# Patient Record
Sex: MALE | Race: WHITE | NOT HISPANIC OR LATINO | Employment: PART TIME | URBAN - METROPOLITAN AREA
[De-identification: names, ages, dates, MRNs, and addresses within clinical notes are randomized per-mention and may not be internally consistent; named-entity substitution may affect disease eponyms.]

---

## 2017-01-27 ENCOUNTER — HOSPITAL ENCOUNTER (OUTPATIENT)
Dept: RADIOLOGY | Facility: HOSPITAL | Age: 62
Discharge: HOME | End: 2017-01-27
Attending: FAMILY MEDICINE
Payer: COMMERCIAL

## 2017-01-27 ENCOUNTER — ALLSCRIPTS OFFICE VISIT (OUTPATIENT)
Dept: OTHER | Facility: OTHER | Age: 62
End: 2017-01-27

## 2017-01-27 ENCOUNTER — TRANSCRIBE ORDERS (OUTPATIENT)
Dept: ADMINISTRATIVE | Facility: HOSPITAL | Age: 62
End: 2017-01-27

## 2017-01-27 DIAGNOSIS — R06.02 SHORTNESS OF BREATH: ICD-10-CM

## 2017-01-27 PROCEDURE — 71020 HB CHEST X-RAY 2VW FRONTAL&LATL: CPT

## 2017-05-30 ENCOUNTER — ALLSCRIPTS OFFICE VISIT (OUTPATIENT)
Dept: OTHER | Facility: OTHER | Age: 62
End: 2017-05-30

## 2017-05-30 DIAGNOSIS — Z13.1 ENCOUNTER FOR SCREENING FOR DIABETES MELLITUS: ICD-10-CM

## 2017-05-30 DIAGNOSIS — Z12.5 ENCOUNTER FOR SCREENING FOR MALIGNANT NEOPLASM OF PROSTATE: ICD-10-CM

## 2017-05-30 DIAGNOSIS — Z13.220 ENCOUNTER FOR SCREENING FOR LIPOID DISORDERS: ICD-10-CM

## 2017-06-09 ENCOUNTER — GENERIC CONVERSION - ENCOUNTER (OUTPATIENT)
Dept: OTHER | Facility: OTHER | Age: 62
End: 2017-06-09

## 2017-06-09 LAB
A/G RATIO (HISTORICAL): 1 (ref 1.2–2.2)
ALBUMIN SERPL BCP-MCNC: 4 G/DL (ref 3.6–4.8)
ALP SERPL-CCNC: 64 IU/L (ref 39–117)
ALT SERPL W P-5'-P-CCNC: 41 IU/L (ref 0–44)
AST SERPL W P-5'-P-CCNC: 50 IU/L (ref 0–40)
BILIRUB SERPL-MCNC: 0.3 MG/DL (ref 0–1.2)
BUN SERPL-MCNC: 16 MG/DL (ref 8–27)
BUN/CREA RATIO (HISTORICAL): 18 (ref 10–24)
CALCIUM SERPL-MCNC: 9.2 MG/DL (ref 8.6–10.2)
CHLORIDE SERPL-SCNC: 100 MMOL/L (ref 96–106)
CHOLEST SERPL-MCNC: 178 MG/DL (ref 100–199)
CO2 SERPL-SCNC: 24 MMOL/L (ref 18–29)
CREAT SERPL-MCNC: 0.88 MG/DL (ref 0.76–1.27)
EGFR AFRICAN AMERICAN (HISTORICAL): 106 ML/MIN/1.73
EGFR-AMERICAN CALC (HISTORICAL): 91 ML/MIN/1.73
GLUCOSE SERPL-MCNC: 100 MG/DL (ref 65–99)
HDLC SERPL-MCNC: 43 MG/DL
LDLC SERPL CALC-MCNC: 115 MG/DL (ref 0–99)
POTASSIUM SERPL-SCNC: 4.4 MMOL/L (ref 3.5–5.2)
PROSTATE SPECIFIC ANTIGEN (HISTORICAL): 0.8 NG/ML (ref 0–4)
SODIUM SERPL-SCNC: 140 MMOL/L (ref 134–144)
TOT. GLOBULIN, SERUM (HISTORICAL): 3.9 G/DL (ref 1.5–4.5)
TOTAL PROTEIN (HISTORICAL): 7.9 G/DL (ref 6–8.5)
TRIGL SERPL-MCNC: 101 MG/DL (ref 0–149)
VLDLC SERPL CALC-MCNC: 20 MG/DL (ref 5–40)

## 2017-06-14 ENCOUNTER — ALLSCRIPTS OFFICE VISIT (OUTPATIENT)
Dept: OTHER | Facility: OTHER | Age: 62
End: 2017-06-14

## 2017-11-10 ENCOUNTER — GENERIC CONVERSION - ENCOUNTER (OUTPATIENT)
Dept: OTHER | Facility: OTHER | Age: 62
End: 2017-11-10

## 2017-12-19 ENCOUNTER — GENERIC CONVERSION - ENCOUNTER (OUTPATIENT)
Dept: FAMILY MEDICINE CLINIC | Facility: CLINIC | Age: 62
End: 2017-12-19

## 2017-12-28 DIAGNOSIS — R05.9 COUGH: ICD-10-CM

## 2018-01-13 VITALS
TEMPERATURE: 97.8 F | BODY MASS INDEX: 36.54 KG/M2 | WEIGHT: 261 LBS | OXYGEN SATURATION: 92 % | HEART RATE: 80 BPM | SYSTOLIC BLOOD PRESSURE: 122 MMHG | DIASTOLIC BLOOD PRESSURE: 80 MMHG | HEIGHT: 71 IN

## 2018-01-14 NOTE — PROGRESS NOTES
Assessment    1  Encounter for preventive health examination (V70 0) (Z00 00)   2  Elevated fasting glucose (790 21) (R73 01)   3  Benign colon polyp (211 3) (K63 5)   4  Elevated AST (SGOT) (790 4) (R74 0)    Plan  Allergic rhinitis    · Fluticasone Propionate 50 MCG/ACT Nasal Suspension; INHALE 2 SPRAY Daily  in each nostril    Healthy though obese 44-year-old male who just had labs done that were basically normal  He has a slightly elevated fasting glucose of 100 and a slightly elevated AST of 50  We discussed these in detail today and at this time I do not think there is anything to do differently but we should recheck all of his labs in 1 year  His cholesterol is excellent and his PSA is normal  He does have a family history of prostate cancer so this should be checked annually  He is up-to-date on his colonoscopy which he had last year  He did have 2 polyps of he will need follow-up 5 years from the colonoscopy  He declines an Adacel today, I did advise why we give the use to prevent tetanus which is a lethal disease and he still declines  I strongly encouraged she come in for the vaccine if he cuts himself  He denies any symptoms of anxiety or depression, he refused the [facility] stating that he was concerned that this was information collected by the government  Discussion/Summary  Possible side effects of new medications were reviewed with the patient/guardian today  The treatment plan was reviewed with the patient/guardian  The patient/guardian understands and agrees with the treatment plan      Chief Complaint  60YEAR OLD MALE PHYSICAL  I DID SEND HIS ALLERGY MED TO YOU FOR A REFILL  HE DECLINED THE ADACEL AT THIS TIME  HE DECLINED THE QUESTIONS ABOUT DEPRESSION  History of Present Illness  HM, Adult Male: The patient is being seen for a health maintenance evaluation  The last health maintenance visit was many year(s) ago  General Health:  The patient's health since the last visit is described as good  He has regular dental visits  He denies vision problems  He denies hearing loss  Immunizations status: not up to date  Lifestyle:  He consumes a diverse and healthy diet  He has weight concerns  He does not use tobacco  He consumes alcohol  He denies drug use  Screening: Prostate cancer screening includes last prostate-specific antigen testing last week, WNL 0 8  Colorectal cancer screening includes last colonoscopy done May 2016, 2 polyps, repeat 5 years  Metabolic screening includes lipid profile performed last week - all WNL except slightly elevated AST at 50, glucose screening performed last week,  and thyroid function test performed last year, WNL  Cardiovascular risk factors: obesity and sedentary lifestyle, but no hypertension, no diabetes, no high LDL cholesterol, no low HDL cholesterol and no tobacco use  General health risks: family history of prostate cancer and previous colon polyps, but no elevated prostate-specific antigen  Safety elements used: seat belt, motorcycle helmet and safe driving habits  Review of Systems    Constitutional: no fever, not feeling poorly, no chills and not feeling tired  Eyes: no eye pain and no eyesight problems  ENT: no sore throat, no hearing loss and no nasal discharge  Cardiovascular: no chest pain  Respiratory: no shortness of breath and no cough  Gastrointestinal: no abdominal pain, no nausea, no vomiting, no constipation and no diarrhea  Genitourinary: no dysuria  Musculoskeletal: no arthralgias and no myalgias  Integumentary: no rashes  Neurological: no headache, no numbness, no dizziness and no fainting  Psychiatric: no anxiety and no depression  Endocrine: no muscle weakness  Hematologic/Lymphatic: no tendency for easy bleeding and no tendency for easy bruising  Active Problems    1   Allergic rhinitis (477 9) (J30 9)    Past Medical History    · History of Abscess, cheek (682 0) (L02 01)   · History of Wheezing without diagnosis of asthma (786 07) (R06 2)    Surgical History    · History of Excision Of Lesion In Vestibule Of Mouth    Family History  Mother    · Family history of essential hypertension (V17 49) (Z82 49)   · Family history of glaucoma (V19 11) (Z83 511)  Father    · Family history of malignant neoplasm of prostate (V16 42) (Z80 42)  Maternal Uncle    · Family history of diabetes mellitus (V18 0) (Z83 3)   · Family history of glaucoma (V19 11) (Z83 511)    Social History    · Alcohol ingestion, 1-4 drinks/week (V69 8) (Z78 9)   · Daily caffeine consumption, 2-3 servings a day   · Never a smoker    Current Meds   1  Fluticasone Propionate 50 MCG/ACT Nasal Suspension; INHALE 2 SPRAY Daily in each   nostril; Therapy: [id number] to (Evaluate:26Nov2017)  Requested for: 13LXG9585; Last   Rx:[id number] Ordered    Allergies    1  Allergy CAPS    Vitals   Recorded: [id number] 08:46AM   Temperature 97 F   Heart Rate 78   Systolic 347   Diastolic 78   Height 5 ft 11 in   Weight 263 lb    BMI Calculated 36 68   BSA Calculated 2 37   O2 Saturation 98     Physical Exam    Constitutional   General appearance: No acute distress, well appearing and well nourished  Head and Face   Head and face: Normal     Eyes   Conjunctiva and lids: No erythema, swelling or discharge  Ears, Nose, Mouth, and Throat   External inspection of ears and nose: Normal     Otoscopic examination: Tympanic membranes translucent with normal light reflex  Canals patent without erythema  Nasal mucosa, septum, and turbinates: Normal without edema or erythema  Lips, teeth, and gums: Normal, good dentition  Oropharynx: Normal with no erythema, edema, exudate or lesions  Neck   Neck: Supple, symmetric, trachea midline, no masses  Pulmonary   Respiratory effort: No increased work of breathing or signs of respiratory distress  Auscultation of lungs: Clear to auscultation      Cardiovascular   Auscultation of heart: Normal rate and rhythm, normal S1 and S2, no murmurs  Examination of extremities for edema and/or varicosities: Normal     Abdomen   Abdomen: Non-tender, no masses  Lymphatic   Palpation of lymph nodes in neck: No lymphadenopathy  Palpation of lymph nodes in other areas: No lymphadenopathy  Musculoskeletal   Gait and station: Normal     Range of motion: Normal     Muscle strength/tone: Normal     Skin   Skin and subcutaneous tissue: Normal without rashes or lesions  Neurologic   Sensation: No sensory loss  Coordination: Normal finger to nose and heel to shin      Psychiatric   Orientation to person, place and time: Normal     Mood and affect: Normal        Signatures   Electronically signed by : PITA Dai ; Jun 14 2017 10:59AM EST                       (Author)

## 2018-01-15 VITALS
HEART RATE: 95 BPM | TEMPERATURE: 97.8 F | SYSTOLIC BLOOD PRESSURE: 122 MMHG | BODY MASS INDEX: 36.26 KG/M2 | WEIGHT: 259 LBS | HEIGHT: 71 IN | OXYGEN SATURATION: 99 % | DIASTOLIC BLOOD PRESSURE: 80 MMHG

## 2018-01-22 VITALS
BODY MASS INDEX: 37.77 KG/M2 | DIASTOLIC BLOOD PRESSURE: 70 MMHG | TEMPERATURE: 97.3 F | HEART RATE: 103 BPM | OXYGEN SATURATION: 98 % | SYSTOLIC BLOOD PRESSURE: 122 MMHG | WEIGHT: 269.75 LBS | HEIGHT: 71 IN

## 2018-01-22 VITALS
HEIGHT: 71 IN | TEMPERATURE: 97 F | WEIGHT: 263 LBS | HEART RATE: 78 BPM | SYSTOLIC BLOOD PRESSURE: 130 MMHG | DIASTOLIC BLOOD PRESSURE: 78 MMHG | OXYGEN SATURATION: 98 % | BODY MASS INDEX: 36.82 KG/M2

## 2018-02-12 RX ORDER — EPINEPHRINE 0.3 MG/.3ML
0.3 INJECTION INTRAMUSCULAR
Refills: 1 | COMMUNITY
Start: 2017-11-10 | End: 2018-06-07 | Stop reason: HOSPADM

## 2018-02-12 RX ORDER — FLUTICASONE PROPIONATE 50 MCG
SPRAY, SUSPENSION (ML) NASAL
Refills: 5 | COMMUNITY
Start: 2017-11-11 | End: 2018-05-15 | Stop reason: SDUPTHER

## 2018-02-12 RX ORDER — PROMETHAZINE HYDROCHLORIDE AND CODEINE PHOSPHATE 6.25; 1 MG/5ML; MG/5ML
SYRUP ORAL
Refills: 0 | COMMUNITY
Start: 2017-11-10 | End: 2018-06-03

## 2018-02-12 RX ORDER — BUTALBITAL, ACETAMINOPHEN AND CAFFEINE 50; 325; 40 MG/1; MG/1; MG/1
CAPSULE ORAL
Refills: 0 | COMMUNITY
Start: 2017-11-10 | End: 2018-04-05 | Stop reason: ALTCHOICE

## 2018-02-12 RX ORDER — HYDROCODONE BITARTRATE AND ACETAMINOPHEN 7.5; 3 MG/1; MG/1
TABLET ORAL
Refills: 0 | COMMUNITY
Start: 2017-11-10 | End: 2018-06-03

## 2018-02-12 RX ORDER — LORATADINE 10 MG/1
10 TABLET ORAL DAILY
Refills: 5 | COMMUNITY
Start: 2017-12-06 | End: 2018-06-03

## 2018-02-13 ENCOUNTER — OFFICE VISIT (OUTPATIENT)
Dept: FAMILY MEDICINE CLINIC | Facility: CLINIC | Age: 63
End: 2018-02-13
Payer: COMMERCIAL

## 2018-02-13 VITALS
DIASTOLIC BLOOD PRESSURE: 72 MMHG | OXYGEN SATURATION: 90 % | TEMPERATURE: 97.6 F | HEART RATE: 103 BPM | HEIGHT: 71 IN | SYSTOLIC BLOOD PRESSURE: 118 MMHG | WEIGHT: 266 LBS | BODY MASS INDEX: 37.24 KG/M2

## 2018-02-13 DIAGNOSIS — R74.01 ELEVATED AST (SGOT): ICD-10-CM

## 2018-02-13 DIAGNOSIS — Z12.5 SCREENING FOR MALIGNANT NEOPLASM OF PROSTATE: ICD-10-CM

## 2018-02-13 DIAGNOSIS — R06.00 DOE (DYSPNEA ON EXERTION): Primary | ICD-10-CM

## 2018-02-13 DIAGNOSIS — Z13.220 SCREENING, LIPID: ICD-10-CM

## 2018-02-13 DIAGNOSIS — R05.3 CHRONIC COUGH: ICD-10-CM

## 2018-02-13 DIAGNOSIS — Z87.891 HISTORY OF TOBACCO USE: ICD-10-CM

## 2018-02-13 DIAGNOSIS — N40.1 NOCTURIA ASSOCIATED WITH BENIGN PROSTATIC HYPERPLASIA: ICD-10-CM

## 2018-02-13 DIAGNOSIS — R35.1 NOCTURIA ASSOCIATED WITH BENIGN PROSTATIC HYPERPLASIA: ICD-10-CM

## 2018-02-13 DIAGNOSIS — R73.01 ELEVATED FASTING GLUCOSE: ICD-10-CM

## 2018-02-13 PROBLEM — J30.9 ALLERGIC RHINITIS: Status: ACTIVE | Noted: 2017-05-30

## 2018-02-13 PROBLEM — G43.109 MIGRAINE WITH AURA: Status: ACTIVE | Noted: 2017-11-10

## 2018-02-13 PROBLEM — K63.5 BENIGN COLON POLYP: Status: ACTIVE | Noted: 2017-06-14

## 2018-02-13 PROCEDURE — 99214 OFFICE O/P EST MOD 30 MIN: CPT | Performed by: FAMILY MEDICINE

## 2018-02-13 RX ORDER — OXYBUTYNIN CHLORIDE 10 MG/1
10 TABLET, EXTENDED RELEASE ORAL
Qty: 30 TABLET | Refills: 5 | Status: SHIPPED | OUTPATIENT
Start: 2018-02-13 | End: 2018-03-04

## 2018-02-13 RX ORDER — ALBUTEROL SULFATE 90 UG/1
2 AEROSOL, METERED RESPIRATORY (INHALATION) EVERY 4 HOURS PRN
Qty: 1 INHALER | Refills: 1 | Status: SHIPPED | OUTPATIENT
Start: 2018-02-13 | End: 2018-03-06 | Stop reason: HOSPADM

## 2018-02-13 NOTE — PATIENT INSTRUCTIONS
COPD (Chronic Obstructive Pulmonary Disease)   AMBULATORY CARE:   COPD (chronic obstructive pulmonary disease)  is a lung disease that makes it hard for you to breathe  COPD is usually a result of lung damage caused by years of irritation and inflammation  COPD limits air flow in your lungs  Smoking, pollution, genetics, or a history of lung infections can increase your risk for COPD  Common symptoms include the following:   · Shortness of breath     · A dry cough     · Coughing fits that bring up mucus from your lungs     · Wheezing and chest tightness  Call 911 if:   · You feel lightheaded, short of breath, and have chest pain  Seek care immediately if:   · You are confused, dizzy, or feel faint  · Your arm or leg feels warm, tender, and painful  It may look swollen and red  · You cough up blood  Contact your healthcare provider if:   · You have more shortness of breath than usual      · You need more medicine than usual to control your symptoms  · You are coughing or wheezing more than usual      · You are coughing up more mucus, or it is a different color or has a different odor  · You gain more than 3 pounds in a week  · You have a fever, a runny or stuffy nose, and a sore throat, or other cold or flu symptoms  · Your skin, lips, or nails start to turn blue  · You have swelling in your legs or ankles  · You are very tired or weak for more than a day  · You notice changes in your mood, or changes in your ability to think or concentrate  · You have questions or concerns about your condition or care  Treatment for COPD  may include medicines to help decrease swelling and inflammation in your lungs  Medicines may also help open your airways or treat and infection  You may need pulmonary rehabilitation to help you manage your symptoms and improve your quality of life  You may need extra oxygen to help you breathe easier    Help make breathing easier:   · Use pursed-lip breathing any time you feel short of breath  Take a deep breath in through your nose  Slowly breathe out through your mouth with your lips pursed for twice as long as you inhaled  You can also practice this breathing pattern while you bend, lift, climb stairs, or exercise  It slows down your breathing and helps move more air in and out of your lungs  · Do not smoke, and avoid others who smoke  Nicotine and other substances can cause lung irritation or damage and make it harder for you to breathe  Do not use e-cigarettes or smokeless tobacco  They still contain nicotine  Ask your healthcare provider for information if you currently smoke and need help to quit  For support and more information:  ¨ [employer]  Phone: 7- 400 - [phone]  Web Address: [employer]      · Be aware of and avoid anything that makes your symptoms worse  Stay out of high altitudes and places with high humidity  Stay inside, or cover your mouth and nose with a scarf when you are outside during cold weather  Stay inside on days when air pollution or pollen counts are high  Do not use aerosol sprays such as deodorant, bug spray, and hair spray  Manage COPD and help prevent exacerbations:  COPD is a serious condition that gets worse over time  A COPD exacerbation means your symptoms suddenly get worse  It is important to prevent exacerbations  An exacerbation can cause more lung damage  COPD cannot be cured, but you can take action to feel better and prevent COPD exacerbations:  · Protect yourself from germs  Germs can get into your lungs and cause an infection  An infection in your lungs can create more mucus and make it harder to breathe  An infection can also create swelling in your airways and prevent air from getting in  You can decrease your risk for infection by doing the following:     [facility] [employer] your hands often with soap and water  Carry germ-killing gel with you   You can use the gel to clean your hands when soap and water are not available  ¨ Do not touch your eyes, nose, or mouth unless you have washed your hands first      ¨ Always cover your mouth when you cough  Cough into a tissue or your shirtsleeve so you do not spread germs from your hands  ¨ Try to avoid people who have a cold or the flu  If you are sick, stay away from others as much as possible  · Drink more liquids  This will help to keep your air passages moist and help you cough up mucus  Ask how much liquid to drink each day and which liquids are best for you  · Exercise daily  Exercise for at least 20 minutes each day to help increase your energy and decrease shortness of breath  Talk to your healthcare provider about the best exercise plan for you  · Ask about vaccines  Your healthcare provider may recommend that you get regular flu and pneumonia vaccines  Pneumonia can become life-threatening for a person who has COPD  Ask about other vaccines you may need  Ask your healthcare provider about the flu and pneumonia vaccines  All adults should get the flu (influenza) vaccine every year as soon as it becomes available  The pneumonia vaccine is given to adults aged 72 or older to prevent pneumococcal disease, such as pneumonia  Adults aged 23 to 59 years who are at high risk for pneumococcal disease also should get the pneumococcal vaccine  It may need to be repeated 1 or 5 years later  Pulmonary rehabilitation:  Your healthcare provider may recommend a program to help you manage your symptoms and improve your quality of life  It may include nutritional counseling and exercise, such as walking, to strengthen your lungs  Make decisions about your choices for future treatment:  Ask for information about advanced medical directives and living gloria  These documents help you decide and write down your choices for treatment and end-of-life care  It is best to complete them when you feel well and can think clearly about your wishes   The information can then be kept for future use if you are in the hospital or become very ill  Follow up with your healthcare provider as directed: You may need more tests  Your healthcare provider may refer you to a pulmonary (lung) specialist  Write down your questions so you remember to ask them during your visits  © 2017 [facility] Information is for End User's use only and may not be sold, redistributed or otherwise used for commercial purposes  All illustrations and images included in CareNotes® are the copyrighted property of [employer] , [employer]  or Arnaldo Valdez  The above information is an  only  It is not intended as medical advice for individual conditions or treatments  Talk to your doctor, nurse or pharmacist before following any medical regimen to see if it is safe and effective for you

## 2018-02-13 NOTE — PROGRESS NOTES
Assessment/Plan:    No problem-specific Assessment & Plan notes found for this encounter  Diagnoses and all orders for this visit:    DSOUZA (dyspnea on exertion)  Chronic cough        History of tobacco use  -     CT chest wo contrast; Future  -     Spirometry; Future  -     albuterol (PROVENTIL HFA,VENTOLIN HFA) 90 mcg/act inhaler; Inhale 2 puffs every 4 (four) hours as needed for wheezing or shortness of breath for up to 30 days  -     Spacer Device for Inhaler        -     CBC and differential; Future    I do think the patient likely has COPD  While he quit smoking many years ago he did smoke for quite a long time  I am going to order PFTs as well as a CT scan as he has already had a normal chest x-ray  Additionally, I ordered an albuterol inhaler with spacer for him to use as needed in the interim  If it does turn out that he has COPD I will likely prescribe a controller medication for him to use daily  Nocturia associated with benign prostatic hyperplasia  -     oxybutynin (DITROPAN-XL) 10 MG 24 hr tablet; Take 1 tablet (10 mg total) by mouth daily at bedtime for 30 days  -     PSA, ultrasensitive; Future      With a normal PSA less than 12 months ago and symptoms progressing over a considerable amount of time the patient likely has BPH  We will recheck all of his labs including PSA 12 months from his last set which would be around June  He will follow up with me after these labs are done  I am going to start oxybutynin to see if this helps with his symptoms  I advised that if this dose is not working we can increase it after a few weeks  If this is not working we can add finasteride though I discussed that this can take up to 6 months to start working  If all else fails we can refer him to Urology  Elevated fasting glucose  -     Comprehensive metabolic panel; Future  -     Hemoglobin A1c; Future    Elevated AST (SGOT)  -     Comprehensive metabolic panel;  Future      Screening, lipid  - Lipid Panel with Direct LDL reflex; Future                Subjective:      Patient ID: Lexi Mcelroy is a 58 y o  male  The patient is here today to talk about his chronic cough and shortness of breath  This has been worsening over the past year   He did see me last year for the cough and we did get a chest x-ray which was normal   It is becoming increasingly worse and he is now very shortness of breath with activity  Winded going up the stairs  Wheezing  He did have PFTs at one point years ago, nothing recent  Smoker but quit a very long time ago - smoked on and off until 2000, started when he was in grade school, more regularly in high school    Additionally he is complaining of worsening nocturia over the past year or so  Now getting up many times at night to urinate  Very tired, not sleeping well  No hematuria   No frequency during the day  No dysuria  He had a PSA in June of 2017 which was 0 8, normal            The following portions of the patient's history were reviewed and updated as appropriate: allergies, current medications, past family history, past medical history, past social history, past surgical history and problem list     Review of Systems   Constitutional: Positive for fatigue  Negative for activity change, appetite change, chills, fever and unexpected weight change  HENT: Negative for congestion, postnasal drip, rhinorrhea, sneezing and sore throat  Eyes: Negative for discharge  Respiratory: Positive for cough, chest tightness and shortness of breath  Cardiovascular: Negative for chest pain and leg swelling  Gastrointestinal: Negative for nausea  Endocrine: Positive for polyuria (but only at night)  Genitourinary: Positive for frequency  Negative for difficulty urinating, dysuria, flank pain, hematuria and urgency  Musculoskeletal: Negative for myalgias  Skin: Negative for rash  Neurological: Positive for light-headedness (when he gets into coughing fits or get DSOUZA)  Negative for headaches  Psychiatric/Behavioral: Negative for dysphoric mood  The patient is not nervous/anxious  Objective:  Vitals:    02/13/18 0924   BP: 118/72   Pulse: 103   Temp: 97 6 °F (36 4 °C)   SpO2: 90%      Physical Exam   Constitutional: He is oriented to person, place, and time  He appears well-developed and well-nourished  HENT:   Head: Normocephalic and atraumatic  Right Ear: External ear normal    Left Ear: External ear normal    Nose: Nose normal    Mouth/Throat: Oropharynx is clear and moist    Eyes: Conjunctivae and EOM are normal  Pupils are equal, round, and reactive to light  Neck: Normal range of motion  Neck supple  Cardiovascular: Normal rate, regular rhythm and normal heart sounds  Pulmonary/Chest: Effort normal and breath sounds normal  He has no wheezes (but breaths sound tight)  Musculoskeletal: Normal range of motion  He exhibits no edema  Neurological: He is alert and oriented to person, place, and time  Skin: Skin is warm, dry and intact  Psychiatric: He has a normal mood and affect  His behavior is normal  Judgment and thought content normal    Nursing note and vitals reviewed

## 2018-02-17 ENCOUNTER — TELEPHONE (OUTPATIENT)
Dept: FAMILY MEDICINE CLINIC | Facility: CLINIC | Age: 63
End: 2018-02-17

## 2018-02-17 DIAGNOSIS — J44.1 COPD EXACERBATION (HCC): Primary | ICD-10-CM

## 2018-02-17 RX ORDER — PREDNISONE 10 MG/1
TABLET ORAL
Qty: 60 TABLET | Refills: 0 | Status: SHIPPED | OUTPATIENT
Start: 2018-02-17 | End: 2018-03-04

## 2018-02-17 NOTE — TELEPHONE ENCOUNTER
Last night was breathing really fast while at rest  Inhaler makes him wired up  Feeling better today  Working on getting a Ct, needs pre Sachin Shankar, will do Monday  I saw him last week  Will rx prednisone taper for now  If breathing gets really fast like last night he should go to the ER

## 2018-02-19 ENCOUNTER — TRANSCRIBE ORDERS (OUTPATIENT)
Dept: FAMILY MEDICINE CLINIC | Facility: CLINIC | Age: 63
End: 2018-02-19

## 2018-02-21 ENCOUNTER — TELEPHONE (OUTPATIENT)
Dept: FAMILY MEDICINE CLINIC | Facility: CLINIC | Age: 63
End: 2018-02-21

## 2018-02-21 DIAGNOSIS — R06.00 DOE (DYSPNEA ON EXERTION): Primary | ICD-10-CM

## 2018-02-21 DIAGNOSIS — N40.1 NOCTURIA ASSOCIATED WITH BENIGN PROSTATIC HYPERPLASIA: ICD-10-CM

## 2018-02-21 DIAGNOSIS — R35.1 NOCTURIA ASSOCIATED WITH BENIGN PROSTATIC HYPERPLASIA: ICD-10-CM

## 2018-02-21 PROBLEM — R06.09 DOE (DYSPNEA ON EXERTION): Status: ACTIVE | Noted: 2018-02-21

## 2018-02-21 NOTE — TELEPHONE ENCOUNTER
Tamir Amador wants to know if it would be beneficial for him to take Flomax with oxybutynin for his enlarged prostrate?

## 2018-02-21 NOTE — PROGRESS NOTES
Please call the patient let him know that since his CT of his chest was denied I think the best next option would be for him to see a pulmonologist   Please provide him with the number for [facility] pulmonology

## 2018-02-22 RX ORDER — TAMSULOSIN HYDROCHLORIDE 0.4 MG/1
0.4 CAPSULE ORAL
Qty: 30 CAPSULE | Refills: 2 | Status: SHIPPED | OUTPATIENT
Start: 2018-02-22 | End: 2018-02-23 | Stop reason: SDUPTHER

## 2018-02-22 NOTE — TELEPHONE ENCOUNTER
This definitely could be beneficial   Is the oxybutynin helping at all? If not then I would stop that and prescribe the Flomax  If he thinks the oxybutynin is helping we could certainly try both but I do not want to have him on both if it is not necessary

## 2018-02-22 NOTE — TELEPHONE ENCOUNTER
PATIENT DOES NOT FEEL LIKE THE OXYBUTYNIN IS HELPING HIM AT ALL  HE IS WAKING UP WITH A VERY DRY MOUTH AT NIGHT AND HE DOESN'T KNOW IF THIS IS A SIDE EFFECT OF THE MEDICATION OR NOT  PATIENT WOULD PREFER TRYING THE FLOMAX IF YOU CAN SEND THAT IN FOR HIM  ALSO HE MADE AN APPOINTMENT WITH [facility] AND THEY REQUESTED A CHEST XRAY PRIOR TO BEING SEEN  ARE YOU ABLE TO PUT ORDERS IN FOR THIS AS WELL AND HE WILL GO TO [facility] TO GET THIS DONE

## 2018-02-22 NOTE — TELEPHONE ENCOUNTER
Yes that can be a side effect  He should stop the oxybutynin, start flomax (I sent it)  I ordered the CXR

## 2018-02-23 ENCOUNTER — TELEPHONE (OUTPATIENT)
Dept: FAMILY MEDICINE CLINIC | Facility: CLINIC | Age: 63
End: 2018-02-23

## 2018-02-23 DIAGNOSIS — R35.1 NOCTURIA ASSOCIATED WITH BENIGN PROSTATIC HYPERPLASIA: ICD-10-CM

## 2018-02-23 DIAGNOSIS — N40.1 NOCTURIA ASSOCIATED WITH BENIGN PROSTATIC HYPERPLASIA: ICD-10-CM

## 2018-02-23 RX ORDER — TAMSULOSIN HYDROCHLORIDE 0.4 MG/1
0.4 CAPSULE ORAL
Qty: 30 CAPSULE | Refills: 5 | Status: SHIPPED | OUTPATIENT
Start: 2018-02-23 | End: 2018-06-14 | Stop reason: SDUPTHER

## 2018-03-04 ENCOUNTER — APPOINTMENT (EMERGENCY)
Dept: RADIOLOGY | Facility: HOSPITAL | Age: 63
DRG: 641 | End: 2018-03-04
Payer: COMMERCIAL

## 2018-03-04 ENCOUNTER — HOSPITAL ENCOUNTER (INPATIENT)
Facility: HOSPITAL | Age: 63
LOS: 2 days | Discharge: HOME | DRG: 641 | End: 2018-03-06
Attending: EMERGENCY MEDICINE | Admitting: INTERNAL MEDICINE
Payer: COMMERCIAL

## 2018-03-04 DIAGNOSIS — R11.2 NAUSEA AND VOMITING: ICD-10-CM

## 2018-03-04 DIAGNOSIS — E87.1 HYPONATREMIA: Primary | ICD-10-CM

## 2018-03-04 DIAGNOSIS — J45.909 ACUTE ASTHMATIC BRONCHITIS: ICD-10-CM

## 2018-03-04 PROBLEM — E86.0 DEHYDRATION: Status: ACTIVE | Noted: 2018-03-04

## 2018-03-04 LAB
ALBUMIN SERPL BCP-MCNC: 3.1 G/DL (ref 3.5–5)
ALP SERPL-CCNC: 50 U/L (ref 46–116)
ALT SERPL W P-5'-P-CCNC: 45 U/L (ref 12–78)
ANION GAP SERPL CALCULATED.3IONS-SCNC: 4 MMOL/L (ref 4–13)
ANION GAP SERPL CALCULATED.3IONS-SCNC: 7 MMOL/L (ref 4–13)
ANION GAP SERPL CALCULATED.3IONS-SCNC: 8 MMOL/L (ref 4–13)
AST SERPL W P-5'-P-CCNC: 42 U/L (ref 5–45)
BASOPHILS # BLD AUTO: 0 THOUSANDS/ΜL (ref 0–0.1)
BASOPHILS NFR BLD AUTO: 0 % (ref 0–1)
BILIRUB SERPL-MCNC: 0.6 MG/DL (ref 0.2–1)
BUN SERPL-MCNC: 10 MG/DL (ref 5–25)
BUN SERPL-MCNC: 11 MG/DL (ref 5–25)
BUN SERPL-MCNC: 12 MG/DL (ref 5–25)
CALCIUM SERPL-MCNC: 7.9 MG/DL (ref 8.3–10.1)
CALCIUM SERPL-MCNC: 7.9 MG/DL (ref 8.3–10.1)
CALCIUM SERPL-MCNC: 8 MG/DL (ref 8.3–10.1)
CHLORIDE SERPL-SCNC: 85 MMOL/L (ref 100–108)
CHLORIDE SERPL-SCNC: 89 MMOL/L (ref 100–108)
CHLORIDE SERPL-SCNC: 89 MMOL/L (ref 100–108)
CLARITY, POC: CLEAR
CO2 SERPL-SCNC: 23 MMOL/L (ref 21–32)
CO2 SERPL-SCNC: 26 MMOL/L (ref 21–32)
CO2 SERPL-SCNC: 28 MMOL/L (ref 21–32)
COLOR, POC: YELLOW
CREAT SERPL-MCNC: 0.61 MG/DL (ref 0.6–1.3)
CREAT SERPL-MCNC: 0.71 MG/DL (ref 0.6–1.3)
CREAT SERPL-MCNC: 0.75 MG/DL (ref 0.6–1.3)
EOSINOPHIL # BLD AUTO: 0.03 THOUSAND/ΜL (ref 0–0.61)
EOSINOPHIL NFR BLD AUTO: 1 % (ref 0–6)
ERYTHROCYTE [DISTWIDTH] IN BLOOD BY AUTOMATED COUNT: 14 % (ref 11.6–15.1)
EXT BILIRUBIN, UA: NEGATIVE
EXT BLOOD URINE: NORMAL
EXT GLUCOSE, UA: NEGATIVE
EXT KETONES: 80
EXT NITRITE, UA: NEGATIVE
EXT PH, UA: 6.5
EXT PROTEIN, UA: NORMAL
EXT SPECIFIC GRAVITY, UA: 1.01
EXT UROBILINOGEN: 0.2
GFR SERPL CREATININE-BSD FRML MDRD: 106 ML/MIN/1.73SQ M
GFR SERPL CREATININE-BSD FRML MDRD: 97 ML/MIN/1.73SQ M
GFR SERPL CREATININE-BSD FRML MDRD: 99 ML/MIN/1.73SQ M
GLUCOSE SERPL-MCNC: 130 MG/DL (ref 65–140)
GLUCOSE SERPL-MCNC: 135 MG/DL (ref 65–140)
GLUCOSE SERPL-MCNC: 97 MG/DL (ref 65–140)
HCT VFR BLD AUTO: 41 % (ref 36.5–49.3)
HGB BLD-MCNC: 14.4 G/DL (ref 12–17)
LIPASE SERPL-CCNC: 181 U/L (ref 73–393)
LYMPHOCYTES # BLD AUTO: 0.71 THOUSANDS/ΜL (ref 0.6–4.47)
LYMPHOCYTES NFR BLD AUTO: 11 % (ref 14–44)
MCH RBC QN AUTO: 27.7 PG (ref 26.8–34.3)
MCHC RBC AUTO-ENTMCNC: 35.1 G/DL (ref 31.4–37.4)
MCV RBC AUTO: 79 FL (ref 82–98)
MONOCYTES # BLD AUTO: 0.41 THOUSAND/ΜL (ref 0.17–1.22)
MONOCYTES NFR BLD AUTO: 6 % (ref 4–12)
NEUTROPHILS # BLD AUTO: 5.24 THOUSANDS/ΜL (ref 1.85–7.62)
NEUTS SEG NFR BLD AUTO: 82 % (ref 43–75)
OSMOLALITY UR/SERPL-RTO: 240 MMOL/KG (ref 282–298)
OSMOLALITY UR: 579 MMOL/KG
PLATELET # BLD AUTO: 208 THOUSANDS/UL (ref 149–390)
PMV BLD AUTO: 8.8 FL (ref 8.9–12.7)
POTASSIUM SERPL-SCNC: 4.6 MMOL/L (ref 3.5–5.3)
POTASSIUM SERPL-SCNC: 4.6 MMOL/L (ref 3.5–5.3)
POTASSIUM SERPL-SCNC: 4.7 MMOL/L (ref 3.5–5.3)
PROT SERPL-MCNC: 8.3 G/DL (ref 6.4–8.2)
RBC # BLD AUTO: 5.2 MILLION/UL (ref 3.88–5.62)
SODIUM SERPL-SCNC: 119 MMOL/L (ref 136–145)
SODIUM SERPL-SCNC: 120 MMOL/L (ref 136–145)
SODIUM SERPL-SCNC: 120 MMOL/L (ref 136–145)
TSH SERPL DL<=0.05 MIU/L-ACNC: 1.57 UIU/ML (ref 0.36–3.74)
WBC # BLD AUTO: 6.39 THOUSAND/UL (ref 4.31–10.16)
WBC # BLD EST: NEGATIVE 10*3/UL

## 2018-03-04 PROCEDURE — 83930 ASSAY OF BLOOD OSMOLALITY: CPT | Performed by: INTERNAL MEDICINE

## 2018-03-04 PROCEDURE — 83690 ASSAY OF LIPASE: CPT | Performed by: PHYSICIAN ASSISTANT

## 2018-03-04 PROCEDURE — C9113 INJ PANTOPRAZOLE SODIUM, VIA: HCPCS | Performed by: INTERNAL MEDICINE

## 2018-03-04 PROCEDURE — 99223 1ST HOSP IP/OBS HIGH 75: CPT | Performed by: INTERNAL MEDICINE

## 2018-03-04 PROCEDURE — 71046 X-RAY EXAM CHEST 2 VIEWS: CPT

## 2018-03-04 PROCEDURE — 80048 BASIC METABOLIC PNL TOTAL CA: CPT | Performed by: INTERNAL MEDICINE

## 2018-03-04 PROCEDURE — 36415 COLL VENOUS BLD VENIPUNCTURE: CPT | Performed by: PHYSICIAN ASSISTANT

## 2018-03-04 PROCEDURE — 83935 ASSAY OF URINE OSMOLALITY: CPT | Performed by: INTERNAL MEDICINE

## 2018-03-04 PROCEDURE — 81002 URINALYSIS NONAUTO W/O SCOPE: CPT | Performed by: PHYSICIAN ASSISTANT

## 2018-03-04 PROCEDURE — 80053 COMPREHEN METABOLIC PANEL: CPT | Performed by: PHYSICIAN ASSISTANT

## 2018-03-04 PROCEDURE — 84300 ASSAY OF URINE SODIUM: CPT | Performed by: INTERNAL MEDICINE

## 2018-03-04 PROCEDURE — 96361 HYDRATE IV INFUSION ADD-ON: CPT

## 2018-03-04 PROCEDURE — 96374 THER/PROPH/DIAG INJ IV PUSH: CPT

## 2018-03-04 PROCEDURE — 84443 ASSAY THYROID STIM HORMONE: CPT | Performed by: PHYSICIAN ASSISTANT

## 2018-03-04 PROCEDURE — 85025 COMPLETE CBC W/AUTO DIFF WBC: CPT | Performed by: PHYSICIAN ASSISTANT

## 2018-03-04 RX ORDER — ONDANSETRON 2 MG/ML
4 INJECTION INTRAMUSCULAR; INTRAVENOUS EVERY 6 HOURS PRN
Status: DISCONTINUED | OUTPATIENT
Start: 2018-03-04 | End: 2018-03-06 | Stop reason: HOSPADM

## 2018-03-04 RX ORDER — SODIUM CHLORIDE 9 MG/ML
75 INJECTION, SOLUTION INTRAVENOUS CONTINUOUS
Status: DISCONTINUED | OUTPATIENT
Start: 2018-03-04 | End: 2018-03-04

## 2018-03-04 RX ORDER — LEVALBUTEROL 1.25 MG/.5ML
1.25 SOLUTION, CONCENTRATE RESPIRATORY (INHALATION)
Status: DISCONTINUED | OUTPATIENT
Start: 2018-03-04 | End: 2018-03-06 | Stop reason: HOSPADM

## 2018-03-04 RX ORDER — SODIUM CHLORIDE 9 MG/ML
100 INJECTION, SOLUTION INTRAVENOUS CONTINUOUS
Status: DISCONTINUED | OUTPATIENT
Start: 2018-03-04 | End: 2018-03-06

## 2018-03-04 RX ORDER — PANTOPRAZOLE SODIUM 40 MG/1
40 INJECTION, POWDER, FOR SOLUTION INTRAVENOUS
Status: DISCONTINUED | OUTPATIENT
Start: 2018-03-04 | End: 2018-03-06 | Stop reason: HOSPADM

## 2018-03-04 RX ORDER — ONDANSETRON 2 MG/ML
4 INJECTION INTRAMUSCULAR; INTRAVENOUS ONCE
Status: COMPLETED | OUTPATIENT
Start: 2018-03-04 | End: 2018-03-04

## 2018-03-04 RX ORDER — PROMETHAZINE HYDROCHLORIDE 25 MG/ML
12.5 INJECTION, SOLUTION INTRAMUSCULAR; INTRAVENOUS EVERY 6 HOURS PRN
Status: DISCONTINUED | OUTPATIENT
Start: 2018-03-04 | End: 2018-03-06 | Stop reason: HOSPADM

## 2018-03-04 RX ORDER — HEPARIN SODIUM 5000 [USP'U]/ML
5000 INJECTION, SOLUTION INTRAVENOUS; SUBCUTANEOUS EVERY 8 HOURS SCHEDULED
Status: DISCONTINUED | OUTPATIENT
Start: 2018-03-04 | End: 2018-03-06 | Stop reason: HOSPADM

## 2018-03-04 RX ORDER — METHYLPREDNISOLONE SODIUM SUCCINATE 40 MG/ML
20 INJECTION, POWDER, LYOPHILIZED, FOR SOLUTION INTRAMUSCULAR; INTRAVENOUS EVERY 8 HOURS SCHEDULED
Status: DISCONTINUED | OUTPATIENT
Start: 2018-03-04 | End: 2018-03-05

## 2018-03-04 RX ORDER — TAMSULOSIN HYDROCHLORIDE 0.4 MG/1
0.4 CAPSULE ORAL
Status: DISCONTINUED | OUTPATIENT
Start: 2018-03-04 | End: 2018-03-06 | Stop reason: HOSPADM

## 2018-03-04 RX ORDER — ACETAMINOPHEN 325 MG/1
650 TABLET ORAL EVERY 6 HOURS PRN
Status: DISCONTINUED | OUTPATIENT
Start: 2018-03-04 | End: 2018-03-06 | Stop reason: HOSPADM

## 2018-03-04 RX ADMIN — METHYLPREDNISOLONE SODIUM SUCCINATE 20 MG: 40 INJECTION, POWDER, FOR SOLUTION INTRAMUSCULAR; INTRAVENOUS at 16:42

## 2018-03-04 RX ADMIN — IPRATROPIUM BROMIDE 0.5 MG: 0.5 SOLUTION RESPIRATORY (INHALATION) at 20:17

## 2018-03-04 RX ADMIN — LEVALBUTEROL 1.25 MG: 1.25 SOLUTION, CONCENTRATE RESPIRATORY (INHALATION) at 16:47

## 2018-03-04 RX ADMIN — SODIUM CHLORIDE 1000 ML: 0.9 INJECTION, SOLUTION INTRAVENOUS at 11:39

## 2018-03-04 RX ADMIN — ONDANSETRON HYDROCHLORIDE 4 MG: 2 INJECTION, SOLUTION INTRAMUSCULAR; INTRAVENOUS at 11:39

## 2018-03-04 RX ADMIN — TAMSULOSIN HYDROCHLORIDE 0.4 MG: 0.4 CAPSULE ORAL at 16:37

## 2018-03-04 RX ADMIN — IPRATROPIUM BROMIDE 0.5 MG: 0.5 SOLUTION RESPIRATORY (INHALATION) at 16:47

## 2018-03-04 RX ADMIN — PANTOPRAZOLE SODIUM 40 MG: 40 INJECTION, POWDER, FOR SOLUTION INTRAVENOUS at 16:42

## 2018-03-04 RX ADMIN — SODIUM CHLORIDE 100 ML/HR: 0.9 INJECTION, SOLUTION INTRAVENOUS at 16:52

## 2018-03-04 RX ADMIN — LEVALBUTEROL 1.25 MG: 1.25 SOLUTION, CONCENTRATE RESPIRATORY (INHALATION) at 20:17

## 2018-03-04 RX ADMIN — HEPARIN SODIUM 5000 UNITS: 5000 INJECTION, SOLUTION INTRAVENOUS; SUBCUTANEOUS at 16:53

## 2018-03-04 RX ADMIN — SODIUM CHLORIDE 75 ML/HR: 0.9 INJECTION, SOLUTION INTRAVENOUS at 13:39

## 2018-03-04 NOTE — H&P
H&P- Rani Ghosh 1955, 58 y o  male MRN: [id number]    Unit/Bed#: ED 04 A Encounter: [id number]    Primary Care Provider: Geovanny Davis DO   Date and time admitted to hospital: 3/4/2018 11:00 AM        * Hyponatremia   Assessment & Plan    Most likely due to dehydration from nausea vomiting last 24 hours with sounds like could be related to prednisone taking versus gastroenteritis  Will check patient's serum and urine osmolarity to further determine the cause of hyponatremia  Hydrate him with normal saline solution will recheck his sodium today at 4:00 p m  Kristin Nuñez Patient is going to stay in the hospital more than 2 midnights        Dehydration   Assessment & Plan    Patient appears to be dehydrated from decreased p o  intake nausea vomiting        Acute asthmatic bronchitis   Assessment & Plan    Chest x-ray shows no CHF or infiltrates  He denies any history of COPD or emphysema  His wheezing started with an upper respiratory infection that was likely a viral infection and now has asthmatic bronchitis  Will treat him with the Xopenex as patient gets jittery with albuterol  Will add ipratropium nebulizer, Symbicort as well as IV Solu-Medrol  Will provide Protonix for gastric protection        Benign prostatic hyperplasia with nocturia   Assessment & Plan    Patient is nocturiamuch better with Flomax will continue the same        Migraine with aura   Assessment & Plan    Will continue p r n  Fioricet                VTE Prophylaxis: ordered  Code Status:  Level 1 DNR      Anticipated Length of Stay:  More than 2 midnights    Justification for Hospital Stay: see assessment and plan        Chief Complaint:   Nausea vomiting and shortness of breath    History of Present Illness:    Rani Ghosh is a 58 y o  male who presents with above chief compaint  Patient's illness started about 2 months ago with nasal congestion sore throat cough and muscle aches    His shortness of breath and wheezing developed afterwards  He has been short of breath on exertion and time even sometimes at rest for last 2 months not getting better with albuterol E inhaler and steroid taper  He just finished the steroid taper this week  The last night when he used his Ventolin inhaler patient feeling jittery shaky and developed nausea ever since then he has had at least 5 episodes of vomiting  Denies any signs of bleeding due to nausea and vomiting as well as the persistent shortness of breath and wheezing he came to the ER for evaluation  He denies any weight gain, chest pain, pleurisy, lower extremity edema, history of CHF, coronary artery disease, arrhythmia  He is a former smoker quit many years ago  Denies history of asthma or COPD      Review of Systems:    Review of Systems   Constitutional: Negative for fever and unexpected weight change  HENT: Negative for congestion  Respiratory: Positive for cough, shortness of breath and wheezing  Cardiovascular: Negative for chest pain, palpitations and leg swelling  Gastrointestinal: Positive for nausea and vomiting  Negative for abdominal pain, blood in stool and diarrhea  Endocrine: Negative for polydipsia and polyphagia  Genitourinary: Negative for difficulty urinating and dysuria  Musculoskeletal: Negative for joint swelling and neck stiffness  Skin: Negative for rash  Neurological: Negative for weakness, numbness and headaches  Hematological: Does not bruise/bleed easily  Psychiatric/Behavioral: Negative for dysphoric mood  All other systems reviewed and are negative  Past Medical and Surgical History:     Past Medical History:   Diagnosis Date    Migraine        History reviewed  No pertinent surgical history  Meds/Allergies:    Prior to Admission Medications   Prescriptions Last Dose Informant Patient Reported? Taking?    Butalbital-APAP-Caffeine -40 MG per capsule  Self Yes Yes   Sig: TAKE ONE CAPSULE EVERY 4 HOURS AS NEEDED EPIPEN 2-PARUL 0 3 MG/0 3ML SOAJ  Self Yes Yes   Sig: Inject 0 3 mg into the shoulder, thigh, or buttocks As directed   HYDROcodone-acetaminophen (XODOL) 7 5-300 MG per tablet  Self Yes Yes   Sig: TAKE 1 TABLET EVERY 4 TO 6 HOURS AS NEEDED FOR PAIN   albuterol (PROVENTIL HFA,VENTOLIN HFA) 90 mcg/act inhaler   No Yes   Sig: Inhale 2 puffs every 4 (four) hours as needed for wheezing or shortness of breath for up to 30 days   fluticasone (FLONASE) 50 mcg/act nasal spray  Self Yes Yes   Sig: INHALE 2 SPRAY DAILY IN EACH NOSTRIL   loratadine (CLARITIN) 10 mg tablet  Self Yes Yes   Sig: Take 10 mg by mouth daily   promethazine-codeine (PHENERGAN WITH CODEINE) 6 25-10 mg/5 mL syrup  Self Yes Yes   Sig: TAKE 5 ML (1 TEASPOONFUL) EVERY 4 HOURS AS NEEDED   tamsulosin (FLOMAX) 0 4 mg   No Yes   Sig: Take 1 capsule (0 4 mg total) by mouth daily with dinner for 30 days      Facility-Administered Medications: None       Allergies: Allergies   Allergen Reactions    Diphenhydramine Hyperactivity and Hypertension     Category: INSOMNIA;        Social History:     History   Alcohol Use    Yes     Comment: socially     History   Smoking Status    Former Smoker   Smokeless Tobacco    Never Used     History   Drug Use No       Family History:    Family History   Problem Relation Age of Onset    Hypertension Mother     Cancer Father        Physical Exam:     Vitals:   Blood Pressure: 163/81 (03/04/18 1104)  Pulse: 72 (03/04/18 1104)  Temperature: 97 6 °F (36 4 °C) (03/04/18 1104)  Temp Source: Temporal (03/04/18 1104)  Respirations: 18 (03/04/18 1104)  Height: 6' (182 9 cm) (03/04/18 1104)  Weight - Scale: 125 kg (275 lb) (03/04/18 1104)  SpO2: 95 % (03/04/18 1104)    Physical Exam   Constitutional: He is oriented to person, place, and time  He appears well-developed  No distress  HENT:   Head: Normocephalic  Oral membranes are dry   Eyes: Conjunctivae are normal    Neck: Neck supple  JVD present     Cardiovascular: Normal rate and regular rhythm  There is no pedal edema   Pulmonary/Chest: Effort normal  No respiratory distress  He has wheezes (Bilateral expiratory wheezing is present)  He has no rales  Abdominal: Soft  Bowel sounds are normal  He exhibits no distension  There is no tenderness  Musculoskeletal: He exhibits no tenderness  Lymphadenopathy:     He has no cervical adenopathy  Neurological: He is alert and oriented to person, place, and time  No cranial nerve deficit  Skin: Skin is warm and dry  No rash noted  Psychiatric: He has a normal mood and affect  Additional Data:     Lab Results: I personally reviewed them      Results from last 7 days  Lab Units 03/04/18  1138   WBC Thousand/uL 6 39   HEMOGLOBIN g/dL 14 4   HEMATOCRIT % 41 0   PLATELETS Thousands/uL 208   NEUTROS PCT % 82*   LYMPHS PCT % 11*   MONOS PCT % 6   EOS PCT % 1       Results from last 7 days  Lab Units 03/04/18  1138   SODIUM mmol/L 120*   POTASSIUM mmol/L 4 6   CHLORIDE mmol/L 85*   CO2 mmol/L 28   BUN mg/dL 12   CREATININE mg/dL 0 75   CALCIUM mg/dL 7 9*   TOTAL PROTEIN g/dL 8 3*   BILIRUBIN TOTAL mg/dL 0 60   ALK PHOS U/L 50   ALT U/L 45   AST U/L 42   GLUCOSE RANDOM mg/dL 130           Imaging: I personally reviewed them    XR chest 2 views   Final Result by Fredrick Ayala MD (03/04 1326)      No acute cardiopulmonary disease  Workstation performed: [id number]             EKG : I personally reviewed      Yanet Werner MD    ** Please Note: This note has been constructed using a voice recognition system   **

## 2018-03-04 NOTE — ASSESSMENT & PLAN NOTE
Chest x-ray shows no CHF or infiltrates  He denies any history of COPD or emphysema  His wheezing started with an upper respiratory infection that was likely a viral infection and now has asthmatic bronchitis  Will treat him with the Xopenex as patient gets jittery with albuterol  Will add ipratropium nebulizer, Symbicort as well as IV Solu-Medrol    Will provide Protonix for gastric protection

## 2018-03-04 NOTE — ASSESSMENT & PLAN NOTE
Most likely due to dehydration from nausea vomiting last 24 hours with sounds like could be related to prednisone taking versus gastroenteritis  Will check patient's serum and urine osmolarity to further determine the cause of hyponatremia  Hydrate him with normal saline solution will recheck his sodium today at 4:00 p m  Logan Riley     Patient is going to stay in the hospital more than 2 midnights

## 2018-03-05 ENCOUNTER — APPOINTMENT (INPATIENT)
Dept: CT IMAGING | Facility: HOSPITAL | Age: 63
DRG: 641 | End: 2018-03-05
Payer: COMMERCIAL

## 2018-03-05 PROBLEM — R74.01 ELEVATED AST (SGOT): Status: RESOLVED | Noted: 2017-06-14 | Resolved: 2018-03-05

## 2018-03-05 PROBLEM — R11.2 NAUSEA VOMITING AND DIARRHEA: Status: ACTIVE | Noted: 2018-03-05

## 2018-03-05 PROBLEM — R06.00 DOE (DYSPNEA ON EXERTION): Status: RESOLVED | Noted: 2018-02-21 | Resolved: 2018-03-05

## 2018-03-05 PROBLEM — R06.09 DOE (DYSPNEA ON EXERTION): Status: RESOLVED | Noted: 2018-02-21 | Resolved: 2018-03-05

## 2018-03-05 PROBLEM — J30.9 ALLERGIC RHINITIS: Status: RESOLVED | Noted: 2017-05-30 | Resolved: 2018-03-05

## 2018-03-05 PROBLEM — K63.5 BENIGN COLON POLYP: Status: RESOLVED | Noted: 2017-06-14 | Resolved: 2018-03-05

## 2018-03-05 PROBLEM — G43.109 MIGRAINE WITH AURA: Status: RESOLVED | Noted: 2017-11-10 | Resolved: 2018-03-05

## 2018-03-05 PROBLEM — R19.7 NAUSEA VOMITING AND DIARRHEA: Status: ACTIVE | Noted: 2018-03-05

## 2018-03-05 PROBLEM — E86.0 DEHYDRATION: Status: RESOLVED | Noted: 2018-03-04 | Resolved: 2018-03-05

## 2018-03-05 PROBLEM — R73.01 ELEVATED FASTING GLUCOSE: Status: RESOLVED | Noted: 2017-06-14 | Resolved: 2018-03-05

## 2018-03-05 LAB
ANION GAP SERPL CALCULATED.3IONS-SCNC: 6 MMOL/L (ref 4–13)
ANION GAP SERPL CALCULATED.3IONS-SCNC: 6 MMOL/L (ref 4–13)
ANION GAP SERPL CALCULATED.3IONS-SCNC: 8 MMOL/L (ref 4–13)
BUN SERPL-MCNC: 11 MG/DL (ref 5–25)
BUN SERPL-MCNC: 16 MG/DL (ref 5–25)
BUN SERPL-MCNC: 9 MG/DL (ref 5–25)
CALCIUM SERPL-MCNC: 7.6 MG/DL (ref 8.3–10.1)
CALCIUM SERPL-MCNC: 7.7 MG/DL (ref 8.3–10.1)
CALCIUM SERPL-MCNC: 9 MG/DL (ref 8.3–10.1)
CHLORIDE SERPL-SCNC: 92 MMOL/L (ref 100–108)
CHLORIDE SERPL-SCNC: 93 MMOL/L (ref 100–108)
CHLORIDE SERPL-SCNC: 97 MMOL/L (ref 100–108)
CO2 SERPL-SCNC: 25 MMOL/L (ref 21–32)
CO2 SERPL-SCNC: 25 MMOL/L (ref 21–32)
CO2 SERPL-SCNC: 26 MMOL/L (ref 21–32)
CREAT SERPL-MCNC: 0.71 MG/DL (ref 0.6–1.3)
CREAT SERPL-MCNC: 0.78 MG/DL (ref 0.6–1.3)
CREAT SERPL-MCNC: 1 MG/DL (ref 0.6–1.3)
ERYTHROCYTE [DISTWIDTH] IN BLOOD BY AUTOMATED COUNT: 14 % (ref 11.6–15.1)
GFR SERPL CREATININE-BSD FRML MDRD: 79 ML/MIN/1.73SQ M
GFR SERPL CREATININE-BSD FRML MDRD: 95 ML/MIN/1.73SQ M
GFR SERPL CREATININE-BSD FRML MDRD: 99 ML/MIN/1.73SQ M
GLUCOSE SERPL-MCNC: 114 MG/DL (ref 65–140)
GLUCOSE SERPL-MCNC: 122 MG/DL (ref 65–140)
GLUCOSE SERPL-MCNC: 126 MG/DL (ref 65–140)
HCT VFR BLD AUTO: 39 % (ref 36.5–49.3)
HGB BLD-MCNC: 13.4 G/DL (ref 12–17)
MCH RBC QN AUTO: 27.5 PG (ref 26.8–34.3)
MCHC RBC AUTO-ENTMCNC: 34.4 G/DL (ref 31.4–37.4)
MCV RBC AUTO: 80 FL (ref 82–98)
OSMOLALITY UR/SERPL-RTO: 272 MMOL/KG (ref 282–298)
OSMOLALITY UR: 135 MMOL/KG
PLATELET # BLD AUTO: 195 THOUSANDS/UL (ref 149–390)
PMV BLD AUTO: 8.9 FL (ref 8.9–12.7)
POTASSIUM SERPL-SCNC: 4.1 MMOL/L (ref 3.5–5.3)
POTASSIUM SERPL-SCNC: 4.8 MMOL/L (ref 3.5–5.3)
POTASSIUM SERPL-SCNC: 5 MMOL/L (ref 3.5–5.3)
RBC # BLD AUTO: 4.87 MILLION/UL (ref 3.88–5.62)
SODIUM 24H UR-SCNC: 53 MOL/L
SODIUM SERPL-SCNC: 123 MMOL/L (ref 136–145)
SODIUM SERPL-SCNC: 125 MMOL/L (ref 136–145)
SODIUM SERPL-SCNC: 130 MMOL/L (ref 136–145)
TSH SERPL DL<=0.05 MIU/L-ACNC: 0.98 UIU/ML (ref 0.36–3.74)
URATE SERPL-MCNC: 4.8 MG/DL (ref 4.2–8)
WBC # BLD AUTO: 5.11 THOUSAND/UL (ref 4.31–10.16)

## 2018-03-05 PROCEDURE — 84550 ASSAY OF BLOOD/URIC ACID: CPT | Performed by: INTERNAL MEDICINE

## 2018-03-05 PROCEDURE — C9113 INJ PANTOPRAZOLE SODIUM, VIA: HCPCS | Performed by: INTERNAL MEDICINE

## 2018-03-05 PROCEDURE — 99285 EMERGENCY DEPT VISIT HI MDM: CPT

## 2018-03-05 PROCEDURE — 99252 IP/OBS CONSLTJ NEW/EST SF 35: CPT | Performed by: INTERNAL MEDICINE

## 2018-03-05 PROCEDURE — 99232 SBSQ HOSP IP/OBS MODERATE 35: CPT | Performed by: INTERNAL MEDICINE

## 2018-03-05 PROCEDURE — 94760 N-INVAS EAR/PLS OXIMETRY 1: CPT

## 2018-03-05 PROCEDURE — 36415 COLL VENOUS BLD VENIPUNCTURE: CPT | Performed by: INTERNAL MEDICINE

## 2018-03-05 PROCEDURE — 94664 DEMO&/EVAL PT USE INHALER: CPT

## 2018-03-05 PROCEDURE — 80048 BASIC METABOLIC PNL TOTAL CA: CPT | Performed by: INTERNAL MEDICINE

## 2018-03-05 PROCEDURE — 74176 CT ABD & PELVIS W/O CONTRAST: CPT

## 2018-03-05 PROCEDURE — 94640 AIRWAY INHALATION TREATMENT: CPT

## 2018-03-05 PROCEDURE — 83930 ASSAY OF BLOOD OSMOLALITY: CPT | Performed by: INTERNAL MEDICINE

## 2018-03-05 PROCEDURE — 83935 ASSAY OF URINE OSMOLALITY: CPT | Performed by: INTERNAL MEDICINE

## 2018-03-05 PROCEDURE — 84443 ASSAY THYROID STIM HORMONE: CPT | Performed by: INTERNAL MEDICINE

## 2018-03-05 PROCEDURE — 71250 CT THORAX DX C-: CPT

## 2018-03-05 PROCEDURE — 85027 COMPLETE CBC AUTOMATED: CPT | Performed by: INTERNAL MEDICINE

## 2018-03-05 RX ORDER — SODIUM CHLORIDE FOR INHALATION 0.9 %
VIAL, NEBULIZER (ML) INHALATION
Status: COMPLETED
Start: 2018-03-05 | End: 2018-03-05

## 2018-03-05 RX ORDER — METHYLPREDNISOLONE SODIUM SUCCINATE 40 MG/ML
20 INJECTION, POWDER, LYOPHILIZED, FOR SOLUTION INTRAMUSCULAR; INTRAVENOUS EVERY 12 HOURS SCHEDULED
Status: DISCONTINUED | OUTPATIENT
Start: 2018-03-05 | End: 2018-03-06 | Stop reason: HOSPADM

## 2018-03-05 RX ADMIN — LEVALBUTEROL 1.25 MG: 1.25 SOLUTION, CONCENTRATE RESPIRATORY (INHALATION) at 19:27

## 2018-03-05 RX ADMIN — IPRATROPIUM BROMIDE 0.5 MG: 0.5 SOLUTION RESPIRATORY (INHALATION) at 10:36

## 2018-03-05 RX ADMIN — METHYLPREDNISOLONE SODIUM SUCCINATE 20 MG: 40 INJECTION, POWDER, FOR SOLUTION INTRAMUSCULAR; INTRAVENOUS at 07:16

## 2018-03-05 RX ADMIN — METHYLPREDNISOLONE SODIUM SUCCINATE 20 MG: 40 INJECTION, POWDER, FOR SOLUTION INTRAMUSCULAR; INTRAVENOUS at 22:33

## 2018-03-05 RX ADMIN — METHYLPREDNISOLONE SODIUM SUCCINATE 20 MG: 40 INJECTION, POWDER, FOR SOLUTION INTRAMUSCULAR; INTRAVENOUS at 13:59

## 2018-03-05 RX ADMIN — TAMSULOSIN HYDROCHLORIDE 0.4 MG: 0.4 CAPSULE ORAL at 16:10

## 2018-03-05 RX ADMIN — ISODIUM CHLORIDE 3 ML: 0.03 SOLUTION RESPIRATORY (INHALATION) at 12:41

## 2018-03-05 RX ADMIN — IPRATROPIUM BROMIDE 0.5 MG: 0.5 SOLUTION RESPIRATORY (INHALATION) at 15:51

## 2018-03-05 RX ADMIN — HEPARIN SODIUM 5000 UNITS: 5000 INJECTION, SOLUTION INTRAVENOUS; SUBCUTANEOUS at 13:56

## 2018-03-05 RX ADMIN — SODIUM CHLORIDE 100 ML/HR: 0.9 INJECTION, SOLUTION INTRAVENOUS at 18:45

## 2018-03-05 RX ADMIN — PANTOPRAZOLE SODIUM 40 MG: 40 INJECTION, POWDER, FOR SOLUTION INTRAVENOUS at 09:17

## 2018-03-05 RX ADMIN — HEPARIN SODIUM 5000 UNITS: 5000 INJECTION, SOLUTION INTRAVENOUS; SUBCUTANEOUS at 22:34

## 2018-03-05 RX ADMIN — LEVALBUTEROL 1.25 MG: 1.25 SOLUTION, CONCENTRATE RESPIRATORY (INHALATION) at 10:36

## 2018-03-05 RX ADMIN — IPRATROPIUM BROMIDE 0.5 MG: 0.5 SOLUTION RESPIRATORY (INHALATION) at 12:41

## 2018-03-05 RX ADMIN — LEVALBUTEROL 1.25 MG: 1.25 SOLUTION, CONCENTRATE RESPIRATORY (INHALATION) at 13:55

## 2018-03-05 RX ADMIN — SODIUM CHLORIDE 100 ML/HR: 0.9 INJECTION, SOLUTION INTRAVENOUS at 01:00

## 2018-03-05 RX ADMIN — METHYLPREDNISOLONE SODIUM SUCCINATE 20 MG: 40 INJECTION, POWDER, FOR SOLUTION INTRAMUSCULAR; INTRAVENOUS at 01:01

## 2018-03-05 RX ADMIN — HEPARIN SODIUM 5000 UNITS: 5000 INJECTION, SOLUTION INTRAVENOUS; SUBCUTANEOUS at 07:15

## 2018-03-05 RX ADMIN — IPRATROPIUM BROMIDE 0.5 MG: 0.5 SOLUTION RESPIRATORY (INHALATION) at 19:27

## 2018-03-05 RX ADMIN — HEPARIN SODIUM 5000 UNITS: 5000 INJECTION, SOLUTION INTRAVENOUS; SUBCUTANEOUS at 01:01

## 2018-03-05 NOTE — CONSULTS
Consultation - Nephrology   Guy Gorman 58 y o  male MRN: [id number]  Unit/Bed#: ED 10-01 Encounter: [id number]      ASSESSMENT and PLAN:    1  Hyponatremia  -etiology is likely multifactorial  -history indicates volume depletion mild hypochloremia no evidence of acute kidney injury, isosthenuric urine however, with a low serum osmolality high urine osmolality, urine sodium not checked, will check now  -probably an element of mild volume depletion however concerning for SIADH in another cause  -nausea could have been a result low serum sodium so will require a further workup especially since no other medical problem, no pneumonia on chest x-ray and not on any medications that are known to cause hyponatremia  -will check a TSH, a m  cortisol, check a noncontrast CT scan of the chest abdomen and pelvis to rule out any occult malignancy  -will repeat a urine sodium, check a urine osmolality and serum osmolality daily  -not on any nephrotoxic agent  -continue isotonic fluid at 100 cc an hour and check BMPs every 6 hours  -need to see whether he is up-to-date on his cancer screenings such as colonoscopy    2  Nausea and vomiting/diarrhea  -he states that his stools were soft but not frequent or runny  -He nausea and vomiting were present all week but worse over the week  -this has subsided now    3  Hypochloremic metabolic alkalosis  -likely in the setting of nausea vomiting  -continue IV fluids as above    HISTORY OF PRESENT ILLNESS:  Requesting Physician: Doris Adams DO  Reason for Consult:  Hyponatremia    Guy Gorman is a 58y o  year old male who was admitted to [facility] after presenting with nausea vomiting and diarrhea  A renal consultation is requested today for assistance in the management of hyponatremia      His medical history is consistent for benign prostatic hypertrophy for which he is on tamsulosin no history of hypertension or diabetes presented with 1 week of nausea vomiting and diarrhea he states his diarrhea was non running however and not frequent his nausea and vomiting were severe he states he has had poor intake over the last week    Otherwise has no history of cardiac disease no chest pain or shortness of Breath no fevers or chills nausea vomiting diarrhea or constipation    PAST MEDICAL HISTORY:  Past Medical History:   Diagnosis Date    Migraine        PAST SURGICAL HISTORY:  History reviewed  No pertinent surgical history      ALLERGIES:  Allergies   Allergen Reactions    Diphenhydramine Hyperactivity and Hypertension     Category: INSOMNIA;        SOCIAL HISTORY:  History   Alcohol Use    Yes     Comment: socially     History   Drug Use No     History   Smoking Status    Former Smoker   Smokeless Tobacco    Never Used       FAMILY HISTORY:  Family History   Problem Relation Age of Onset    Hypertension Mother     Cancer Father        MEDICATIONS:    Current Facility-Administered Medications:     acetaminophen (TYLENOL) tablet 650 mg, 650 mg, Oral, Q6H PRN, Chantale Ortiz MD    heparin (porcine) subcutaneous injection 5,000 Units, 5,000 Units, Subcutaneous, Q8H JONNY, 5,000 Units at 03/05/18 0715 **AND** [CANCELED] Platelet count, , , Once, Chantale Ortiz MD    ipratropium (ATROVENT) 0 02 % inhalation solution 0 5 mg, 0 5 mg, Nebulization, 4x Daily, Chantale Ortiz MD, 0 5 mg at 03/05/18 1036    levalbuterol (XOPENEX) inhalation solution 1 25 mg, 1 25 mg, Nebulization, TID, Chantale Ortiz MD, 1 25 mg at 03/05/18 1036    methylPREDNISolone sodium succinate (Solu-MEDROL) injection 20 mg, 20 mg, Intravenous, Q8H [address], Chantale Ortiz MD, 20 mg at 03/05/18 0716    ondansetron (ZOFRAN) injection 4 mg, 4 mg, Intravenous, Q6H PRN, Chantale Ortiz MD    pantoprazole (PROTONIX) injection 40 mg, 40 mg, Intravenous, Q24H [address], Chantale Ortiz MD, 40 mg at 03/05/18 0917    promethazine (PHENERGAN) injection 12 5 mg, 12 5 mg, Intravenous, Q6H PRN, Chantale Ortiz MD    sodium chloride 0 9 % infusion, 100 mL/hr, Intravenous, Continuous, Wong Michael MD, Last Rate: 100 mL/hr at 03/05/18 0100, 100 mL/hr at 03/05/18 0100    tamsulosin (FLOMAX) capsule 0 4 mg, 0 4 mg, Oral, Daily With Lm Vogel MD, 0 4 mg at 03/04/18 1637    Current Outpatient Prescriptions:     albuterol (PROVENTIL HFA,VENTOLIN HFA) 90 mcg/act inhaler, Inhale 2 puffs every 4 (four) hours as needed for wheezing or shortness of breath for up to 30 days, Disp: 1 Inhaler, Rfl: 1    Butalbital-APAP-Caffeine -40 MG per capsule, TAKE ONE CAPSULE EVERY 4 HOURS AS NEEDED, Disp: , Rfl: 0    EPIPEN 2-PARUL 0 3 MG/0 3ML SOAJ, Inject 0 3 mg into the shoulder, thigh, or buttocks As directed, Disp: , Rfl: 1    fluticasone (FLONASE) 50 mcg/act nasal spray, INHALE 2 SPRAY DAILY IN EACH NOSTRIL, Disp: , Rfl: 5    HYDROcodone-acetaminophen (XODOL) 7 5-300 MG per tablet, TAKE 1 TABLET EVERY 4 TO 6 HOURS AS NEEDED FOR PAIN, Disp: , Rfl: 0    loratadine (CLARITIN) 10 mg tablet, Take 10 mg by mouth daily, Disp: , Rfl: 5    promethazine-codeine (PHENERGAN WITH CODEINE) 6 25-10 mg/5 mL syrup, TAKE 5 ML (1 TEASPOONFUL) EVERY 4 HOURS AS NEEDED, Disp: , Rfl: 0    tamsulosin (FLOMAX) 0 4 mg, Take 1 capsule (0 4 mg total) by mouth daily with dinner for 30 days, Disp: 30 capsule, Rfl: 5    REVIEW OF SYSTEMS:  All the systems were reviewed and were negative except as documented on the HPI        PHYSICAL EXAM:  Current Weight: Weight - Scale: 125 kg (275 lb)  First Weight: Weight - Scale: 125 kg (275 lb)  Vitals:    03/05/18 1000 03/05/18 1015 03/05/18 1030 03/05/18 1045   BP:   136/88 136/88   BP Location:   Left arm    Pulse: 101 95 78 78   Resp:    19   Temp:    97 9 °F (36 6 °C)   TempSrc:    Temporal   SpO2: 93% 92% 94% 98%   Weight:       Height:           Intake/Output Summary (Last 24 hours) at 03/05/18 1158  Last data filed at 03/05/18 0051   Gross per 24 hour   Intake             1000 ml   Output                0 ml   Net             1000 ml     General: conscious, cooperative, in not acute distress  Eyes: conjunctivae pink, anicteric sclerae  ENT: lips and mucous membranes moist  Neck: supple, no JVD  Chest: clear breath sounds bilateral, no crackles, ronchus or wheezings  CVS: distinct S1 & S2, normal rate, regular rhythm  Abdomen: soft, non-tender, non-distended, normoactive bowel sounds  Extremities: no edema of both legs  Skin: no rash  Neuro: awake, alert, oriented      Invasive Devices:      Lab Results:     Results from last 7 days  Lab Units 03/05/18  0455 03/04/18  2018 03/04/18  1641 03/04/18  1138   WBC Thousand/uL 5 11  --   --  6 39   HEMOGLOBIN g/dL 13 4  --   --  14 4   HEMATOCRIT % 39 0  --   --  41 0   PLATELETS Thousands/uL 195  --   --  208   SODIUM mmol/L 123* 119* 120* 120*   POTASSIUM mmol/L 5 0 4 7 4 6 4 6   CHLORIDE mmol/L 92* 89* 89* 85*   CO2 mmol/L 25 26 23 28   BUN mg/dL 9 10 11 12   CREATININE mg/dL 0 71 0 71 0 61 0 75   CALCIUM mg/dL 7 6* 7 9* 8 0* 7 9*   TOTAL PROTEIN g/dL  --   --   --  8 3*   BILIRUBIN TOTAL mg/dL  --   --   --  0 60   ALK PHOS U/L  --   --   --  50   ALT U/L  --   --   --  45   AST U/L  --   --   --  42   GLUCOSE RANDOM mg/dL 114 135 97 130     Other Studies:  Please see previous notes

## 2018-03-05 NOTE — ED NOTES
Patient appears to be sleeping  Symmetrical chest rise, no facial grimace, and comfortable position noted  Will continue to monitor        Chin Boggs RN  03/05/18 0166

## 2018-03-05 NOTE — ED NOTES
Spoke with floor RN  Floor RN discharging 2 patients  This RN to provide breathing treatment and then patient will be transported to floor        Carmela Moctezuma RN  03/05/18 0272

## 2018-03-05 NOTE — ASSESSMENT & PLAN NOTE
· Continue IV steroids, will wean to q12h  · Check flu PCR  · CXR negative  · Continue nebulizers PRN a

## 2018-03-05 NOTE — PROGRESS NOTES
Progress Note - True Olivas 1955, 58 y o  male MRN: [id number]    Unit/Bed#: ED 10-01 Encounter: [id number]    Primary Care Provider: Reina Harrington DO   Date and time admitted to hospital: 3/4/2018 11:00 AM      * Hyponatremia   Assessment & Plan    · Unknown etiology  History indicates a volume depletion cause 2/2 nausea, vomiting and poor PO intake  · Nephrology following  Is some concern for SIADH  · TSH normal  AM cortisol pending  CT C/A/P pending to r/o occult malignancy  · Continue IVF per renal recommendations with q6h BMP with caution to raise Na level too quickly         Nausea vomiting and diarrhea   Assessment & Plan    · Unclear etiology  Has mostly resolved  2/2 viral gastroenteritis  · CT will be done to r/o malignancy and thus will look for any abnormalities   · Supportive care with IVF and PRN medications         Acute asthmatic bronchitis   Assessment & Plan    · Continue IV steroids, will wean to q12h  · Check flu PCR  · CXR negative  · Continue nebulizers PRN a        Benign prostatic hyperplasia with nocturia   Assessment & Plan    · Continue flomax           VTE Pharmacologic Prophylaxis:   Pharmacologic: Heparin  Mechanical VTE Prophylaxis in Place: No    Patient Centered Rounds: I have evaluated patient without nursing staff present due to ER HOLD     Discussions with Specialists or Other Care Team Provider: appreciate nephrology input  Education and Discussions with Family / Patient: patient  Time Spent for Care: 30 minutes  More than 50% of total time spent on counseling and coordination of care as described above  Current Length of Stay: 1 day(s)    Current Patient Status: Inpatient   Certification Statement: The patient will continue to require additional inpatient hospital stay due to close monitoring of sodium levels    Discharge Plan: not yet stable  Should have no needs at dc  Code Status: Level 1 - Full Code      Subjective:   Pt reports feeling okay   Denies any nausea, vomiting, diarrhea  Still with cough and mild SOB with exertion  Objective:     Vitals:   Temp (24hrs), Av 9 °F (36 6 °C), Min:97 9 °F (36 6 °C), Max:97 9 °F (36 6 °C)    HR:  [] 78  Resp:  [18-20] 19  BP: (136-163)/(74-88) 136/88  SpO2:  [90 %-98 %] 98 %  Body mass index is 37 3 kg/m²  Input and Output Summary (last 24 hours): Intake/Output Summary (Last 24 hours) at 18 1430  Last data filed at 18 0051   Gross per 24 hour   Intake             1000 ml   Output                0 ml   Net             1000 ml       Physical Exam:     Physical Exam   Constitutional: He is oriented to person, place, and time  No distress  Obese    Cardiovascular: Normal rate and regular rhythm  Pulmonary/Chest: Effort normal  He has wheezes  Occasional coarse breath sounds   Abdominal: Soft  Bowel sounds are normal  He exhibits no distension  There is no tenderness  Musculoskeletal: He exhibits no edema  Neurological: He is alert and oriented to person, place, and time  Skin: Skin is warm and dry  Psychiatric: He has a normal mood and affect  Nursing note and vitals reviewed  Additional Data:     Labs:      Results from last 7 days  Lab Units 18  0455 18  1138   WBC Thousand/uL 5 11 6 39   HEMOGLOBIN g/dL 13 4 14 4   HEMATOCRIT % 39 0 41 0   PLATELETS Thousands/uL 195 208   NEUTROS PCT %  --  82*   LYMPHS PCT %  --  11*   MONOS PCT %  --  6   EOS PCT %  --  1       Results from last 7 days  Lab Units 18  1231  18  1138   SODIUM mmol/L 125*  < > 120*   POTASSIUM mmol/L 4 8  < > 4 6   CHLORIDE mmol/L 93*  < > 85*   CO2 mmol/L 26  < > 28   BUN mg/dL 11  < > 12   CREATININE mg/dL 0 78  < > 0 75   CALCIUM mg/dL 9 0  < > 7 9*   TOTAL PROTEIN g/dL  --   --  8 3*   BILIRUBIN TOTAL mg/dL  --   --  0 60   ALK PHOS U/L  --   --  50   ALT U/L  --   --  45   AST U/L  --   --  42   GLUCOSE RANDOM mg/dL 126  < > 130   < > = values in this interval not displayed  * I Have Reviewed All Lab Data Listed Above  * Additional Pertinent Lab Tests Reviewed: All Labs Within Last 24 Hours Reviewed    Imaging:    Imaging Reports Reviewed Today Include: CXR  Imaging Personally Reviewed by Myself Includes:  none    Recent Cultures (last 7 days):           Last 24 Hours Medication List:     Current Facility-Administered Medications:  acetaminophen 650 mg Oral Q6H PRN Hector Gonzalez MD    heparin (porcine) 5,000 Units Subcutaneous Q8H [address], MD    ipratropium 0 5 mg Nebulization 4x Daily Hector Gonzalez MD    levalbuterol 1 25 mg Nebulization TID Hector Gonzalez MD    methylPREDNISolone sodium succinate 20 mg Intravenous Q12H [facility] Airam Kerns PA-C    ondansetron 4 mg Intravenous Q6H PRN Hector Gonzalez MD    pantoprazole 40 mg Intravenous Q24H [address], MD    promethazine 12 5 mg Intravenous Q6H PRN Hector Gonzalez MD    sodium chloride 100 mL/hr Intravenous Continuous Hector Gonzalez MD Last Rate: 100 mL/hr (03/05/18 0100)   tamsulosin 0 4 mg Oral Daily With Kae Colon MD         Today, Patient Was Seen By: Herberth Hurley PA-C    ** Please Note: Dictation voice to text software may have been used in the creation of this document   **

## 2018-03-05 NOTE — ED NOTES
Pt repositioned  Call bell within reach  Pt has no complaints at this time  Pt pleasant upon approach  No noted respiratory distress  Pt has productive cough  No mucus coughed up  Lights turned down low to provide comfort    IV fluids running at 100 mL/hr     Yobany Henriquez RN  03/05/18 9416

## 2018-03-05 NOTE — ED NOTES
Pt in bed snoring  No labored breathing noted   Will continue to monitor     Kanika Le, SLIME  03/04/18 9118

## 2018-03-05 NOTE — ED NOTES
Called floor for 10 minute notification  Room currently being cleaned  Floor to call when bed available        Indira Capone RN  03/05/18 0051

## 2018-03-05 NOTE — ASSESSMENT & PLAN NOTE
· Unknown etiology  History indicates a volume depletion cause 2/2 nausea, vomiting and poor PO intake  · Nephrology following  Is some concern for SIADH  · TSH normal  AM cortisol pending   CT C/A/P pending to r/o occult malignancy  · Continue IVF per renal recommendations with q6h BMP with caution to raise Na level too quickly

## 2018-03-05 NOTE — ED NOTES
Patient sleeping  Symmetrical chest rise, no facial grimace, and comfortable position noted  Will continue to monitor        Trina Rahman RN  03/05/18 0781

## 2018-03-05 NOTE — ASSESSMENT & PLAN NOTE
· Unclear etiology  Has mostly resolved   2/2 viral gastroenteritis  · CT will be done to r/o malignancy and thus will look for any abnormalities   · Supportive care with IVF and PRN medications

## 2018-03-05 NOTE — ED NOTES
Patient appears to be sleeping  Symmetrical chest rise, no facial grimace, and comfortable position noted  Patient easily awakened  Denies current complaints  Will continue to monitor        Faye Montalvo RN  03/05/18 0927

## 2018-03-06 VITALS
RESPIRATION RATE: 18 BRPM | BODY MASS INDEX: 35.86 KG/M2 | TEMPERATURE: 97.7 F | DIASTOLIC BLOOD PRESSURE: 73 MMHG | HEIGHT: 72 IN | WEIGHT: 264.77 LBS | HEART RATE: 100 BPM | OXYGEN SATURATION: 95 % | SYSTOLIC BLOOD PRESSURE: 135 MMHG

## 2018-03-06 PROBLEM — E87.1 HYPONATREMIA: Status: RESOLVED | Noted: 2018-03-04 | Resolved: 2018-03-06

## 2018-03-06 PROBLEM — R19.7 NAUSEA VOMITING AND DIARRHEA: Status: RESOLVED | Noted: 2018-03-05 | Resolved: 2018-03-06

## 2018-03-06 PROBLEM — R11.2 NAUSEA VOMITING AND DIARRHEA: Status: RESOLVED | Noted: 2018-03-05 | Resolved: 2018-03-06

## 2018-03-06 PROBLEM — J45.909 ACUTE ASTHMATIC BRONCHITIS: Status: RESOLVED | Noted: 2018-03-04 | Resolved: 2018-03-06

## 2018-03-06 LAB
ANION GAP SERPL CALCULATED.3IONS-SCNC: 9 MMOL/L (ref 4–13)
BASOPHILS # BLD AUTO: 0 THOUSANDS/ΜL (ref 0–0.1)
BASOPHILS NFR BLD AUTO: 0 % (ref 0–1)
BUN SERPL-MCNC: 13 MG/DL (ref 5–25)
CALCIUM SERPL-MCNC: 8.3 MG/DL (ref 8.3–10.1)
CHLORIDE SERPL-SCNC: 100 MMOL/L (ref 100–108)
CO2 SERPL-SCNC: 24 MMOL/L (ref 21–32)
CORTIS AM PEAK SERPL-MCNC: 3.7 UG/DL (ref 4.2–22.4)
CREAT SERPL-MCNC: 0.82 MG/DL (ref 0.6–1.3)
EOSINOPHIL # BLD AUTO: 0 THOUSAND/ΜL (ref 0–0.61)
EOSINOPHIL NFR BLD AUTO: 0 % (ref 0–6)
ERYTHROCYTE [DISTWIDTH] IN BLOOD BY AUTOMATED COUNT: 14.6 % (ref 11.6–15.1)
GFR SERPL CREATININE-BSD FRML MDRD: 93 ML/MIN/1.73SQ M
GLUCOSE SERPL-MCNC: 121 MG/DL (ref 65–140)
HCT VFR BLD AUTO: 38.7 % (ref 36.5–49.3)
HGB BLD-MCNC: 13.1 G/DL (ref 12–17)
LYMPHOCYTES # BLD AUTO: 1.23 THOUSANDS/ΜL (ref 0.6–4.47)
LYMPHOCYTES NFR BLD AUTO: 19 % (ref 14–44)
MCH RBC QN AUTO: 28.4 PG (ref 26.8–34.3)
MCHC RBC AUTO-ENTMCNC: 33.9 G/DL (ref 31.4–37.4)
MCV RBC AUTO: 84 FL (ref 82–98)
MONOCYTES # BLD AUTO: 0.28 THOUSAND/ΜL (ref 0.17–1.22)
MONOCYTES NFR BLD AUTO: 4 % (ref 4–12)
NEUTROPHILS # BLD AUTO: 5.02 THOUSANDS/ΜL (ref 1.85–7.62)
NEUTS SEG NFR BLD AUTO: 77 % (ref 43–75)
PLATELET # BLD AUTO: 215 THOUSANDS/UL (ref 149–390)
PMV BLD AUTO: 9.1 FL (ref 8.9–12.7)
POTASSIUM SERPL-SCNC: 4.6 MMOL/L (ref 3.5–5.3)
RBC # BLD AUTO: 4.61 MILLION/UL (ref 3.88–5.62)
SODIUM SERPL-SCNC: 133 MMOL/L (ref 136–145)
WBC # BLD AUTO: 6.53 THOUSAND/UL (ref 4.31–10.16)

## 2018-03-06 PROCEDURE — C9113 INJ PANTOPRAZOLE SODIUM, VIA: HCPCS | Performed by: INTERNAL MEDICINE

## 2018-03-06 PROCEDURE — 94640 AIRWAY INHALATION TREATMENT: CPT

## 2018-03-06 PROCEDURE — 99232 SBSQ HOSP IP/OBS MODERATE 35: CPT | Performed by: PHYSICIAN ASSISTANT

## 2018-03-06 PROCEDURE — 99239 HOSP IP/OBS DSCHRG MGMT >30: CPT | Performed by: HOSPITALIST

## 2018-03-06 PROCEDURE — 82533 TOTAL CORTISOL: CPT | Performed by: INTERNAL MEDICINE

## 2018-03-06 PROCEDURE — 85025 COMPLETE CBC W/AUTO DIFF WBC: CPT | Performed by: INTERNAL MEDICINE

## 2018-03-06 PROCEDURE — 80048 BASIC METABOLIC PNL TOTAL CA: CPT | Performed by: INTERNAL MEDICINE

## 2018-03-06 PROCEDURE — 94760 N-INVAS EAR/PLS OXIMETRY 1: CPT

## 2018-03-06 RX ORDER — BUDESONIDE AND FORMOTEROL FUMARATE DIHYDRATE 160; 4.5 UG/1; UG/1
2 AEROSOL RESPIRATORY (INHALATION) 2 TIMES DAILY
Qty: 1 INHALER | Refills: 0 | Status: SHIPPED | OUTPATIENT
Start: 2018-03-06 | End: 2018-04-05 | Stop reason: SDUPTHER

## 2018-03-06 RX ORDER — DEXTROSE MONOHYDRATE 50 MG/ML
500 INJECTION, SOLUTION INTRAVENOUS CONTINUOUS
Status: DISPENSED | OUTPATIENT
Start: 2018-03-06 | End: 2018-03-06

## 2018-03-06 RX ORDER — LEVALBUTEROL TARTRATE 45 UG/1
1-2 AEROSOL, METERED ORAL EVERY 4 HOURS PRN
Qty: 1 INHALER | Refills: 0 | Status: SHIPPED | OUTPATIENT
Start: 2018-03-06 | End: 2018-05-15 | Stop reason: SDUPTHER

## 2018-03-06 RX ADMIN — LEVALBUTEROL 1.25 MG: 1.25 SOLUTION, CONCENTRATE RESPIRATORY (INHALATION) at 09:24

## 2018-03-06 RX ADMIN — METHYLPREDNISOLONE SODIUM SUCCINATE 20 MG: 40 INJECTION, POWDER, FOR SOLUTION INTRAMUSCULAR; INTRAVENOUS at 08:48

## 2018-03-06 RX ADMIN — PANTOPRAZOLE SODIUM 40 MG: 40 INJECTION, POWDER, FOR SOLUTION INTRAVENOUS at 08:48

## 2018-03-06 RX ADMIN — DEXTROSE 500 ML/HR: 5 SOLUTION INTRAVENOUS at 11:27

## 2018-03-06 RX ADMIN — IPRATROPIUM BROMIDE 0.5 MG: 0.5 SOLUTION RESPIRATORY (INHALATION) at 09:24

## 2018-03-06 RX ADMIN — HEPARIN SODIUM 5000 UNITS: 5000 INJECTION, SOLUTION INTRAVENOUS; SUBCUTANEOUS at 06:46

## 2018-03-06 NOTE — ASSESSMENT & PLAN NOTE
· Secondary to volume depletion  · TSH normal  AM cortisol pending    · CT C/A/P unremarkable for any acute findings  · Sodium today 133

## 2018-03-06 NOTE — DISCHARGE INSTRUCTIONS
Hyponatremia   WHAT YOU NEED TO KNOW:   What is hyponatremia? Hyponatremia occurs when the amount of sodium (salt) in your blood is lower than normal  Sodium is an electrolyte (mineral) that helps your muscles, heart, and digestive system work properly  It helps control blood pressure and fluid balance  What causes hyponatremia? Hyponatremia happens when too much sodium leaves your body, or when more water than sodium stays in your blood  Any of the following conditions can lead to hyponatremia:  · A diet that is low in sodium    · Drinking too much water or receiving too much fluid through an IV    · Intense and prolonged exercise that causes excessive sweating    · Medical conditions, such as Laith disease, kidney disease, congestive heart failure, liver cirrhosis, or cancer    · Medicines, such as diuretics, antidepressants, pain medicines, or illegal drugs such as ecstasy    · Dehydration  What are the signs and symptoms of hyponatremia? You may have no signs or symptoms  Symptoms may start to appear when the amount of sodium in your blood drops too low or too fast  You may have any of the following:  · Abdominal cramps, nausea, or vomiting    · Headache, confusion, hallucinations, or trouble staying awake    · Muscle weakness or cramps     · Seizures or coma  How is hyponatremia diagnosed? Your healthcare provider will ask you about the medicines you take  He will do a physical exam to look for signs of swelling caused by fluid retention (extra water in your body)  · Blood tests  will be done to check the level of sodium in your blood  They may also be done to find the cause of your hyponatremia  · Urine sodium  is a test that checks the level of sodium in your urine  A sample of your urine is collected and is sent to a lab for tests  How is hyponatremia treated? Treatment depends on the cause of your hyponatremia and how severe it is   Healthcare providers may limit the amount of liquids you drink if you are retaining water  A salt solution may be given through an IV to increase the amount of sodium in your blood  Medicines may also be given to help get rid of extra fluid in your body  You may urinate more often while taking these medicines  When should I contact my healthcare provider? · You have muscle cramps or twitching  · You feel very weak or tired  · You have nausea or are vomiting  · You have questions or concerns about your condition or care  When should I seek immediate care or call 911? · You have a seizure  · You have an irregular heartbeat  · You have trouble breathing  · You cannot move your arms and legs  · You are confused or cannot think clearly  CARE AGREEMENT:   You have the right to help plan your care  Learn about your health condition and how it may be treated  Discuss treatment options with your caregivers to decide what care you want to receive  You always have the right to refuse treatment  The above information is an  only  It is not intended as medical advice for individual conditions or treatments  Talk to your doctor, nurse or pharmacist before following any medical regimen to see if it is safe and effective for you  © 2017 [facility] Information is for End User's use only and may not be sold, redistributed or otherwise used for commercial purposes  All illustrations and images included in CareNotes® are the copyrighted property of [employer]  or Arnaldo Valdez

## 2018-03-06 NOTE — PROGRESS NOTES
NEPHROLOGY PROGRESS NOTE   Nancy Gwendolyn 58 y o  male MRN: [id number]  Unit/Bed#: [address] [place] [address] [id number] Encounter: [id number]      ASSESSMENT and PLAN:  1  Hyponatremia: likely volume depletion as sodium rapidly corrected with IVF  Could have had some SIADH from nausea   -CT chest, abdo, pelvis: no signs of malignancy    -serum osm 272, urine osm 135, urine sodium 53, uric acid 4 8, TSH normal    -cortisol pending    -sodium corrected 123 to 133 in 24 hours with normal saline    -will d/c normal saline   -discussed with Dr Adin Holter and due to rapid correction will give D5W x 750cc   2  Nausea, vomiting, diarrhea: resolved     Dispo: If patient is d/c today recommend repeating BMP Thursday  Follow up 3/15 at 8:40 am in our [facility] office  Discussed with Dr Aaron :  Feeling much better today  Wants to go home  No nausea, vomiting or diarrhea   Eating and drinking well today     OBJECTIVE:  Current Weight: Weight - Scale: 120 kg (264 lb 12 4 oz)  Vitals:    03/06/18 0928   BP:    Pulse:    Resp:    Temp:    SpO2: 95%     General: no acute distress   Skin: no rash   Eyes: sclera anicteric   ENT: moist mucous membranes  Neck: supple, symmetric  Chest: clear to auscultation    CVS: regular rate and rhythm  Abdomen:  Soft, non-tender, non-distended   Extremities: no edema   Neuro: awake and alert   Psych: appropriate affect      Medications:  Scheduled Meds:  Current Facility-Administered Medications:  acetaminophen 650 mg Oral Q6H PRN Hector Gonzalez MD    heparin (porcine) 5,000 Units Subcutaneous Q8H [address] [id number], MD    ipratropium 0 5 mg Nebulization TID Hector Gonzalez MD    levalbuterol 1 25 mg Nebulization TID Hector Gonzalez MD    methylPREDNISolone sodium succinate 20 mg Intravenous Q12H [address] Airam Kerns PA-C    ondansetron 4 mg Intravenous Q6H PRN Hector Gonzalez MD    pantoprazole 40 mg Intravenous Q24H [address] Hector Gonzalez MD    promethazine 12 5 mg Intravenous Q6H PRN Hector Gonzalez MD    sodium chloride 100 mL/hr Intravenous Continuous Wyatt Mcmanus MD Last Rate: 100 mL/hr (03/05/18 1845)   tamsulosin 0 4 mg Oral Daily With Jaswinder Hubbard MD        PRN Meds:   acetaminophen    ondansetron    promethazine    Continuous Infusions:  sodium chloride 100 mL/hr Last Rate: 100 mL/hr (03/05/18 1845)       Laboratory Results:  Lab Results   Component Value Date    WBC 6 53 03/06/2018    HGB 13 1 03/06/2018    HCT 38 7 03/06/2018    MCV 84 03/06/2018     03/06/2018     Lab Results   Component Value Date    GLUCOSE 121 03/06/2018    CALCIUM 8 3 03/06/2018     (L) 03/06/2018    K 4 6 03/06/2018    CO2 24 03/06/2018     03/06/2018    BUN 13 03/06/2018    CREATININE 0 82 03/06/2018     Lab Results   Component Value Date    CALCIUM 8 3 03/06/2018

## 2018-03-06 NOTE — DISCHARGE SUMMARY
Discharge- Tommy Loja 1955, 58 y o  male MRN: [id number]    Unit/Bed#: [address] [place], [place] [address] Encounter: [id number]    Primary Care Provider: Timothy Dean DO   Date and time admitted to hospital: 3/4/2018 11:00 AM        Nausea vomiting and diarrhea   Assessment & Plan    Resolved        Acute asthmatic bronchitis   Assessment & Plan    · CXR negative  · No active exacerbation  · Continue nebulizers PRN         Benign prostatic hyperplasia with nocturia   Assessment & Plan    · Continue flomax         * Hyponatremia   Assessment & Plan    · Secondary to volume depletion  · TSH normal  AM cortisol pending  · CT C/A/P unremarkable for any acute findings  · Sodium today 133               Resolved Problems  Date Reviewed: 3/6/2018          Resolved    Migraine with aura 3/5/2018     Resolved by  Anastacio Thomson PA-C    Dehydration 3/5/2018     Resolved by  Anastacio Thomson PA-C          Consultations During Hospital Stay:  · Nephrology    Procedures Performed:     CT of abdomen and pelvis:   No acute inflammatory changes in the chest, abdomen or pelvis  Note definite chest, abdominal or pelvic malignancy    Chest x-ray:  No acute cardiopulmonary disease    Significant Findings / Test Results:     None  Incidental Findings:   · None    Test Results Pending at Discharge (will require follow up): None   Outpatient Tests Requested:  None  Complications:  none    Reason for Admission:  Nausea and vomiting, shortness of breath    Hospital Course:     Tommy Loja is a 58 y o  male patient who originally presented to the hospital on 3/4/2018 due to nausea vomiting and shortness of breath  Patient was brought to the ER because of acute onset shortness of breath, wheezing, was recently given an albuterol inhaler for shortness of breath  He reported feeling very jittery and shaky after using the albuterol inhaler  Continue to have persistent shortness of breath and wheezing which brought into the ER    Patient was found to have hyponatremia secondary to dehydration from nausea and vomiting over 24 hours prior to coming in to the hospital, likely dehydration  Nephrology was consulted testing completed, imaging was unremarkable acute changes from chest x-ray negative for acute changes  Acute asthmatic bronchitis resolved  Patient will be discharged with Xopenex inhaler due to a side effect profile  Continue Symbicort as outpatient, follow up with pulmonary specialist an allergy specialist for further management, peak flow 3 x 250, 300, 300 at bedside compatible the patient  Please see above list of diagnoses and related plan for additional information  Condition at Discharge: good     Discharge Day Visit / Exam:     Subjective:  No complaints today  Review of systems negative for nausea, vomiting, changes in mental status  Vitals: Blood Pressure: 135/73 (03/06/18 0700)  Pulse: 100 (03/06/18 0700)  Temperature: 97 7 °F (36 5 °C) (03/06/18 0700)  Temp Source: Oral (03/06/18 0700)  Respirations: 18 (03/06/18 0700)  Height: 6' (182 9 cm) (03/05/18 1738)  Weight - Scale: 120 kg (264 lb 12 4 oz) (03/06/18 0700)  SpO2: 98 % (03/06/18 0700)  Exam:   Physical Exam   Constitutional: He is oriented to person, place, and time  No distress  HENT:   Head: Normocephalic and atraumatic  Mouth/Throat: Oropharynx is clear and moist    Eyes: EOM are normal  Pupils are equal, round, and reactive to light  Neck: Normal range of motion  No JVD present  Cardiovascular: Normal rate  No murmur heard  Pulmonary/Chest: Effort normal  No respiratory distress  He has no wheezes  Abdominal: Soft  Bowel sounds are normal  He exhibits no distension  There is no tenderness  Musculoskeletal: Normal range of motion  He exhibits no edema  Neurological: He is alert and oriented to person, place, and time  No cranial nerve deficit  Coordination normal    Skin: Skin is warm  No erythema  Psychiatric: He has a normal mood and affect   His behavior is normal        Discussion with Family: no    Discharge instructions/Information to patient and family:   See after visit summary for information provided to patient and family  Provisions for Follow-Up Care:  See after visit summary for information related to follow-up care and any pertinent home health orders  Disposition:     Home    For Discharges to [facility]:   · Not Applicable to this Patient - Not Applicable to this Patient    Planned Readmission: none     Discharge Statement:  I spent35 minutes discharging the patient  This time was spent on the day of discharge  I had direct contact with the patient on the day of discharge  Greater than 50% of the total time was spent examining patient, answering all patient questions, arranging and discussing plan of care with patient as well as directly providing post-discharge instructions  Additional time then spent on discharge activities  Discharge Medications:  See after visit summary for reconciled discharge medications provided to patient and family        ** Please Note: This note has been constructed using a voice recognition system **

## 2018-03-06 NOTE — PHYSICAL THERAPY NOTE
PT screen  ORder rec'd chart removed  PT is amb to [facility] no difficulty  States he is d/c today and anticipates no needs or issues   Screen only d/c PT

## 2018-03-06 NOTE — RESPIRATORY THERAPY NOTE
RT Protocol Note  Ambrose Corona 58 y o  male MRN: [id number]  Unit/Bed#: [address] [place] [address] [id number] Encounter: [id number]    Assessment    Principal Problem:    Hyponatremia  Active Problems:    Benign prostatic hyperplasia with nocturia    Acute asthmatic bronchitis    Nausea vomiting and diarrhea      Home Pulmonary Medications:  Albuterol MDI  prn    Past Medical History:   Diagnosis Date    Migraine     Seasonal allergies      Social History     Social History    Marital status: /Civil Union     Spouse name: N/A    Number of children: N/A    Years of education: N/A     Social History Main Topics    Smoking status: Former Smoker     Packs/day: 1 50     Years: 20 00     Quit date: 2/5/1993    Smokeless tobacco: Never Used    Alcohol use Yes      Comment: socially    Drug use: No    Sexual activity: No     Other Topics Concern    None     Social History Narrative    None       Subjective         Objective    Physical Exam:   Assessment Type: Pre-treatment  General Appearance: Alert  Respiratory Pattern: Normal  Chest Assessment: Chest expansion symmetrical  Bilateral Breath Sounds: Diminished  Cough: None    Vitals:  Blood pressure 115/58, pulse (!) 112, temperature 98 6 °F (37 °C), temperature source Oral, resp  rate 20, height 6' (1 829 m), weight 120 kg (264 lb 15 9 oz), SpO2 94 %  Imaging and other studies: I have personally reviewed pertinent reports              Plan    Respiratory Plan: Mild Distress pathway

## 2018-03-06 NOTE — CASE MANAGEMENT
Initial Clinical Review    Admission: Date/Time/Statement: 3/4/18 @ 1259     Orders Placed This Encounter   Procedures    Inpatient Admission (expected length of stay for this patient is greater than two midnights)     Standing Status:   Standing     Number of Occurrences:   1     Order Specific Question:   Admitting Physician     Answer:   Estee Gusman [[id number]]     Order Specific Question:   Level of Care     Answer:   Med Surg [[id number]]     Order Specific Question:   Estimated length of stay     Answer:   More than 2 Midnights     Order Specific Question:   Certification     Answer:   I certify that inpatient services are medically necessary for this patient for a duration of greater than two midnights  See H&P and MD Progress Notes for additional information about the patient's course of treatment  ED: Date/Time/Mode of Arrival:   ED Arrival Information     Expected Arrival Acuity Means of Arrival Escorted By Service Admission Type    - 3/4/2018 10:52 Urgent Walk-In Spouse General Medicine Urgent    Arrival Complaint    Breathing Problem          Chief Complaint:   Chief Complaint   Patient presents with    Flu Symptoms     Pt c/o dizziness, nausea, vomiting, and diarrhea that started this morning  History of Illness:  58 y o  male who presents with above chief compaint      Patient's illness started about 2 months ago with nasal congestion sore throat cough and muscle aches  His shortness of breath and wheezing developed afterwards  He has been short of breath on exertion and time even sometimes at rest for last 2 months not getting better with albuterol E inhaler and steroid taper  He just finished the steroid taper this week      The last night when he used his Ventolin inhaler patient feeling jittery shaky and developed nausea ever since then he has had at least 5 episodes of vomiting    Denies any signs of bleeding due to nausea and vomiting as well as the persistent shortness of breath and wheezing he came to the ER for evaluation      He denies any weight gain, chest pain, pleurisy, lower extremity edema, history of CHF, coronary artery disease, arrhythmia      He is a former smoker quit many years ago  ED Vital Signs:   ED Triage Vitals [03/04/18 1104]   Temperature Pulse Respirations Blood Pressure SpO2   97 6 °F (36 4 °C) 72 18 163/81 95 %      Temp Source Heart Rate Source Patient Position - Orthostatic VS BP Location FiO2 (%)   Temporal Monitor Lying Left arm --      Pain Score       No Pain        Wt Readings from Last 1 Encounters:   03/06/18 120 kg (264 lb 12 4 oz)       Vital Signs (abnormal): pulse 110-112  Abnormal Labs/Diagnostic Test Results: na 120--cl 85---ca 7 9--t  Pro 8 3--alb 3 1  neuts 82  Ct of chest/abd/pelvis--  No acute inflammatory changes in the chest, abdomen or pelvis      Note definite chest, abdominal or pelvic malignancy  ED Treatment:   Medication Administration from 03/04/2018 1052 to 03/05/2018 1644       Date/Time Order Dose Route Action Action by Comments     03/04/2018 1339 sodium chloride 0 9 % bolus 1,000 mL 0 mL Intravenous Stopped Shaina Falcon RN      03/04/2018 1139 sodium chloride 0 9 % bolus 1,000 mL 1,000 mL Intravenous [address] Que Diaz RN      03/04/2018 1139 ondansetron (ZOFRAN) injection 4 mg 4 mg Intravenous Given Que Diaz RN      03/04/2018 1606 sodium chloride 0 9 % infusion 0 mL/hr Intravenous Stopped Shaina Falcon RN      03/04/2018 1339 sodium chloride 0 9 % infusion 75 mL/hr Intravenous [id number] [facility], RN      03/05/2018 1610 tamsulosin (FLOMAX) capsule 0 4 mg 0 4 mg Oral Given Carmela Moctezuma RN      03/04/2018 1637 tamsulosin (FLOMAX) capsule 0 4 mg 0 4 mg Oral Given Shaina Falcon RN      03/05/2018 1355 levalbuterol (XOPENEX) inhalation solution 1 25 mg 1 25 mg Nebulization Given Carmela Moctezuma RN      03/05/2018 1036 levalbuterol (XOPENEX) inhalation solution 1 25 mg 1 25 mg Nebulization Given Gerson Giron RN      03/05/2018 0286 levalbuterol Marvetta Rein) inhalation solution 1 25 mg 0 mg Nebulization Hold Gerson Giron RN patient requested after breakfast     03/04/2018 2017 levalbuterol (Clearnce Sartorius) inhalation solution 1 25 mg 1 25 mg Nebulization Given Ness Davis RN      03/04/2018 1647 levalbuterol (Clearnce Sartorius) inhalation solution 1 25 mg 1 25 mg Nebulization Given Ness Davis RN      03/05/2018 1551 ipratropium (ATROVENT) 0 02 % inhalation solution 0 5 mg 0 5 mg Nebulization Given Gerson Giron RN      03/05/2018 1241 ipratropium (ATROVENT) 0 02 % inhalation solution 0 5 mg 0 5 mg Nebulization Given Gerson Giron RN      03/05/2018 1036 ipratropium (ATROVENT) 0 02 % inhalation solution 0 5 mg 0 5 mg Nebulization Given Gerson Giron RN      03/05/2018 2856 ipratropium (ATROVENT) 0 02 % inhalation solution 0 5 mg 0 mg Nebulization Hold Gerson Giron RN patient requested after breakfast     03/04/2018 2017 ipratropium (ATROVENT) 0 02 % inhalation solution 0 5 mg 0 5 mg Nebulization Given Ness Davis RN      03/04/2018 1647 ipratropium (ATROVENT) 0 02 % inhalation solution 0 5 mg 0 5 mg Nebulization Given Ness Davis RN      03/05/2018 1359 methylPREDNISolone sodium succinate (Solu-MEDROL) injection 20 mg 20 mg Intravenous Given Gerson Giron RN      03/05/2018 0114 methylPREDNISolone sodium succinate (Solu-MEDROL) injection 20 mg 20 mg Intravenous Given Chad Machuca RN      03/05/2018 0101 methylPREDNISolone sodium succinate (Solu-MEDROL) injection 20 mg 20 mg Intravenous Given Carson Felix RN      03/04/2018 1642 methylPREDNISolone sodium succinate (Solu-MEDROL) injection 20 mg 20 mg Intravenous Given Ness Davis RN      03/05/2018 1356 heparin (porcine) subcutaneous injection 5,000 Units 5,000 Units Subcutaneous Given Gerson Giron RN      03/05/2018 0715 heparin (porcine) subcutaneous injection 5,000 Units 5,000 Units Subcutaneous Given Tabatha Ear, RN      03/05/2018 0101 heparin (porcine) subcutaneous injection 5,000 Units 5,000 Units Subcutaneous Given Twan Garcia RN      03/04/2018 1653 heparin (porcine) subcutaneous injection 5,000 Units 5,000 Units Subcutaneous Given Mario Silver RN      03/05/2018 0100 sodium chloride 0 9 % infusion 100 mL/hr Intravenous [address] Twan Garcia RN      03/05/2018 8051 sodium chloride 0 9 % infusion 0 mL/hr Intravenous Stopped Tabatha Ange, SLIME      03/04/2018 1652 sodium chloride 0 9 % infusion 100 mL/hr Intravenous [id number] [facility], RN      03/05/2018 6250 pantoprazole (PROTONIX) injection 40 mg 40 mg Intravenous Given Denise Park RN      03/04/2018 1642 pantoprazole (PROTONIX) injection 40 mg 40 mg Intravenous Given Mario Silver RN      03/05/2018 1241 sodium chloride 0 9 % inhalation solution **AcuDose Override Pull** 3 mL  Given Denise Park RN           Past Medical/Surgical History:    Active Ambulatory Problems     Diagnosis Date Noted    Benign prostatic hyperplasia with nocturia 02/13/2018     Resolved Ambulatory Problems     Diagnosis Date Noted    Allergic rhinitis 05/30/2017    Benign colon polyp 06/14/2017    Elevated AST (SGOT) 06/14/2017    Elevated fasting glucose 06/14/2017    Migraine with aura 11/10/2017    DSOUZA (dyspnea on exertion) 02/21/2018     Past Medical History:   Diagnosis Date    Migraine     Seasonal allergies        Admitting Diagnosis: Hyponatremia [E87 1]  SOB (shortness of breath) [R06 02]  Nausea and vomiting [R11 2]    Age/Sex: 58 y o  male    Assessment/Plan:   Hyponatremia   Assessment & Plan     Most likely due to dehydration from nausea vomiting last 24 hours with sounds like could be related to prednisone taking versus gastroenteritis      Will check patient's serum and urine osmolarity to further determine the cause of hyponatremia      Hydrate him with normal saline solution will recheck his sodium today at 4:00 p m        Patient is going to stay in the hospital more than 2 midnights          Dehydration   Assessment & Plan     Patient appears to be dehydrated from decreased p o  intake nausea vomiting          Acute asthmatic bronchitis   Assessment & Plan     Chest x-ray shows no CHF or infiltrates  He denies any history of COPD or emphysema  His wheezing started with an upper respiratory infection that was likely a viral infection and now has asthmatic bronchitis      Will treat him with the Xopenex as patient gets jittery with albuterol  Will add ipratropium nebulizer, Symbicort as well as IV Solu-Medrol  Will provide Protonix for gastric protection          Benign prostatic hyperplasia with nocturia   Assessment & Plan     Patient is nocturiamuch better with Flomax will continue the same          Migraine with aura   Assessment & Plan     Will continue p r n   Fioricet                       Admission Orders:  Scheduled Meds:   Current Facility-Administered Medications:  acetaminophen 650 mg Oral Q6H PRN Javon Manley MD    heparin (porcine) 5,000 Units Subcutaneous Q8H [address], MD    ipratropium 0 5 mg Nebulization TID Javon Manley MD    levalbuterol 1 25 mg Nebulization TID Javon Manley MD    methylPREDNISolone sodium succinate 20 mg Intravenous Q12H [address] Airam Kerns PA-C    ondansetron 4 mg Intravenous Q6H PRN Javon Manley MD    pantoprazole 40 mg Intravenous Q24H [address], MD    promethazine 12 5 mg Intravenous Q6H PRN Javon Manley MD    sodium chloride 100 mL/hr Intravenous Continuous Javon Manley MD Last Rate: 100 mL/hr (03/05/18 1845)   tamsulosin 0 4 mg Oral Daily With Amberly Hernandez MD      Continuous Infusions:   sodium chloride 100 mL/hr Last Rate: 100 mL/hr (03/05/18 1845)     PRN Meds:   acetaminophen    ondansetron    promethazine      Na 125--130--133  Urine osmo 135  Serum osmo 272    Pt/ot  resp protocol  Consult nephrology  Reg diet on 3/5  Cortisol level

## 2018-03-08 ENCOUNTER — TELEPHONE (OUTPATIENT)
Dept: FAMILY MEDICINE CLINIC | Facility: CLINIC | Age: 63
End: 2018-03-08

## 2018-03-09 ENCOUNTER — TELEPHONE (OUTPATIENT)
Dept: NEPHROLOGY | Facility: CLINIC | Age: 63
End: 2018-03-09

## 2018-03-09 NOTE — TELEPHONE ENCOUNTER
I called to schedule an appointment for Magalie Schroeder  He already had an appointment scheduled with Dr Amalia Rodriguez  However, he wanted to cancel and refused to reschedule  I explained to him the reason for seeing one of the doctors, and he stated "If I have a problem, I will call you"

## 2018-03-09 NOTE — TELEPHONE ENCOUNTER
----- Message from Marisel Adame DO sent at 3/8/2018  3:28 PM EST -----  [facility] schedule this patient to follow-up with iglesia and then me for hyponatremia

## 2018-03-14 ENCOUNTER — HOSPITAL ENCOUNTER (OUTPATIENT)
Dept: PULMONOLOGY | Facility: HOSPITAL | Age: 63
Discharge: HOME | End: 2018-03-14
Attending: FAMILY MEDICINE
Payer: COMMERCIAL

## 2018-03-14 DIAGNOSIS — R05.3 CHRONIC COUGH: ICD-10-CM

## 2018-03-14 DIAGNOSIS — R06.00 DOE (DYSPNEA ON EXERTION): ICD-10-CM

## 2018-03-14 PROCEDURE — 94060 EVALUATION OF WHEEZING: CPT

## 2018-03-14 PROCEDURE — 94760 N-INVAS EAR/PLS OXIMETRY 1: CPT

## 2018-03-14 PROCEDURE — 94010 BREATHING CAPACITY TEST: CPT | Performed by: INTERNAL MEDICINE

## 2018-03-14 RX ORDER — ALBUTEROL SULFATE 2.5 MG/3ML
2.5 SOLUTION RESPIRATORY (INHALATION) EVERY 6 HOURS PRN
Status: DISCONTINUED | OUTPATIENT
Start: 2018-03-14 | End: 2018-03-18 | Stop reason: HOSPADM

## 2018-03-14 RX ADMIN — ALBUTEROL SULFATE 2.5 MG: 2.5 SOLUTION RESPIRATORY (INHALATION) at 10:51

## 2018-03-30 ENCOUNTER — OFFICE VISIT (OUTPATIENT)
Dept: PULMONOLOGY | Facility: HOSPITAL | Age: 63
End: 2018-03-30
Payer: COMMERCIAL

## 2018-03-30 ENCOUNTER — TRANSCRIBE ORDERS (OUTPATIENT)
Dept: ADMINISTRATIVE | Facility: HOSPITAL | Age: 63
End: 2018-03-30

## 2018-03-30 VITALS
WEIGHT: 266 LBS | RESPIRATION RATE: 14 BRPM | HEART RATE: 91 BPM | HEIGHT: 72 IN | OXYGEN SATURATION: 95 % | DIASTOLIC BLOOD PRESSURE: 70 MMHG | SYSTOLIC BLOOD PRESSURE: 118 MMHG | TEMPERATURE: 97.9 F | BODY MASS INDEX: 36.03 KG/M2

## 2018-03-30 DIAGNOSIS — J45.40 MODERATE PERSISTENT ASTHMA WITHOUT COMPLICATION: Primary | ICD-10-CM

## 2018-03-30 DIAGNOSIS — R06.00 DOE (DYSPNEA ON EXERTION): ICD-10-CM

## 2018-03-30 PROCEDURE — 99244 OFF/OP CNSLTJ NEW/EST MOD 40: CPT | Performed by: INTERNAL MEDICINE

## 2018-03-30 NOTE — PATIENT INSTRUCTIONS
Asthma   WHAT YOU NEED TO KNOW:   Asthma is a lung disease that makes breathing difficult  Chronic inflammation and reactions to triggers narrow the airways in the lungs  Asthma can become life-threatening if it is not managed  DISCHARGE INSTRUCTIONS:   Return to the emergency department if:   · You have severe shortness of breath  · Your lips or nails turn blue or gray  · The skin around your neck and ribs pulls in with each breath  · You have shortness of breath, even after you take your short-term medicine as directed  · Your peak flow numbers are in the red zone of your AAP  Contact your healthcare provider if:   · You run out of medicine before your next refill is due  · Your symptoms get worse  · You need to take more medicine than usual to control your symptoms  · You have questions or concerns about your condition or care  Medicines:   · Medicines  decrease inflammation, open airways, and make it easier to breathe  Medicines may be inhaled, taken as a pill, or injected  Short-term medicines relieve your symptoms quickly  Long-term medicines are used to prevent future attacks  You may also need medicine to help control your allergies  Ask your healthcare provider for more information about the medicine you are given and how to take it safely  · Take your medicine as directed  Contact your healthcare provider if you think your medicine is not helping or if you have side effects  Tell him of her if you are allergic to any medicine  Keep a list of the medicines, vitamins, and herbs you take  Include the amounts, and when and why you take them  Bring the list or the pill bottles to follow-up visits  Carry your medicine list with you in case of an emergency  Follow up with your healthcare provider as directed: You will need to return to make sure your medicine is working and your symptoms are controlled   You may be referred to an asthma specialist  Betsy Dee may be asked to keep a record of your peak flow values and bring it with you to your appointments  Write down your questions so you remember to ask them during your visits  Manage your symptoms and prevent future attacks:   · Follow your Asthma Action Plan (AAP)  This is a written plan that you and your healthcare provider create  It explains which medicine you need and when to change doses if necessary  It also explains how you can monitor symptoms and use a peak flow meter  The meter measures how well your lungs are working  · Manage other health conditions , such as allergies, acid reflux, and sleep apnea  · Identify and avoid triggers  These may include pets, dust mites, mold, and cockroaches  · Do not smoke or be around others who smoke  Nicotine and other chemicals in cigarettes and cigars can cause lung damage  Ask your healthcare provider for information if you currently smoke and need help to quit  E-cigarettes or smokeless tobacco still contain nicotine  Talk to your healthcare provider before you use these products  · Ask about the flu vaccine  The flu can make your asthma worse  You may need a yearly flu shot  © 2017 [facility] Information is for End User's use only and may not be sold, redistributed or otherwise used for commercial purposes  All illustrations and images included in CareNotes® are the copyrighted property of A D A M , Inc  or Arnaldo Valdez  The above information is an  only  It is not intended as medical advice for individual conditions or treatments  Talk to your doctor, nurse or pharmacist before following any medical regimen to see if it is safe and effective for you

## 2018-03-30 NOTE — LETTER
March 30, 2018     MD Regine [id number]    Patient: Jenna Khan   YOB: 1955   Date of Visit: 3/30/2018       Dear Dr Farzaneh Palafox:    Thank you for referring Jenna Khan to me for evaluation  Below are my notes for this consultation  If you have questions, please do not hesitate to call me  I look forward to following your patient along with you  Sincerely,        Nasir Little DO        CC: No Recipients  Nasir Little DO  3/30/2018 12:55 PM  Sign at close encounter  Pulmonary Consultation   Jenna Khan 58 y o  male MRN: [id number]      Reason for consultation: Shortness of breath    Requesting physician: Dr Farzaneh Palafox    Assessment/Plan  59 y/o M with PMHx of BPH, Hay fever, alcohol abuse and obesity who comes in for evaluation of dyspnea  1   Dyspnea - multifactorial   He has severe obstructive lung disease, morbid obesity and may event have cardiac involvement (such as pulmonary hypertension) given alcohol history       -  Management of obstructive lung disease as below       -  I encouraged diet and weight loss as his obesity is likely contributing to restrictive lung disease       -  He will likely need a full cardiac work up and evaluation with echocardiogram as he may be at risk for pulmonary hypertension or diastolic dysfunction       -  At next visit will ask about sleepiness and consider sleep study    2  Severe obstructive lung disease with bronchodilator response  He most likely has asthma given response to symbicort but has a cat at home despite being allergic     However, he also smoked 30 pack years       -  Check CBC, IgE and [facility] allergy panel to assess for triggers and to consider use of xolair       -  I strongly encouraged him to get rid of his cat       -   Continue Symbicort 160 BID       -  Use Flonase and Zyrten everyday for 2 weeks to see if that will help symptoms       -  If flonase does not help significantly, I have given him samples of Spiriva to take to see if he feels better with that  History of Present Illness   HPI:  Rosalva Munguia is a 58 y o  male with PMHx as below who comes in for evaluation of dyspnea  IN December he had URI symptoms and then persistent cough occurred after that  Since then he has noted dyspnea and has note significantly improve  He states that his primary care performed a PFT and noted severe obstruction  He was placed on Symbicort and was sent over to be evaluated  He states that with the addition of the symbicort he has noted a significant improvement but still has dyspnea most of the time  He has been following his peak flows which have been 400 without symbicort and up to 450 - 500 with symbicort  He admits to having a cat in his house  In addition he has season variation of symptoms with hay fever  He does note some rhinitis and season variation to his symptoms  He also admitted that he is a 30 pack year smoker as well  The dyspnea is mostly with exertion but can occur at any time  It can occur up a flight of steps or up a slight hill  He sometimes even gets it on a flat surface  He does admit to a chronic cough that is dry  He had tried albuterol and xopenex both of which had given him severe nausea  ROS:   Review of Systems   Constitutional: Negative for appetite change and fever  HENT: Positive for sneezing and sore throat  Negative for ear pain, postnasal drip, rhinorrhea and trouble swallowing  Eyes: Negative for visual disturbance  Respiratory: Positive for cough, shortness of breath and wheezing  Cardiovascular: Negative for chest pain  Gastrointestinal: Positive for abdominal distention  Negative for blood in stool and diarrhea  Endocrine: Negative for cold intolerance and polyuria  Genitourinary: Negative for difficulty urinating and discharge  Musculoskeletal: Positive for myalgias     Skin: Negative for color change and rash  Allergic/Immunologic: Negative for environmental allergies  Neurological: Positive for headaches  Hematological: Negative for adenopathy  Does not bruise/bleed easily  Psychiatric/Behavioral: Negative for agitation  The patient is not nervous/anxious  Historical Information   Past Medical History:   Diagnosis Date    Migraine     Seasonal allergies      No past surgical history on file  Family History   Problem Relation Age of Onset    Hypertension Mother     Cancer Father      Social History     Social History    Marital status: /Civil Union     Spouse name: N/A    Number of children: N/A    Years of education: N/A     Occupational History    Not on file  Social History Main Topics    Smoking status: Former Smoker     Packs/day: 1 50     Years: 20 00     Types: Cigarettes     Quit date: 2/5/1993    Smokeless tobacco: Never Used    Alcohol use Yes      Comment: socially    Drug use: No    Sexual activity: No     Other Topics Concern    Not on file     Social History Narrative    No narrative on file       Occupational History:     Meds/Allergies   Allergies   Allergen Reactions    Eggs Or Egg-Derived Products Diarrhea     Pt states he becomes nauseated and hasd diarrhea    Diphenhydramine Hyperactivity and Hypertension     Category: INSOMNIA;        Home medications:  Prior to Admission medications    Medication Sig Start Date End Date Taking?  Authorizing Provider   budesonide-formoterol (SYMBICORT) 160-4 5 mcg/act inhaler Inhale 2 puffs 2 (two) times a day 3/6/18  Yes Warden Kal MD   Butalbital-APAP-Caffeine -40 MG per capsule TAKE ONE CAPSULE EVERY 4 HOURS AS NEEDED 11/10/17  Yes Historical Provider, MD   EPIPEN 2-PARUL 0 3 MG/0 3ML SOAJ Inject 0 3 mg into the shoulder, thigh, or buttocks As directed 11/10/17  Yes Historical Provider, MD   fluticasone (FLONASE) 50 mcg/act nasal spray INHALE 2 SPRAY DAILY IN [facility] NOSTRIL 11/11/17 Yes Historical Provider, MD   HYDROcodone-acetaminophen (XODOL) 7 5-300 MG per tablet TAKE 1 TABLET EVERY 4 TO 6 HOURS AS NEEDED FOR PAIN 11/10/17  Yes Historical Provider, MD   loratadine (CLARITIN) 10 mg tablet Take 10 mg by mouth daily 12/6/17  Yes Historical Provider, MD   promethazine-codeine (PHENERGAN WITH CODEINE) 6 25-10 mg/5 mL syrup TAKE 5 ML (1 TEASPOONFUL) EVERY 4 HOURS AS NEEDED 11/10/17  Yes Historical Provider, MD   levalbuterol (XOPENEX HFA) 45 mcg/act inhaler Inhale 1-2 puffs every 4 (four) hours as needed for wheezing 3/6/18   Evelia Santiago MD   tamsulosin (FLOMAX) 0 4 mg Take 1 capsule (0 4 mg total) by mouth daily with dinner for 30 days 2/23/18 3/25/18  Yi Aguayo MD       Vitals:   Blood pressure 118/70, pulse 91, temperature 97 9 °F (36 6 °C), resp  rate 14, height 6' (1 829 m), weight 121 kg (266 lb), SpO2 95 % , RA, Body mass index is 36 08 kg/m²  Physical Exam  General: Morbidly obese, Awake alert and oriented x 3, conversant without conversational dyspnea, NAD, normal affect  HEENT:  PERRL, Sclera noninjected, nonicteric OU, Nares patent,  no craniofacial abnormalities, Mucous membranes, moist, no oral lesions, normal dentition  NECK: Trachea midline, no accessory muscle use, no stridor, no cervical or supraclavicular adenopathy, JVP not elevated  CARDIAC: Reg, single s1/S2, no m/r/g  PULM: Mild crackles in L lung base, otherwise lungs CTA, mild expiratory wheezing, no rhonchi  ABD: Obese, Normoactive bowel sounds, soft nontender, nondistended, no rebound, no rigidity, no guarding  EXT: No cyanosis, no clubbing, no edema, normal capillary refill  NEURO: no focal neurologic deficits, AAOx3, moving all extremities appropriately    Labs: I have personally reviewed pertinent lab results    Lab Results   Component Value Date    WBC 6 53 03/06/2018    HGB 13 1 03/06/2018    HCT 38 7 03/06/2018    MCV 84 03/06/2018     03/06/2018      Lab Results   Component Value Date GLUCOSE 121 03/06/2018    CALCIUM 8 3 03/06/2018     (L) 03/06/2018    K 4 6 03/06/2018    CO2 24 03/06/2018     03/06/2018    BUN 13 03/06/2018    CREATININE 0 82 03/06/2018       PFTs:  The most recent pulmonary function tests were reviewed  Ashlee Pena - 3/14/18   Study Interpretation:   · Severe airflow limitation  · Significant improvement in airflow on vital capacity following the administration of bronchodilators      Imaging  I personally reviewed the images on the [facility] system pertinent to today's visit  CT chest 3/5/18  ICHEST     LUNGS:  Lungs are clear    There is no tracheal or endobronchial lesion      PLEURA:  Unremarkable      HEART/GREAT VESSELS:  Unremarkable for patient's age      MEDIASTINUM AND LIZABETH:  Unremarkable    DO Christopher Dumont  [facility]  Answers for HPI/ROS submitted by the patient on 3/29/2018   Primary symptoms  Do you have difficulty breathing?: Yes  Do you have a wet cough?: Yes  Chronicity: chronic  When did you first notice your symptoms?: more than 1 month ago  How often do your symptoms occur?: constantly  Since you first noticed this problem, how has it changed?: gradually worsening  Do you have shortness of breath that occurs with effort or exertion?: Yes  Do you have ear congestion?: No  Do you have heartburn?: No  Do you have fatigue?: Yes  Do you have nasal congestion?: No  Do you have shortness of breath when lying flat?: No  Do you have shortness of breath when you wake up?: No  Do you have sweats?: No  Have you experienced weight loss?: No  Which of the following makes your symptoms worse?: animal exposure, any activity, climbing stairs, exercise, minimal activity  Which of the following makes your symptoms better?: beta-agonist, prescription cough suppressant, steroid inhaler  Risk factors for lung disease: smoking/tobacco exposure, travel

## 2018-03-30 NOTE — PROGRESS NOTES
Pulmonary Consultation   Alycia Demarco 58 y o  male MRN: [id number]      Reason for consultation: Shortness of breath    Requesting physician: Dr Juan M Pritchard    Assessment/Plan  57 y/o M with PMHx of BPH, Hay fever, alcohol abuse and obesity who comes in for evaluation of dyspnea  1   Dyspnea - multifactorial   He has severe obstructive lung disease, morbid obesity and may event have cardiac involvement (such as pulmonary hypertension) given alcohol history       -  Management of obstructive lung disease as below       -  I encouraged diet and weight loss as his obesity is likely contributing to restrictive lung disease       -  He will likely need a full cardiac work up and evaluation with echocardiogram as he may be at risk for pulmonary hypertension or diastolic dysfunction       -  At next visit will ask about sleepiness and consider sleep study    2  Severe obstructive lung disease with bronchodilator response  He most likely has asthma given response to symbicort but has a cat at home despite being allergic  However, he also smoked 30 pack years       -  Check CBC, IgE and [facility] allergy panel to assess for triggers and to consider use of xolair       -  I strongly encouraged him to get rid of his cat       -   Continue Symbicort 160 BID       -  Use Flonase and Zyrten everyday for 2 weeks to see if that will help symptoms       -  If flonase does not help significantly, I have given him samples of Spiriva to take to see if he feels better with that  History of Present Illness   HPI:  Alycia Demarco is a 58 y o  male with PMHx as below who comes in for evaluation of dyspnea  IN December he had URI symptoms and then persistent cough occurred after that  Since then he has noted dyspnea and has note significantly improve  He states that his primary care performed a PFT and noted severe obstruction  He was placed on Symbicort and was sent over to be evaluated    He states that with the addition of the symbicort he has noted a significant improvement but still has dyspnea most of the time  He has been following his peak flows which have been 400 without symbicort and up to 450 - 500 with symbicort  He admits to having a cat in his house  In addition he has season variation of symptoms with hay fever  He does note some rhinitis and season variation to his symptoms  He also admitted that he is a 30 pack year smoker as well  The dyspnea is mostly with exertion but can occur at any time  It can occur up a flight of steps or up a slight hill  He sometimes even gets it on a flat surface  He does admit to a chronic cough that is dry  He had tried albuterol and xopenex both of which had given him severe nausea  ROS:   Review of Systems   Constitutional: Negative for appetite change and fever  HENT: Positive for sneezing and sore throat  Negative for ear pain, postnasal drip, rhinorrhea and trouble swallowing  Eyes: Negative for visual disturbance  Respiratory: Positive for cough, shortness of breath and wheezing  Cardiovascular: Negative for chest pain  Gastrointestinal: Positive for abdominal distention  Negative for blood in stool and diarrhea  Endocrine: Negative for cold intolerance and polyuria  Genitourinary: Negative for difficulty urinating and discharge  Musculoskeletal: Positive for myalgias  Skin: Negative for color change and rash  Allergic/Immunologic: Negative for environmental allergies  Neurological: Positive for headaches  Hematological: Negative for adenopathy  Does not bruise/bleed easily  Psychiatric/Behavioral: Negative for agitation  The patient is not nervous/anxious  Historical Information   Past Medical History:   Diagnosis Date    Migraine     Seasonal allergies      No past surgical history on file    Family History   Problem Relation Age of Onset    Hypertension Mother     Cancer Father      Social History     Social History    Marital status: /Civil Union     Spouse name: N/A    Number of children: N/A    Years of education: N/A     Occupational History    Not on file  Social History Main Topics    Smoking status: Former Smoker     Packs/day: 1 50     Years: 20 00     Types: Cigarettes     Quit date: 2/5/1993    Smokeless tobacco: Never Used    Alcohol use Yes      Comment: socially    Drug use: No    Sexual activity: No     Other Topics Concern    Not on file     Social History Narrative    No narrative on file       Occupational History:     Meds/Allergies   Allergies   Allergen Reactions    Eggs Or Egg-Derived Products Diarrhea     Pt states he becomes nauseated and hasd diarrhea    Diphenhydramine Hyperactivity and Hypertension     Category: INSOMNIA;        Home medications:  Prior to Admission medications    Medication Sig Start Date End Date Taking?  Authorizing Provider   budesonide-formoterol (SYMBICORT) 160-4 5 mcg/act inhaler Inhale 2 puffs 2 (two) times a day 3/6/18  Yes Wilbert Hernández MD   Butalbital-APAP-Caffeine -40 MG per capsule TAKE ONE CAPSULE EVERY 4 HOURS AS NEEDED 11/10/17  Yes Historical Provider, MD   EPIPEN 2-PARUL 0 3 MG/0 3ML SOAJ Inject 0 3 mg into the shoulder, thigh, or buttocks As directed 11/10/17  Yes Historical Provider, MD   fluticasone (FLONASE) 50 mcg/act nasal spray INHALE 2 SPRAY DAILY IN EACH NOSTRIL 11/11/17  Yes Historical Provider, MD   HYDROcodone-acetaminophen ([place] [facility]) 7 5-300 MG per tablet TAKE 1 TABLET EVERY 4 TO 6 HOURS AS NEEDED FOR PAIN 11/10/17  Yes Historical Provider, MD   loratadine (CLARITIN) 10 mg tablet Take 10 mg by mouth daily 12/6/17  Yes Historical Provider, MD   promethazine-codeine (PHENERGAN WITH CODEINE) 6 25-10 mg/5 mL syrup TAKE 5 ML (1 TEASPOONFUL) EVERY 4 HOURS AS NEEDED 11/10/17  Yes Historical Provider, MD   levalbuterol (XOPENEX HFA) 45 mcg/act inhaler Inhale 1-2 puffs every 4 (four) hours as needed for wheezing 3/6/18 Cynthia Barajas MD   tamsulosin (FLOMAX) 0 4 mg Take 1 capsule (0 4 mg total) by mouth daily with dinner for 30 days 2/23/18 3/25/18  Dyan Henry MD       Vitals:   Blood pressure 118/70, pulse 91, temperature 97 9 °F (36 6 °C), resp  rate 14, height 6' (1 829 m), weight 121 kg (266 lb), SpO2 95 % , RA, Body mass index is 36 08 kg/m²  Physical Exam  General: Morbidly obese, Awake alert and oriented x 3, conversant without conversational dyspnea, NAD, normal affect  HEENT:  PERRL, Sclera noninjected, nonicteric OU, Nares patent,  no craniofacial abnormalities, Mucous membranes, moist, no oral lesions, normal dentition  NECK: Trachea midline, no accessory muscle use, no stridor, no cervical or supraclavicular adenopathy, JVP not elevated  CARDIAC: Reg, single s1/S2, no m/r/g  PULM: Mild crackles in L lung base, otherwise lungs CTA, mild expiratory wheezing, no rhonchi  ABD: Obese, Normoactive bowel sounds, soft nontender, nondistended, no rebound, no rigidity, no guarding  EXT: No cyanosis, no clubbing, no edema, normal capillary refill  NEURO: no focal neurologic deficits, AAOx3, moving all extremities appropriately    Labs: I have personally reviewed pertinent lab results  Lab Results   Component Value Date    WBC 6 53 03/06/2018    HGB 13 1 03/06/2018    HCT 38 7 03/06/2018    MCV 84 03/06/2018     03/06/2018      Lab Results   Component Value Date    GLUCOSE 121 03/06/2018    CALCIUM 8 3 03/06/2018     (L) 03/06/2018    K 4 6 03/06/2018    CO2 24 03/06/2018     03/06/2018    BUN 13 03/06/2018    CREATININE 0 82 03/06/2018       PFTs:  The most recent pulmonary function tests were reviewed  Sima Rueda - 3/14/18   Study Interpretation:   · Severe airflow limitation    · Significant improvement in airflow on vital capacity following the administration of bronchodilators      Imaging  I personally reviewed the images on the [facility] system pertinent to today's visit  CT chest 3/5/18  ICHEST     LUNGS:  Lungs are clear    There is no tracheal or endobronchial lesion      PLEURA:  Unremarkable      HEART/GREAT VESSELS:  Unremarkable for patient's age      MEDIASTINUM AND LIZABETH:  Unremarkable    Claudeen Berry, DO [facility]  Answers for HPI/ROS submitted by the patient on 3/29/2018   Primary symptoms  Do you have difficulty breathing?: Yes  Do you have a wet cough?: Yes  Chronicity: chronic  When did you first notice your symptoms?: more than 1 month ago  How often do your symptoms occur?: constantly  Since you first noticed this problem, how has it changed?: gradually worsening  Do you have shortness of breath that occurs with effort or exertion?: Yes  Do you have ear congestion?: No  Do you have heartburn?: No  Do you have fatigue?: Yes  Do you have nasal congestion?: No  Do you have shortness of breath when lying flat?: No  Do you have shortness of breath when you wake up?: No  Do you have sweats?: No  Have you experienced weight loss?: No  Which of the following makes your symptoms worse?: animal exposure, any activity, climbing stairs, exercise, minimal activity  Which of the following makes your symptoms better?: beta-agonist, prescription cough suppressant, steroid inhaler  Risk factors for lung disease: smoking/tobacco exposure, travel

## 2018-04-05 ENCOUNTER — OFFICE VISIT (OUTPATIENT)
Dept: FAMILY MEDICINE CLINIC | Facility: HOSPITAL | Age: 63
End: 2018-04-05
Payer: COMMERCIAL

## 2018-04-05 VITALS
HEART RATE: 70 BPM | TEMPERATURE: 96.2 F | SYSTOLIC BLOOD PRESSURE: 108 MMHG | WEIGHT: 261.5 LBS | RESPIRATION RATE: 14 BRPM | HEIGHT: 73 IN | OXYGEN SATURATION: 94 % | BODY MASS INDEX: 34.66 KG/M2 | DIASTOLIC BLOOD PRESSURE: 80 MMHG

## 2018-04-05 DIAGNOSIS — R06.00 DYSPNEA ON EXERTION: Primary | ICD-10-CM

## 2018-04-05 DIAGNOSIS — J45.909 ACUTE ASTHMATIC BRONCHITIS: ICD-10-CM

## 2018-04-05 DIAGNOSIS — E87.1 HYPONATREMIA: ICD-10-CM

## 2018-04-05 PROBLEM — J45.40 MODERATE PERSISTENT ASTHMA WITHOUT COMPLICATION: Status: ACTIVE | Noted: 2018-04-05

## 2018-04-05 PROBLEM — J30.81 CHRONIC ALLERGIC RHINITIS DUE TO ANIMAL HAIR AND DANDER: Status: ACTIVE | Noted: 2018-04-05

## 2018-04-05 PROCEDURE — 99214 OFFICE O/P EST MOD 30 MIN: CPT | Performed by: INTERNAL MEDICINE

## 2018-04-05 RX ORDER — BUDESONIDE AND FORMOTEROL FUMARATE DIHYDRATE 160; 4.5 UG/1; UG/1
2 AEROSOL RESPIRATORY (INHALATION) 2 TIMES DAILY
Qty: 1 INHALER | Refills: 5 | Status: SHIPPED | OUTPATIENT
Start: 2018-04-05 | End: 2018-07-27

## 2018-04-05 NOTE — PROGRESS NOTES
Assessment/Plan:    Chronic allergic rhinitis due to animal hair and dander  Stable on flonase, claritin    Benign prostatic hyperplasia with nocturia  Stable flomax    Moderate persistent asthma without complication  Feels continuous improvement on symbicort  Handheld peak flows at home are 500-550 these days       Diagnoses and all orders for this visit:    Dyspnea on exertion  -     Echo complete with contrast if indicated; Future    Acute asthmatic bronchitis  -     budesonide-formoterol (SYMBICORT) 160-4 5 mcg/act inhaler; Inhale 2 puffs 2 (two) times a day    Hyponatremia  -     Basic metabolic panel; Future      Will rule out cardiac causes of dyspnea which I doubt  Since PFTs were done in the setting of respiratory infection I think they should be repeated to document if severe obstruction is resolving or persisting, will ask Dr Katelyn Schmid to address this    Pt's Na in the hospital was 133, will recheck this    Subjective:      Patient ID: Hernandez Suarez is a 58 y o  male  Asthma   He complains of shortness of breath  There is no cough, hemoptysis or sputum production  This is a new problem  The current episode started more than 1 month ago  The problem has been gradually improving  Pertinent negatives include no chest pain, fever, headaches, myalgias, PND or sore throat  His symptoms are aggravated by exercise and animal exposure  His symptoms are alleviated by beta-agonist  He reports significant improvement on treatment  His past medical history is significant for asthma  Shortness of Breath   This is a new problem  The current episode started more than 1 month ago  The problem occurs intermittently  The problem has been rapidly improving  Pertinent negatives include no abdominal pain, chest pain, claudication, coryza, fever, headaches, hemoptysis, leg pain, leg swelling, orthopnea, PND, rash, sore throat or sputum production  The symptoms are aggravated by exercise and animal exposure   He has tried beta agonist inhalers for the symptoms  The treatment provided significant relief  His past medical history is significant for asthma  The following portions of the patient's history were reviewed and updated as appropriate: current medications, past family history, past medical history, past social history, past surgical history and problem list     Review of Systems   Constitutional: Negative for fever  HENT: Negative for congestion and sore throat  Eyes: Negative for visual disturbance  Respiratory: Positive for shortness of breath  Negative for cough, hemoptysis and sputum production  Cardiovascular: Negative for chest pain, palpitations, orthopnea, claudication, leg swelling and PND  Gastrointestinal: Negative for abdominal pain, blood in stool and diarrhea  Endocrine: Negative for polydipsia and polyphagia  Genitourinary: Negative for difficulty urinating and dysuria  Musculoskeletal: Negative for joint swelling, myalgias and neck stiffness  Skin: Negative for rash  Neurological: Negative for weakness, numbness and headaches  Hematological: Negative for adenopathy  Psychiatric/Behavioral: Negative for dysphoric mood  All other systems reviewed and are negative  Objective:    /80   Pulse 70   Temp (!) 96 2 °F (35 7 °C) (Tympanic)   Resp 14   Ht 6' 1" (1 854 m)   Wt 119 kg (261 lb 8 oz)   SpO2 94%   BMI 34 50 kg/m²      Physical Exam   Constitutional: He is oriented to person, place, and time  He appears well-developed  No distress  HENT:   Head: Normocephalic  Mouth/Throat: Oropharynx is clear and moist    Eyes: Conjunctivae are normal    Neck: Neck supple  Cardiovascular: Normal rate and regular rhythm  Pulmonary/Chest: Effort normal  No respiratory distress  He has no wheezes  He has no rales  Abdominal: Soft  Bowel sounds are normal  He exhibits no distension  There is no tenderness  Musculoskeletal: He exhibits no tenderness     Lymphadenopathy: He has no cervical adenopathy  Neurological: He is alert and oriented to person, place, and time  No cranial nerve deficit  Skin: Skin is warm and dry  No rash noted  Psychiatric: He has a normal mood and affect  Vitals reviewed            Alyson Kaplan MD

## 2018-04-08 LAB
A ALTERNATA IGE QN: <0.1 KU/L
A FUMIGATUS IGE QN: <0.1 KU/L
BERMUDA GRASS IGE QN: 0.25 KU/L
BOXELDER IGE QN: <0.1 KU/L
C HERBARUM IGE QN: <0.1 KU/L
CAT DANDER IGE QN: 1.75 KU/L
CMN PIGWEED IGE QN: <0.1 KU/L
COMMON RAGWEED IGE QN: 0.53 KU/L
COTTONWOOD IGE QN: <0.1 KU/L
D FARINAE IGE QN: 5.68 KU/L
D PTERONYSS IGE QN: 6.41 KU/L
DOG DANDER IGE QN: 0.67 KU/L
IGE SERPL-ACNC: 104 IU/ML (ref 0–100)
LONDON PLANE IGE QN: 0.19 KU/L
Lab: ABNORMAL
MOUSE URINE PROT IGE QN: <0.1 KU/L
MT JUNIPER IGE QN: <0.1 KU/L
MUGWORT IGE QN: <0.1 KU/L
PENICILLIUM CHRYSOGENUM: <0.1 KU/L
ROACH IGE QN: <0.1 KU/L
SHEEP SORREL IGE QN: <0.1 KU/L
SILVER BIRCH IGE QN: 5.22 KU/L
TIMOTHY IGE QN: 5.91 KU/L
WALNUT IGE: <0.1 KU/L
WHITE ASH IGE QN: 1.32 KU/L
WHITE ELM IGE QN: <0.1 KU/L
WHITE MULBERRY IGE QN: <0.1 KU/L
WHITE OAK IGE QN: 3.27 KU/L

## 2018-04-10 NOTE — PROGRESS NOTES
Call patient:Someone ordered an allergy panel that shows that he is probably allergic to multiple trees in glasses in the area as well as CAT dander

## 2018-04-11 ENCOUNTER — TELEPHONE (OUTPATIENT)
Dept: FAMILY MEDICINE CLINIC | Facility: HOSPITAL | Age: 63
End: 2018-04-11

## 2018-04-12 ENCOUNTER — TELEPHONE (OUTPATIENT)
Dept: FAMILY MEDICINE CLINIC | Facility: HOSPITAL | Age: 63
End: 2018-04-12

## 2018-04-12 LAB
AMBIG ABBREV DEFAULT: NORMAL
BUN SERPL-MCNC: 16 MG/DL (ref 8–27)
BUN/CREAT SERPL: 17 (ref 10–24)
CALCIUM SERPL-MCNC: 8.9 MG/DL (ref 8.6–10.2)
CHLORIDE SERPL-SCNC: 102 MMOL/L (ref 96–106)
CO2 SERPL-SCNC: 24 MMOL/L (ref 18–29)
CREAT SERPL-MCNC: 0.94 MG/DL (ref 0.76–1.27)
GLUCOSE SERPL-MCNC: 88 MG/DL (ref 65–99)
POTASSIUM SERPL-SCNC: 4.6 MMOL/L (ref 3.5–5.2)
SL AMB EGFR AFRICAN AMERICAN: 99 ML/MIN/1.73
SL AMB EGFR NON AFRICAN AMERICAN: 85 ML/MIN/1.73
SODIUM SERPL-SCNC: 139 MMOL/L (ref 134–144)

## 2018-04-13 ENCOUNTER — TRANSCRIBE ORDERS (OUTPATIENT)
Dept: FAMILY MEDICINE CLINIC | Facility: CLINIC | Age: 63
End: 2018-04-13

## 2018-04-13 DIAGNOSIS — R94.2 ABNORMAL PFT: Primary | ICD-10-CM

## 2018-04-23 DIAGNOSIS — J44.9 OBSTRUCTIVE LUNG DISEASE (GENERALIZED) (HCC): Primary | ICD-10-CM

## 2018-04-23 NOTE — PROGRESS NOTES
I discussed the results of the allergy panel  I discussed different treatment options including continue spiriva vs  Adding or changing to singulair  He preferred to trial spiriva for 8 weeks first and then if not successful move toward singulair  He will continue flonase and zyrtec as well

## 2018-05-07 ENCOUNTER — HOSPITAL ENCOUNTER (OUTPATIENT)
Dept: NON INVASIVE DIAGNOSTICS | Facility: CLINIC | Age: 63
Discharge: HOME | End: 2018-05-07
Payer: COMMERCIAL

## 2018-05-07 DIAGNOSIS — R06.00 DYSPNEA ON EXERTION: ICD-10-CM

## 2018-05-07 PROCEDURE — 93306 TTE W/DOPPLER COMPLETE: CPT | Performed by: INTERNAL MEDICINE

## 2018-05-07 PROCEDURE — 93306 TTE W/DOPPLER COMPLETE: CPT

## 2018-05-15 DIAGNOSIS — J45.909 ACUTE ASTHMATIC BRONCHITIS: ICD-10-CM

## 2018-05-15 DIAGNOSIS — J40 BRONCHITIS: Primary | ICD-10-CM

## 2018-05-15 DIAGNOSIS — J30.9 ALLERGIC RHINITIS, UNSPECIFIED SEASONALITY, UNSPECIFIED TRIGGER: ICD-10-CM

## 2018-05-15 RX ORDER — CETIRIZINE HYDROCHLORIDE 10 MG/1
10 TABLET ORAL DAILY
Qty: 30 TABLET | Refills: 1 | Status: SHIPPED | OUTPATIENT
Start: 2018-05-15 | End: 2018-06-03

## 2018-05-15 RX ORDER — FLUTICASONE PROPIONATE 50 MCG
2 SPRAY, SUSPENSION (ML) NASAL DAILY
Qty: 16 G | Refills: 3 | Status: SHIPPED | OUTPATIENT
Start: 2018-05-15 | End: 2018-07-31 | Stop reason: SDUPTHER

## 2018-05-15 RX ORDER — GUAIFENESIN 1200 MG/1
TABLET, EXTENDED RELEASE ORAL
Qty: 14 EACH | Refills: 0 | Status: SHIPPED | OUTPATIENT
Start: 2018-05-15 | End: 2018-06-03

## 2018-05-15 RX ORDER — LEVALBUTEROL TARTRATE 45 UG/1
1-2 AEROSOL, METERED ORAL EVERY 4 HOURS PRN
Qty: 1 INHALER | Refills: 3 | Status: SHIPPED | OUTPATIENT
Start: 2018-05-15 | End: 2018-07-07

## 2018-06-03 ENCOUNTER — APPOINTMENT (EMERGENCY)
Dept: RADIOLOGY | Facility: HOSPITAL | Age: 63
DRG: 193 | End: 2018-06-03
Payer: COMMERCIAL

## 2018-06-03 ENCOUNTER — HOSPITAL ENCOUNTER (INPATIENT)
Facility: HOSPITAL | Age: 63
LOS: 4 days | Discharge: HOME | DRG: 193 | End: 2018-06-07
Attending: EMERGENCY MEDICINE | Admitting: INTERNAL MEDICINE
Payer: COMMERCIAL

## 2018-06-03 DIAGNOSIS — J44.9 CHRONIC OBSTRUCTIVE PULMONARY DISEASE, UNSPECIFIED COPD TYPE (HCC): ICD-10-CM

## 2018-06-03 DIAGNOSIS — J30.81 CHRONIC ALLERGIC RHINITIS DUE TO ANIMAL HAIR AND DANDER: ICD-10-CM

## 2018-06-03 DIAGNOSIS — J18.9 COMMUNITY ACQUIRED PNEUMONIA: Primary | ICD-10-CM

## 2018-06-03 DIAGNOSIS — R09.02 HYPOXEMIA: ICD-10-CM

## 2018-06-03 DIAGNOSIS — J45.40 MODERATE PERSISTENT ASTHMA WITHOUT COMPLICATION: ICD-10-CM

## 2018-06-03 DIAGNOSIS — A41.9 SEPSIS (HCC): ICD-10-CM

## 2018-06-03 DIAGNOSIS — J96.01 ACUTE RESPIRATORY FAILURE WITH HYPOXIA (HCC): ICD-10-CM

## 2018-06-03 DIAGNOSIS — J18.9 ATYPICAL PNEUMONIA: ICD-10-CM

## 2018-06-03 LAB
ALBUMIN SERPL BCP-MCNC: 2.4 G/DL (ref 3.5–5)
ALP SERPL-CCNC: 55 U/L (ref 46–116)
ALT SERPL W P-5'-P-CCNC: 176 U/L (ref 12–78)
ANION GAP SERPL CALCULATED.3IONS-SCNC: 10 MMOL/L (ref 4–13)
APTT PPP: 30 SECONDS (ref 24–36)
ARTERIAL PATENCY WRIST A: ABNORMAL
AST SERPL W P-5'-P-CCNC: 78 U/L (ref 5–45)
BASE EXCESS BLDA CALC-SCNC: -3 MMOL/L (ref -2–3)
BASOPHILS # BLD AUTO: 0.01 THOUSANDS/ΜL (ref 0–0.1)
BASOPHILS NFR BLD AUTO: 0 % (ref 0–1)
BILIRUB SERPL-MCNC: 0.3 MG/DL (ref 0.2–1)
BUN SERPL-MCNC: 21 MG/DL (ref 5–25)
CALCIUM SERPL-MCNC: 8.8 MG/DL (ref 8.3–10.1)
CHLORIDE SERPL-SCNC: 103 MMOL/L (ref 100–108)
CO2 SERPL-SCNC: 24 MMOL/L (ref 21–32)
CREAT SERPL-MCNC: 1.02 MG/DL (ref 0.6–1.3)
EOSINOPHIL # BLD AUTO: 0.33 THOUSAND/ΜL (ref 0–0.61)
EOSINOPHIL NFR BLD AUTO: 3 % (ref 0–6)
ERYTHROCYTE [DISTWIDTH] IN BLOOD BY AUTOMATED COUNT: 14.3 % (ref 11.6–15.1)
FIO2 GAS DIL.REBREATH: 40 L
GFR SERPL CREATININE-BSD FRML MDRD: 77 ML/MIN/1.73SQ M
GLUCOSE SERPL-MCNC: 114 MG/DL (ref 65–140)
HCO3 BLDA-SCNC: 20.3 MMOL/L (ref 22–28)
HCT VFR BLD AUTO: 41.1 % (ref 36.5–49.3)
HGB BLD-MCNC: 13.2 G/DL (ref 12–17)
IMM GRANULOCYTES # BLD AUTO: 0.06 THOUSAND/UL (ref 0–0.2)
IMM GRANULOCYTES NFR BLD AUTO: 1 % (ref 0–2)
INR PPP: 1.1 (ref 0.86–1.17)
LACTATE SERPL-SCNC: 1.2 MMOL/L (ref 0.5–2)
LYMPHOCYTES # BLD AUTO: 2.14 THOUSANDS/ΜL (ref 0.6–4.47)
LYMPHOCYTES NFR BLD AUTO: 21 % (ref 14–44)
MCH RBC QN AUTO: 27.6 PG (ref 26.8–34.3)
MCHC RBC AUTO-ENTMCNC: 32.1 G/DL (ref 31.4–37.4)
MCV RBC AUTO: 86 FL (ref 82–98)
MONOCYTES # BLD AUTO: 0.69 THOUSAND/ΜL (ref 0.17–1.22)
MONOCYTES NFR BLD AUTO: 7 % (ref 4–12)
NEUTROPHILS # BLD AUTO: 7.18 THOUSANDS/ΜL (ref 1.85–7.62)
NEUTS SEG NFR BLD AUTO: 68 % (ref 43–75)
NRBC BLD AUTO-RTO: 0 /100 WBCS
NT-PROBNP SERPL-MCNC: 237 PG/ML
PCO2 BLD: 21 MMOL/L (ref 21–32)
PCO2 BLD: 30.7 MM HG (ref 36–44)
PH BLD: 7.43 [PH] (ref 7.35–7.45)
PLATELET # BLD AUTO: 303 THOUSANDS/UL (ref 149–390)
PMV BLD AUTO: 8.9 FL (ref 8.9–12.7)
PO2 BLD: 92 MM HG (ref 75–129)
POTASSIUM SERPL-SCNC: 4 MMOL/L (ref 3.5–5.3)
PROT SERPL-MCNC: 7.8 G/DL (ref 6.4–8.2)
PROTHROMBIN TIME: 13.6 SECONDS (ref 11.8–14.2)
RBC # BLD AUTO: 4.78 MILLION/UL (ref 3.88–5.62)
SAMPLE SITE: ABNORMAL
SAO2 % BLD FROM PO2: 97 % (ref 95–98)
SODIUM SERPL-SCNC: 137 MMOL/L (ref 136–145)
SPECIMEN SOURCE: ABNORMAL
WBC # BLD AUTO: 10.41 THOUSAND/UL (ref 4.31–10.16)

## 2018-06-03 PROCEDURE — 83880 ASSAY OF NATRIURETIC PEPTIDE: CPT | Performed by: EMERGENCY MEDICINE

## 2018-06-03 PROCEDURE — 87040 BLOOD CULTURE FOR BACTERIA: CPT | Performed by: EMERGENCY MEDICINE

## 2018-06-03 PROCEDURE — 93005 ELECTROCARDIOGRAM TRACING: CPT

## 2018-06-03 PROCEDURE — 80053 COMPREHEN METABOLIC PANEL: CPT | Performed by: EMERGENCY MEDICINE

## 2018-06-03 PROCEDURE — 36600 WITHDRAWAL OF ARTERIAL BLOOD: CPT

## 2018-06-03 PROCEDURE — 82803 BLOOD GASES ANY COMBINATION: CPT

## 2018-06-03 PROCEDURE — 96375 TX/PRO/DX INJ NEW DRUG ADDON: CPT

## 2018-06-03 PROCEDURE — 85610 PROTHROMBIN TIME: CPT | Performed by: EMERGENCY MEDICINE

## 2018-06-03 PROCEDURE — 36415 COLL VENOUS BLD VENIPUNCTURE: CPT | Performed by: EMERGENCY MEDICINE

## 2018-06-03 PROCEDURE — 96367 TX/PROPH/DG ADDL SEQ IV INF: CPT

## 2018-06-03 PROCEDURE — 85730 THROMBOPLASTIN TIME PARTIAL: CPT | Performed by: EMERGENCY MEDICINE

## 2018-06-03 PROCEDURE — 83605 ASSAY OF LACTIC ACID: CPT | Performed by: EMERGENCY MEDICINE

## 2018-06-03 PROCEDURE — 71045 X-RAY EXAM CHEST 1 VIEW: CPT

## 2018-06-03 PROCEDURE — 96361 HYDRATE IV INFUSION ADD-ON: CPT

## 2018-06-03 PROCEDURE — 96365 THER/PROPH/DIAG IV INF INIT: CPT

## 2018-06-03 PROCEDURE — 87631 RESP VIRUS 3-5 TARGETS: CPT | Performed by: EMERGENCY MEDICINE

## 2018-06-03 PROCEDURE — 85025 COMPLETE CBC W/AUTO DIFF WBC: CPT | Performed by: EMERGENCY MEDICINE

## 2018-06-03 RX ORDER — TAMSULOSIN HYDROCHLORIDE 0.4 MG/1
CAPSULE ORAL
COMMUNITY
Start: 2018-04-16 | End: 2018-06-03

## 2018-06-03 RX ORDER — SODIUM CHLORIDE FOR INHALATION 0.9 %
VIAL, NEBULIZER (ML) INHALATION
Status: COMPLETED
Start: 2018-06-03 | End: 2018-06-03

## 2018-06-03 RX ORDER — LEVALBUTEROL 1.25 MG/.5ML
1.25 SOLUTION, CONCENTRATE RESPIRATORY (INHALATION) ONCE
Status: COMPLETED | OUTPATIENT
Start: 2018-06-03 | End: 2018-06-03

## 2018-06-03 RX ORDER — ALBUTEROL SULFATE 90 UG/1
2 AEROSOL, METERED RESPIRATORY (INHALATION) EVERY 6 HOURS PRN
COMMUNITY
End: 2018-06-07 | Stop reason: HOSPADM

## 2018-06-03 RX ORDER — METHYLPREDNISOLONE SODIUM SUCCINATE 125 MG/2ML
80 INJECTION, POWDER, LYOPHILIZED, FOR SOLUTION INTRAMUSCULAR; INTRAVENOUS ONCE
Status: COMPLETED | OUTPATIENT
Start: 2018-06-03 | End: 2018-06-03

## 2018-06-03 RX ORDER — 0.9 % SODIUM CHLORIDE 0.9 %
3 VIAL (ML) INJECTION AS NEEDED
Status: DISCONTINUED | OUTPATIENT
Start: 2018-06-03 | End: 2018-06-07 | Stop reason: HOSPADM

## 2018-06-03 RX ORDER — ACETAMINOPHEN 325 MG/1
650 TABLET ORAL ONCE
Status: COMPLETED | OUTPATIENT
Start: 2018-06-03 | End: 2018-06-03

## 2018-06-03 RX ADMIN — ISODIUM CHLORIDE 3 ML: 0.03 SOLUTION RESPIRATORY (INHALATION) at 19:46

## 2018-06-03 RX ADMIN — IPRATROPIUM BROMIDE 0.5 MG: 0.5 SOLUTION RESPIRATORY (INHALATION) at 19:46

## 2018-06-03 RX ADMIN — LEVALBUTEROL 1.25 MG: 1.25 SOLUTION, CONCENTRATE RESPIRATORY (INHALATION) at 19:46

## 2018-06-03 RX ADMIN — METHYLPREDNISOLONE SODIUM SUCCINATE 80 MG: 125 INJECTION, POWDER, FOR SOLUTION INTRAMUSCULAR; INTRAVENOUS at 20:14

## 2018-06-03 RX ADMIN — CEFTRIAXONE 1000 MG: 1 INJECTION, SOLUTION INTRAVENOUS at 21:06

## 2018-06-03 RX ADMIN — AZITHROMYCIN MONOHYDRATE 500 MG: 500 INJECTION, POWDER, LYOPHILIZED, FOR SOLUTION INTRAVENOUS at 21:46

## 2018-06-03 RX ADMIN — ACETAMINOPHEN 650 MG: 325 TABLET, FILM COATED ORAL at 19:44

## 2018-06-03 RX ADMIN — SODIUM CHLORIDE 1000 ML: 0.9 INJECTION, SOLUTION INTRAVENOUS at 19:34

## 2018-06-03 NOTE — ED PROVIDER NOTES
History  Chief Complaint   Patient presents with    Cough     Patient complains of coughing for 1 week  Does not feel asthma meds are working  Stopped taking spiriva, because did not improve peak flows  Denies chest pain     This 16-year-old man with history of asthma complains of increased dyspnea wheeze and productive cough for the past week  He had anorexia several days and diarrhea twice since yesterday  He had rigors and subjective fever last night and today  Patient denies hemoptysis chest pain bloody stool rash and recent foreign travel  He is hypoxemic here  He coughs frequently, especially when taking deep breaths for the pulmonary exam   Patient had an essentially normal echocardiogram May 7, 2018  Denies recent pedal edema and orthopnea  Prior to Admission Medications   Prescriptions Last Dose Informant Patient Reported? Taking?    EPIPEN 2-PARUL 0 3 MG/0 3ML SOAJ  Self Yes No   Sig: Inject 0 3 mg into the shoulder, thigh, or buttocks As directed   Tiotropium Bromide Monohydrate 2 5 MCG/ACT AERS   No No   Sig: Inhale 2 Act (5 mcg total) daily   albuterol (PROVENTIL HFA,VENTOLIN HFA) 90 mcg/act inhaler   Yes Yes   Sig: Inhale 2 puffs every 6 (six) hours as needed for wheezing   budesonide-formoterol (SYMBICORT) 160-4 5 mcg/act inhaler   No Yes   Sig: Inhale 2 puffs 2 (two) times a day   fluticasone (FLONASE) 50 mcg/act nasal spray   No Yes   Si sprays into each nostril daily   levalbuterol (XOPENEX HFA) 45 mcg/act inhaler   No No   Sig: Inhale 1-2 puffs every 4 (four) hours as needed for wheezing   tamsulosin (FLOMAX) 0 4 mg   No Yes   Sig: Take 1 capsule (0 4 mg total) by mouth daily with dinner for 30 days      Facility-Administered Medications: None       Past Medical History:   Diagnosis Date    Asthma     Chronic headaches     Constipation     Cough     Difficulty attaining erection     Dyspnea     Enlarged prostate     Hemorrhoids     Migraine     Seasonal allergies     Slow urinary stream     Wheezing        Past Surgical History:   Procedure Laterality Date    ABSCESS DRAINAGE         Family History   Problem Relation Age of Onset    Hypertension Mother     Cancer Father     Diabetes Maternal Uncle      I have reviewed and agree with the history as documented  Social History   Substance Use Topics    Smoking status: Former Smoker     Packs/day: 1 50     Years: 20 00     Types: Cigarettes     Quit date: 2/5/1993    Smokeless tobacco: Former User    Alcohol use Yes      Comment: socially        Review of Systems   Constitutional: Positive for appetite change, chills and fever  Negative for diaphoresis  HENT: Negative  Eyes: Negative  Respiratory: Positive for cough, shortness of breath and wheezing  Cardiovascular: Negative  Gastrointestinal: Positive for diarrhea and nausea  Negative for abdominal pain, blood in stool and vomiting  Endocrine: Negative  Genitourinary: Negative  Musculoskeletal: Negative  Skin: Negative  Allergic/Immunologic: Negative  Neurological: Negative  Hematological: Negative  Psychiatric/Behavioral: Negative  All other systems reviewed and are negative  Physical Exam  Physical Exam   Constitutional: He is oriented to person, place, and time  He appears well-developed and well-nourished  HENT:   Head: Normocephalic and atraumatic  Right Ear: External ear normal    Left Ear: External ear normal    Mouth/Throat: Oropharynx is clear and moist    Eyes: Conjunctivae and EOM are normal  Pupils are equal, round, and reactive to light  Neck: Normal range of motion  Neck supple  No JVD present  Cardiovascular: Regular rhythm, normal heart sounds and intact distal pulses  No murmur heard  Rapid heartbeat   Pulmonary/Chest: Effort normal  He has wheezes  He has no rales  He exhibits no tenderness  Patient coughs after each deep breath   Abdominal: Soft  Bowel sounds are normal  He exhibits no mass  There is no rebound and no guarding  Musculoskeletal: Normal range of motion  He exhibits no edema or tenderness  Lymphadenopathy:     He has no cervical adenopathy  Neurological: He is alert and oriented to person, place, and time  He has normal reflexes  No cranial nerve deficit  Coordination normal    Skin: Skin is warm and dry  Capillary refill takes less than 2 seconds  No rash noted  Psychiatric: He has a normal mood and affect  His behavior is normal    Nursing note and vitals reviewed        Vital Signs  ED Triage Vitals [06/03/18 1913]   Temperature Pulse Respirations Blood Pressure SpO2   (!) 102 8 °F (39 3 °C) (!) 124 (!) 28 131/72 (!) 87 %      Temp Source Heart Rate Source Patient Position - Orthostatic VS BP Location FiO2 (%)   Tympanic -- -- -- --      Pain Score       No Pain           Vitals:    06/03/18 2010 06/03/18 2025 06/03/18 2030 06/03/18 2100   BP: 116/58  115/63 104/57   Pulse: (!) 128 (!) 125 (!) 123 (!) 122       Visual Acuity      ED Medications  Medications   sodium chloride (PF) 0 9 % injection 3 mL (not administered)   azithromycin (ZITHROMAX) 500 mg in sodium chloride 0 9% 250mL IVPB 500 mg (500 mg Intravenous New Bag 6/3/18 2146)   sodium chloride 0 9 % bolus 1,000 mL (0 mL Intravenous Stopped 6/3/18 2034)   acetaminophen (TYLENOL) tablet 650 mg (650 mg Oral Given 6/3/18 1944)   levalbuterol (XOPENEX) inhalation solution 1 25 mg (1 25 mg Nebulization Given 6/3/18 1946)   ipratropium (ATROVENT) 0 02 % inhalation solution 0 5 mg (0 5 mg Nebulization Given 6/3/18 1946)   sodium chloride 0 9 % inhalation solution **AcuDose Override Pull** (3 mL  Given 6/3/18 1946)   methylPREDNISolone sodium succinate (Solu-MEDROL) injection 80 mg (80 mg Intravenous Given 6/3/18 2014)   cefTRIAXone (ROCEPHIN) IVPB (premix) 1,000 mg (1,000 mg Intravenous New Bag 6/3/18 2106)       Diagnostic Studies  Results Reviewed     Procedure Component Value Units Date/Time    POCT Blood Gas (G3+) [[id number]] (Abnormal) Collected:  06/03/18 2128    Lab Status:  Final result Updated:  06/03/18 2132     pH, Art i-STAT 7 428     pCO2, Art i-STAT 30 7 (L) mm HG      pO2, ART i-STAT 92 0 mm HG      BE, i-STAT -3 (L) mmol/L      HCO3, Art i-STAT 20 3 (L) mmol/L      CO2, i-STAT 21 mmol/L      O2 Sat, i-STAT 97 %      POC FIO2 40 L      Specimen Type ARTERIAL     SITE Right Radial     YARIEL TEST Postive Yariel Test    Influenza A/B and RSV by PCR (indicated for patients >2 mo of age) [[id number]] Collected:  06/03/18 2049    Lab Status: In process Specimen:  Nasopharyngeal from Nasopharyngeal Swab Updated:  06/03/18 2049    B-type natriuretic peptide [[id number]]  (Abnormal) Collected:  06/03/18 1937    Lab Status:  Final result Specimen:  Blood from Arm, Left Updated:  06/03/18 2040     NT-proBNP 237 (H) pg/mL     Comprehensive metabolic panel [[id number]]  (Abnormal) Collected:  06/03/18 1937    Lab Status:  Final result Specimen:  Blood from Arm, Left Updated:  06/03/18 2020     Sodium 137 mmol/L      Potassium 4 0 mmol/L      Chloride 103 mmol/L      CO2 24 mmol/L      Anion Gap 10 mmol/L      BUN 21 mg/dL      Creatinine 1 02 mg/dL      Glucose 114 mg/dL      Calcium 8 8 mg/dL      AST 78 (H) U/L       (H) U/L      Alkaline Phosphatase 55 U/L      Total Protein 7 8 g/dL      Albumin 2 4 (L) g/dL      Total Bilirubin 0 30 mg/dL      eGFR 77 ml/min/1 73sq m     Narrative:         National Kidney Disease Education Program recommendations are as follows:  GFR calculation is accurate only with a steady state creatinine  Chronic Kidney disease less than 60 ml/min/1 73 sq  meters  Kidney failure less than 15 ml/min/1 73 sq  meters  Lactic Acid x2 [[id number]]  (Normal) Collected:  06/03/18 1937    Lab Status:  Final result Specimen:  Blood from Arm, Left Updated:  06/03/18 2015     LACTIC ACID 1 2 mmol/L     Narrative:         Result may be elevated if tourniquet was used during collection      Protime-INR [[id number]]  (Normal) Collected:  06/03/18 1937    Lab Status:  Final result Specimen:  Blood from Arm, Left Updated:  06/03/18 2007     Protime 13 6 seconds      INR 1 10    APTT [[id number]]  (Normal) Collected:  06/03/18 1937    Lab Status:  Final result Specimen:  Blood from Arm, Left Updated:  06/03/18 2007     PTT 30 seconds     CBC and differential [[id number]]  (Abnormal) Collected:  06/03/18 1937    Lab Status:  Final result Specimen:  Blood from Arm, Left Updated:  06/03/18 1955     WBC 10 41 (H) Thousand/uL      RBC 4 78 Million/uL      Hemoglobin 13 2 g/dL      Hematocrit 41 1 %      MCV 86 fL      MCH 27 6 pg      MCHC 32 1 g/dL      RDW 14 3 %      MPV 8 9 fL      Platelets 652 Thousands/uL      nRBC 0 /100 WBCs      Neutrophils Relative 68 %      Immat GRANS % 1 %      Lymphocytes Relative 21 %      Monocytes Relative 7 %      Eosinophils Relative 3 %      Basophils Relative 0 %      Neutrophils Absolute 7 18 Thousands/µL      Immature Grans Absolute 0 06 Thousand/uL      Lymphocytes Absolute 2 14 Thousands/µL      Monocytes Absolute 0 69 Thousand/µL      Eosinophils Absolute 0 33 Thousand/µL      Basophils Absolute 0 01 Thousands/µL     Blood culture #1 [[id number]] Collected:  06/03/18 1937    Lab Status: In process Specimen:  Blood from Arm, Left Updated:  06/03/18 1953    Blood culture #2 [[id number]] Collected:  06/03/18 1937    Lab Status: In process Specimen:  Blood from Arm, Left Updated:  06/03/18 1953    Lactic Acid x2 [[id number]]     Lab Status:  No result Specimen:  Blood     UA w Reflex to Microscopic w Reflex to Culture [[id number]]     Lab Status:  No result Specimen:  Urine                  XR chest portable   ED Interpretation by Beulah Guadalupe DO (06/03 2018)   Bilateral interstitial markings in butterfly pattern rule out pulmonary edema      Final Result by Kyung Dudley MD (06/03 2042)      Interstitial airspace opacities in a perihilar and bronchovascular distribution suggesting atypical pneumonia  Workstation performed: [id number]                    Procedures  ECG 12 Lead Documentation  Date/Time: 6/3/2018 9:26 PM  Performed by: Bubba Eduardo  Authorized by: Bubba Eduardo     Previous ECG:     Previous ECG:  Unavailable  Interpretation:     Interpretation comment:  Sinus tachycardia  Rightward axis  Phone Contacts  ED Phone Contact    ED Course  ED Course as of Jun 03 2207   Micah Berumen Jun 03, 2018 2044   Cough has diminished and a air entry improved with nebulizer  Wheezing still noted generally  No rales  Flu swab performed  Antibiotics to be ordered    2049  Treatment begun  Case discussed with nurse practitioner  We will check an ABG before deciding whether he can be held in the emergency department tonight or needs transfer out because the hospital is full                                  [facility]  Number of Diagnoses or Management Options  Community acquired pneumonia: new and requires workup  Hypoxemia: new and requires workup  Sepsis [facility]): new and requires workup     Amount and/or Complexity of Data Reviewed  Clinical lab tests: ordered and reviewed  Tests in the radiology section of CPT®: ordered and reviewed  Obtain history from someone other than the patient: yes  Review and summarize past medical records: yes  Discuss the patient with other providers: yes  Independent visualization of images, tracings, or specimens: yes      CritCare Time    Disposition  Final diagnoses:   Community acquired pneumonia   Hypoxemia   Sepsis ([facility] [address] )     Time reflects when diagnosis was documented in both MDM as applicable and the Disposition within this note     Time User Action Codes Description Comment    6/3/2018 10:06 PM Tamsen Jung Add [J18 9] Community acquired pneumonia     6/3/2018 10:06 PM Tamsen Jung Add [R09 02] Hypoxemia     6/3/2018 10:06 PM [address] [A41 9] Sepsis [facility])       ED Disposition     ED Disposition Condition Comment    Admit  Case was discussed with NP and the patient's admission status was agreed to be Admission Status: inpatient status to the service of Dr Gurrola   Follow-up Information    None         Patient's Medications   Discharge Prescriptions    No medications on file     No discharge procedures on file      ED Provider  Electronically Signed by           Smooth Duggan DO  06/03/18 5364

## 2018-06-04 PROBLEM — J45.41 MODERATE PERSISTENT ASTHMA WITH ACUTE EXACERBATION: Status: ACTIVE | Noted: 2018-04-05

## 2018-06-04 PROBLEM — R74.01 TRANSAMINITIS: Status: ACTIVE | Noted: 2018-06-04

## 2018-06-04 LAB
ALBUMIN SERPL BCP-MCNC: 2.2 G/DL (ref 3.5–5)
ALP SERPL-CCNC: 51 U/L (ref 46–116)
ALT SERPL W P-5'-P-CCNC: 148 U/L (ref 12–78)
ANION GAP SERPL CALCULATED.3IONS-SCNC: 10 MMOL/L (ref 4–13)
AST SERPL W P-5'-P-CCNC: 62 U/L (ref 5–45)
BASOPHILS # BLD AUTO: 0.01 THOUSANDS/ΜL (ref 0–0.1)
BASOPHILS NFR BLD AUTO: 0 % (ref 0–1)
BILIRUB SERPL-MCNC: 0.3 MG/DL (ref 0.2–1)
BILIRUB UR QL STRIP: NEGATIVE
BUN SERPL-MCNC: 18 MG/DL (ref 5–25)
CALCIUM SERPL-MCNC: 8.5 MG/DL (ref 8.3–10.1)
CHLORIDE SERPL-SCNC: 109 MMOL/L (ref 100–108)
CLARITY UR: CLEAR
CO2 SERPL-SCNC: 22 MMOL/L (ref 21–32)
COLOR UR: YELLOW
CREAT SERPL-MCNC: 0.86 MG/DL (ref 0.6–1.3)
EOSINOPHIL # BLD AUTO: 0 THOUSAND/ΜL (ref 0–0.61)
EOSINOPHIL NFR BLD AUTO: 0 % (ref 0–6)
ERYTHROCYTE [DISTWIDTH] IN BLOOD BY AUTOMATED COUNT: 14.6 % (ref 11.6–15.1)
FLUAV AG SPEC QL: NORMAL
FLUBV AG SPEC QL: NORMAL
GFR SERPL CREATININE-BSD FRML MDRD: 92 ML/MIN/1.73SQ M
GLUCOSE SERPL-MCNC: 165 MG/DL (ref 65–140)
GLUCOSE UR STRIP-MCNC: NEGATIVE MG/DL
HCT VFR BLD AUTO: 39 % (ref 36.5–49.3)
HGB BLD-MCNC: 12.3 G/DL (ref 12–17)
HGB UR QL STRIP.AUTO: NEGATIVE
IMM GRANULOCYTES # BLD AUTO: 0.03 THOUSAND/UL (ref 0–0.2)
IMM GRANULOCYTES NFR BLD AUTO: 0 % (ref 0–2)
KETONES UR STRIP-MCNC: NEGATIVE MG/DL
L PNEUMO1 AG UR QL IA.RAPID: NEGATIVE
LEUKOCYTE ESTERASE UR QL STRIP: NEGATIVE
LYMPHOCYTES # BLD AUTO: 1.43 THOUSANDS/ΜL (ref 0.6–4.47)
LYMPHOCYTES NFR BLD AUTO: 21 % (ref 14–44)
MCH RBC QN AUTO: 27.5 PG (ref 26.8–34.3)
MCHC RBC AUTO-ENTMCNC: 31.5 G/DL (ref 31.4–37.4)
MCV RBC AUTO: 87 FL (ref 82–98)
MONOCYTES # BLD AUTO: 0.13 THOUSAND/ΜL (ref 0.17–1.22)
MONOCYTES NFR BLD AUTO: 2 % (ref 4–12)
NEUTROPHILS # BLD AUTO: 5.17 THOUSANDS/ΜL (ref 1.85–7.62)
NEUTS SEG NFR BLD AUTO: 77 % (ref 43–75)
NITRITE UR QL STRIP: NEGATIVE
NRBC BLD AUTO-RTO: 0 /100 WBCS
PH UR STRIP.AUTO: 5.5 [PH] (ref 4.5–8)
PLATELET # BLD AUTO: 282 THOUSANDS/UL (ref 149–390)
PMV BLD AUTO: 9 FL (ref 8.9–12.7)
POTASSIUM SERPL-SCNC: 4.6 MMOL/L (ref 3.5–5.3)
PROT SERPL-MCNC: 7.3 G/DL (ref 6.4–8.2)
PROT UR STRIP-MCNC: NEGATIVE MG/DL
RBC # BLD AUTO: 4.47 MILLION/UL (ref 3.88–5.62)
RSV B RNA SPEC QL NAA+PROBE: NORMAL
S PNEUM AG UR QL: NEGATIVE
SODIUM SERPL-SCNC: 141 MMOL/L (ref 136–145)
SP GR UR STRIP.AUTO: 1.02 (ref 1–1.03)
UROBILINOGEN UR QL STRIP.AUTO: 0.2 E.U./DL
WBC # BLD AUTO: 6.77 THOUSAND/UL (ref 4.31–10.16)

## 2018-06-04 PROCEDURE — 94640 AIRWAY INHALATION TREATMENT: CPT

## 2018-06-04 PROCEDURE — 85025 COMPLETE CBC W/AUTO DIFF WBC: CPT | Performed by: NURSE PRACTITIONER

## 2018-06-04 PROCEDURE — 87070 CULTURE OTHR SPECIMN AEROBIC: CPT | Performed by: NURSE PRACTITIONER

## 2018-06-04 PROCEDURE — 87449 NOS EACH ORGANISM AG IA: CPT | Performed by: NURSE PRACTITIONER

## 2018-06-04 PROCEDURE — 99254 IP/OBS CNSLTJ NEW/EST MOD 60: CPT | Performed by: INTERNAL MEDICINE

## 2018-06-04 PROCEDURE — 80053 COMPREHEN METABOLIC PANEL: CPT | Performed by: NURSE PRACTITIONER

## 2018-06-04 PROCEDURE — 99285 EMERGENCY DEPT VISIT HI MDM: CPT

## 2018-06-04 PROCEDURE — 99223 1ST HOSP IP/OBS HIGH 75: CPT | Performed by: NURSE PRACTITIONER

## 2018-06-04 PROCEDURE — 87205 SMEAR GRAM STAIN: CPT | Performed by: NURSE PRACTITIONER

## 2018-06-04 PROCEDURE — 81003 URINALYSIS AUTO W/O SCOPE: CPT | Performed by: EMERGENCY MEDICINE

## 2018-06-04 PROCEDURE — 94760 N-INVAS EAR/PLS OXIMETRY 1: CPT

## 2018-06-04 RX ORDER — TAMSULOSIN HYDROCHLORIDE 0.4 MG/1
0.4 CAPSULE ORAL ONCE
Status: COMPLETED | OUTPATIENT
Start: 2018-06-04 | End: 2018-06-04

## 2018-06-04 RX ORDER — ALBUTEROL SULFATE 90 UG/1
2 AEROSOL, METERED RESPIRATORY (INHALATION) EVERY 6 HOURS PRN
Status: DISCONTINUED | OUTPATIENT
Start: 2018-06-04 | End: 2018-06-07 | Stop reason: HOSPADM

## 2018-06-04 RX ORDER — ACETAMINOPHEN 325 MG/1
650 TABLET ORAL EVERY 6 HOURS PRN
Status: DISCONTINUED | OUTPATIENT
Start: 2018-06-04 | End: 2018-06-07 | Stop reason: HOSPADM

## 2018-06-04 RX ORDER — LEVALBUTEROL INHALATION SOLUTION 0.63 MG/3ML
0.63 SOLUTION RESPIRATORY (INHALATION)
Status: DISCONTINUED | OUTPATIENT
Start: 2018-06-04 | End: 2018-06-07 | Stop reason: HOSPADM

## 2018-06-04 RX ORDER — FLUTICASONE PROPIONATE 50 MCG
2 SPRAY, SUSPENSION (ML) NASAL DAILY
Status: DISCONTINUED | OUTPATIENT
Start: 2018-06-04 | End: 2018-06-07 | Stop reason: HOSPADM

## 2018-06-04 RX ORDER — SODIUM CHLORIDE 9 MG/ML
100 INJECTION, SOLUTION INTRAVENOUS CONTINUOUS
Status: DISCONTINUED | OUTPATIENT
Start: 2018-06-04 | End: 2018-06-04

## 2018-06-04 RX ORDER — GUAIFENESIN/DEXTROMETHORPHAN 100-10MG/5
10 SYRUP ORAL EVERY 4 HOURS PRN
Status: DISCONTINUED | OUTPATIENT
Start: 2018-06-04 | End: 2018-06-04

## 2018-06-04 RX ORDER — TAMSULOSIN HYDROCHLORIDE 0.4 MG/1
0.4 CAPSULE ORAL
Status: DISCONTINUED | OUTPATIENT
Start: 2018-06-04 | End: 2018-06-07 | Stop reason: HOSPADM

## 2018-06-04 RX ORDER — BUDESONIDE AND FORMOTEROL FUMARATE DIHYDRATE 160; 4.5 UG/1; UG/1
2 AEROSOL RESPIRATORY (INHALATION) 2 TIMES DAILY
Status: DISCONTINUED | OUTPATIENT
Start: 2018-06-04 | End: 2018-06-07 | Stop reason: HOSPADM

## 2018-06-04 RX ORDER — PREDNISONE 20 MG/1
40 TABLET ORAL
Status: DISCONTINUED | OUTPATIENT
Start: 2018-06-04 | End: 2018-06-06

## 2018-06-04 RX ORDER — HYDROCODONE POLISTIREX AND CHLORPHENIRAMINE POLISTIREX 10; 8 MG/5ML; MG/5ML
5 SUSPENSION, EXTENDED RELEASE ORAL EVERY 12 HOURS PRN
Status: DISCONTINUED | OUTPATIENT
Start: 2018-06-04 | End: 2018-06-05

## 2018-06-04 RX ADMIN — FLUTICASONE PROPIONATE 2 SPRAY: 50 SPRAY, METERED NASAL at 09:17

## 2018-06-04 RX ADMIN — BUDESONIDE AND FORMOTEROL FUMARATE DIHYDRATE 2 PUFF: 160; 4.5 AEROSOL RESPIRATORY (INHALATION) at 19:40

## 2018-06-04 RX ADMIN — IPRATROPIUM BROMIDE 0.5 MG: 0.5 SOLUTION RESPIRATORY (INHALATION) at 13:54

## 2018-06-04 RX ADMIN — BUDESONIDE AND FORMOTEROL FUMARATE DIHYDRATE 2 PUFF: 160; 4.5 AEROSOL RESPIRATORY (INHALATION) at 07:51

## 2018-06-04 RX ADMIN — GUAIFENESIN AND DEXTROMETHORPHAN 10 ML: 100; 10 SYRUP ORAL at 09:17

## 2018-06-04 RX ADMIN — TAMSULOSIN HYDROCHLORIDE 0.4 MG: 0.4 CAPSULE ORAL at 02:05

## 2018-06-04 RX ADMIN — HYDROCODONE POLISTIREX AND CHLORPHENIRAMINE POLISTIREX 5 ML: 10; 8 SUSPENSION, EXTENDED RELEASE ORAL at 23:17

## 2018-06-04 RX ADMIN — TAMSULOSIN HYDROCHLORIDE 0.4 MG: 0.4 CAPSULE ORAL at 17:22

## 2018-06-04 RX ADMIN — IPRATROPIUM BROMIDE 0.5 MG: 0.5 SOLUTION RESPIRATORY (INHALATION) at 19:41

## 2018-06-04 RX ADMIN — PREDNISONE 40 MG: 20 TABLET ORAL at 11:46

## 2018-06-04 RX ADMIN — LEVALBUTEROL HYDROCHLORIDE 0.63 MG: 0.63 SOLUTION RESPIRATORY (INHALATION) at 07:51

## 2018-06-04 RX ADMIN — AZITHROMYCIN MONOHYDRATE 500 MG: 500 INJECTION, POWDER, LYOPHILIZED, FOR SOLUTION INTRAVENOUS at 20:41

## 2018-06-04 RX ADMIN — GUAIFENESIN AND DEXTROMETHORPHAN 10 ML: 100; 10 SYRUP ORAL at 04:38

## 2018-06-04 RX ADMIN — CEFTRIAXONE 1000 MG: 1 INJECTION, SOLUTION INTRAVENOUS at 22:21

## 2018-06-04 RX ADMIN — LEVALBUTEROL HYDROCHLORIDE 0.63 MG: 0.63 SOLUTION RESPIRATORY (INHALATION) at 19:41

## 2018-06-04 RX ADMIN — LEVALBUTEROL HYDROCHLORIDE 0.63 MG: 0.63 SOLUTION RESPIRATORY (INHALATION) at 13:54

## 2018-06-04 RX ADMIN — IPRATROPIUM BROMIDE 0.5 MG: 0.5 SOLUTION RESPIRATORY (INHALATION) at 07:51

## 2018-06-04 RX ADMIN — ENOXAPARIN SODIUM 40 MG: 40 INJECTION SUBCUTANEOUS at 09:17

## 2018-06-04 RX ADMIN — SODIUM CHLORIDE 100 ML/HR: 0.9 INJECTION, SOLUTION INTRAVENOUS at 04:32

## 2018-06-04 NOTE — RESPIRATORY THERAPY NOTE
RT Protocol Note  Sandi Julien 58 y o  male MRN: [id number]  Unit/Bed#: [address] [place] [address] [id number] Encounter: [id number]    Assessment    Principal Problem:    Acute respiratory failure with hypoxia ([address] )  Active Problems:    Benign prostatic hyperplasia with nocturia    Chronic allergic rhinitis due to animal hair and dander    Moderate persistent asthma without complication    Atypical pneumonia    Transaminitis      Home Pulmonary Medications:  Pt on breathing inhalers at home       Past Medical History:   Diagnosis Date    Asthma     Chronic headaches     Constipation     Cough     Difficulty attaining erection     Dyspnea     Enlarged prostate     Hemorrhoids     Migraine     Seasonal allergies     Slow urinary stream     Wheezing      Social History     Social History    Marital status: /Civil Union     Spouse name: N/A    Number of children: N/A    Years of education: N/A     Social History Main Topics    Smoking status: Former Smoker     Packs/day: 1 50     Years: 20 00     Types: Cigarettes     Quit date: 2/5/1993    Smokeless tobacco: Former User    Alcohol use Yes      Comment: socially    Drug use: No    Sexual activity: No     Other Topics Concern    None     Social History Narrative    None       Subjective         Objective    Physical Exam:   Assessment Type: Assess only  Bilateral Breath Sounds: Diminished, Coarse  Cough: Non-productive    Vitals:  Blood pressure 116/78, pulse 80, temperature 97 9 °F (36 6 °C), temperature source Oral, resp  rate 20, height 6' (1 829 m), weight 118 kg (260 lb), SpO2 92 %  Results from last 7 days  Lab Units 06/03/18 2128   YARIEL TEST  Postive Yariel Test       Imaging and other studies: I have personally reviewed pertinent reports              Plan    Respiratory Plan: Mild Distress pathway

## 2018-06-04 NOTE — CONSULTS
Pulmonary Consultation   Rakesh Molina 58 y o  male MRN: [id number]  Unit/Bed#: [address] [place] [address] Encounter: [id number]      Reason for consultation:  Pneumonia, respiratory failure, asthma    Requesting physician: Ana Rai    Impressions/Recommendations:     · Asthma with probable COPD overlap with acute exacerbation -  given IV steroids yesterday  Continue prednisone 40 mg daily, tapering by 10 mg every 3 days  Continue Symbicort twice daily  Consider trial of Incruse Ellipta on hospital discharge  Patient felt that Spiriva was not beneficial   He has an appointment with Dr Marce Duong on 07/17/2018    · Acute bilateral community-acquired pneumonia - continue ceftriaxone and azithromycin  Follow up final culture data, urinary antigens and flu swab  He will need repeat chest x-ray in 4-6 weeks     · Acute hypoxemic respiratory failure - oxygenation improving  Room air saturations at rest are adequate  We will need to perform ambulatory pulse ox prior to discharge      History of Present Illness   HPI:  Rakesh Molina is a 58 y o  male who was admitted yesterday with worsening shortness of breath and fever  He was recently seen by my partner, Dr Marce Duong for dyspnea on exertion  Recent PFT showed severe obstruction with bronchodilator response, felt to be underlying asthma  He was started on Symbicort twice daily  He was also recently given samples of Spiriva, but did not find it beneficial   He discontinued the Spiriva about a week ago and noted increasing cough in the evening hours  He initially thought it was related to stopping the Spiriva, however he then developed worsening cough with post-tussive emesis, fevers and chills  He presented to the emergency department and room air saturations were noted to be 82% with a temperature of 102 8°  Chest x-ray demonstrated bilateral airspace opacities and he was started on ceftriaxone and azithromycin    He was also given intravenous steroids and nebulizer regimen  Today, he is feeling better  Review of systems:  (+) headache  No vision changes  Weight is stable  Denies sore throat or runny nose  (+) fever, chills, no sweats  Denies chest pain or palpitations  Denies nausea, vomiting, (+)diarrhea  Denies lower extremity edema  Denies skin rashes  Denies dysuria or hematuria  All other 12-point review of systems are negative      Historical Information   Past Medical History:   Diagnosis Date    Asthma     Chronic headaches     Constipation     Cough     Difficulty attaining erection     Dyspnea     Enlarged prostate     Hemorrhoids     Migraine     Seasonal allergies     Slow urinary stream     Wheezing      Past Surgical History:   Procedure Laterality Date    ABSCESS DRAINAGE       Family History   Problem Relation Age of Onset    Hypertension Mother     Cancer Father     Diabetes Maternal Uncle        Tobacco history:  Smoked 1 pack per day for 20 years, quit prior to 2000    Family history:  Negative from pulmonary standpoint    Meds/Allergies   Current Facility-Administered Medications   Medication Dose Route Frequency    acetaminophen (TYLENOL) tablet 650 mg  650 mg Oral Q6H PRN    albuterol (PROVENTIL HFA,VENTOLIN HFA) inhaler 2 puff  2 puff Inhalation Q6H PRN    [START ON 6/5/2018] cefTRIAXone (ROCEPHIN) IVPB (premix) 1,000 mg  1,000 mg Intravenous Q24H    And    [START ON 6/5/2018] azithromycin (ZITHROMAX) 500 mg in sodium chloride 0 9% 250mL IVPB 500 mg  500 mg Intravenous Q24H    budesonide-formoterol (SYMBICORT) 160-4 5 mcg/act inhaler 2 puff  2 puff Inhalation BID    dextromethorphan-guaiFENesin (ROBITUSSIN DM)  mg/5 mL oral syrup 10 mL  10 mL Oral Q4H PRN    enoxaparin (LOVENOX) subcutaneous injection 40 mg  40 mg Subcutaneous Daily    fluticasone (FLONASE) 50 mcg/act nasal spray 2 spray  2 spray Nasal Daily    ipratropium (ATROVENT) 0 02 % inhalation solution 0 5 mg  0 5 mg Nebulization TID    levalbuterol (XOPENEX) inhalation solution 0 63 mg  0 63 mg Nebulization TID    sodium chloride (PF) 0 9 % injection 3 mL  3 mL Intravenous PRN    sodium chloride 0 9 % infusion  100 mL/hr Intravenous Continuous    tamsulosin (FLOMAX) capsule 0 4 mg  0 4 mg Oral Daily With Dinner     Allergies   Allergen Reactions    Eggs Or Egg-Derived Products Diarrhea     Pt states he becomes nauseated and hasd diarrhea    Diphenhydramine Hyperactivity and Hypertension     Category: INSOMNIA;        Vitals: Blood pressure 127/79, pulse 78, temperature 97 8 °F (36 6 °C), temperature source Oral, resp  rate 18, height 6' (1 829 m), weight 117 kg (257 lb 4 4 oz), SpO2 93 %  , 2 L, 90% on room air, Body mass index is 34 89 kg/m²  Intake/Output Summary (Last 24 hours) at 06/04/18 0942  Last data filed at 06/03/18 2034   Gross per 24 hour   Intake             1000 ml   Output                0 ml   Net             1000 ml       Physical exam:    General Appearance:    Alert, cooperative, mild conversational dyspnea, no accessory muscle use       Head/eyes:    Normocephalic, without obvious abnormality, atraumatic,         PERRL, extraocular muscles intact, no scleral icterus    Nose:   Nares normal, septum midline, mucosa normal, no drainage    or sinus tenderness   Throat:   Moist mucous membranes, no thrush   Neck:   Supple, trachea midline, no adenopathy; no carotid    bruit or JVD   Lungs:     Scant crackles on the left, no wheeze   Chest Wall:    No tenderness or deformity    Heart:    Regular rate and rhythm, S1 and S2 normal, no murmur, rub   or gallop   Abdomen:     Soft, non-tender, bowel sounds active all four quadrants,     no masses, no organomegaly   Extremities:   Extremities normal, atraumatic, no cyanosis or edema   Skin:   Warm, dry, turgor normal, no rashes or lesions   Lymph nodes:   Cervical and supraclavicular nodes normal   Neurologic:   CNII-XII intact, normal strength, non-focal     Labs:  I have personally reviewed pertinent lab results  ABG done yesterday shows pH 7 42, pCO2 30, PO2 92 on 40% FiO2    Results from last 7 days  Lab Units 18  0544 18  1937   WBC Thousand/uL 6 77 10 41*   HEMOGLOBIN g/dL 12 3 13 2   HEMATOCRIT % 39 0 41 1   PLATELETS Thousands/uL 282 303           Results from last 7 days  Lab Units 18  0543 18  1937   SODIUM mmol/L 141 137   POTASSIUM mmol/L 4 6 4 0   CHLORIDE mmol/L 109* 103   CO2 mmol/L 22 24   BUN mg/dL 18 21   CREATININE mg/dL 0 86 1 02   CALCIUM mg/dL 8 5 8 8   TOTAL PROTEIN g/dL 7 3 7 8   BILIRUBIN TOTAL mg/dL 0 30 0 30   ALK PHOS U/L 51 55   ALT U/L 148* 176*   AST U/L 62* 78*   GLUCOSE RANDOM mg/dL 165* 114       Results from last 7 days  Lab Units 18  193   INR  1 10   PTT seconds 30           NT-    Imaging and other studies: I have personally reviewed pertinent films in PACS chest x-ray done yesterday shows perihilar airspace disease, left greater than right    Pulmonary function testin2018  FEV1/FVC ratio 60 %    FEV1 48% predicted  FVC 60% predicted  There is response to bronchodilators  Post bronchodilator FEV1 57% predicted    Code Status: Level 1 - Full Code    Thank you for allowing us to participate in the care of your patient      Marzena CosmeDO

## 2018-06-04 NOTE — ASSESSMENT & PLAN NOTE
No wheezing heard on exam   Continue Symbicort  Patient stopped taking Spiriva approximately 1 week ago due to the fact this  Will place inpatient consult to pulmonology

## 2018-06-04 NOTE — PLAN OF CARE
Problem: PAIN - ADULT  Goal: Verbalizes/displays adequate comfort level or baseline comfort level  Interventions:  - Encourage patient to monitor pain and request assistance  - Assess pain using appropriate pain scale  - Administer analgesics based on type and severity of pain and evaluate response  - Implement non-pharmacological measures as appropriate and evaluate response  - Consider cultural and social influences on pain and pain management  - Notify physician/advanced practitioner if interventions unsuccessful or patient reports new pain   Outcome: Progressing      Problem: INFECTION - ADULT  Goal: Absence or prevention of progression during hospitalization  INTERVENTIONS:  - Assess and monitor for signs and symptoms of infection  - Monitor lab/diagnostic results  - Monitor all insertion sites, i e  indwelling lines, tubes, and drains  - Monitor endotracheal (as able) and nasal secretions for changes in amount and color  - Oriental appropriate cooling/warming therapies per order  - Administer medications as ordered  - Instruct and encourage patient and family to use good hand hygiene technique  - Identify and instruct in appropriate isolation precautions for identified infection/condition   Outcome: Progressing      Problem: SAFETY ADULT  Goal: Patient will remain free of falls  INTERVENTIONS:  - Assess patient frequently for physical needs  -  Identify cognitive and physical deficits and behaviors that affect risk of falls    -  Oriental fall precautions as indicated by assessment   - Educate patient/family on patient safety including physical limitations  - Instruct patient to call for assistance with activity based on assessment  - Modify environment to reduce risk of injury  - Consider OT/PT consult to assist with strengthening/mobility   Outcome: Progressing    Goal: Maintain or return to baseline ADL function  INTERVENTIONS:  -  Assess patient's ability to carry out ADLs; assess patient's baseline for ADL function and identify physical deficits which impact ability to perform ADLs (bathing, care of mouth/teeth, toileting, grooming, dressing, etc )  - Assess/evaluate cause of self-care deficits   - Assess range of motion  - Assess patient's mobility; develop plan if impaired  - Assess patient's need for assistive devices and provide as appropriate  - Encourage maximum independence but intervene and supervise when necessary  ¯ Involve family in performance of ADLs  ¯ Assess for home care needs following discharge   ¯ Request OT consult to assist with ADL evaluation and planning for discharge  ¯ Provide patient education as appropriate   Outcome: Progressing    Goal: Maintain or return mobility status to optimal level  INTERVENTIONS:  - Assess patient's baseline mobility status (ambulation, transfers, stairs, etc )    - Identify cognitive and physical deficits and behaviors that affect mobility  - Identify mobility aids required to assist with transfers and/or ambulation (gait belt, sit-to-stand, lift, walker, cane, etc )  - [place] fall precautions as indicated by assessment  - Record patient progress and toleration of activity level on Mobility SBAR; progress patient to next Phase/Stage  - Instruct patient to call for assistance with activity based on assessment  - Request Rehabilitation consult to assist with strengthening/weightbearing, etc    Outcome: Progressing      Problem: DISCHARGE PLANNING  Goal: Discharge to home or other facility with appropriate resources  INTERVENTIONS:  - Identify barriers to discharge w/patient and caregiver  - Arrange for needed discharge resources and transportation as appropriate  - Identify discharge learning needs (meds, wound care, etc )  - Arrange for interpretive services to assist at discharge as needed  - Refer to Case Management Department for coordinating discharge planning if the patient needs post-hospital services based on physician/advanced practitioner order or complex needs related to functional status, cognitive ability, or social support system   Outcome: Progressing      Problem: Knowledge Deficit  Goal: Patient/family/caregiver demonstrates understanding of disease process, treatment plan, medications, and discharge instructions  Complete learning assessment and assess knowledge base    Interventions:  - Provide teaching at level of understanding  - Provide teaching via preferred learning methods   Outcome: Progressing      Problem: RESPIRATORY - ADULT  Goal: Achieves optimal ventilation and oxygenation  INTERVENTIONS:  - Assess for changes in respiratory status  - Assess for changes in mentation and behavior  - Position to facilitate oxygenation and minimize respiratory effort  - Oxygen administration by appropriate delivery method based on oxygen saturation (per order) or ABGs  - Initiate smoking cessation education as indicated  - Encourage broncho-pulmonary hygiene including cough, deep breathe, Incentive Spirometry  - Assess the need for suctioning and aspirate as needed  - Assess and instruct to report SOB or any respiratory difficulty  - Respiratory Therapy support as indicated   Outcome: Progressing

## 2018-06-04 NOTE — SOCIAL WORK
Met with patient  Explained role of care management  Patient lives with spouse in a two story home  No steps to enter  He is independent adl's and ambulation, drives, works  DME - denies  Past services - denies  Pharmacy of choice is [facility] in [facility]  He plans on returning home at discharge and does not anticipate any discharge needs  Will follow

## 2018-06-04 NOTE — H&P
H&P- Dorcus Days 1955, 58 y o  male MRN: [id number]    Unit/Bed#: [address] [place] [address] Encounter: [id number]    Primary Care Provider: Clint Velasco MD   Date and time admitted to hospital: 6/3/2018  7:08 PM        * Acute respiratory failure with hypoxia (HCC)   Assessment & Plan    SpO2 87%  Administer O2 to keep SpO2 greater than 92%  Atypical pneumonia   Assessment & Plan    CXR shows interstitial airspace opacities in a perihilar and bronchovascular distribution  Obtain sputum culture, urine Legionella, and strep pneumoniae  Continue ceftriaxone and azithromycin  Follow up on culture results and flu PCR  Place on Atrovent and Xopenex inhalers t i d   Incentive spirometry  Guaifenesin p r n  Moderate persistent asthma without complication   Assessment & Plan    No wheezing heard on exam   Continue Symbicort  Patient stopped taking Spiriva approximately 1 week ago due to the fact this  Will place inpatient consult to pulmonology  Chronic allergic rhinitis due to animal hair and dander   Assessment & Plan    Continue fluticasone nasal spray  Benign prostatic hyperplasia with nocturia   Assessment & Plan    Continue Flomax  Transaminitis   Assessment & Plan    Avoid hepatotoxic medications  Administer IV fluids for hydration  Recheck CMP in a m  VTE Prophylaxis: Enoxaparin (Lovenox)  / sequential compression device   Code Status:  Full code  POLST: There is no POLST form on file for this patient (pre-hospital)  Discussion with family:  No family present during admission    Anticipated Length of Stay:  Patient will be admitted on an Inpatient basis with an anticipated length of stay of  > 2 midnights  Justification for Hospital Stay:  IV antibiotics    Total Time for Visit, including Counseling / Coordination of Care: 30 minutes  Greater than 50% of this total time spent on direct patient counseling and coordination of care      Chief Complaint: Shortness of breath    History of Present Illness:    Malou Nova is a 58 y o  male with history of asthma who presents with shortness of breath and cough x1 week  Patient states he has frequent productive cough  He has been coughing so hard he had several episodes of vomiting  Patient complains of fevers and chills  No nausea but has vomited and had diarrhea  Denies chest pain or palpitations  Lab work shows WBC 10 41, AST 70    Chest x-ray shows atypical pneumonia  SpO2 82% on room air, heart rate in the 120s, T-max 102 8° on exam patient appears diaphoretic  Crackles heard to bilateral lung bases       Review of Systems:    Review of Systems   Constitutional: Positive for chills and fever  Negative for activity change and appetite change  HENT: Negative for congestion, postnasal drip and sneezing  Respiratory: Positive for cough and shortness of breath  Negative for apnea  Cardiovascular: Negative for chest pain, palpitations and leg swelling  Gastrointestinal: Positive for diarrhea and vomiting  Negative for abdominal distention, abdominal pain, constipation and nausea  Genitourinary: Negative for dysuria  Neurological: Negative for dizziness, seizures, facial asymmetry, weakness, light-headedness, numbness and headaches  Psychiatric/Behavioral: Negative for agitation and confusion  The patient is not nervous/anxious  All other systems reviewed and are negative  Past Medical and Surgical History:     Past Medical History:   Diagnosis Date    Asthma     Chronic headaches     Constipation     Cough     Difficulty attaining erection     Dyspnea     Enlarged prostate     Hemorrhoids     Migraine     Seasonal allergies     Slow urinary stream     Wheezing        Past Surgical History:   Procedure Laterality Date    ABSCESS DRAINAGE         Meds/Allergies:    Prior to Admission medications    Medication Sig Start Date End Date Taking?  Authorizing Provider albuterol (PROVENTIL HFA,VENTOLIN HFA) 90 mcg/act inhaler Inhale 2 puffs every 6 (six) hours as needed for wheezing   Yes Historical Provider, MD   budesonide-formoterol (SYMBICORT) 160-4 5 mcg/act inhaler Inhale 2 puffs 2 (two) times a day 4/5/18  Yes Elisabeth Parsons MD   fluticasone (FLONASE) 50 mcg/act nasal spray 2 sprays into each nostril daily 5/15/18  Yes Elisabeth Parsons MD   tamsulosin (FLOMAX) 0 4 mg Take 1 capsule (0 4 mg total) by mouth daily with dinner for 30 days 2/23/18 6/3/18 Yes Augusto Marcos MD   EPIPEN 2-PARUL 0 3 MG/0 3ML SOAJ Inject 0 3 mg into the shoulder, thigh, or buttocks As directed 11/10/17   Historical Provider, MD   levalbuterol Rosalynd Letona HFA) 45 mcg/act inhaler Inhale 1-2 puffs every 4 (four) hours as needed for wheezing 5/15/18   Elisabeth Parsons MD   Tiotropium Bromide Monohydrate 2 5 MCG/ACT AERS Inhale 2 Act (5 mcg total) daily 4/23/18   Aravind Jones DO     I have reviewed home medications with patient personally  Allergies:    Allergies   Allergen Reactions    Eggs Or Egg-Derived Products Diarrhea     Pt states he becomes nauseated and hasd diarrhea    Diphenhydramine Hyperactivity and Hypertension     Category: INSOMNIA;        Social History:     Marital Status: /Civil Union   Occupation:    Patient Pre-hospital Living Situation:  Wife  Patient Pre-hospital Level of Mobility:  Independent  Patient Pre-hospital Diet Restrictions:  None  Substance Use History:   History   Alcohol Use    Yes     Comment: socially     History   Smoking Status    Former Smoker    Packs/day: 1 50    Years: 20 00    Types: Cigarettes    Quit date: 2/5/1993   Smokeless Tobacco    Former User     History   Drug Use No       Family History:    non-contributory    Physical Exam:     Vitals:   Blood Pressure: 114/74 (06/04/18 0205)  Pulse: 77 (06/04/18 0300)  Temperature: (!) 96 8 °F (36 °C) (06/04/18 0100)  Temp Source: Temporal (06/04/18 0100)  Respirations: 20 (06/04/18 0300)  Height: 6' (182 9 cm) (06/03/18 1913)  Weight - Scale: 118 kg (260 lb) (06/03/18 1913)  SpO2: 93 % (06/04/18 0300)    Physical Exam   Constitutional: He is oriented to person, place, and time  He appears well-developed and well-nourished  He appears ill  No distress  Nasal cannula in place  HENT:   Head: Normocephalic and atraumatic  Eyes: EOM are normal  Pupils are equal, round, and reactive to light  Neck: Normal range of motion  Neck supple  Cardiovascular: Regular rhythm, normal heart sounds and intact distal pulses  Tachycardia present  Exam reveals no gallop and no friction rub  No murmur heard  Pulmonary/Chest: Effort normal  Tachypnea noted  No respiratory distress  He has no wheezes  He has rales  Abdominal: Soft  Bowel sounds are normal  He exhibits no distension  There is no tenderness  Musculoskeletal: Normal range of motion  He exhibits no edema  Neurological: He is alert and oriented to person, place, and time  Skin: Skin is warm and dry  Psychiatric: He has a normal mood and affect  Judgment normal    Nursing note and vitals reviewed  Additional Data:     Lab Results: I have personally reviewed pertinent reports  Results from last 7 days  Lab Units 06/03/18 1937   WBC Thousand/uL 10 41*   HEMOGLOBIN g/dL 13 2   HEMATOCRIT % 41 1   PLATELETS Thousands/uL 303   NEUTROS PCT % 68   LYMPHS PCT % 21   MONOS PCT % 7   EOS PCT % 3       Results from last 7 days  Lab Units 06/03/18 1937   SODIUM mmol/L 137   POTASSIUM mmol/L 4 0   CHLORIDE mmol/L 103   CO2 mmol/L 24   BUN mg/dL 21   CREATININE mg/dL 1 02   CALCIUM mg/dL 8 8   TOTAL PROTEIN g/dL 7 8   BILIRUBIN TOTAL mg/dL 0 30   ALK PHOS U/L 55   ALT U/L 176*   AST U/L 78*   GLUCOSE RANDOM mg/dL 114       Results from last 7 days  Lab Units 06/03/18 1937   INR  1 10               Imaging: I have personally reviewed pertinent reports     and I have personally reviewed pertinent films in PACS    XR chest portable   ED Interpretation by Tuan Mari DO (06/03 2018)   Bilateral interstitial markings in butterfly pattern rule out pulmonary edema      Final Result by Riccardo Larkin MD (06/03 2042)      Interstitial airspace opacities in a perihilar and bronchovascular distribution suggesting atypical pneumonia  Workstation performed: [id number]             EKG, Pathology, and Other Studies Reviewed on Admission:   · EKG:  Sinus tachycardia    Allscripts / Epic Records Reviewed: Yes     ** Please Note: This note has been constructed using a voice recognition system   **

## 2018-06-04 NOTE — ED NOTES
Pt ambulated to bathroom to void, pulse oxi 88% on room air, tolerated well        Martine Howard RN  06/04/18 2703

## 2018-06-04 NOTE — CASE MANAGEMENT
Initial Clinical Review  Admission: Date/Time/Statement: 6/3/18 @ 2207   Orders Placed This Encounter   Procedures    Inpatient Admission (expected length of stay for this patient is greater than two midnights)     Standing Status:   Standing     Number of Occurrences:   1     Order Specific Question:   Admitting Physician     Answer:   Graeme Sigala [[id number]]     Order Specific Question:   Level of Care     Answer:   Med Surg [16]     Order Specific Question:   Estimated length of stay     Answer:   More than 2 Midnights     Order Specific Question:   Certification     Answer:   I certify that inpatient services are medically necessary for this patient for a duration of greater than two midnights  See H&P and MD Progress Notes for additional information about the patient's course of treatment  ED: Date/Time/Mode of Arrival:   ED Arrival Information     Expected Arrival Acuity Means of Arrival Escorted By Service Admission Type    - 6/3/2018 19:04 Emergent [address]  [facility] Emergency    Arrival Complaint    SOB      Chief Complaint:   Chief Complaint   Patient presents with    Cough     Patient complains of coughing for 1 week  Does not feel asthma meds are working  Stopped taking spiriva, because did not improve peak flows  Denies chest pain   History of Illness:   25-year-old man with history of asthma complains of increased dyspnea wheeze and productive cough for the past week  He had anorexia several days and diarrhea twice since yesterday  He had rigors and subjective fever last night and today  Patient denies hemoptysis chest pain bloody stool rash and recent foreign travel  He is hypoxemic here  He coughs frequently, especially when taking deep breaths for the pulmonary exam   Patient had an essentially normal echocardiogram May 7, 2018  Denies recent pedal edema and orthopnea    ED Vital Signs:   ED Triage Vitals   Temperature Pulse Respirations Blood Pressure SpO2   06/03/18 1913 06/03/18 1913 06/03/18 1913 06/03/18 1913 06/03/18 1913   (!) 102 8 °F (39 3 °C) (!) 124 (!) 28 131/72 (!) 87 %      Temp Source Heart Rate Source Patient Position - Orthostatic VS BP Location FiO2 (%)   06/03/18 1913 06/03/18 2339 06/03/18 2339 06/03/18 2339 --   Tympanic Monitor Lying Right arm       Pain Score       06/03/18 1913       No Pain        Wt Readings from Last 1 Encounters:   06/04/18 117 kg (257 lb 4 4 oz)   Vital Signs (abnormal): Abnormal Labs/Diagnostic Test Results:   WBC 10 41 AST 78  ALB 2 4    pCO2, Art i-STAT 36 0 - 44 0 mm HG 30 7   L    pO2, ART i-STAT 75 0 - 129 0 mm HG 92 0     BE, i-STAT -2 - 3 mmol/L -3   L    HCO3, Art i-STAT 22 0 - 28 0 mmol/L 20 3   L      cxr=Interstitial airspace opacities in a perihilar and bronchovascular distribution suggesting atypical pneumonia   ekg=Sinus tachycardia  Rightward axis    ED Treatment:   Medication Administration from 06/03/2018 1904 to 06/04/2018 4543       Date/Time Order Dose Route Action Action by Comments     06/03/2018 2034 sodium chloride 0 9 % bolus 1,000 mL 0 mL Intravenous Stopped Chino Causey RN      06/03/2018 1934 sodium chloride 0 9 % bolus 1,000 mL 1,000 mL Intravenous [address] Chino Causey RN      06/03/2018 1944 acetaminophen (TYLENOL) tablet 650 mg 650 mg Oral Given Chino Causey RN      06/03/2018 1946 levalbuterol (XOPENEX) inhalation solution 1 25 mg 1 25 mg Nebulization Given Chino Causey RN      06/03/2018 1946 ipratropium (ATROVENT) 0 02 % inhalation solution 0 5 mg 0 5 mg Nebulization Given Chino Causey RN      06/03/2018 1946 sodium chloride 0 9 % inhalation solution **AcuDose Override Pull** 3 mL  Given Chino Causey RN      06/03/2018 2014 methylPREDNISolone sodium succinate (Solu-MEDROL) injection 80 mg 80 mg Intravenous Given Chino Causey RN      06/03/2018 2130 cefTRIAXone (ROCEPHIN) IVPB (premix) 1,000 mg 0 mg Intravenous Stopped Alia Tellez RN      06/03/2018 2106 cefTRIAXone (ROCEPHIN) IVPB (premix) 1,000 mg 1,000 mg Intravenous [address] Ronaldo Pitts RN      06/03/2018 2250 azithromycin (ZITHROMAX) 500 mg in sodium chloride 0 9% 250mL IVPB 500 mg 0 mg Intravenous Stopped Fariha Dolan RN      06/03/2018 2146 azithromycin (ZITHROMAX) 500 mg in sodium chloride 0 9% 250mL IVPB 500 mg 500 mg Intravenous [address] Levon Jack RN      06/04/2018 0205 tamsulosin (FLOMAX) capsule 0 4 mg 0 4 mg Oral Given Fariha Dolan RN       Past Medical/Surgical History: Active Ambulatory Problems     Diagnosis Date Noted    Benign prostatic hyperplasia with nocturia 02/13/2018    Chronic allergic rhinitis due to animal hair and dander 04/05/2018    Moderate persistent asthma without complication 11/14/1515     Resolved Ambulatory Problems     Diagnosis Date Noted    Allergic rhinitis 05/30/2017    Benign colon polyp 06/14/2017    Elevated AST (SGOT) 06/14/2017    Elevated fasting glucose 06/14/2017    Migraine with aura 11/10/2017    DSOUZA (dyspnea on exertion) 02/21/2018    Hyponatremia 03/04/2018    Dehydration 03/04/2018    Acute asthmatic bronchitis 03/04/2018    Nausea vomiting and diarrhea 03/05/2018    Enlarged prostate     Asthma      Past Medical History:   Diagnosis Date    Asthma     Chronic headaches     Constipation     Cough     Difficulty attaining erection     Dyspnea     Enlarged prostate     Hemorrhoids     Migraine     Seasonal allergies     Slow urinary stream     Wheezing    Admitting Diagnosis: Hypoxemia [R09 02]  SOB (shortness of breath) [R06 02]  Community acquired pneumonia [J18 9]  Atypical pneumonia [J18 9]  Acute respiratory failure with hypoxia (HCC) [J96 01]  Moderate persistent asthma without complication [B81 50]  Sepsis ([facility] [address] ) [A41 9]  Chronic allergic rhinitis due to animal hair and dander [J30 81]  Age/Sex: 58 y o  male  Assessment/Plan:   Acute respiratory failure with hypoxia (HCC)   Assessment & Plan     SpO2 87%    Administer O2 to keep SpO2 greater than 92%  Atypical pneumonia   Assessment & Plan     CXR shows interstitial airspace opacities in a perihilar and bronchovascular distribution  Obtain sputum culture, urine Legionella, and strep pneumoniae  Continue ceftriaxone and azithromycin  Follow up on culture results and flu PCR  Place on Atrovent and Xopenex inhalers t i d   Incentive spirometry  Guaifenesin p r n  Moderate persistent asthma without complication   Assessment & Plan     Continue Symbicort  Patient stopped taking Spiriva approximately 1 week ago due to the fact this  Will place inpatient consult to pulmonology  Transaminitis   Assessment & Plan     Avoid hepatotoxic medications  Administer IV fluids for hydration  Recheck CMP in a m      Admission Orders:  Telemetry  DROPLET ISOLATION  CONSULT PULMONARY  ELEVATE HOB 30 DEGREES  INCENTIVE SPIROMETRY  VENODYNES  Scheduled Meds:   Current Facility-Administered Medications:  acetaminophen 650 mg Oral Q6H PRN eigital, CHRISTOPHENP    albuterol 2 puff Inhalation Q6H PRN Thermon GENNY Nagel    cefTRIAXone 1,000 mg Intravenous Q24H eigital, CHRISTOPHENP    And        azithromycin 500 mg Intravenous Q24H eigital, GENNY    budesonide-formoterol 2 puff Inhalation BID eigital, CHRISTOPHENP    dextromethorphan-guaiFENesin 10 mL Oral Q4H PRN Thermon GENNY Nagel    enoxaparin 40 mg Subcutaneous Daily GENNY Santiago    fluticasone 2 spray Nasal Daily GENNY Santiago    ipratropium 0 5 mg Nebulization TID eigital, GENNY    levalbuterol 0 63 mg Nebulization TID eigital, CHRISTOPHENP    predniSONE 40 mg Oral Daily With Breakfast Angella Jarquin, DO    sodium chloride (PF) 3 mL Intravenous PRN Pedro August, DO    sodium chloride 100 mL/hr Intravenous Continuous GENNY Santiago Last Rate: 100 mL/hr (06/04/18 3382)   tamsulosin 0 4 mg Oral Daily With [employer] GENNY Thomas    Continuous Infusions:   sodium chloride 100 mL/hr Last Rate: 100 mL/hr (06/04/18 0432)   PRN Meds:   acetaminophen    albuterol    dextromethorphan-guaiFENesin    sodium chloride (PF)  Thank you,  [facility] in the [facility] by Arnaldo Valdez for 2017  Network Utilization Review Department  Phone: [phone]; Fax [phone]  ATTENTION: The Network Utilization Review Department is now centralized for our 7 Facilities  Make a note that we have a new phone and fax numbers for our Department  Please call with any questions or concerns to [phone] and carefully follow the prompts so that you are directed to the right person  All voicemails are confidential  Fax any determinations, approvals, denials, and requests for initial or continue stay review clinical to [phone]  Due to HIGH CALL volume, it would be easier if you could please send faxed requests to expedite your requests and in part, help us provide discharge notifications faster

## 2018-06-04 NOTE — PROGRESS NOTES
Progress Note - Christiano Reid 58 y o  male MRN: [id number]    Unit/Bed#: [address] [place] [address] Encounter: [id number]      Assessment:  Principal Problem:    Atypical pneumonia  Active Problems: Moderate persistent asthma with acute exacerbation    Acute respiratory failure with hypoxia (HCC)    Benign prostatic hyperplasia with nocturia    Chronic allergic rhinitis due to animal hair and dander    Transaminitis  Resolved Problems:    * No resolved hospital problems  *        Plan:  · Pneumonia-chest x-ray with interstitial opacities and bronchovascular distribution consistent with atypical pneumonitis-day 2  Of empiric antibiotic therapy azithromycin and Rocephin-also influenza swab is pending although not typical for this time of year  · Moderate persistent asthma with acute exacerbation-switch to p o  prednisone as per Dr Huynh note yesterday  Patient did not tolerate Spiriva because of urinary symptoms with his BPH and did not seem to make a significant difference improvement in his peak flows  pulmonary did suggest use of  Incruse on discharge    Subjective:   Patient having coughing spasms this morning with minimal sputum production  He did send down a specimen to the lab earlier today for sputum culture  ROS  Comprehensive system review negative other than noted above    Objective:     Vitals: Blood pressure 127/79, pulse 78, temperature 97 8 °F (36 6 °C), temperature source Oral, resp  rate 18, height 6' (1 829 m), weight 117 kg (257 lb 4 4 oz), SpO2 90 %  ,Body mass index is 34 89 kg/m²    Current Facility-Administered Medications   Medication Dose Route Frequency Provider Last Rate Last Dose    acetaminophen (TYLENOL) tablet 650 mg  650 mg Oral Q6H PRN GENNY Ortiz        albuterol (PROVENTIL HFA,VENTOLIN HFA) inhaler 2 puff  2 puff Inhalation Q6H PRN GENNY Ortiz        [START ON 6/5/2018] cefTRIAXone (ROCEPHIN) IVPB (premix) 1,000 mg  1,000 mg Intravenous Q24H Minal Rosenbaum GENNY        And    [START ON 6/5/2018] azithromycin (ZITHROMAX) 500 mg in sodium chloride 0 9% 250mL IVPB 500 mg  500 mg Intravenous Q24H GENNY Santiago        budesonide-formoterol (SYMBICORT) 160-4 5 mcg/act inhaler 2 puff  2 puff Inhalation BID GENNY Wynn   2 puff at 06/04/18 0751    dextromethorphan-guaiFENesin (ROBITUSSIN DM)  mg/5 mL oral syrup 10 mL  10 mL Oral Q4H PRN GENNY Santiago   10 mL at 06/04/18 0917    enoxaparin (LOVENOX) subcutaneous injection 40 mg  40 mg Subcutaneous Daily GENNY Santiago   40 mg at 06/04/18 0917    fluticasone (FLONASE) 50 mcg/act nasal spray 2 spray  2 spray Nasal Daily GENNY Santiago   2 spray at 06/04/18 0917    ipratropium (ATROVENT) 0 02 % inhalation solution 0 5 mg  0 5 mg Nebulization TID GENNY Wynn   0 5 mg at 06/04/18 0751    levalbuterol (XOPENEX) inhalation solution 0 63 mg  0 63 mg Nebulization TID GENNY Wynn   0 63 mg at 06/04/18 0751    predniSONE tablet 40 mg  40 mg Oral Daily With Breakfast Angella Jarquin DO   40 mg at 06/04/18 1146    sodium chloride (PF) 0 9 % injection 3 mL  3 mL Intravenous PRN Cassandra Martinez DO        tamsulosin (FLOMAX) capsule 0 4 mg  0 4 mg Oral Daily With [employer] GENNY Thomas         Prescriptions Prior to Admission   Medication    albuterol (PROVENTIL HFA,VENTOLIN HFA) 90 mcg/act inhaler    budesonide-formoterol (SYMBICORT) 160-4 5 mcg/act inhaler    fluticasone (FLONASE) 50 mcg/act nasal spray    tamsulosin (FLOMAX) 0 4 mg    EPIPEN 2-PARUL 0 3 MG/0 3ML SOAJ    levalbuterol (XOPENEX HFA) 45 mcg/act inhaler    Tiotropium Bromide Monohydrate 2 5 MCG/ACT AERS         Intake/Output Summary (Last 24 hours) at 06/04/18 1149  Last data filed at 06/03/18 2034   Gross per 24 hour   Intake             1000 ml   Output                0 ml   Net             1000 ml       Physical Exam:  General appearance: alert and mild distress  Neck: no adenopathy, no JVD, supple, symmetrical, trachea midline and thyroid not enlarged, symmetric, no tenderness/mass/nodules  Lungs: clear to auscultation bilaterally  Heart: regular rate and rhythm, S1, S2 normal, no murmur, click, rub or gallop  Abdomen: soft, non-tender; bowel sounds normal; no masses,  no organomegaly  Extremities: extremities normal, warm and well-perfused; no cyanosis, clubbing, or edema  Skin: Skin color, texture, turgor normal  No rashes or lesions  Neurologic: Grossly normal       Lab, Imaging and other studies: I have personally reviewed pertinent reports  and I have personally reviewed pertinent films in PACS          Results from last 7 days  Lab Units 06/04/18  0544 06/03/18 1937   WBC Thousand/uL 6 77 10 41*   HEMOGLOBIN g/dL 12 3 13 2   HEMATOCRIT % 39 0 41 1   PLATELETS Thousands/uL 282 303   NEUTROS PCT % 77* 68   LYMPHS PCT % 21 21   MONOS PCT % 2* 7   EOS PCT % 0 3       Results from last 7 days  Lab Units 06/04/18  0543 06/03/18 1937   SODIUM mmol/L 141 137   POTASSIUM mmol/L 4 6 4 0   CHLORIDE mmol/L 109* 103   CO2 mmol/L 22 24   BUN mg/dL 18 21   CREATININE mg/dL 0 86 1 02   CALCIUM mg/dL 8 5 8 8   TOTAL PROTEIN g/dL 7 3 7 8   BILIRUBIN TOTAL mg/dL 0 30 0 30   ALK PHOS U/L 51 55   ALT U/L 148* 176*   AST U/L 62* 78*   GLUCOSE RANDOM mg/dL 165* 114     No results found for: TROPONINI, CKMB, CKTOTAL    Results from last 7 days  Lab Units 06/03/18 1937   INR  1 10     No results found for: Wendelin Peaches, SPUTUMCULTUR    Imaging:  Results for orders placed during the hospital encounter of 06/03/18   XR chest portable    Narrative CHEST     INDICATION:   Sepsis, dyspnea, tachypnea  COMPARISON:  3/4/2018    EXAM PERFORMED/VIEWS:  XR CHEST PORTABLE      FINDINGS:    Normal heart size and mediastinal contour  No evidence of pleural effusion  Pulmonary vascular congestion    Interstitial and airspace opacities in a bilateral perihilar and bronchovascular distribution  No evidence of pulmonary interstitial edema  No atelectasis or pneumothorax  No evidence of lytic or blastic lesion or fracture  Impression Interstitial airspace opacities in a perihilar and bronchovascular distribution suggesting atypical pneumonia  Workstation performed: [id number]       Results for orders placed during the hospital encounter of 03/04/18   XR chest 2 views    Narrative CHEST     INDICATION:  "SOB,DIZZY"  wheezing    COMPARISON:  Dual-energy chest 1/27/2017  EXAM PERFORMED/VIEWS:  XR CHEST PA & LATERAL  The frontal view was performed utilizing dual energy radiographic technique  FINDINGS:    Cardiomediastinal silhouette appears unremarkable  The lungs are clear  No pneumothorax or pleural effusion  Osseous structures appear within normal limits for patient age  Impression No acute cardiopulmonary disease  Workstation performed: [id number]         PATIENT CENTERED ROUNDS: I have performed rounds with the nursing staff            Lucinda Astorga DO

## 2018-06-05 LAB
ERYTHROCYTE [DISTWIDTH] IN BLOOD BY AUTOMATED COUNT: 14.6 % (ref 11.6–15.1)
GLUCOSE SERPL-MCNC: 120 MG/DL (ref 65–140)
HCT VFR BLD AUTO: 37.4 % (ref 36.5–49.3)
HGB BLD-MCNC: 11.6 G/DL (ref 12–17)
MCH RBC QN AUTO: 27.1 PG (ref 26.8–34.3)
MCHC RBC AUTO-ENTMCNC: 31 G/DL (ref 31.4–37.4)
MCV RBC AUTO: 87 FL (ref 82–98)
PLATELET # BLD AUTO: 304 THOUSANDS/UL (ref 149–390)
PMV BLD AUTO: 9.1 FL (ref 8.9–12.7)
RBC # BLD AUTO: 4.28 MILLION/UL (ref 3.88–5.62)
WBC # BLD AUTO: 9.47 THOUSAND/UL (ref 4.31–10.16)

## 2018-06-05 PROCEDURE — 94640 AIRWAY INHALATION TREATMENT: CPT

## 2018-06-05 PROCEDURE — 99232 SBSQ HOSP IP/OBS MODERATE 35: CPT | Performed by: INTERNAL MEDICINE

## 2018-06-05 PROCEDURE — 82948 REAGENT STRIP/BLOOD GLUCOSE: CPT

## 2018-06-05 PROCEDURE — 85027 COMPLETE CBC AUTOMATED: CPT | Performed by: INTERNAL MEDICINE

## 2018-06-05 PROCEDURE — 94760 N-INVAS EAR/PLS OXIMETRY 1: CPT

## 2018-06-05 RX ORDER — HYDROCODONE POLISTIREX AND CHLORPHENIRAMINE POLISTIREX 10; 8 MG/5ML; MG/5ML
5 SUSPENSION, EXTENDED RELEASE ORAL EVERY 8 HOURS PRN
Status: DISCONTINUED | OUTPATIENT
Start: 2018-06-05 | End: 2018-06-07 | Stop reason: HOSPADM

## 2018-06-05 RX ORDER — BENZONATATE 100 MG/1
100 CAPSULE ORAL 3 TIMES DAILY
Status: DISCONTINUED | OUTPATIENT
Start: 2018-06-05 | End: 2018-06-07 | Stop reason: HOSPADM

## 2018-06-05 RX ADMIN — BENZONATATE 100 MG: 100 CAPSULE ORAL at 14:34

## 2018-06-05 RX ADMIN — AZITHROMYCIN MONOHYDRATE 500 MG: 500 INJECTION, POWDER, LYOPHILIZED, FOR SOLUTION INTRAVENOUS at 21:16

## 2018-06-05 RX ADMIN — LEVALBUTEROL HYDROCHLORIDE 0.63 MG: 0.63 SOLUTION RESPIRATORY (INHALATION) at 08:14

## 2018-06-05 RX ADMIN — BENZONATATE 100 MG: 100 CAPSULE ORAL at 21:09

## 2018-06-05 RX ADMIN — LEVALBUTEROL HYDROCHLORIDE 0.63 MG: 0.63 SOLUTION RESPIRATORY (INHALATION) at 20:07

## 2018-06-05 RX ADMIN — FLUTICASONE PROPIONATE 2 SPRAY: 50 SPRAY, METERED NASAL at 09:46

## 2018-06-05 RX ADMIN — IPRATROPIUM BROMIDE 0.5 MG: 0.5 SOLUTION RESPIRATORY (INHALATION) at 08:14

## 2018-06-05 RX ADMIN — BUDESONIDE AND FORMOTEROL FUMARATE DIHYDRATE 2 PUFF: 160; 4.5 AEROSOL RESPIRATORY (INHALATION) at 20:09

## 2018-06-05 RX ADMIN — CEFTRIAXONE 1000 MG: 1 INJECTION, SOLUTION INTRAVENOUS at 21:16

## 2018-06-05 RX ADMIN — ENOXAPARIN SODIUM 40 MG: 40 INJECTION SUBCUTANEOUS at 09:40

## 2018-06-05 RX ADMIN — IPRATROPIUM BROMIDE 0.5 MG: 0.5 SOLUTION RESPIRATORY (INHALATION) at 20:07

## 2018-06-05 RX ADMIN — HYDROCODONE POLISTIREX AND CHLORPHENIRAMINE POLISTIREX 5 ML: 10; 8 SUSPENSION, EXTENDED RELEASE ORAL at 21:09

## 2018-06-05 RX ADMIN — HYDROCODONE POLISTIREX AND CHLORPHENIRAMINE POLISTIREX 5 ML: 10; 8 SUSPENSION, EXTENDED RELEASE ORAL at 09:45

## 2018-06-05 RX ADMIN — BENZONATATE 100 MG: 100 CAPSULE ORAL at 16:27

## 2018-06-05 RX ADMIN — LEVALBUTEROL HYDROCHLORIDE 0.63 MG: 0.63 SOLUTION RESPIRATORY (INHALATION) at 15:25

## 2018-06-05 RX ADMIN — IPRATROPIUM BROMIDE 0.5 MG: 0.5 SOLUTION RESPIRATORY (INHALATION) at 15:25

## 2018-06-05 RX ADMIN — BUDESONIDE AND FORMOTEROL FUMARATE DIHYDRATE 2 PUFF: 160; 4.5 AEROSOL RESPIRATORY (INHALATION) at 08:14

## 2018-06-05 RX ADMIN — PREDNISONE 40 MG: 20 TABLET ORAL at 09:40

## 2018-06-05 RX ADMIN — TAMSULOSIN HYDROCHLORIDE 0.4 MG: 0.4 CAPSULE ORAL at 16:27

## 2018-06-05 NOTE — PROGRESS NOTES
Progress Note - Mago Coy 58 y o  male MRN: [id number]    Unit/Bed#: [address] [place] [id number] Encounter: [id number]      Assessment:    Principal Problem:    Atypical pneumonia  Active Problems:    Benign prostatic hyperplasia with nocturia    Chronic allergic rhinitis due to animal hair and dander    Moderate persistent asthma with acute exacerbation    Acute respiratory failure with hypoxia (HCC)    Transaminitis  Resolved Problems:    * No resolved hospital problems  *      Plan:  Continue current treatment for pneumonia  Increased frequency of cough med  Patient still hypoxic  Check repeat chest x-ray tomorrow    Check repeat liver function tests as his LFTs were normal back in March  Need to watch sugars as a drifting up on prednisone    Subjective:   Coughing intermittently  Objective:     Vitals: Blood pressure 122/64, pulse 84, temperature (!) 97 3 °F (36 3 °C), temperature source Tympanic, resp  rate 18, height 6' (1 829 m), weight 117 kg (257 lb 4 4 oz), SpO2 91 %  ,Body mass index is 34 89 kg/m²  No intake or output data in the 24 hours ending 06/05/18 1110    Physical Exam:    Alert and awake in no acute distress  Lungs coarse breath sounds bilaterally with few expiratory wheezes  Heart regular rate and rythm, normal heart sounds  Abdomen soft, active bowel sounds, non-tender, non-distended  Extremities: no cyanosis, clubbing or edema        Invasive Devices     Peripheral Intravenous Line            Peripheral IV 06/04/18 Left Forearm less than 1 day                            Lab, Imaging and other studies: I have personally reviewed pertinent reports         Results from last 7 days  Lab Units 06/05/18  0535 06/04/18  0544 06/03/18  1937   WBC Thousand/uL 9 47 6 77 10 41*   HEMOGLOBIN g/dL 11 6* 12 3 13 2   HEMATOCRIT % 37 4 39 0 41 1   PLATELETS Thousands/uL 304 282 303   NEUTROS PCT %  --  77* 68   LYMPHS PCT %  --  21 21   MONOS PCT %  --  2* 7   EOS PCT %  --  0 3       Results from last 7 days  Lab Units 06/04/18  0543 06/03/18  1937   SODIUM mmol/L 141 137   POTASSIUM mmol/L 4 6 4 0   CHLORIDE mmol/L 109* 103   CO2 mmol/L 22 24   BUN mg/dL 18 21   CREATININE mg/dL 0 86 1 02   CALCIUM mg/dL 8 5 8 8   TOTAL PROTEIN g/dL 7 3 7 8   BILIRUBIN TOTAL mg/dL 0 30 0 30   ALK PHOS U/L 51 55   ALT U/L 148* 176*   AST U/L 62* 78*   GLUCOSE RANDOM mg/dL 165* 114     No results found for: TROPONINI, CKMB, CKTOTAL    Results from last 7 days  Lab Units 06/03/18  1937   INR  1 10     Lab Results   Component Value Date    BLOODCX No Growth at 24 hrs  06/03/2018    BLOODCX No Growth at 24 hrs  06/03/2018       Imaging:  Results for orders placed during the hospital encounter of 06/03/18   XR chest portable    Narrative CHEST     INDICATION:   Sepsis, dyspnea, tachypnea  COMPARISON:  3/4/2018    EXAM PERFORMED/VIEWS:  XR CHEST PORTABLE      FINDINGS:    Normal heart size and mediastinal contour  No evidence of pleural effusion  Pulmonary vascular congestion  Interstitial and airspace opacities in a bilateral perihilar and bronchovascular distribution  No evidence of pulmonary interstitial edema  No atelectasis or pneumothorax  No evidence of lytic or blastic lesion or fracture  Impression Interstitial airspace opacities in a perihilar and bronchovascular distribution suggesting atypical pneumonia  Workstation performed: [id number]       Results for orders placed during the hospital encounter of 03/04/18   XR chest 2 views    Narrative CHEST     INDICATION:  "SOB,DIZZY"  wheezing    COMPARISON:  Dual-energy chest 1/27/2017  EXAM PERFORMED/VIEWS:  XR CHEST PA & LATERAL  The frontal view was performed utilizing dual energy radiographic technique  FINDINGS:    Cardiomediastinal silhouette appears unremarkable  The lungs are clear  No pneumothorax or pleural effusion  Osseous structures appear within normal limits for patient age        Impression No acute cardiopulmonary disease  Workstation performed: [id number]         PATIENT CENTERED ROUNDS: I have performed rounds with the nursing staff  This note has been constructed using a voice recognition system      Magy Velasco MD

## 2018-06-05 NOTE — PROGRESS NOTES
Progress Note - Pulmonary   Varun Coker 58 y o  male MRN: [id number]  Unit/Bed#: [address] [place] [address] Encounter: [id number]      Assessment/Plan:    · Asthma/ probable COPD overlap with mild exacerbation - Prednisone taper    · Acute bilateral CAP - Antibiotic day #3/7 - can transition to levofloxacin to complete the course when time for D/C    · Acute hypoxic respiratory failure - titrate O2 for sats > 88%  Hopefully can wean off prior to D/C  Check RA sats  Subjective:   Feels a little bit better  Still with cough and SOB  Objective:     Vitals: Blood pressure 122/64, pulse 84, temperature (!) 97 3 °F (36 3 °C), temperature source Tympanic, resp  rate 18, height 6' (1 829 m), weight 117 kg (257 lb 4 4 oz), SpO2 91 % , 2L, Body mass index is 34 89 kg/m²  No intake or output data in the 24 hours ending 06/05/18 1228    Physical Exam:      General:  Awake, alert, no distress   HEENT: PERRL, EOMI, moist mucosa    Heart:  Regular rate and rhythm, no murmur   Lungs: Clear   Abdomen: Soft, nontender, normal bowel sounds   Extremities: No clubbing, cyanosis or edema    Labs: I have personally reviewed pertinent lab results        Results from last 7 days  Lab Units 06/05/18  0535 06/04/18  0544 06/03/18 1937   WBC Thousand/uL 9 47 6 77 10 41*   HEMOGLOBIN g/dL 11 6* 12 3 13 2   HEMATOCRIT % 37 4 39 0 41 1   PLATELETS Thousands/uL 304 282 303           Results from last 7 days  Lab Units 06/04/18  0543 06/03/18 1937   SODIUM mmol/L 141 137   POTASSIUM mmol/L 4 6 4 0   CHLORIDE mmol/L 109* 103   CO2 mmol/L 22 24   BUN mg/dL 18 21   CREATININE mg/dL 0 86 1 02   CALCIUM mg/dL 8 5 8 8   TOTAL PROTEIN g/dL 7 3 7 8   BILIRUBIN TOTAL mg/dL 0 30 0 30   ALK PHOS U/L 51 55   ALT U/L 148* 176*   AST U/L 62* 78*   GLUCOSE RANDOM mg/dL 165* 114       Results from last 7 days  Lab Units 06/03/18 1937   INR  1 10   PTT seconds 30

## 2018-06-06 ENCOUNTER — APPOINTMENT (INPATIENT)
Dept: RADIOLOGY | Facility: HOSPITAL | Age: 63
DRG: 193 | End: 2018-06-06
Payer: COMMERCIAL

## 2018-06-06 LAB
ALBUMIN SERPL BCP-MCNC: 2.3 G/DL (ref 3.5–5)
ALP SERPL-CCNC: 45 U/L (ref 46–116)
ALT SERPL W P-5'-P-CCNC: 99 U/L (ref 12–78)
ANION GAP SERPL CALCULATED.3IONS-SCNC: 9 MMOL/L (ref 4–13)
ARTERIAL PATENCY WRIST A: ABNORMAL
AST SERPL W P-5'-P-CCNC: 34 U/L (ref 5–45)
BACTERIA SPT RESP CULT: NORMAL
BASE EXCESS BLDA CALC-SCNC: 1 MMOL/L (ref -2–3)
BASOPHILS # BLD AUTO: 0.01 THOUSANDS/ΜL (ref 0–0.1)
BASOPHILS NFR BLD AUTO: 0 % (ref 0–1)
BILIRUB SERPL-MCNC: 0.3 MG/DL (ref 0.2–1)
BUN SERPL-MCNC: 15 MG/DL (ref 5–25)
CALCIUM SERPL-MCNC: 8.5 MG/DL (ref 8.3–10.1)
CHLORIDE SERPL-SCNC: 106 MMOL/L (ref 100–108)
CO2 SERPL-SCNC: 25 MMOL/L (ref 21–32)
CREAT SERPL-MCNC: 0.84 MG/DL (ref 0.6–1.3)
EOSINOPHIL # BLD AUTO: 0.01 THOUSAND/ΜL (ref 0–0.61)
EOSINOPHIL NFR BLD AUTO: 0 % (ref 0–6)
ERYTHROCYTE [DISTWIDTH] IN BLOOD BY AUTOMATED COUNT: 14.6 % (ref 11.6–15.1)
FIO2 GAS DIL.REBREATH: 21 L
GFR SERPL CREATININE-BSD FRML MDRD: 93 ML/MIN/1.73SQ M
GLUCOSE SERPL-MCNC: 74 MG/DL (ref 65–140)
GLUCOSE SERPL-MCNC: 81 MG/DL (ref 65–140)
GRAM STN SPEC: NORMAL
HCO3 BLDA-SCNC: 24.5 MMOL/L (ref 22–28)
HCT VFR BLD AUTO: 40.7 % (ref 36.5–49.3)
HGB BLD-MCNC: 12.6 G/DL (ref 12–17)
IMM GRANULOCYTES # BLD AUTO: 0.05 THOUSAND/UL (ref 0–0.2)
IMM GRANULOCYTES NFR BLD AUTO: 1 % (ref 0–2)
LYMPHOCYTES # BLD AUTO: 1.76 THOUSANDS/ΜL (ref 0.6–4.47)
LYMPHOCYTES NFR BLD AUTO: 21 % (ref 14–44)
MCH RBC QN AUTO: 27.2 PG (ref 26.8–34.3)
MCHC RBC AUTO-ENTMCNC: 31 G/DL (ref 31.4–37.4)
MCV RBC AUTO: 88 FL (ref 82–98)
MONOCYTES # BLD AUTO: 0.51 THOUSAND/ΜL (ref 0.17–1.22)
MONOCYTES NFR BLD AUTO: 6 % (ref 4–12)
NEUTROPHILS # BLD AUTO: 6.11 THOUSANDS/ΜL (ref 1.85–7.62)
NEUTS SEG NFR BLD AUTO: 72 % (ref 43–75)
NRBC BLD AUTO-RTO: 0 /100 WBCS
PCO2 BLD: 26 MMOL/L (ref 21–32)
PCO2 BLD: 35.5 MM HG (ref 36–44)
PH BLD: 7.45 [PH] (ref 7.35–7.45)
PLATELET # BLD AUTO: 335 THOUSANDS/UL (ref 149–390)
PMV BLD AUTO: 9.2 FL (ref 8.9–12.7)
PO2 BLD: 61 MM HG (ref 75–129)
POTASSIUM SERPL-SCNC: 4.2 MMOL/L (ref 3.5–5.3)
PROT SERPL-MCNC: 7.3 G/DL (ref 6.4–8.2)
RBC # BLD AUTO: 4.64 MILLION/UL (ref 3.88–5.62)
SAMPLE SITE: ABNORMAL
SAO2 % BLD FROM PO2: 92 % (ref 95–98)
SODIUM SERPL-SCNC: 140 MMOL/L (ref 136–145)
SPECIMEN SOURCE: ABNORMAL
WBC # BLD AUTO: 8.45 THOUSAND/UL (ref 4.31–10.16)

## 2018-06-06 PROCEDURE — 99232 SBSQ HOSP IP/OBS MODERATE 35: CPT | Performed by: INTERNAL MEDICINE

## 2018-06-06 PROCEDURE — 36600 WITHDRAWAL OF ARTERIAL BLOOD: CPT

## 2018-06-06 PROCEDURE — 82948 REAGENT STRIP/BLOOD GLUCOSE: CPT

## 2018-06-06 PROCEDURE — 94761 N-INVAS EAR/PLS OXIMETRY MLT: CPT

## 2018-06-06 PROCEDURE — 94640 AIRWAY INHALATION TREATMENT: CPT

## 2018-06-06 PROCEDURE — 80053 COMPREHEN METABOLIC PANEL: CPT | Performed by: INTERNAL MEDICINE

## 2018-06-06 PROCEDURE — 71046 X-RAY EXAM CHEST 2 VIEWS: CPT

## 2018-06-06 PROCEDURE — 82803 BLOOD GASES ANY COMBINATION: CPT

## 2018-06-06 PROCEDURE — 85025 COMPLETE CBC W/AUTO DIFF WBC: CPT | Performed by: INTERNAL MEDICINE

## 2018-06-06 PROCEDURE — 94760 N-INVAS EAR/PLS OXIMETRY 1: CPT

## 2018-06-06 RX ORDER — AZITHROMYCIN 250 MG/1
500 TABLET, FILM COATED ORAL EVERY 24 HOURS
Status: COMPLETED | OUTPATIENT
Start: 2018-06-06 | End: 2018-06-06

## 2018-06-06 RX ORDER — PREDNISONE 20 MG/1
20 TABLET ORAL
Status: DISCONTINUED | OUTPATIENT
Start: 2018-06-07 | End: 2018-06-07 | Stop reason: HOSPADM

## 2018-06-06 RX ADMIN — BENZONATATE 100 MG: 100 CAPSULE ORAL at 16:39

## 2018-06-06 RX ADMIN — IPRATROPIUM BROMIDE 0.5 MG: 0.5 SOLUTION RESPIRATORY (INHALATION) at 19:41

## 2018-06-06 RX ADMIN — IPRATROPIUM BROMIDE 0.5 MG: 0.5 SOLUTION RESPIRATORY (INHALATION) at 07:47

## 2018-06-06 RX ADMIN — LEVALBUTEROL HYDROCHLORIDE 0.63 MG: 0.63 SOLUTION RESPIRATORY (INHALATION) at 19:41

## 2018-06-06 RX ADMIN — AZITHROMYCIN 500 MG: 250 TABLET, FILM COATED ORAL at 21:18

## 2018-06-06 RX ADMIN — IPRATROPIUM BROMIDE 0.5 MG: 0.5 SOLUTION RESPIRATORY (INHALATION) at 13:07

## 2018-06-06 RX ADMIN — BUDESONIDE AND FORMOTEROL FUMARATE DIHYDRATE 2 PUFF: 160; 4.5 AEROSOL RESPIRATORY (INHALATION) at 05:27

## 2018-06-06 RX ADMIN — TAMSULOSIN HYDROCHLORIDE 0.4 MG: 0.4 CAPSULE ORAL at 16:39

## 2018-06-06 RX ADMIN — PREDNISONE 40 MG: 20 TABLET ORAL at 09:18

## 2018-06-06 RX ADMIN — BUDESONIDE AND FORMOTEROL FUMARATE DIHYDRATE 2 PUFF: 160; 4.5 AEROSOL RESPIRATORY (INHALATION) at 19:40

## 2018-06-06 RX ADMIN — CEFTRIAXONE 1000 MG: 1 INJECTION, SOLUTION INTRAVENOUS at 21:18

## 2018-06-06 RX ADMIN — LEVALBUTEROL HYDROCHLORIDE 0.63 MG: 0.63 SOLUTION RESPIRATORY (INHALATION) at 07:47

## 2018-06-06 RX ADMIN — BENZONATATE 100 MG: 100 CAPSULE ORAL at 21:18

## 2018-06-06 RX ADMIN — HYDROCODONE POLISTIREX AND CHLORPHENIRAMINE POLISTIREX 5 ML: 10; 8 SUSPENSION, EXTENDED RELEASE ORAL at 05:17

## 2018-06-06 RX ADMIN — HYDROCODONE POLISTIREX AND CHLORPHENIRAMINE POLISTIREX 5 ML: 10; 8 SUSPENSION, EXTENDED RELEASE ORAL at 21:20

## 2018-06-06 RX ADMIN — FLUTICASONE PROPIONATE 2 SPRAY: 50 SPRAY, METERED NASAL at 09:14

## 2018-06-06 RX ADMIN — ENOXAPARIN SODIUM 40 MG: 40 INJECTION SUBCUTANEOUS at 09:18

## 2018-06-06 RX ADMIN — LEVALBUTEROL HYDROCHLORIDE 0.63 MG: 0.63 SOLUTION RESPIRATORY (INHALATION) at 13:08

## 2018-06-06 RX ADMIN — BENZONATATE 100 MG: 100 CAPSULE ORAL at 09:18

## 2018-06-06 NOTE — RESPIRATORY THERAPY NOTE
sl oxygen          Home Oxygen Qualifying Test       Patient name: Beck Carrington        : 1955   Date of Test:  2018  Diagnosis:      Home Oxygen Test:    **Medicare Guidelines require item(s) 1-5 on all ambulatory patients or 1 and 2 on on-ambulatory patients  1   Baseline SPO2 on Room Air at rest 92 %  If = or < 88% on room air add O2 via NC and titrate patient  Patient must be ambulated with O2 and titrated to > 88% with exertion  2   SPO2 on Oxygen at rest 2lpm, 94 %  3   SPO2 during exercise on Room Air 87% to 91 %  4   SPO2 during exercise on Oxygen   93% to 95% at a liter flow of  2 lpm     5   Exercise performed:    [x] Walking     [] Stairs     [x] Duration  5 (min )     [x] Distance 350 (ft )       [x]  Supplemental Home Oxygen is indicated  []  Client does not qualify for home oxygen        Carlos Alberto Tatum , RT

## 2018-06-06 NOTE — PROGRESS NOTES
Progress Note - Pulmonary   Jean Rashid 58 y o  male MRN: [id number]  Unit/Bed#: [address] [place] [address] Encounter: [id number]      Assessment/Plan:    · Asthma/probable COPD overlap with mild exacerbation - agree with steroid taper  Continue Symbicort twice daily and Xopenex with ipratropium 3 times daily  Patient will be discharged home with nebulizer    · Acute bilateral community-acquired pneumonia, antibiotic day #4/7 - can discontinue azithromycin after tomorrow's dose, then complete 2 additional days of Omnicef upon discharge    · Acute hypoxic respiratory failure - check room air saturations at rest and with ambulation  Subjective:   Patient feels better today  Had some cough earlier today  Now improved  Complains of dyspnea on exertion  Objective:     Vitals: Blood pressure 148/86, pulse 89, temperature 97 5 °F (36 4 °C), temperature source Oral, resp  rate (!) 2, height 6' (1 829 m), weight 117 kg (258 lb 2 5 oz), SpO2 93 %  , 2 L, Body mass index is 35 01 kg/m²  Intake/Output Summary (Last 24 hours) at 06/06/18 1351  Last data filed at 06/06/18 0000   Gross per 24 hour   Intake              250 ml   Output                0 ml   Net              250 ml       Physical Exam:      General:  Awake, alert, no distress   HEENT: PERRL, EOMI, moist mucosa    Heart:  Regular rate and rhythm, no murmur   Lungs: Clear   Abdomen: Soft, nontender, normal bowel sounds   Extremities: No clubbing, cyanosis or edema    Labs: I have personally reviewed pertinent lab results        Results from last 7 days  Lab Units 06/06/18  0518 06/05/18  0535 06/04/18  0544   WBC Thousand/uL 8 45 9 47 6 77   HEMOGLOBIN g/dL 12 6 11 6* 12 3   HEMATOCRIT % 40 7 37 4 39 0   PLATELETS Thousands/uL 335 304 282           Results from last 7 days  Lab Units 06/06/18  0518 06/04/18  0543 06/03/18  1937   SODIUM mmol/L 140 141 137   POTASSIUM mmol/L 4 2 4 6 4 0   CHLORIDE mmol/L 106 109* 103   CO2 mmol/L 25 22 24   BUN mg/dL 15 18 21 CREATININE mg/dL 0 84 0 86 1 02   CALCIUM mg/dL 8 5 8 5 8 8   TOTAL PROTEIN g/dL 7 3 7 3 7 8   BILIRUBIN TOTAL mg/dL 0 30 0 30 0 30   ALK PHOS U/L 45* 51 55   ALT U/L 99* 148* 176*   AST U/L 34 62* 78*   GLUCOSE RANDOM mg/dL 81 165* 114       Results from last 7 days  Lab Units 06/03/18  1937   INR  1 10   PTT seconds 30     Imaging and other studies: I have personally reviewed pertinent films in PACS chest x-ray today shows stable bilateral airspace and interstitial prominence

## 2018-06-06 NOTE — PROGRESS NOTES
Pt observed to be awake frequently overnight during hrly rounds; denies pain  Req inhaler that he didn't have the previous evening; RRT notified

## 2018-06-06 NOTE — PROGRESS NOTES
Progress Note - Emmett Longoria 58 y o  male MRN: [id number]    Unit/Bed#: [address] [place] [address] Encounter: [id number]      Assessment:  Principal Problem:    Atypical pneumonia  Active Problems: Moderate persistent asthma with acute exacerbation    Acute respiratory failure with hypoxia (HCC)    Benign prostatic hyperplasia with nocturia    Chronic allergic rhinitis due to animal hair and dander    Transaminitis  Resolved Problems:    * No resolved hospital problems  *        Plan:  · Bilateral pneumonia-chest x-ray still shows infiltrates clinically showing some improvement- day 4  Empiric antibiotic treatment-sputum culture shows mixed growth of respiratory cata-hope to have him stable for discharge to home tomorrow  · Asthma-some component of seasonal exacerbation-will taper steroids tomorrow morning to 20 mg daily  Will have case management explore home nebulizer is he is getting better relief with that compared withMDI  · Hypoxic respiratory failure-check ABG on room air and hope to be able to have him sent home without oxygen supplement but if still hypoxic may need oxygen set up at home  Subjective:   Still having some cough and thick sputum production with less discoloration  Still some shortness of breath with the coughing spasms    ROS  Comprehensive system review negative other than noted above    Objective:     Vitals: Blood pressure 148/86, pulse 89, temperature 97 5 °F (36 4 °C), temperature source Oral, resp  rate (!) 2, height 6' (1 829 m), weight 117 kg (258 lb 2 5 oz), SpO2 93 %  ,Body mass index is 35 01 kg/m²    Current Facility-Administered Medications   Medication Dose Route Frequency Provider Last Rate Last Dose    acetaminophen (TYLENOL) tablet 650 mg  650 mg Oral Q6H PRN GENNY Nieves        albuterol (PROVENTIL HFA,VENTOLIN HFA) inhaler 2 puff  2 puff Inhalation Q6H PRN GENNY Santiago        cefTRIAXone (ROCEPHIN) IVPB (premix) 1,000 mg  1,000 mg Intravenous Q24H Alvino Harris,  mL/hr at 06/05/18 2116 1,000 mg at 06/05/18 2116    And    azithromycin (ZITHROMAX) 500 mg in sodium chloride 0 9% 250mL IVPB 500 mg  500 mg Intravenous Q24H Christine Fly, DO   Stopped at 06/06/18 0000    benzonatate (TESSALON PERLES) capsule 100 mg  100 mg Oral TID Maryjo Funez MD   100 mg at 06/06/18 0424    budesonide-formoterol (SYMBICORT) 160-4 5 mcg/act inhaler 2 puff  2 puff Inhalation BID GENNY Lopez   2 puff at 06/06/18 0527    enoxaparin (LOVENOX) subcutaneous injection 40 mg  40 mg Subcutaneous Daily GENNY Santiago   40 mg at 06/06/18 0918    fluticasone (FLONASE) 50 mcg/act nasal spray 2 spray  2 spray Nasal Daily GENNY Santiago   2 spray at 06/06/18 0914    hydrocodone-chlorpheniramine polistirex (TUSSIONEX) 10-8 mg/5 mL ER suspension 5 mL  5 mL Oral Q8H PRN Maryjo Funez MD   5 mL at 06/06/18 0517    ipratropium (ATROVENT) 0 02 % inhalation solution 0 5 mg  0 5 mg Nebulization TID GENNY Lopez   0 5 mg at 06/06/18 0747    levalbuterol (XOPENEX) inhalation solution 0 63 mg  0 63 mg Nebulization TID GENNY Lopez   0 63 mg at 06/06/18 0747    [START ON 6/7/2018] predniSONE tablet 20 mg  20 mg Oral Daily With Breakfast Christine Fly, DO        sodium chloride (PF) 0 9 % injection 3 mL  3 mL Intravenous PRN Rekha Galicia DO        tamsulosin [facility]) capsule 0 4 mg  0 4 mg Oral Daily With [employer] GENNY Thomas   0 4 mg at 06/05/18 1627     Prescriptions Prior to Admission   Medication    albuterol (PROVENTIL HFA,VENTOLIN HFA) 90 mcg/act inhaler    budesonide-formoterol (SYMBICORT) 160-4 5 mcg/act inhaler    fluticasone (FLONASE) 50 mcg/act nasal spray    tamsulosin (FLOMAX) 0 4 mg    EPIPEN 2-PARUL 0 3 MG/0 3ML SOAJ    levalbuterol (XOPENEX HFA) 45 mcg/act inhaler    Tiotropium Bromide Monohydrate 2 5 MCG/ACT AERS         Intake/Output Summary (Last 24 hours) at 06/06/18 1107  Last data filed at 06/06/18 0000   Gross per 24 hour Intake              250 ml   Output                0 ml   Net              250 ml       Physical Exam:  General appearance: alert and fatigued  Neck: no adenopathy, no JVD and thyroid not enlarged, symmetric, no tenderness/mass/nodules  Lungs: wheezes upper airways bilaterally  Heart: regular rate and rhythm, S1, S2 normal, no murmur, click, rub or gallop  Abdomen: soft, non-tender; bowel sounds normal; no masses,  no organomegaly  Extremities: extremities normal, warm and well-perfused; no cyanosis, clubbing, or edema  Skin: Skin color, texture, turgor normal  No rashes or lesions  Neurologic: Grossly normal       Lab, Imaging and other studies: I have personally reviewed pertinent reports      I reviewed his films in PACs today          Results from last 7 days  Lab Units 06/06/18 0518 06/05/18  0535 06/04/18  0544 06/03/18 1937   WBC Thousand/uL 8 45 9 47 6 77 10 41*   HEMOGLOBIN g/dL 12 6 11 6* 12 3 13 2   HEMATOCRIT % 40 7 37 4 39 0 41 1   PLATELETS Thousands/uL 335 304 282 303   NEUTROS PCT % 72  --  77* 68   LYMPHS PCT % 21  --  21 21   MONOS PCT % 6  --  2* 7   EOS PCT % 0  --  0 3       Results from last 7 days  Lab Units 06/06/18 0518 06/04/18  0543 06/03/18 1937   SODIUM mmol/L 140 141 137   POTASSIUM mmol/L 4 2 4 6 4 0   CHLORIDE mmol/L 106 109* 103   CO2 mmol/L 25 22 24   BUN mg/dL 15 18 21   CREATININE mg/dL 0 84 0 86 1 02   CALCIUM mg/dL 8 5 8 5 8 8   TOTAL PROTEIN g/dL 7 3 7 3 7 8   BILIRUBIN TOTAL mg/dL 0 30 0 30 0 30   ALK PHOS U/L 45* 51 55   ALT U/L 99* 148* 176*   AST U/L 34 62* 78*   GLUCOSE RANDOM mg/dL 81 165* 114     No results found for: TROPONINI, CKMB, CKTOTAL    Results from last 7 days  Lab Units 06/03/18 1937   INR  1 10     Lab Results   Component Value Date    BLOODCX No Growth at 48 hrs  06/03/2018    BLOODCX No Growth at 48 hrs  06/03/2018    SPUTUMCULTUR 1+ Growth of  06/04/2018       Imaging:  Results for orders placed during the hospital encounter of 06/03/18   XR chest portable    Narrative CHEST     INDICATION:   Sepsis, dyspnea, tachypnea  COMPARISON:  3/4/2018    EXAM PERFORMED/VIEWS:  XR CHEST PORTABLE      FINDINGS:    Normal heart size and mediastinal contour  No evidence of pleural effusion  Pulmonary vascular congestion  Interstitial and airspace opacities in a bilateral perihilar and bronchovascular distribution  No evidence of pulmonary interstitial edema  No atelectasis or pneumothorax  No evidence of lytic or blastic lesion or fracture  Impression Interstitial airspace opacities in a perihilar and bronchovascular distribution suggesting atypical pneumonia  Workstation performed: [id number]       Results for orders placed during the hospital encounter of 03/04/18   XR chest 2 views    Narrative CHEST     INDICATION:  "SOB,DIZZY"  wheezing    COMPARISON:  Dual-energy chest 1/27/2017  EXAM PERFORMED/VIEWS:  XR CHEST PA & LATERAL  The frontal view was performed utilizing dual energy radiographic technique  FINDINGS:    Cardiomediastinal silhouette appears unremarkable  The lungs are clear  No pneumothorax or pleural effusion  Osseous structures appear within normal limits for patient age  Impression No acute cardiopulmonary disease  Workstation performed: [id number]         PATIENT CENTERED ROUNDS: I have performed rounds with the nursing staff            Alvino Harris DO

## 2018-06-06 NOTE — SOCIAL WORK
Received consult for a home nebulizer for patient  Met with patient, discussed his need for a nebulizer and he is agreeable  Freedom of Choice given and he has no preference  Referral to [facility] made via [place]    Will follow

## 2018-06-07 VITALS
WEIGHT: 257.94 LBS | RESPIRATION RATE: 20 BRPM | HEART RATE: 84 BPM | BODY MASS INDEX: 34.94 KG/M2 | SYSTOLIC BLOOD PRESSURE: 168 MMHG | OXYGEN SATURATION: 93 % | DIASTOLIC BLOOD PRESSURE: 86 MMHG | TEMPERATURE: 99.2 F | HEIGHT: 72 IN

## 2018-06-07 PROCEDURE — 94640 AIRWAY INHALATION TREATMENT: CPT

## 2018-06-07 PROCEDURE — 99238 HOSP IP/OBS DSCHRG MGMT 30/<: CPT | Performed by: INTERNAL MEDICINE

## 2018-06-07 PROCEDURE — 94760 N-INVAS EAR/PLS OXIMETRY 1: CPT

## 2018-06-07 PROCEDURE — 99232 SBSQ HOSP IP/OBS MODERATE 35: CPT | Performed by: INTERNAL MEDICINE

## 2018-06-07 RX ORDER — PREDNISONE 20 MG/1
20 TABLET ORAL DAILY
Qty: 60 TABLET | Refills: 1 | Status: SHIPPED | OUTPATIENT
Start: 2018-06-07 | End: 2018-06-07

## 2018-06-07 RX ORDER — PREDNISONE 20 MG/1
20 TABLET ORAL DAILY
Qty: 60 TABLET | Refills: 0 | Status: SHIPPED | OUTPATIENT
Start: 2018-06-07 | End: 2018-06-21 | Stop reason: ALTCHOICE

## 2018-06-07 RX ORDER — PROMETHAZINE HYDROCHLORIDE AND CODEINE PHOSPHATE 6.25; 1 MG/5ML; MG/5ML
5 SYRUP ORAL EVERY 4 HOURS PRN
Qty: 120 ML | Refills: 0 | Status: SHIPPED | OUTPATIENT
Start: 2018-06-07 | End: 2018-06-17

## 2018-06-07 RX ORDER — CEFUROXIME AXETIL 500 MG/1
500 TABLET ORAL EVERY 12 HOURS SCHEDULED
Qty: 6 TABLET | Refills: 0 | Status: SHIPPED | OUTPATIENT
Start: 2018-06-07 | End: 2018-06-10

## 2018-06-07 RX ORDER — HYDROCODONE POLISTIREX AND CHLORPHENIRAMINE POLISTIREX 10; 8 MG/5ML; MG/5ML
5 SUSPENSION, EXTENDED RELEASE ORAL EVERY 8 HOURS PRN
Qty: 120 ML | Refills: 0 | Status: SHIPPED | OUTPATIENT
Start: 2018-06-07 | End: 2018-06-07 | Stop reason: HOSPADM

## 2018-06-07 RX ORDER — CEFUROXIME AXETIL 500 MG/1
500 TABLET ORAL EVERY 12 HOURS SCHEDULED
Qty: 6 TABLET | Refills: 0 | Status: SHIPPED | OUTPATIENT
Start: 2018-06-07 | End: 2018-06-07

## 2018-06-07 RX ADMIN — PREDNISONE 20 MG: 20 TABLET ORAL at 10:36

## 2018-06-07 RX ADMIN — FLUTICASONE PROPIONATE 2 SPRAY: 50 SPRAY, METERED NASAL at 10:00

## 2018-06-07 RX ADMIN — LEVALBUTEROL HYDROCHLORIDE 0.63 MG: 0.63 SOLUTION RESPIRATORY (INHALATION) at 09:23

## 2018-06-07 RX ADMIN — IPRATROPIUM BROMIDE 0.5 MG: 0.5 SOLUTION RESPIRATORY (INHALATION) at 09:23

## 2018-06-07 RX ADMIN — BUDESONIDE AND FORMOTEROL FUMARATE DIHYDRATE 2 PUFF: 160; 4.5 AEROSOL RESPIRATORY (INHALATION) at 09:23

## 2018-06-07 NOTE — DISCHARGE SUMMARY
Discharge Summary  Mago Coy 58 y o  male MRN: [id number]  Unit/Bed#: [address] [place] [address] Encounter: [id number]    Admission Date:   Admission Orders     Ordered        06/03/18 2207  Inpatient Admission (expected length of stay for this patient is greater than two midnights)  Once               Discharge Date: 06/07/18    Admitting Diagnosis: Hypoxemia [R09 02]  SOB (shortness of breath) [R06 02]  Community acquired pneumonia [J18 9]  Atypical pneumonia [J18 9]  Acute respiratory failure with hypoxia ([address] ) [J96 01]  Moderate persistent asthma without complication [N29 58]  Sepsis ([address] ) [A41 9]  Chronic allergic rhinitis due to animal hair and dander [J30 81]    HPI:  See history and physical    Procedures Performed:   Orders Placed This Encounter   Procedures    ED ECG Documentation Only       Hospital Course:  Patient to the hospital with atypical pneumonia  He had bilateral infiltrates  He was treated with Rocephin azithromycin  The patient did improve but did week require oxygen with ambulation  Patient was discharged with that and also be used at bedtime  His discharge in 20 mg of prednisone a be followed by his primary care physician in 1 week  He has an appoint with his pulmonologist next month  He had mild elevation of his transaminases which were decreasing at the time of admission  CMP should be repeated as an outpatient  Patient should have repeat chest x-ray in about a month  Also assessment of his oxygen needs in the office would be a useful  Significant Findings, Care, Treatment and Services Provided:  None    Discharge Diagnosis: Principal Problem:    Atypical pneumonia  Active Problems:    Benign prostatic hyperplasia with nocturia    Chronic allergic rhinitis due to animal hair and dander    Moderate persistent asthma with acute exacerbation    Acute respiratory failure with hypoxia (HCC)    Transaminitis  Resolved Problems:    * No resolved hospital problems   *        Condition at Discharge: stable     Discharge instructions/Information to patient and family:   See after visit summary for information provided to patient and family  Provisions for Follow-Up Care:  See after visit summary for information related to follow-up care and any pertinent home health orders  Disposition: Home    Discharge Medications:  See after visit summary for reconciled discharge medications provided to patient and family  This note has been constructed using a voice recognition system         Jodi Mora MD

## 2018-06-07 NOTE — PROGRESS NOTES
Progress Note - Pulmonary   Celeste Canas 58 y o  male MRN: [id number]  Unit/Bed#: [address] [place] [address] Encounter: [id number]      Assessment/Plan:    · Asthma/probable COPD overlap with mild exacerbation - taper prednisone by 10 mg every 3 days  Continue Symbicort twice daily and Xopenex with Atrovent 3 times daily  · Acute bilateral community-acquired pneumonia - Day #5/5 azithromycin, day #5/7 ceftriaxone (can transition to [address] to complete a course)    · Hypoxia - we will make arrangements for home oxygen to use at 2 liters/minute with exertion    Patient has follow-up with Dr Chris Dahl on 07/17/2018    Subjective:   Patient is feeling better today  No significant cough or sputum  Objective:     Vitals: Blood pressure 168/86, pulse 84, temperature 99 2 °F (37 3 °C), temperature source Oral, resp  rate 20, height 6' (1 829 m), weight 117 kg (257 lb 15 oz), SpO2 94 %  , 2L, Body mass index is 34 98 kg/m²  No intake or output data in the 24 hours ending 06/07/18 0918    Physical Exam:      General:  Awake, alert, no distress   HEENT: PERRL, EOMI, moist mucosa    Heart:  Regular rate and rhythm, no murmur   Lungs: Clear   Abdomen: Soft, nontender, normal bowel sounds   Extremities: No clubbing, cyanosis or edema    Labs: I have personally reviewed pertinent lab results        Results from last 7 days  Lab Units 06/06/18  0518 06/05/18  0535 06/04/18  0544   WBC Thousand/uL 8 45 9 47 6 77   HEMOGLOBIN g/dL 12 6 11 6* 12 3   HEMATOCRIT % 40 7 37 4 39 0   PLATELETS Thousands/uL 335 304 282           Results from last 7 days  Lab Units 06/06/18  0518 06/04/18  0543 06/03/18  1937   SODIUM mmol/L 140 141 137   POTASSIUM mmol/L 4 2 4 6 4 0   CHLORIDE mmol/L 106 109* 103   CO2 mmol/L 25 22 24   BUN mg/dL 15 18 21   CREATININE mg/dL 0 84 0 86 1 02   CALCIUM mg/dL 8 5 8 5 8 8   TOTAL PROTEIN g/dL 7 3 7 3 7 8   BILIRUBIN TOTAL mg/dL 0 30 0 30 0 30   ALK PHOS U/L 45* 51 55   ALT U/L 99* 148* 176*   AST U/L 34 62* 78* GLUCOSE RANDOM mg/dL 81 165* 114       Results from last 7 days  Lab Units 06/03/18  1937   INR  1 10   PTT seconds 30        Ambulatory pulse oximetry testing performed on 06/06/2018 is reviewed  Room air saturations at rest 92%  Room air saturations with exertion 87%  Saturations with ambulation on 2 liters/minute, 93-95%

## 2018-06-07 NOTE — SOCIAL WORK
Received consult for Home O2 for patient  Met with patient, dicussed his need for Home O2 and he is agreeable  Freedom of Choice given and he has no preference  Referral sent to [facility] via [address]  His wife will transport him home

## 2018-06-08 ENCOUNTER — TRANSITIONAL CARE MANAGEMENT (OUTPATIENT)
Dept: FAMILY MEDICINE CLINIC | Facility: HOSPITAL | Age: 63
End: 2018-06-08

## 2018-06-08 LAB
ATRIAL RATE: 120 BPM
P AXIS: 63 DEGREES
PR INTERVAL: 132 MS
QRS AXIS: 92 DEGREES
QRSD INTERVAL: 74 MS
QT INTERVAL: 300 MS
QTC INTERVAL: 424 MS
T WAVE AXIS: 66 DEGREES
VENTRICULAR RATE: 120 BPM

## 2018-06-08 PROCEDURE — 93010 ELECTROCARDIOGRAM REPORT: CPT | Performed by: INTERNAL MEDICINE

## 2018-06-08 NOTE — SOCIAL WORK
Late note form 6/7/18, patient decided he did not want a home nebulizer and asked that it be retrurned  Placed call to [facility] and spoke with Flora  Home Nebulizer will be picked up by [facility]

## 2018-06-09 LAB
BACTERIA BLD CULT: NORMAL
BACTERIA BLD CULT: NORMAL

## 2018-06-14 ENCOUNTER — OFFICE VISIT (OUTPATIENT)
Dept: FAMILY MEDICINE CLINIC | Facility: HOSPITAL | Age: 63
End: 2018-06-14
Payer: COMMERCIAL

## 2018-06-14 VITALS
OXYGEN SATURATION: 93 % | WEIGHT: 251.5 LBS | BODY MASS INDEX: 34.06 KG/M2 | HEART RATE: 107 BPM | DIASTOLIC BLOOD PRESSURE: 84 MMHG | TEMPERATURE: 98 F | SYSTOLIC BLOOD PRESSURE: 110 MMHG | HEIGHT: 72 IN

## 2018-06-14 DIAGNOSIS — R35.1 BENIGN PROSTATIC HYPERPLASIA WITH NOCTURIA: ICD-10-CM

## 2018-06-14 DIAGNOSIS — J18.9 PNEUMONIA OF BOTH LUNGS DUE TO INFECTIOUS ORGANISM, UNSPECIFIED PART OF LUNG: Primary | ICD-10-CM

## 2018-06-14 DIAGNOSIS — N40.1 NOCTURIA ASSOCIATED WITH BENIGN PROSTATIC HYPERPLASIA: ICD-10-CM

## 2018-06-14 DIAGNOSIS — N40.1 BENIGN PROSTATIC HYPERPLASIA WITH NOCTURIA: ICD-10-CM

## 2018-06-14 DIAGNOSIS — J45.40 MODERATE PERSISTENT ASTHMA WITHOUT COMPLICATION: ICD-10-CM

## 2018-06-14 DIAGNOSIS — R35.1 NOCTURIA ASSOCIATED WITH BENIGN PROSTATIC HYPERPLASIA: ICD-10-CM

## 2018-06-14 PROBLEM — J96.01 ACUTE RESPIRATORY FAILURE WITH HYPOXIA (HCC): Status: RESOLVED | Noted: 2018-06-03 | Resolved: 2018-06-14

## 2018-06-14 PROBLEM — R74.01 TRANSAMINITIS: Status: RESOLVED | Noted: 2018-06-04 | Resolved: 2018-06-14

## 2018-06-14 PROCEDURE — 99495 TRANSJ CARE MGMT MOD F2F 14D: CPT | Performed by: INTERNAL MEDICINE

## 2018-06-14 RX ORDER — TAMSULOSIN HYDROCHLORIDE 0.4 MG/1
0.4 CAPSULE ORAL
Qty: 30 CAPSULE | Refills: 5
Start: 2018-06-14 | End: 2018-07-18 | Stop reason: SDUPTHER

## 2018-06-14 NOTE — PROGRESS NOTES
Assessment/Plan:     Benign prostatic hyperplasia with nocturia  Stable on flomax    Moderate persistent asthma without complication  Exacerbation resolved, pt has no tightness and I hear no wheezing  Will taper off prednisone and continue symbicort       Diagnoses and all orders for this visit:    Pneumonia of both lungs due to infectious organism, unspecified part of lung  -     XR chest pa & lateral; Future    Nocturia associated with benign prostatic hyperplasia  -     tamsulosin (FLOMAX) 0 4 mg; Take 1 capsule (0 4 mg total) by mouth daily with dinner for 30 days    Moderate persistent asthma without complication    Benign prostatic hyperplasia with nocturia        will recheck cxr in 4 wks! Subjective:     Patient ID: Laura Wyatt is a 58 y o  male  Asthma   There is no chest tightness, cough, difficulty breathing, shortness of breath, sputum production or wheezing  This is a chronic problem  The current episode started more than 1 year ago  The problem occurs intermittently  Pertinent negatives include no chest pain, fever, headaches or myalgias  His past medical history is significant for asthma  Review of Systems   Constitutional: Negative for chills and fever  HENT: Negative for congestion  Eyes: Negative for visual disturbance  Respiratory: Negative for cough, sputum production, shortness of breath and wheezing  Cardiovascular: Negative for chest pain, palpitations and leg swelling  Gastrointestinal: Negative for abdominal pain, blood in stool and diarrhea  Endocrine: Negative for polydipsia and polyphagia  Genitourinary: Negative for difficulty urinating and dysuria  Musculoskeletal: Negative for joint swelling, myalgias and neck stiffness  Skin: Negative for rash  Neurological: Negative for weakness, numbness and headaches  Hematological: Negative for adenopathy  Psychiatric/Behavioral: Negative for dysphoric mood  All other systems reviewed and are negative  Objective:     Physical Exam   Constitutional: He appears well-developed  No distress  HENT:   Head: Normocephalic  Mouth/Throat: Oropharynx is clear and moist    Eyes: Conjunctivae are normal    Neck: Neck supple  Cardiovascular: Normal rate and regular rhythm  Pulmonary/Chest: Effort normal  No respiratory distress  He has no wheezes  He has no rales  Abdominal: Soft  Bowel sounds are normal  He exhibits no distension  There is no tenderness  Musculoskeletal: He exhibits no tenderness  Lymphadenopathy:     He has no cervical adenopathy  Neurological: He is alert  No cranial nerve deficit  Skin: Skin is warm and dry  No rash noted  Psychiatric: He has a normal mood and affect  Vitals reviewed  Vitals:    06/14/18 1500   BP: 110/84   Pulse: (!) 107   Temp: 98 °F (36 7 °C)   SpO2: 93%   Weight: 114 kg (251 lb 8 oz)   Height: 6' (1 829 m)       Transitional Care Management Review:  Vasu Cardenas is a 58 y o  male here for TCM follow up  During the TCM phone call patient stated:    Date and time hospital follow up call was made:  6/8/2018  1:39 PM  Hospital care reviewed:  Records reviewed  Patient was hopsitalized at:  [facility]  Date of admission:  6/3/18  Date of discharge:  6/7/18  Diagnosis:  Atypical pneumonia   Disposition:  Home  Were the patients medicaitons reviewed and updated:  Yes  Current symptoms:  None  Scheduled for follow up?:  Yes  Patients specialists:  Pulmonlolgist  Pulmonologist's name:  Isatu Petty, DO  Did you obtain your prescribed medications:  Yes  Do you need help managing your perscriptions or medications:  No  Is transportation to your appointments needed:  No  I have advised the patient to call PCP with any new or worsening symptoms (please type in name along with any credentials):   HELLEN Ann MA  Living Arrangements:  Spouse or Significiant other  Support System:  Spouse, Friends, Family  Are you recieving outpatient services: No  Are you recieving home care services:  No  Comments:  PT states that he's doing OK - is using is Oxygen - readings were 89-90 without, 92 with               Skip Reddy MD

## 2018-06-14 NOTE — ASSESSMENT & PLAN NOTE
Exacerbation resolved, pt has no tightness and I hear no wheezing    Will taper off prednisone and continue symbicort

## 2018-06-21 ENCOUNTER — OFFICE VISIT (OUTPATIENT)
Dept: PULMONOLOGY | Facility: CLINIC | Age: 63
End: 2018-06-21
Payer: COMMERCIAL

## 2018-06-21 VITALS
TEMPERATURE: 97.9 F | HEIGHT: 72 IN | SYSTOLIC BLOOD PRESSURE: 114 MMHG | WEIGHT: 258.4 LBS | HEART RATE: 113 BPM | OXYGEN SATURATION: 92 % | BODY MASS INDEX: 35 KG/M2 | DIASTOLIC BLOOD PRESSURE: 82 MMHG

## 2018-06-21 DIAGNOSIS — R94.2 ABNORMAL PFT: ICD-10-CM

## 2018-06-21 DIAGNOSIS — J96.11 CHRONIC RESPIRATORY FAILURE WITH HYPOXIA (HCC): ICD-10-CM

## 2018-06-21 DIAGNOSIS — J44.9 CHRONIC OBSTRUCTIVE PULMONARY DISEASE, UNSPECIFIED COPD TYPE (HCC): ICD-10-CM

## 2018-06-21 DIAGNOSIS — J18.9 PNEUMONIA DUE TO INFECTIOUS ORGANISM, UNSPECIFIED LATERALITY, UNSPECIFIED PART OF LUNG: ICD-10-CM

## 2018-06-21 DIAGNOSIS — J45.30 MILD PERSISTENT ASTHMA WITHOUT COMPLICATION: Primary | ICD-10-CM

## 2018-06-21 PROCEDURE — 99214 OFFICE O/P EST MOD 30 MIN: CPT | Performed by: INTERNAL MEDICINE

## 2018-06-21 PROCEDURE — 94618 PULMONARY STRESS TESTING: CPT | Performed by: INTERNAL MEDICINE

## 2018-06-21 RX ORDER — MONTELUKAST SODIUM 10 MG/1
10 TABLET ORAL
Qty: 30 TABLET | Refills: 3 | Status: SHIPPED | OUTPATIENT
Start: 2018-06-21 | End: 2018-07-27

## 2018-06-21 NOTE — PROGRESS NOTES
Progress note - Pulmonary Medicine   Shell Salguero 58 y o  male MRN: [id number]    Impression & Plan:   57 y/o M with PMHx of BPH, Hay fever, alcohol abuse and obesity who comes in for follow up of COPD/asthma overlap and chronic hypoxic respiratory failure  1  Chronic hypoxic respiratory failure - multifactorial   He has severe obstructive lung disease, obesity and there were interstitial changes on most recent CXR which could represent pulmonary edema or ILD  -  continue 3L of supplemental oxygen with ambulation       -  I again emphasized diet and weight loss as his obesity is likely contributing to restrictive lung disease       -  Repeat CXR pending     2  Bilateral atypical pneumonia  Vs  Interstitial edema or inflammation          -  Repeat CXR pending    3  COPD and asthma overlap -  Severe obstruction noted with bronchodilator response  IgE mildly elevated but [place] panel with many different allergens       -  I again strongly encouraged him to get rid of his cat and dogs       -   Continue Symbicort 160 BID       -  Continue Flonase         -  Will add singulair to see if that helps     ______________________________________________________________________    HPI:    Shell Salguero presents today for follow-up after recent hospitalization due to bilateral pneumonia  He was treated with Rocephin and Zithromax and had noted significant clinical improvement  He is now back at his baseline and does not feel he has limitations with his breathing  However, with exertion, especially up a hill he will have significant work of breathing  He denies that he has any limitations related to his breathing  He denies wheezing, cough, night sweats or fatigue        Answers for HPI/ROS submitted by the patient on 6/14/2018   Primary symptoms  Chronicity: chronic  When did you first notice your symptoms?: more than 1 month ago  How often do your symptoms occur?: constantly  Since you first noticed this problem, how has it changed?: gradually improving  Do you have shortness of breath that occurs with effort or exertion?: Yes  Do you have ear congestion?: No  Do you have heartburn?: No  Do you have fatigue?: No  Do you have nasal congestion?: No  Do you have shortness of breath when lying flat?: No  Do you have shortness of breath when you wake up?: No  Do you have sweats?: No  Have you experienced weight loss?: No  Which of the following makes your symptoms worse?: any activity, change in weather, climbing stairs, exercise, pollen  Which of the following makes your symptoms better?: beta-agonist, ipratropium, oral steroids    Review of Systems:  Review of Systems   Constitutional: Negative for appetite change and fever  HENT: Negative for ear pain, postnasal drip, rhinorrhea, sneezing, sore throat and trouble swallowing  Eyes: Negative  Respiratory: Positive for cough and shortness of breath  Negative for chest tightness  Cardiovascular: Negative for chest pain and palpitations  Gastrointestinal: Negative for abdominal distention, anal bleeding, nausea and vomiting  Endocrine: Negative  Genitourinary: Negative  Musculoskeletal: Negative for arthralgias, gait problem and myalgias  Allergic/Immunologic: Negative  Neurological: Negative for speech difficulty, weakness and headaches  Hematological: Negative  Psychiatric/Behavioral: Negative  Social history updates:  History   Smoking Status    Former Smoker    Packs/day: 1 50    Years: 20 00    Types: Cigarettes    Quit date: 2/5/1993   Smokeless Tobacco    Never Used     Comment: never a smoker per [employer]     Social History     Social History    Marital status: /Civil Union     Spouse name: N/A    Number of children: N/A    Years of education: N/A     Occupational History    Not on file       Social History Main Topics    Smoking status: Former Smoker     Packs/day: 1 50     Years: 20 00     Types: Cigarettes Quit date: 2/5/1993    Smokeless tobacco: Never Used      Comment: never a smoker per [employer]    Alcohol use Yes      Comment: socially / 1-4 drinks/week    Drug use: No    Sexual activity: No     Other Topics Concern    Not on file     Social History Narrative    Daily caffeine consumption, 2-3 servings a day           PhysicalExamination:  Vitals:   /82 (BP Location: Left arm, Patient Position: Sitting)   Pulse (!) 113   Temp 97 9 °F (36 6 °C) (Tympanic)   Ht 6' (1 829 m)   Wt 117 kg (258 lb 6 4 oz)   SpO2 92%   BMI 35 05 kg/m²   General: Obese, Awake alert and oriented x 3, conversant without conversational dyspnea, NAD, normal affect  HEENT:  PERRL, Sclera noninjected, nonicteric OU, Nares patent,  no craniofacial abnormalities, Mucous membranes, moist, no oral lesions, normal dentition,   NECK: Trachea midline, no accessory muscle use, no stridor, no cervical or supraclavicular adenopathy, JVP not elevated  CARDIAC: Reg, single s1/S2, no m/r/g  PULM: CTA bilaterally no wheezing, rhonchi or rales  ABD: Normoactive bowel sounds, soft nontender, nondistended, no rebound, no rigidity, no guarding  EXT: No cyanosis, no clubbing, no edema, normal capillary refill  NEURO: no focal neurologic deficits, AAOx3, moving all extremities appropriately    Diagnostic Data:  Labs: I personally reviewed the most recent laboratory data pertinent to today's visit  I have personally reviewed pertinent lab results    Lab Results   Component Value Date    WBC 8 45 06/06/2018    HGB 12 6 06/06/2018    HCT 40 7 06/06/2018    MCV 88 06/06/2018     06/06/2018     Lab Results   Component Value Date    GLUCOSE 81 06/06/2018    CALCIUM 8 5 06/06/2018     06/06/2018    K 4 2 06/06/2018    CO2 25 06/06/2018     06/06/2018    BUN 15 06/06/2018    CREATININE 0 84 06/06/2018     No results found for: IGE  Lab Results   Component Value Date    ALT 99 (H) 06/06/2018    AST 34 06/06/2018    ALKPHOS 45 (L) 06/06/2018    BILITOT 0 30 06/06/2018       PFT results: The most recent pulmonary function tests were reviewed  3/4/18 Study Interpretation:   · Severe airflow limitation  Significant improvement in airflow on vital capacity following the administration of bronchodilators  6 minute walk test in office today  Saturation - 91% at start, dropped to 80% with ambulation, maintained at 90% with 3L nasal cannula    Imaging:  I personally reviewed the images on the [facility] system pertinent to today's visit  CXR - 6/6/18   IMPRESSION:  Persistent bilateral pulmonary airspace disease and interstitial prominence in a pattern most compatible with pneumonia and with no significant interval change since 6/3/2018    Other studies:  Echo -    SUMMARY     LEFT VENTRICLE:  Size was normal   Systolic function was normal  Ejection fraction was estimated to be 55 %    Wall thickness was normal   Left ventricular diastolic function parameters were normal      RIGHT VENTRICLE:  The size was normal   Systolic function was normal      LEFT ATRIUM:  The atrium was mildly dilated      TRICUSPID VALVE:  There was trace regurgitation      AORTA:  The root exhibited mild dilatation      Verubya Samanthas, DO

## 2018-07-02 ENCOUNTER — TELEPHONE (OUTPATIENT)
Dept: FAMILY MEDICINE CLINIC | Facility: HOSPITAL | Age: 63
End: 2018-07-02

## 2018-07-03 DIAGNOSIS — I10 HYPERTENSION, UNSPECIFIED TYPE: Primary | ICD-10-CM

## 2018-07-04 LAB
ALBUMIN SERPL-MCNC: 3.4 G/DL (ref 3.6–4.8)
ALBUMIN/GLOB SERPL: 0.9 {RATIO} (ref 1.2–2.2)
ALP SERPL-CCNC: 51 IU/L (ref 39–117)
ALT SERPL-CCNC: 20 IU/L (ref 0–44)
AST SERPL-CCNC: 31 IU/L (ref 0–40)
BASOPHILS # BLD AUTO: 0 X10E3/UL (ref 0–0.2)
BASOPHILS NFR BLD AUTO: 0 %
BILIRUB SERPL-MCNC: 0.4 MG/DL (ref 0–1.2)
BUN SERPL-MCNC: 12 MG/DL (ref 8–27)
BUN/CREAT SERPL: 11 (ref 10–24)
CALCIUM SERPL-MCNC: 8.4 MG/DL (ref 8.6–10.2)
CHLORIDE SERPL-SCNC: 98 MMOL/L (ref 96–106)
CHOLEST SERPL-MCNC: 118 MG/DL (ref 100–199)
CO2 SERPL-SCNC: 24 MMOL/L (ref 20–29)
CREAT SERPL-MCNC: 1.05 MG/DL (ref 0.76–1.27)
EOSINOPHIL # BLD AUTO: 0.3 X10E3/UL (ref 0–0.4)
EOSINOPHIL NFR BLD AUTO: 5 %
ERYTHROCYTE [DISTWIDTH] IN BLOOD BY AUTOMATED COUNT: 14.8 % (ref 12.3–15.4)
EST. AVERAGE GLUCOSE BLD GHB EST-MCNC: 126 MG/DL
GLOBULIN SER-MCNC: 4 G/DL (ref 1.5–4.5)
GLUCOSE SERPL-MCNC: 95 MG/DL (ref 65–99)
HBA1C MFR BLD: 6 % (ref 4.8–5.6)
HCT VFR BLD AUTO: 40 % (ref 37.5–51)
HDLC SERPL-MCNC: 32 MG/DL
HGB BLD-MCNC: 13.4 G/DL (ref 13–17.7)
IMM GRANULOCYTES # BLD: 0 X10E3/UL (ref 0–0.1)
IMM GRANULOCYTES NFR BLD: 1 %
LDLC SERPL CALC-MCNC: 75 MG/DL (ref 0–99)
LDLC/HDLC SERPL: 2.3 RATIO (ref 0–3.6)
LYMPHOCYTES # BLD AUTO: 2.2 X10E3/UL (ref 0.7–3.1)
LYMPHOCYTES NFR BLD AUTO: 37 %
MCH RBC QN AUTO: 27.4 PG (ref 26.6–33)
MCHC RBC AUTO-ENTMCNC: 33.5 G/DL (ref 31.5–35.7)
MCV RBC AUTO: 82 FL (ref 79–97)
MONOCYTES # BLD AUTO: 0.4 X10E3/UL (ref 0.1–0.9)
MONOCYTES NFR BLD AUTO: 7 %
NEUTROPHILS # BLD AUTO: 3 X10E3/UL (ref 1.4–7)
NEUTROPHILS NFR BLD AUTO: 50 %
PLATELET # BLD AUTO: 236 X10E3/UL (ref 150–379)
POTASSIUM SERPL-SCNC: 4.2 MMOL/L (ref 3.5–5.2)
PROT SERPL-MCNC: 7.4 G/DL (ref 6–8.5)
PSA SERPL DL<=0.01 NG/ML-MCNC: 0.99 NG/ML (ref 0–4)
RBC # BLD AUTO: 4.89 X10E6/UL (ref 4.14–5.8)
SL AMB EGFR AFRICAN AMERICAN: 86 ML/MIN/1.73
SL AMB EGFR NON AFRICAN AMERICAN: 75 ML/MIN/1.73
SL AMB VLDL CHOLESTEROL CALC: 11 MG/DL (ref 5–40)
SODIUM SERPL-SCNC: 138 MMOL/L (ref 134–144)
TRIGL SERPL-MCNC: 56 MG/DL (ref 0–149)
WBC # BLD AUTO: 5.9 X10E3/UL (ref 3.4–10.8)

## 2018-07-07 ENCOUNTER — HOSPITAL ENCOUNTER (INPATIENT)
Facility: HOSPITAL | Age: 63
LOS: 9 days | Discharge: HOME | DRG: 974 | End: 2018-07-16
Attending: EMERGENCY MEDICINE | Admitting: INTERNAL MEDICINE
Payer: COMMERCIAL

## 2018-07-07 ENCOUNTER — APPOINTMENT (EMERGENCY)
Dept: RADIOLOGY | Facility: HOSPITAL | Age: 63
DRG: 974 | End: 2018-07-07
Payer: COMMERCIAL

## 2018-07-07 DIAGNOSIS — J18.9 PNEUMONIA OF BOTH LOWER LOBES DUE TO INFECTIOUS ORGANISM: ICD-10-CM

## 2018-07-07 DIAGNOSIS — J45.40 MODERATE PERSISTENT ASTHMA WITHOUT COMPLICATION: Chronic | ICD-10-CM
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
## 2018-07-07 DIAGNOSIS — J18.9 HAP (HOSPITAL-ACQUIRED PNEUMONIA): Primary | ICD-10-CM

## 2018-07-07 DIAGNOSIS — B59: ICD-10-CM

## 2018-07-07 PROBLEM — A41.9 SEPSIS (HCC): Status: ACTIVE | Noted: 2018-07-07

## 2018-07-07 PROBLEM — J44.9 COPD (CHRONIC OBSTRUCTIVE PULMONARY DISEASE) (HCC): Status: ACTIVE | Noted: 2018-07-07

## 2018-07-07 LAB
ALBUMIN SERPL BCP-MCNC: 2.4 G/DL (ref 3.5–5)
ALP SERPL-CCNC: 51 U/L (ref 46–116)
ALT SERPL W P-5'-P-CCNC: 30 U/L (ref 12–78)
ANION GAP SERPL CALCULATED.3IONS-SCNC: 8 MMOL/L (ref 4–13)
APTT PPP: 30 SECONDS (ref 24–36)
AST SERPL W P-5'-P-CCNC: 38 U/L (ref 5–45)
BASOPHILS # BLD AUTO: 0.01 THOUSANDS/ΜL (ref 0–0.1)
BASOPHILS NFR BLD AUTO: 0 % (ref 0–1)
BILIRUB SERPL-MCNC: 0.4 MG/DL (ref 0.2–1)
BUN SERPL-MCNC: 13 MG/DL (ref 5–25)
CALCIUM SERPL-MCNC: 8.6 MG/DL (ref 8.3–10.1)
CHLORIDE SERPL-SCNC: 101 MMOL/L (ref 100–108)
CO2 SERPL-SCNC: 27 MMOL/L (ref 21–32)
CREAT SERPL-MCNC: 1.03 MG/DL (ref 0.6–1.3)
EOSINOPHIL # BLD AUTO: 0.4 THOUSAND/ΜL (ref 0–0.61)
EOSINOPHIL NFR BLD AUTO: 4 % (ref 0–6)
ERYTHROCYTE [DISTWIDTH] IN BLOOD BY AUTOMATED COUNT: 14.6 % (ref 11.6–15.1)
GFR SERPL CREATININE-BSD FRML MDRD: 76 ML/MIN/1.73SQ M
GLUCOSE SERPL-MCNC: 106 MG/DL (ref 65–140)
HCT VFR BLD AUTO: 40.3 % (ref 36.5–49.3)
HGB BLD-MCNC: 12.7 G/DL (ref 12–17)
IMM GRANULOCYTES # BLD AUTO: 0.04 THOUSAND/UL (ref 0–0.2)
IMM GRANULOCYTES NFR BLD AUTO: 0 % (ref 0–2)
INR PPP: 1.08 (ref 0.86–1.17)
LACTATE SERPL-SCNC: 1.3 MMOL/L (ref 0.5–2)
LYMPHOCYTES # BLD AUTO: 0.88 THOUSANDS/ΜL (ref 0.6–4.47)
LYMPHOCYTES NFR BLD AUTO: 9 % (ref 14–44)
MCH RBC QN AUTO: 26.8 PG (ref 26.8–34.3)
MCHC RBC AUTO-ENTMCNC: 31.5 G/DL (ref 31.4–37.4)
MCV RBC AUTO: 85 FL (ref 82–98)
MONOCYTES # BLD AUTO: 0.55 THOUSAND/ΜL (ref 0.17–1.22)
MONOCYTES NFR BLD AUTO: 6 % (ref 4–12)
NEUTROPHILS # BLD AUTO: 8.12 THOUSANDS/ΜL (ref 1.85–7.62)
NEUTS SEG NFR BLD AUTO: 81 % (ref 43–75)
PLATELET # BLD AUTO: 349 THOUSANDS/UL (ref 149–390)
PMV BLD AUTO: 9 FL (ref 8.9–12.7)
POTASSIUM SERPL-SCNC: 4.2 MMOL/L (ref 3.5–5.3)
PROT SERPL-MCNC: 7.6 G/DL (ref 6.4–8.2)
PROTHROMBIN TIME: 13.4 SECONDS (ref 11.8–14.2)
RBC # BLD AUTO: 4.74 MILLION/UL (ref 3.88–5.62)
SODIUM SERPL-SCNC: 136 MMOL/L (ref 136–145)
TROPONIN I SERPL-MCNC: <0.02 NG/ML
WBC # BLD AUTO: 10 THOUSAND/UL (ref 4.31–10.16)

## 2018-07-07 PROCEDURE — 85610 PROTHROMBIN TIME: CPT | Performed by: EMERGENCY MEDICINE

## 2018-07-07 PROCEDURE — 71046 X-RAY EXAM CHEST 2 VIEWS: CPT

## 2018-07-07 PROCEDURE — 87449 NOS EACH ORGANISM AG IA: CPT | Performed by: PHYSICIAN ASSISTANT

## 2018-07-07 PROCEDURE — 36415 COLL VENOUS BLD VENIPUNCTURE: CPT | Performed by: EMERGENCY MEDICINE

## 2018-07-07 PROCEDURE — 87040 BLOOD CULTURE FOR BACTERIA: CPT | Performed by: EMERGENCY MEDICINE

## 2018-07-07 PROCEDURE — 80053 COMPREHEN METABOLIC PANEL: CPT | Performed by: EMERGENCY MEDICINE

## 2018-07-07 PROCEDURE — 93005 ELECTROCARDIOGRAM TRACING: CPT

## 2018-07-07 PROCEDURE — 99223 1ST HOSP IP/OBS HIGH 75: CPT | Performed by: PHYSICIAN ASSISTANT

## 2018-07-07 PROCEDURE — 99285 EMERGENCY DEPT VISIT HI MDM: CPT

## 2018-07-07 PROCEDURE — 84484 ASSAY OF TROPONIN QUANT: CPT | Performed by: EMERGENCY MEDICINE

## 2018-07-07 PROCEDURE — 85025 COMPLETE CBC W/AUTO DIFF WBC: CPT | Performed by: EMERGENCY MEDICINE

## 2018-07-07 PROCEDURE — 94640 AIRWAY INHALATION TREATMENT: CPT

## 2018-07-07 PROCEDURE — 83605 ASSAY OF LACTIC ACID: CPT | Performed by: EMERGENCY MEDICINE

## 2018-07-07 PROCEDURE — 85730 THROMBOPLASTIN TIME PARTIAL: CPT | Performed by: EMERGENCY MEDICINE

## 2018-07-07 PROCEDURE — 84145 PROCALCITONIN (PCT): CPT | Performed by: PHYSICIAN ASSISTANT

## 2018-07-07 RX ORDER — ALBUTEROL SULFATE 90 UG/1
2 AEROSOL, METERED RESPIRATORY (INHALATION) EVERY 6 HOURS PRN
COMMUNITY
End: 2019-10-17 | Stop reason: SDUPTHER

## 2018-07-07 RX ORDER — ALBUTEROL SULFATE 90 UG/1
2 AEROSOL, METERED RESPIRATORY (INHALATION) EVERY 6 HOURS PRN
Status: DISCONTINUED | OUTPATIENT
Start: 2018-07-07 | End: 2018-07-16 | Stop reason: HOSPADM

## 2018-07-07 RX ORDER — SODIUM CHLORIDE 9 MG/ML
100 INJECTION, SOLUTION INTRAVENOUS CONTINUOUS
Status: DISCONTINUED | OUTPATIENT
Start: 2018-07-07 | End: 2018-07-08

## 2018-07-07 RX ORDER — TAMSULOSIN HYDROCHLORIDE 0.4 MG/1
0.4 CAPSULE ORAL
Status: DISCONTINUED | OUTPATIENT
Start: 2018-07-08 | End: 2018-07-07

## 2018-07-07 RX ORDER — MONTELUKAST SODIUM 10 MG/1
10 TABLET ORAL
Status: DISCONTINUED | OUTPATIENT
Start: 2018-07-07 | End: 2018-07-16 | Stop reason: HOSPADM

## 2018-07-07 RX ORDER — 0.9 % SODIUM CHLORIDE 0.9 %
3 VIAL (ML) INJECTION AS NEEDED
Status: DISCONTINUED | OUTPATIENT
Start: 2018-07-07 | End: 2018-07-16 | Stop reason: HOSPADM

## 2018-07-07 RX ORDER — ALBUTEROL SULFATE 2.5 MG/3ML
5 SOLUTION RESPIRATORY (INHALATION) ONCE
Status: COMPLETED | OUTPATIENT
Start: 2018-07-07 | End: 2018-07-07

## 2018-07-07 RX ORDER — TAMSULOSIN HYDROCHLORIDE 0.4 MG/1
0.4 CAPSULE ORAL
Status: DISCONTINUED | OUTPATIENT
Start: 2018-07-07 | End: 2018-07-16 | Stop reason: HOSPADM

## 2018-07-07 RX ORDER — ACETAMINOPHEN 325 MG/1
650 TABLET ORAL EVERY 6 HOURS PRN
Status: DISCONTINUED | OUTPATIENT
Start: 2018-07-07 | End: 2018-07-16 | Stop reason: HOSPADM

## 2018-07-07 RX ORDER — FLUTICASONE PROPIONATE 50 MCG
2 SPRAY, SUSPENSION (ML) NASAL DAILY
Status: DISCONTINUED | OUTPATIENT
Start: 2018-07-08 | End: 2018-07-16 | Stop reason: HOSPADM

## 2018-07-07 RX ORDER — BUDESONIDE AND FORMOTEROL FUMARATE DIHYDRATE 160; 4.5 UG/1; UG/1
2 AEROSOL RESPIRATORY (INHALATION) 2 TIMES DAILY
Status: DISCONTINUED | OUTPATIENT
Start: 2018-07-07 | End: 2018-07-16 | Stop reason: HOSPADM

## 2018-07-07 RX ADMIN — SODIUM CHLORIDE 1000 ML: 0.9 INJECTION, SOLUTION INTRAVENOUS at 19:57

## 2018-07-07 RX ADMIN — ALBUTEROL SULFATE 5 MG: 2.5 SOLUTION RESPIRATORY (INHALATION) at 19:25

## 2018-07-07 RX ADMIN — ACETAMINOPHEN 650 MG: 325 TABLET, FILM COATED ORAL at 21:35

## 2018-07-07 RX ADMIN — IPRATROPIUM BROMIDE 0.5 MG: 0.5 SOLUTION RESPIRATORY (INHALATION) at 19:25

## 2018-07-07 RX ADMIN — TAMSULOSIN HYDROCHLORIDE 0.4 MG: 0.4 CAPSULE ORAL at 21:51

## 2018-07-07 RX ADMIN — SODIUM CHLORIDE 100 ML/HR: 0.9 INJECTION, SOLUTION INTRAVENOUS at 21:51

## 2018-07-07 RX ADMIN — CEFEPIME HYDROCHLORIDE 2000 MG: 2 INJECTION, SOLUTION INTRAVENOUS at 20:26

## 2018-07-07 NOTE — Clinical Note
Case was discussed with Merline Lay, [facility] and the patient's admission status was agreed to be Admission Status: inpatient status to the service of Dr Dr Kirsten Aguirre

## 2018-07-08 PROBLEM — J96.21 ACUTE ON CHRONIC RESPIRATORY FAILURE WITH HYPOXIA (HCC): Status: ACTIVE | Noted: 2018-06-03

## 2018-07-08 LAB
ALBUMIN SERPL BCP-MCNC: 2.1 G/DL (ref 3.5–5)
ALP SERPL-CCNC: 45 U/L (ref 46–116)
ALT SERPL W P-5'-P-CCNC: 26 U/L (ref 12–78)
ANION GAP SERPL CALCULATED.3IONS-SCNC: 7 MMOL/L (ref 4–13)
AST SERPL W P-5'-P-CCNC: 34 U/L (ref 5–45)
BACTERIA SPT RESP CULT: NORMAL
BASOPHILS # BLD AUTO: 0.01 THOUSANDS/ΜL (ref 0–0.1)
BASOPHILS NFR BLD AUTO: 0 % (ref 0–1)
BILIRUB SERPL-MCNC: 0.3 MG/DL (ref 0.2–1)
BUN SERPL-MCNC: 12 MG/DL (ref 5–25)
CALCIUM SERPL-MCNC: 8.2 MG/DL (ref 8.3–10.1)
CHLORIDE SERPL-SCNC: 106 MMOL/L (ref 100–108)
CO2 SERPL-SCNC: 27 MMOL/L (ref 21–32)
CREAT SERPL-MCNC: 0.99 MG/DL (ref 0.6–1.3)
EOSINOPHIL # BLD AUTO: 0.13 THOUSAND/ΜL (ref 0–0.61)
EOSINOPHIL NFR BLD AUTO: 2 % (ref 0–6)
ERYTHROCYTE [DISTWIDTH] IN BLOOD BY AUTOMATED COUNT: 14.1 % (ref 11.6–15.1)
GFR SERPL CREATININE-BSD FRML MDRD: 80 ML/MIN/1.73SQ M
GLUCOSE SERPL-MCNC: 82 MG/DL (ref 65–140)
GRAM STN SPEC: NORMAL
HCT VFR BLD AUTO: 37 % (ref 36.5–49.3)
HGB BLD-MCNC: 11.9 G/DL (ref 12–17)
IMM GRANULOCYTES # BLD AUTO: 0.06 THOUSAND/UL (ref 0–0.2)
IMM GRANULOCYTES NFR BLD AUTO: 1 % (ref 0–2)
L PNEUMO1 AG UR QL IA.RAPID: NEGATIVE
LYMPHOCYTES # BLD AUTO: 1.65 THOUSANDS/ΜL (ref 0.6–4.47)
LYMPHOCYTES NFR BLD AUTO: 23 % (ref 14–44)
MAGNESIUM SERPL-MCNC: 2 MG/DL (ref 1.6–2.6)
MCH RBC QN AUTO: 27.6 PG (ref 26.8–34.3)
MCHC RBC AUTO-ENTMCNC: 32.2 G/DL (ref 31.4–37.4)
MCV RBC AUTO: 86 FL (ref 82–98)
MONOCYTES # BLD AUTO: 0.31 THOUSAND/ΜL (ref 0.17–1.22)
MONOCYTES NFR BLD AUTO: 4 % (ref 4–12)
NEUTROPHILS # BLD AUTO: 5.09 THOUSANDS/ΜL (ref 1.85–7.62)
NEUTS SEG NFR BLD AUTO: 70 % (ref 43–75)
NRBC BLD AUTO-RTO: 0 /100 WBCS
PLATELET # BLD AUTO: 326 THOUSANDS/UL (ref 149–390)
PMV BLD AUTO: 9.3 FL (ref 8.9–12.7)
POTASSIUM SERPL-SCNC: 4 MMOL/L (ref 3.5–5.3)
PROCALCITONIN SERPL-MCNC: 2.01 NG/ML
PROCALCITONIN SERPL-MCNC: 2.64 NG/ML
PROT SERPL-MCNC: 6.9 G/DL (ref 6.4–8.2)
RBC # BLD AUTO: 4.31 MILLION/UL (ref 3.88–5.62)
S PNEUM AG UR QL: NEGATIVE
SODIUM SERPL-SCNC: 140 MMOL/L (ref 136–145)
WBC # BLD AUTO: 7.25 THOUSAND/UL (ref 4.31–10.16)

## 2018-07-08 PROCEDURE — 86606 ASPERGILLUS ANTIBODY: CPT | Performed by: PHYSICIAN ASSISTANT

## 2018-07-08 PROCEDURE — 86255 FLUORESCENT ANTIBODY SCREEN: CPT | Performed by: PHYSICIAN ASSISTANT

## 2018-07-08 PROCEDURE — 82785 ASSAY OF IGE: CPT | Performed by: PHYSICIAN ASSISTANT

## 2018-07-08 PROCEDURE — 94640 AIRWAY INHALATION TREATMENT: CPT

## 2018-07-08 PROCEDURE — 99233 SBSQ HOSP IP/OBS HIGH 50: CPT | Performed by: INTERNAL MEDICINE

## 2018-07-08 PROCEDURE — 80053 COMPREHEN METABOLIC PANEL: CPT | Performed by: PHYSICIAN ASSISTANT

## 2018-07-08 PROCEDURE — 99255 IP/OBS CONSLTJ NEW/EST HI 80: CPT | Performed by: INTERNAL MEDICINE

## 2018-07-08 PROCEDURE — 86671 FUNGUS NES ANTIBODY: CPT | Performed by: PHYSICIAN ASSISTANT

## 2018-07-08 PROCEDURE — 84145 PROCALCITONIN (PCT): CPT | Performed by: PHYSICIAN ASSISTANT

## 2018-07-08 PROCEDURE — 86331 IMMUNODIFFUSION OUCHTERLONY: CPT | Performed by: PHYSICIAN ASSISTANT

## 2018-07-08 PROCEDURE — 83735 ASSAY OF MAGNESIUM: CPT | Performed by: PHYSICIAN ASSISTANT

## 2018-07-08 PROCEDURE — 85025 COMPLETE CBC W/AUTO DIFF WBC: CPT | Performed by: PHYSICIAN ASSISTANT

## 2018-07-08 PROCEDURE — 94760 N-INVAS EAR/PLS OXIMETRY 1: CPT

## 2018-07-08 PROCEDURE — 86602 ANTINOMYCES ANTIBODY: CPT | Performed by: PHYSICIAN ASSISTANT

## 2018-07-08 PROCEDURE — 87205 SMEAR GRAM STAIN: CPT | Performed by: PHYSICIAN ASSISTANT

## 2018-07-08 PROCEDURE — 86038 ANTINUCLEAR ANTIBODIES: CPT | Performed by: PHYSICIAN ASSISTANT

## 2018-07-08 PROCEDURE — 87081 CULTURE SCREEN ONLY: CPT | Performed by: PHYSICIAN ASSISTANT

## 2018-07-08 RX ORDER — GUAIFENESIN 100 MG/5ML
200 SOLUTION ORAL EVERY 4 HOURS PRN
Status: DISCONTINUED | OUTPATIENT
Start: 2018-07-08 | End: 2018-07-16 | Stop reason: HOSPADM

## 2018-07-08 RX ORDER — METHYLPREDNISOLONE SODIUM SUCCINATE 40 MG/ML
40 INJECTION, POWDER, LYOPHILIZED, FOR SOLUTION INTRAMUSCULAR; INTRAVENOUS EVERY 12 HOURS SCHEDULED
Status: DISCONTINUED | OUTPATIENT
Start: 2018-07-08 | End: 2018-07-08

## 2018-07-08 RX ORDER — SODIUM CHLORIDE FOR INHALATION 0.9 %
3 VIAL, NEBULIZER (ML) INHALATION
Status: DISCONTINUED | OUTPATIENT
Start: 2018-07-08 | End: 2018-07-09

## 2018-07-08 RX ORDER — LEVOFLOXACIN 5 MG/ML
750 INJECTION, SOLUTION INTRAVENOUS EVERY 24 HOURS
Status: DISCONTINUED | OUTPATIENT
Start: 2018-07-08 | End: 2018-07-09

## 2018-07-08 RX ORDER — ALBUTEROL SULFATE 2.5 MG/3ML
2.5 SOLUTION RESPIRATORY (INHALATION) EVERY 4 HOURS PRN
Status: DISCONTINUED | OUTPATIENT
Start: 2018-07-08 | End: 2018-07-08

## 2018-07-08 RX ORDER — BENZONATATE 100 MG/1
100 CAPSULE ORAL 3 TIMES DAILY PRN
Status: DISCONTINUED | OUTPATIENT
Start: 2018-07-08 | End: 2018-07-16 | Stop reason: HOSPADM

## 2018-07-08 RX ORDER — ALBUTEROL SULFATE 2.5 MG/3ML
SOLUTION RESPIRATORY (INHALATION)
Status: DISPENSED
Start: 2018-07-08 | End: 2018-07-08

## 2018-07-08 RX ORDER — LEVALBUTEROL 1.25 MG/.5ML
1.25 SOLUTION, CONCENTRATE RESPIRATORY (INHALATION)
Status: DISCONTINUED | OUTPATIENT
Start: 2018-07-08 | End: 2018-07-16 | Stop reason: HOSPADM

## 2018-07-08 RX ORDER — LEVALBUTEROL 1.25 MG/.5ML
1.25 SOLUTION, CONCENTRATE RESPIRATORY (INHALATION)
Status: DISCONTINUED | OUTPATIENT
Start: 2018-07-08 | End: 2018-07-08

## 2018-07-08 RX ADMIN — BUDESONIDE AND FORMOTEROL FUMARATE DIHYDRATE 2 PUFF: 160; 4.5 AEROSOL RESPIRATORY (INHALATION) at 08:08

## 2018-07-08 RX ADMIN — METHYLPREDNISOLONE SODIUM SUCCINATE 40 MG: 40 INJECTION, POWDER, FOR SOLUTION INTRAMUSCULAR; INTRAVENOUS at 08:28

## 2018-07-08 RX ADMIN — LEVALBUTEROL 1.25 MG: 1.25 SOLUTION, CONCENTRATE RESPIRATORY (INHALATION) at 13:25

## 2018-07-08 RX ADMIN — CEFEPIME HYDROCHLORIDE 2000 MG: 2 INJECTION, SOLUTION INTRAVENOUS at 20:10

## 2018-07-08 RX ADMIN — ISODIUM CHLORIDE 3 ML: 0.03 SOLUTION RESPIRATORY (INHALATION) at 13:27

## 2018-07-08 RX ADMIN — MONTELUKAST SODIUM 10 MG: 10 TABLET, FILM COATED ORAL at 21:59

## 2018-07-08 RX ADMIN — ALBUTEROL SULFATE 2.5 MG: 2.5 SOLUTION RESPIRATORY (INHALATION) at 05:42

## 2018-07-08 RX ADMIN — LEVOFLOXACIN 750 MG: 5 INJECTION, SOLUTION INTRAVENOUS at 15:00

## 2018-07-08 RX ADMIN — TAMSULOSIN HYDROCHLORIDE 0.4 MG: 0.4 CAPSULE ORAL at 16:24

## 2018-07-08 RX ADMIN — CEFEPIME HYDROCHLORIDE 2000 MG: 2 INJECTION, SOLUTION INTRAVENOUS at 08:28

## 2018-07-08 RX ADMIN — LEVALBUTEROL 1.25 MG: 1.25 SOLUTION, CONCENTRATE RESPIRATORY (INHALATION) at 20:10

## 2018-07-08 RX ADMIN — BUDESONIDE AND FORMOTEROL FUMARATE DIHYDRATE 2 PUFF: 160; 4.5 AEROSOL RESPIRATORY (INHALATION) at 20:10

## 2018-07-08 RX ADMIN — VANCOMYCIN HYDROCHLORIDE 1750 MG: 1 INJECTION, POWDER, LYOPHILIZED, FOR SOLUTION INTRAVENOUS at 12:45

## 2018-07-08 RX ADMIN — ACETAMINOPHEN 650 MG: 325 TABLET, FILM COATED ORAL at 15:03

## 2018-07-08 RX ADMIN — GUAIFENESIN 200 MG: 100 SOLUTION ORAL at 15:03

## 2018-07-08 RX ADMIN — ENOXAPARIN SODIUM 40 MG: 40 INJECTION, SOLUTION INTRAVENOUS; SUBCUTANEOUS at 08:28

## 2018-07-08 RX ADMIN — GUAIFENESIN 200 MG: 100 SOLUTION ORAL at 08:47

## 2018-07-08 RX ADMIN — ISODIUM CHLORIDE 3 ML: 0.03 SOLUTION RESPIRATORY (INHALATION) at 20:10

## 2018-07-08 RX ADMIN — FLUTICASONE PROPIONATE 2 SPRAY: 50 SPRAY, METERED NASAL at 11:21

## 2018-07-08 NOTE — H&P
[address] Internal Medicine    H&P- Paula Lake 1955, 58 y o  male MRN: [id number]    Unit/Bed#: -02 Encounter: [id number]    Primary Care Provider: Kenney Morrison MD   Date and time admitted to hospital: 7/7/2018  6:53 PM      * Pneumonia of both lungs due to infectious organism   Assessment & Plan    · CXR:  Bilateral pneumonia  · Patient hospitalized 06/03/2018 for similar complaint, qualifies patient for high risk HCAP  · Cefepime 2000 mg IV Q 24 H, day 1  · Sputum culture Gram stain  · Strep pneumoniae urine, Legionella urine antigen  · Procalcitonin  · Respiratory protocol  · Continue nasal cannula, wean as tolerated to patient's baseline 3 L   · Consider D/C Symbicort, this is patient's 2nd hospitalization with pneumonia in 90 days        Moderate persistent asthma without complication   Assessment & Plan    · Continue Symbicort, Flonase, Xopenex, Singulair        Sepsis ([facility] [address] )   Assessment & Plan    · , R 28  · Lactic 1 3  · BC x2 pending  · Antibiotics:  Cefepime 2000 mg IV Q 24 H, day 1  · Suspected source: Pneumonia          COPD (chronic obstructive pulmonary disease) ([facility] [address] )   Assessment & Plan    · No signs of acute exacerbation  · Continue albuterol, Symbicort, Flonase, Singulair          VTE Prophylaxis: Enoxaparin (Lovenox)  / sequential compression device   Code Status: Full  POLST: There is no POLST form on file for this patient (pre-hospital)  Discussion with family: Family at bedside    Anticipated Length of Stay:  Patient will be admitted on an Inpatient basis with an anticipated length of stay of  Greater than 2 midnights  Justification for Hospital Stay:  Bilateral pneumonia requiring IV antibiotics    Chief Complaint:   Shortness of breath    History of Present Illness:    Paula Lake is a 58 y o  male with a PMH for COPD, asthma who presents with shortness of breath x1 week    Patient states that he has become more short of breath over the past week and is SpO2 at home was 60-70% taken with an at home pulse oximeter  He also complains of a productive cough x3 days  He said that this afternoon he could not catch his breath and had to increas his nasal cannula to 6 L as well as use his rescue inhaler  Patient states that he feels as if he has pneumonia again  He was recently admitted in June with bilateral pneumonia and treated with Rocephin and azithromycin  Patient also complains of fever, chills, diaphoresis  His T-max is 102 0° taken tympanically for which she took Tylenol  Upon arrival to the ED his SpO2 was 95%  CXR showed bilateral pneumonia  Patient was given 1 L NSS due to blood pressure 95/55  Patient wears 3 L nasal cannula chronically at home for asthma and COPD  According to pulmonology notes he may also have a component of ILD  Patient meets sepsis criteria due to increased heart rate and respirations  Lactic was 1 3  He denies dizziness, headache, sick contacts, nausea, vomiting, diarrhea, stomach pain  Review of Systems:    Review of Systems   Constitutional: Positive for chills, diaphoresis and fever  Negative for activity change and fatigue  HENT: Negative for facial swelling, rhinorrhea, sneezing and sore throat  Eyes: Negative for photophobia  Respiratory: Positive for cough (Productive) and shortness of breath  Negative for chest tightness, wheezing and stridor  Cardiovascular: Negative for chest pain, palpitations and leg swelling  Gastrointestinal: Negative for abdominal pain, blood in stool, constipation, nausea and vomiting  Endocrine: Negative for polydipsia, polyphagia and polyuria  Musculoskeletal: Negative for back pain, neck pain and neck stiffness  Skin: Negative for pallor and wound  Neurological: Negative for dizziness, tremors, syncope, weakness, numbness and headaches         Past Medical and Surgical History:     Past Medical History:   Diagnosis Date    Abscess, cheek     Last Assessed: 2/23/2016    Asthma     Chronic headaches     Constipation     [facility]     Cough     Difficulty attaining erection     Dyspnea     Enlarged prostate     Hemorrhoids     Migraine     Seasonal allergies     Slow urinary stream     Wheezing        Past Surgical History:   Procedure Laterality Date    ABSCESS DRAINAGE      SKIN LESION EXCISION      Excision of lesion in vestibule of mouth; Last Assessed: 2/23/2016       Meds/Allergies:    Prior to Admission medications    Medication Sig Start Date End Date Taking? Authorizing Provider   albuterol (PROVENTIL HFA,VENTOLIN HFA) 90 mcg/act inhaler Inhale 2 puffs every 6 (six) hours as needed for wheezing   Yes Historical Provider, MD   budesonide-formoterol (SYMBICORT) 160-4 5 mcg/act inhaler Inhale 2 puffs 2 (two) times a day 4/5/18  Yes Zuleima Gupta MD   fluticasone (FLONASE) 50 mcg/act nasal spray 2 sprays into each nostril daily 5/15/18  Yes Zuleima Gupta MD   montelukast (SINGULAIR) 10 mg tablet Take 1 tablet (10 mg total) by mouth daily at bedtime 6/21/18  Yes Niels Runner,    tamsulosin (FLOMAX) 0 4 mg Take 1 capsule (0 4 mg total) by mouth daily with dinner for 30 days 6/14/18 7/14/18 Yes Zuleima Gupta MD   levalbuterol [facility]) 45 mcg/act inhaler Inhale 1-2 puffs every 4 (four) hours as needed for wheezing 5/15/18 7/7/18  Zuleima Gupta MD     I have reviewed home medications with patient personally  Allergies:    Allergies   Allergen Reactions    Eggs Or Egg-Derived Products Diarrhea     Pt states he becomes nauseated and hasd diarrhea    Diphenhydramine Hyperactivity and Hypertension     Category: INSOMNIA;        Social History:     Marital Status: /Civil Union   Occupation:  Noncontributory  Patient Pre-hospital Living Situation:  Family  Patient Pre-hospital Level of Mobility:  Full  Patient Pre-hospital Diet Restrictions:  None  Substance Use History:   History   Alcohol Use    Yes     Comment: socially / 1-4 drinks/week     History   Smoking Status    Former Smoker    Packs/day: 1 50    Years: 20 00    Types: Cigarettes    Quit date: 2/5/1993   Smokeless Tobacco    Never Used     Comment: never a smoker per [employer]     History   Drug Use No       Family History:    Family History   Problem Relation Age of Onset    Hypertension Mother     Glaucoma Mother     Cancer Father         Prostate    Diabetes Maternal Uncle     Glaucoma Maternal Uncle        Physical Exam:     Vitals:   Blood Pressure: 117/56 (07/07/18 2101)  Pulse: (!) 112 (07/07/18 2101)  Temperature: 99 7 °F (37 6 °C) (07/07/18 2101)  Temp Source: Oral (07/07/18 2101)  Respirations: (!) 24 (07/07/18 2101)  Height: 6' (182 9 cm) (07/07/18 2101)  Weight - Scale: 116 kg (255 lb 11 7 oz) (07/07/18 2101)  SpO2: 93 % (07/07/18 2101)    Physical Exam   Constitutional: He is oriented to person, place, and time  Vital signs are normal  He appears well-developed and well-nourished  Non-toxic appearance  No distress  Nasal cannula in place  HENT:   Head: Normocephalic and atraumatic  Mouth/Throat: Oropharynx is clear and moist and mucous membranes are normal  Normal dentition  No oropharyngeal exudate  Eyes: Conjunctivae are normal  Pupils are equal, round, and reactive to light  Right eye exhibits no discharge  Left eye exhibits no discharge  No scleral icterus  Neck: No JVD present  No tracheal deviation and no erythema present  Cardiovascular: Regular rhythm, normal heart sounds, intact distal pulses and normal pulses  Tachycardia present  Exam reveals no gallop and no friction rub  No murmur heard  Pulmonary/Chest: Effort normal  No accessory muscle usage or stridor  No respiratory distress  He has no decreased breath sounds  He has no wheezes  He has rales (Lower bases)  Abdominal: Soft  Bowel sounds are normal  He exhibits no distension and no mass  There is no tenderness  There is no rebound     Musculoskeletal: He exhibits no edema, tenderness or deformity  Neurological: He is alert and oriented to person, place, and time  GCS eye subscore is 4  GCS verbal subscore is 5  GCS motor subscore is 6  Skin: Skin is warm  No rash noted  He is diaphoretic  No erythema  No pallor  Psychiatric: He has a normal mood and affect  His behavior is normal    Nursing note and vitals reviewed  Additional Data:     Lab Results: I have personally reviewed pertinent reports  Results from last 7 days  Lab Units 07/07/18  1921   WBC Thousand/uL 10 00   HEMOGLOBIN g/dL 12 7   HEMATOCRIT % 40 3   PLATELETS Thousands/uL 349   NEUTROS PCT % 81*   LYMPHS PCT % 9*   MONOS PCT % 6   EOS PCT % 4       Results from last 7 days  Lab Units 07/07/18  1922   SODIUM mmol/L 136   POTASSIUM mmol/L 4 2   CHLORIDE mmol/L 101   CO2 mmol/L 27   BUN mg/dL 13   CREATININE mg/dL 1 03   CALCIUM mg/dL 8 6   TOTAL PROTEIN g/dL 7 6   BILIRUBIN TOTAL mg/dL 0 40   ALK PHOS U/L 51   ALT U/L 30   AST U/L 38   GLUCOSE RANDOM mg/dL 106       Results from last 7 days  Lab Units 07/07/18  1922   INR  1 08           Results from last 7 days  Lab Units 07/03/18  0724   HEMOGLOBIN A1C % 6 0*       Imaging: I have personally reviewed pertinent reports  XR Chest PA & Lateral: Bilateral pneumonia    EKG, Pathology, and Other Studies Reviewed on Admission:   · EKG: Sinus Tachycardia  Vent  rate 111 BPM  ND interval 130 ms  QRS duration 76 ms  QT/QTc 316/429 ms  P-R-T axes 54 81 45  BP 95/55 mmHg    Allscripts / Epic Records Reviewed: Yes     ** Please Note: This note has been constructed using a voice recognition system   **

## 2018-07-08 NOTE — CONSULTS
Pulmonary Consultation   Paula Lake 58 y o  male MRN: [id number]  Unit/Bed#: -02 Encounter: [id number]      Reason for consultation:   Shortness of Breath / Pneumonia    Requesting physician:   Dr Michelle Jessica / Recommendations    1  Acute / Chronic Hypoxic respiratory failure  · Continue SpO2 monitoring and maintain >88%  Currently on 5L via NC with SpO2 97%, will decrease to 3L now and monitor  · I do not see utility in steroid therapy at this time as no wheezing on exam, will d/c steroids and monitor closely  · As I believe large part is bronchospastic due to pneumonia, I will start on scheduled xopenex q8h today and monitor for improvement  I will hold on Atrovent administration and continue symbicort bid  · Continue flonase and singulair    2  Acute bilateral pneumonia vs interstitial edema vs Cryptogenic organizing PNA  · Continue SpO2 monitoring as above  · Continue cefepime BID  · Obtain MRSA swab  · Will add vancomycin   · Additionally will add atypical coverage - as recently completed azithromycin will place on Levaquin 750mg  · Obtain Hypersensitivity panel as well as ANCA, RADHA  · Obtain BNP  · Consider Chest CT  · Follow up sputum culture  · May need to consider Bronchoscopy  · Urine Strep and Legionella negative    3  Asthma with probable COPD overlap with bronchospasm  · Will initiate xopenex as above  · Recent PFTs reveiwed    The above will be discussed with Dr Romero Owens with further treatment plan modifications as necessary    History of Present Illness   HPI:  Paula Lake is a 58 y o  male who presented to [facility] with the chief complaint of shortness of breath which has been progressively getting worse over the past week  Patient reports a recent hospital admission here at [facility] 6/3 thru 6/7 for which he was treated for Pneumonia and completed his course of antibiotics    Mr Wen Cervantes reports an extensive PMH of COPD for which he takes Xopenex / Albuterol MDI as well as symbicort 2x daily  Previously attempted addition of spiriva to regimen; however stated he didn't think it helped and actually gave him additional side effects  Recent PFTs revealed severe obstruction with bronchodilator use felt to be associated with underlying Asthma  He reports since discharge from said admission he was seen by Dr Maria T Madera whom is his pulmonologist   He reports at that visit he was to continue his home O2, but at 3L and singular was added however did not appear to help  Patient reports over the past week he has been experiencing increasing shortness of breath, particularly with exertion  Fevers reported Tmax of 102 0 over the past week, with cough, at times productive; light yellow in color  Yesterday patient reports persistent shortness of breath with SpO2 60s  He attempted to use his rescue inhaler and increased his O2 L flow without relief  Upon arrival in the ED SpO2 95%, CXR findings consistent with bilateral pneumonia and cefepime was initiated  Additionally patient started on Steroids  Review of systems:  No Headache or dizziness  No Visual changes  Denied sore throat or runny nose; however positive fevers, chills and sweats  Positive shortness of breath, no wheezing  Denies chest pains  Denies abdominal pain, nausea or vomiting  Denies calf pain or leg swelling  All other ROS points negative      Historical Information   Past Medical History:   Diagnosis Date    Abscess, cheek     Last Assessed: 2/23/2016    Asthma     Chronic headaches     Constipation     COPD (chronic obstructive pulmonary disease) (HCC)     Cough     Difficulty attaining erection     Dyspnea     Enlarged prostate     Hemorrhoids     Migraine     Seasonal allergies     Slow urinary stream     Wheezing      Past Surgical History:   Procedure Laterality Date    ABSCESS DRAINAGE      SKIN LESION EXCISION      Excision of lesion in vestibule of mouth;  Last Assessed: 2/23/2016     Family History   Problem Relation Age of Onset    Hypertension Mother    [facility] Glaucoma Mother     Cancer Father         Prostate    Diabetes Maternal Uncle     Glaucoma Maternal Uncle        Occupational history: Car part manufactering    Tobacco history: Previous smoker quitting in 90s    Meds/Allergies   Current Facility-Administered Medications   Medication Dose Route Frequency    acetaminophen (TYLENOL) tablet 650 mg  650 mg Oral Q6H PRN    albuterol (2 5 mg/3 mL) 0 083 % inhalation solution **AcuDose Override Pull**        albuterol (PROVENTIL HFA,VENTOLIN HFA) inhaler 2 puff  2 puff Inhalation Q6H PRN    albuterol inhalation solution 2 5 mg  2 5 mg Nebulization Q4H PRN    benzonatate (TESSALON PERLES) capsule 100 mg  100 mg Oral TID PRN    budesonide-formoterol (SYMBICORT) 160-4 5 mcg/act inhaler 2 puff  2 puff Inhalation BID    cefepime (MAXIPIME) IVPB (premix) 2,000 mg  2,000 mg Intravenous Q12H    enoxaparin (LOVENOX) subcutaneous injection 40 mg  40 mg Subcutaneous Daily    fluticasone (FLONASE) 50 mcg/act nasal spray 2 spray  2 spray Nasal Daily    guaiFENesin (ROBITUSSIN) oral solution 200 mg  200 mg Oral Q4H PRN    methylPREDNISolone sodium succinate (Solu-MEDROL) injection 40 mg  40 mg Intravenous Q12H JONNY    montelukast (SINGULAIR) tablet 10 mg  10 mg Oral HS    sodium chloride (PF) 0 9 % injection 3 mL  3 mL Intravenous PRN    tamsulosin (FLOMAX) capsule 0 4 mg  0 4 mg Oral Daily With Dinner     Prescriptions Prior to Admission   Medication    albuterol (PROVENTIL HFA,VENTOLIN HFA) 90 mcg/act inhaler    budesonide-formoterol (SYMBICORT) 160-4 5 mcg/act inhaler    fluticasone (FLONASE) 50 mcg/act nasal spray    montelukast (SINGULAIR) 10 mg tablet    tamsulosin (FLOMAX) 0 4 mg     Allergies   Allergen Reactions    Eggs Or Egg-Derived Products Diarrhea     Pt states he becomes nauseated and hasd diarrhea    Diphenhydramine Hyperactivity and Hypertension     Category: INSOMNIA; Vitals: Blood pressure 114/59, pulse 84, temperature 100 1 °F (37 8 °C), temperature source Oral, resp  rate 21, height 6' (1 829 m), weight 118 kg (260 lb 2 3 oz), SpO2 93 %  , currently 5L with SpO2 95%, Body mass index is 35 28 kg/m²  Intake/Output Summary (Last 24 hours) at 07/08/18 1036  Last data filed at 07/08/18 0201   Gross per 24 hour   Intake                0 ml   Output              750 ml   Net             -750 ml       Physical exam:    General Appearance:    Alert, cooperative, with conversational dyspnea however no accessory     muscle use       Head/eyes:    Normocephalic, without obvious abnormality, atraumatic,         PERRL, extraocular muscles intact, no scleral icterus    Nose:   Nares normal, septum midline, mucosa normal, no drainage    or sinus tenderness   Throat:   Moist mucous membranes, no thrush   Neck:   Supple, trachea midline, no adenopathy; no carotid    bruit or JVD   Lungs:     Decreased breath sounds; however good air exchange, no wheezing, rales, or rhonchi appreciated   Chest Wall:    No tenderness or deformity    Heart:    Regular rate and rhythm, S1 and S2 normal, no murmur, rub   or gallop   Abdomen:     Soft, non-tender, bowel sounds active all four quadrants,     no masses, no organomegaly   Extremities:   Extremities normal, atraumatic, no cyanosis or edema   Skin:   Warm, dry, turgor normal, no rashes or lesions   Lymph nodes:   Cervical and supraclavicular nodes normal   Neurologic:   CNII-XII intact, normal strength, non-focal         Labs: I have personally reviewed pertinent lab results  , CBC:   Lab Results   Component Value Date    WBC 7 25 07/08/2018    HGB 11 9 (L) 07/08/2018    HCT 37 0 07/08/2018    MCV 86 07/08/2018     07/08/2018    MCH 27 6 07/08/2018    MCHC 32 2 07/08/2018    RDW 14 1 07/08/2018    MPV 9 3 07/08/2018    NRBC 0 07/08/2018   , CMP:   Lab Results   Component Value Date     07/08/2018    K 4 0 07/08/2018     07/08/2018    CO2 27 07/08/2018    ANIONGAP 7 07/08/2018    BUN 12 07/08/2018    CREATININE 0 99 07/08/2018    GLUCOSE 82 07/08/2018    CALCIUM 8 2 (L) 07/08/2018    AST 34 07/08/2018    ALT 26 07/08/2018    ALKPHOS 45 (L) 07/08/2018    PROT 6 9 07/08/2018    BILITOT 0 30 07/08/2018    EGFR 80 07/08/2018       Imaging and other studies: I have personally reviewed pertinent reports  and I have personally reviewed pertinent films in PACS    Pulmonary function testing: Feb 2018  FEV1/FVC ratio 60%  FEV1 48% predicted  FVC 60% predicted  There is response to bronchodilators  Post bronchodilator FEV1 57% predicted    EKG, Pathology, and Other Studies: I have personally reviewed pertinent reports     and I have personally reviewed pertinent films in PACS    Code Status: Level 1 - Full Code      Sherman Caraballo PA-C

## 2018-07-08 NOTE — ASSESSMENT & PLAN NOTE
Recurrent pneumonia -day 2  Of empiric antibiotic therapy with cefepime    Continue neb treatments and inhalers

## 2018-07-08 NOTE — PLAN OF CARE
Problem: PAIN - ADULT  Goal: Verbalizes/displays adequate comfort level or baseline comfort level  Interventions:  - Encourage patient to monitor pain and request assistance  - Assess pain using appropriate pain scale  - Administer analgesics based on type and severity of pain and evaluate response  - Implement non-pharmacological measures as appropriate and evaluate response  - Consider cultural and social influences on pain and pain management  - Notify physician/advanced practitioner if interventions unsuccessful or patient reports new pain  Outcome: Not Progressing      Problem: INFECTION - ADULT  Goal: Absence or prevention of progression during hospitalization  INTERVENTIONS:  - Assess and monitor for signs and symptoms of infection  - Monitor lab/diagnostic results  - Monitor all insertion sites, i e  indwelling lines, tubes, and drains  - Monitor endotracheal (as able) and nasal secretions for changes in amount and color  - [place] appropriate cooling/warming therapies per order  - Administer medications as ordered  - Instruct and encourage patient and family to use good hand hygiene technique  - Identify and instruct in appropriate isolation precautions for identified infection/condition  Outcome: Not Progressing      Problem: SAFETY ADULT  Goal: Maintain or return to baseline ADL function  INTERVENTIONS:  -  Assess patient's ability to carry out ADLs; assess patient's baseline for ADL function and identify physical deficits which impact ability to perform ADLs (bathing, care of mouth/teeth, toileting, grooming, dressing, etc )  - Assess/evaluate cause of self-care deficits   - Assess range of motion  - Assess patient's mobility; develop plan if impaired  - Assess patient's need for assistive devices and provide as appropriate  - Encourage maximum independence but intervene and supervise when necessary  ¯ Involve family in performance of ADLs  ¯ Assess for home care needs following discharge   ¯ Request OT consult to assist with ADL evaluation and planning for discharge  ¯ Provide patient education as appropriate  Outcome: Not Progressing    Goal: Maintain or return mobility status to optimal level  INTERVENTIONS:  - Assess patient's baseline mobility status (ambulation, transfers, stairs, etc )    - Identify cognitive and physical deficits and behaviors that affect mobility  - Identify mobility aids required to assist with transfers and/or ambulation (gait belt, sit-to-stand, lift, walker, cane, etc )  - [place] fall precautions as indicated by assessment  - Record patient progress and toleration of activity level on Mobility SBAR; progress patient to next Phase/Stage  - Instruct patient to call for assistance with activity based on assessment  - Request Rehabilitation consult to assist with strengthening/weightbearing, etc   Outcome: Not Progressing      Problem: DISCHARGE PLANNING  Goal: Discharge to home or other facility with appropriate resources  INTERVENTIONS:  - Identify barriers to discharge w/patient and caregiver  - Arrange for needed discharge resources and transportation as appropriate  - Identify discharge learning needs (meds, wound care, etc )  - Arrange for interpretive services to assist at discharge as needed  - Refer to Case Management Department for coordinating discharge planning if the patient needs post-hospital services based on physician/advanced practitioner order or complex needs related to functional status, cognitive ability, or social support system  Outcome: Not Progressing      Problem: Knowledge Deficit  Goal: Patient/family/caregiver demonstrates understanding of disease process, treatment plan, medications, and discharge instructions  Complete learning assessment and assess knowledge base    Interventions:  - Provide teaching at level of understanding  - Provide teaching via preferred learning methods  Outcome: Not Progressing      Problem: RESPIRATORY - ADULT  Goal: Achieves optimal ventilation and oxygenation  INTERVENTIONS:  - Assess for changes in respiratory status  - Assess for changes in mentation and behavior  - Position to facilitate oxygenation and minimize respiratory effort  - Oxygen administration by appropriate delivery method based on oxygen saturation (per order) or ABGs  - Initiate smoking cessation education as indicated  - Encourage broncho-pulmonary hygiene including cough, deep breathe, Incentive Spirometry  - Assess the need for suctioning and aspirate as needed  - Assess and instruct to report SOB or any respiratory difficulty  - Respiratory Therapy support as indicated  Outcome: Not Progressing

## 2018-07-08 NOTE — ASSESSMENT & PLAN NOTE
· , R 28  · Lactic 1 3  · BC x2 pending  · Antibiotics:  Cefepime 2000 mg IV Q 24 H, day 1  · Suspected source: Pneumonia

## 2018-07-08 NOTE — RESPIRATORY THERAPY NOTE
RT Protocol Note  Shell Salguero 58 y o  male MRN: [id number]  Unit/Bed#: -02 Encounter: [id number]    Assessment    Principal Problem:    Pneumonia of both lungs due to infectious organism  Active Problems: Moderate persistent asthma without complication    Sepsis (HCC)    COPD (chronic obstructive pulmonary disease) ([facility])      Home Pulmonary Medications:  Xopenex mdi prn, Albuterol mdi prn, Symbicort bid  Home Devices/Therapy: (P) Home O2    Past Medical History:   Diagnosis Date    Abscess, cheek     Last Assessed: 2/23/2016    Asthma     Chronic headaches     Constipation     COPD (chronic obstructive pulmonary disease) ([facility])     Cough     Difficulty attaining erection     Dyspnea     Enlarged prostate     Hemorrhoids     Migraine     Seasonal allergies     Slow urinary stream     Wheezing      Social History     Social History    Marital status: /Civil Union     Spouse name: N/A    Number of children: N/A    Years of education: N/A     Social History Main Topics    Smoking status: Former Smoker     Packs/day: 1 50     Years: 20 00     Types: Cigarettes     Quit date: 2/5/1993    Smokeless tobacco: Never Used      Comment: never a smoker per [employer]    Alcohol use Yes      Comment: socially / 1-4 drinks/week    Drug use: No    Sexual activity: No     Other Topics Concern    None     Social History Narrative    Daily caffeine consumption, 2-3 servings a day           Subjective         Objective    Physical Exam:   Assessment Type: (P) Assess only  General Appearance: (P) Alert, Awake  Respiratory Pattern: (P) Tachypneic  Chest Assessment: (P) Chest expansion symmetrical  Bilateral Breath Sounds: (P) Scattered, Rales  Location Specific: (P) Yes  Cough: (P) Non-productive    Vitals:  Blood pressure 105/58, pulse 100, temperature 99 7 °F (37 6 °C), temperature source Oral, resp  rate 22, height 6' (1 829 m), weight 116 kg (255 lb 11 7 oz), SpO2 94 %            Imaging and other studies: I have personally reviewed pertinent reports              Plan    Respiratory Plan: (P) Mild Distress pathway

## 2018-07-08 NOTE — ASSESSMENT & PLAN NOTE
Will place on IV steroids and consult Pulmonary who have followed him in the past   He had he develops diarrhea with Xopenex but has tachycardia and some jitteriness with albuterol    Trial on Spiriva had cause urinary issues with retention in the past   He is on Flomax currently

## 2018-07-08 NOTE — PROGRESS NOTES
Progress Note - Rakesh Males 1955, 58 y o  male MRN: [id number]    Unit/Bed#: -02 Encounter: [id number]    Primary Care Provider: Skip Reddy MD   Date and time admitted to hospital: 2018  6:53 PM        Sepsis [facility])   Assessment & Plan    Improved status-will cap IV fluids as he is taking p o  without difficulty        Acute on chronic respiratory failure with hypoxia [facility])   Assessment & Plan    Patient had sats in the 60% range at home and currently is on 5 L  usually on 2 L at home  * Pneumonia of both lungs due to infectious organism   Assessment & Plan    Recurrent pneumonia -day 2  Of empiric antibiotic therapy with cefepime  Continue neb treatments and inhalers        Moderate persistent asthma without complication   Assessment & Plan    Will place on IV steroids and consult Pulmonary who have followed him in the past   He had he develops diarrhea with Xopenex but has tachycardia and some jitteriness with albuterol  Trial on Spiriva had cause urinary issues with retention in the past   He is on Flomax currently            VTE Prophylaxis: in place on subcu Lovenox    Patient Centered Rounds: I rounded with patient's nurse    Current Length of Stay: 1 day(s)    Current Patient Status: Inpatient    Certification Statement: Pt requires additional inpatient hospital stay due to: see assessment and plan        Subjective:   Patient still having coughing spasms overnight  Did bring up some thick yellow discolored sputum and this was sent to the lab  No nausea or vomiting  Mild tachycardia this morning after just receiving nebulizer treatment to 105    All other ROS are negative    Objective:     Vitals:   Temp (24hrs), Av 1 °F (37 3 °C), Min:98 3 °F (36 8 °C), Max:99 8 °F (37 7 °C)    HR:  [] 84  Resp:  [20-28] 22  BP: ()/(50-63) 114/59  SpO2:  [90 %-96 %] 96 %  Body mass index is 35 28 kg/m²  Input and Output Summary (last 24 hours):        Intake/Output Summary (Last 24 hours) at 07/08/18 0728  Last data filed at 07/08/18 0201   Gross per 24 hour   Intake                0 ml   Output              750 ml   Net             -750 ml       Physical Exam:     Physical Exam   Constitutional: He is oriented to person, place, and time  He appears well-developed and well-nourished  No distress  HENT:   Head: Normocephalic and atraumatic  Mouth/Throat: No oropharyngeal exudate  Eyes: Conjunctivae are normal  Pupils are equal, round, and reactive to light  Right eye exhibits no discharge  Left eye exhibits no discharge  No scleral icterus  Neck: No JVD present  No tracheal deviation present  No thyromegaly present  Cardiovascular: Regular rhythm  No murmur heard  Mild tachycardia at 1:05 a m  after having nebulizer treat   Pulmonary/Chest: He is in respiratory distress  He has wheezes  Using accessory muscles   Abdominal: Soft  Bowel sounds are normal  He exhibits no distension  There is no tenderness  Musculoskeletal: He exhibits no edema or tenderness  Neurological: He is alert and oriented to person, place, and time  No cranial nerve deficit  Skin: Skin is warm and dry  No rash noted  No erythema  Psychiatric: He has a normal mood and affect  His behavior is normal    Nursing note and vitals reviewed  I personally reviewed labs and imaging reports for today        Last 24 Hours Medication List:     Current Facility-Administered Medications:  acetaminophen 650 mg Oral Q6H PRN Koko Loyd PA-C    albuterol        albuterol 2 puff Inhalation Q6H PRN Koko Loyd PA-C    albuterol 2 5 mg Nebulization Q4H PRN Dawson , DO    benzonatate 100 mg Oral TID PRN Koko Loyd PA-C    budesonide-formoterol 2 puff Inhalation BID Koko Loyd PA-C    cefepime 2,000 mg Intravenous Q12H Koko Loyd PA-C    enoxaparin 40 mg Subcutaneous Daily Koko Loyd PA-C    fluticasone 2 spray Nasal Daily Clearance Rogerio Jessica Benavides PA-C    guaiFENesin 200 mg Oral Q4H PRN Florence Castillo PA-C    montelukast 10 mg Oral HS Florence Castillo PA-C    sodium chloride (PF) 3 mL Intravenous PRN Eugenie Wood DO    sodium chloride 100 mL/hr Intravenous Continuous Florence Castillo PA-C Last Rate: 100 mL/hr (07/07/18 2151)   tamsulosin 0 4 mg Oral Daily With 2701 Vijay Luz DO           Today, Patient Was Seen By: Nabeel Perez DO    ** Please Note: Dictation voice to text software may have been used in the creation of this document   **

## 2018-07-08 NOTE — CASE MANAGEMENT
Initial Clinical Review    Admission: Date/Time/Statement: 7/7/18 @ 2022     Orders Placed This Encounter   Procedures    Inpatient Admission (expected length of stay for this patient is greater than two midnights)     Standing Status:   Standing     Number of Occurrences:   1     Order Specific Question:   Admitting Physician     Answer:   Karla Ramirez [[id number]]     Order Specific Question:   Level of Care     Answer:   Level 1 Stepdown [13]     Order Specific Question:   Estimated length of stay     Answer:   More than 2 Midnights     Order Specific Question:   Certification     Answer:   I certify that inpatient services are medically necessary for this patient for a duration of greater than two midnights  See H&P and MD Progress Notes for additional information about the patient's course of treatment  ED: Date/Time/Mode of Arrival:   ED Arrival Information     Expected Arrival Acuity Means of Arrival Escorted By Service Admission Type    - 7/7/2018 18:47 Urgent Wheelchair Family Member General Medicine Urgent    Arrival Complaint    SOB        Chief Complaint:   Chief Complaint   Patient presents with    Shortness of Breath     Pt presents wiht sob that he has been having for the last because asthma and COPD  Pt around 2pm he could not catch his breath and his pulse ox was 60 to 70 percent  Pt diagnised wiht pneumonia about month ago  Pt temp at home was 101 but he took motrin for it  History of Illness: Admitted the beginning of June for PNA and resp distress  Hx of Asthma, COPD - oxygen dependent  Over the last week or so, worsening cough - similar to when he had PNA  Today, started coughing and couldn't catch his breath - stated his oxygen level was on 60-70 and he had to increase his home O2 to 6L and use his rescue inhaler before he felt any better  C/o subjective fever, shaking chills and sweats  No sig ha/congestion, no sore throat  Productive cough, no cp/pressure    Notes worsening sob and curran   Appetite okay, no n/v/d  No le pain or swelling  No hx of CHF, dvt/PE  Tachypnea     decreased breath sounds  wheezes in the right upper field and the left upper field  He has rales in the left middle field and the left lower field  ED Vital Signs:   ED Triage Vitals   Temperature Pulse Respirations Blood Pressure SpO2   07/07/18 1901 07/07/18 1901 07/07/18 1901 07/07/18 1901 07/07/18 1901   99 8 °F (37 7 °C) (!) 113 20 95/55 95 %      Temp Source Heart Rate Source Patient Position - Orthostatic VS BP Location FiO2 (%)   07/07/18 1901 07/07/18 1901 07/07/18 2101 07/07/18 1901 --   Tympanic Monitor Lying Left arm       Pain Score       07/07/18 1901       No Pain        Wt Readings from Last 1 Encounters:   07/08/18 118 kg (260 lb 2 3 oz)       Vital Signs (abnormal):   07/07/18 2030 --  118  28 109/59 96 % -- EK   07/07/18 1901 99 8 °F (37 7 °C)  113 20 95/55 95 % 117 kg (259 lb) EK       Abnormal Labs/Diagnostic Test Results:   CXR: Diffuse bilateral lung opacities representing vascular congestion with pulmonary edema versus pneumonia  Alb 2 4,    ANC 8 12  BC's x 2  pending    ED Treatment:  O2  @ 6 liters NC  Medication Administration from 07/07/2018 1847 to 07/07/2018 2100       Date/Time Order Dose Route Action Action by Comments     07/07/2018 1925 albuterol inhalation solution 5 mg 5 mg Nebulization Given       07/07/2018 1925 ipratropium (ATROVENT) 0 02 % inhalation solution 0 5 mg 0 5 mg Nebulization Given       07/07/2018 1957 sodium chloride 0 9 % bolus 1,000 mL 1,000 mL Intravenous New Bag       07/07/2018 2026 cefepime (MAXIPIME) IVPB (premix) 2,000 mg 2,000 mg Intravenous New Bag            Past Medical/Surgical History:    Active Ambulatory Problems     Diagnosis Date Noted    Benign prostatic hyperplasia with nocturia 02/13/2018    Chronic allergic rhinitis due to animal hair and dander 04/05/2018    Moderate persistent asthma without complication 76/32/4566    Acute on chronic respiratory failure with hypoxia ([facility] [address] ) 06/03/2018     Resolved Ambulatory Problems     Diagnosis Date Noted    Allergic rhinitis 05/30/2017    Benign colon polyp 06/14/2017    Elevated AST (SGOT) 06/14/2017    Elevated fasting glucose 06/14/2017    Migraine with aura 11/10/2017    DSOUZA (dyspnea on exertion) 02/21/2018    Hyponatremia 03/04/2018    Dehydration 03/04/2018    Acute asthmatic bronchitis 03/04/2018    Nausea vomiting and diarrhea 03/05/2018    Enlarged prostate     Asthma     Atypical pneumonia 06/03/2018    Transaminitis 06/04/2018     Past Medical History:   Diagnosis Date    Abscess, cheek     Asthma     Chronic headaches     Constipation     [facility]ch[facility] obstructive pulmonary disease[facility]     Cough     Difficulty attaining erection     Dyspnea     Enlarged prostate     Hemorrhoids     Migraine     Seasonal allergies     Slow urinary stream     Wheezing        Admitting Diagnosis: Shortness of breath [R06 02]  HAP (hospital-acquired pneumonia) [J18 9]    Age/Sex: 58 y o  male    Assessment/Plan:   Pneumonia of both lungs due to infectious organism        · CXR:  Bilateral pneumonia  · Patient hospitalized 06/03/2018 for similar complaint, qualifies patient for high risk HCAP  · Cefepime 2000 mg IV Q 24 H, day 1  · Sputum culture Gram stain  · Strep pneumoniae urine, Legionella urine antigen  · Procalcitonin  · Respiratory protocol  · Continue nasal cannula, wean as tolerated to patient's baseline 3 L   · Consider D/C Symbicort, this is patient's 2nd hospitalization with pneumonia in 90 days       Moderate persistent asthma without complication        · Continue Symbicort, Flonase, Xopenex, Singulair       Sepsis ([facility] [address] )        · , R 28  · Lactic 1 3  · BC x2 pending  · Antibiotics:  Cefepime 2000 mg IV Q 24 H, day 1  · Suspected source: Pneumonia       COPD (chronic obstructive pulmonary disease) ([facility])        · No signs of acute exacerbation  · Continue albuterol, Symbicort, Flonase, Singulair     Admission Orders:  IP  CardioPulm Monitoring  Aspiration Precautions  O2 @  5 to 6 liters NC  CBC,  CMP,   Mag  Sputum cx  Urine strep pneumonaie & legionella  SCD's  Scheduled Meds:   Current Facility-Administered Medications:                                  budesonide-formoterol 2 puff Inhalation BID     cefepime 2,000 mg Intravenous Q12H     enoxaparin 40 mg Subcutaneous Daily     fluticasone 2 spray Nasal Daily             levalbuterol 1 25 mg Nebulization TID     levofloxacin 750 mg Intravenous Q24H     montelukast 10 mg Oral HS             sodium chloride 3 mL Nebulization TID     tamsulosin 0 4 mg Oral Daily With Dinner     vancomycin 15 mg/kg Intravenous Q12H       Continuous Infusions:  IVF @ 100 / hr  PRN Meds:   acetaminophen    albuterol    benzonatate    guaiFENesin    Insert peripheral IV **AND** sodium chloride (PF)  7/8:  100 1 - to 99 3 - 86 - 20   135/77   O2 @ 6 liters NC  Mild tachycardia at 1:05 a m  after having nebulizer treat   Pulmonary/Chest: He is in respiratory distress  He has wheezes  Using accessory muscles   Consult Pulm  Per Pulm:    1  Acute / Chronic Hypoxic respiratory failure  · Continue SpO2 monitoring and maintain >88%  Currently on 5L via NC with SpO2 97%, will decrease to 3L now and monitor  2  Acute bilateral pneumonia vs interstitial edema vs Cryptogenic organizing PNA  · Continue SpO2 monitoring as above  · Continue cefepime BID  · Obtain MRSA swab  · Will add vancomycin   · Additionally will add atypical coverage - as recently completed azithromycin will place on Levaquin 750mg  · Obtain Hypersensitivity panel as well as ANCA, RADHA     3  Asthma with probable COPD overlap with bronchospasm  · Will initiate xopenex as above  ·   Thank you,  Venecia Aqq  [facility] Utilization Review Department  Phone: [phone];  Fax [phone]  ATTENTION: The Network Utilization Review Department is now centralized for our 9 Facilities  Make a note that we have a new phone and fax numbers for our Department  Please call with any questions or concerns to [phone] and carefully follow the prompts so that you are directed to the right person  All voicemails are confidential  Fax any determinations, approvals, denials, and requests for initial or continue stay review clinical to [phone]  Due to HIGH CALL volume, it would be easier if you could please send faxed requests to expedite your requests and in part, help us provide discharge notifications faster

## 2018-07-08 NOTE — ASSESSMENT & PLAN NOTE
· CXR:  Bilateral pneumonia  · Patient hospitalized 06/03/2018 for similar complaint, qualifies patient for high risk HCAP  · Cefepime 2000 mg IV Q 24 H, day 1  · Sputum culture Gram stain  · Strep pneumoniae urine, Legionella urine antigen  · Procalcitonin  · Respiratory protocol  · Continue nasal cannula, wean as tolerated to patient's baseline 3 L   · Consider D/C Symbicort, this is patient's 2nd hospitalization with pneumonia in 90 days

## 2018-07-08 NOTE — SEPSIS NOTE
Sepsis Note   Tuan Mix 58 y o  male MRN: [id number]  Unit/Bed#: ED 05 Encounter: [id number]            Initial Sepsis Screening     Row Name 07/07/18 2006                Is the patient's history suggestive of a new or worsening infection? (!)  Yes (Proceed)  -VL        Suspected source of infection pneumonia  -VL        Are two or more of the following signs & symptoms of infection both present and new to the patient? (!)  Yes (Proceed)  -VL        Indicate SIRS criteria          If the answer is yes to both questions, suspicion of sepsis is present          If severe sepsis is present AND tissue hypoperfusion perists in the hour after fluid resuscitation or lactate > 4, the patient meets criteria for SEPTIC SHOCK          Are any of the following organ dysfunction criteria present within 6 hours of suspected infection and SIRS criteria that are NOT considered to be chronic conditions? No  -VL        Organ dysfunction          Date of presentation of severe sepsis          Time of presentation of severe sepsis          Tissue hypoperfusion persists in the hour after crystalloid fluid administration, evidenced, by either:          Was hypotension present within one hour of the conclusion of crystalloid fluid administration?           Date of presentation of septic shock          Time of presentation of septic shock            User Key  (r) = Recorded By, (t) = Taken By, (c) = Cosigned By    Initials Name Provider Type    VL Teodoro Quintanilla DO Physician

## 2018-07-09 ENCOUNTER — APPOINTMENT (INPATIENT)
Dept: CT IMAGING | Facility: HOSPITAL | Age: 63
DRG: 974 | End: 2018-07-09
Payer: COMMERCIAL

## 2018-07-09 LAB
ANION GAP SERPL CALCULATED.3IONS-SCNC: 7 MMOL/L (ref 4–13)
BUN SERPL-MCNC: 11 MG/DL (ref 5–25)
CALCIUM SERPL-MCNC: 8.3 MG/DL (ref 8.3–10.1)
CHLORIDE SERPL-SCNC: 107 MMOL/L (ref 100–108)
CO2 SERPL-SCNC: 26 MMOL/L (ref 21–32)
CREAT SERPL-MCNC: 0.77 MG/DL (ref 0.6–1.3)
ERYTHROCYTE [DISTWIDTH] IN BLOOD BY AUTOMATED COUNT: 14 % (ref 11.6–15.1)
GFR SERPL CREATININE-BSD FRML MDRD: 96 ML/MIN/1.73SQ M
GLUCOSE SERPL-MCNC: 128 MG/DL (ref 65–140)
HCT VFR BLD AUTO: 35.1 % (ref 36.5–49.3)
HGB BLD-MCNC: 11.3 G/DL (ref 12–17)
MCH RBC QN AUTO: 27.5 PG (ref 26.8–34.3)
MCHC RBC AUTO-ENTMCNC: 32.2 G/DL (ref 31.4–37.4)
MCV RBC AUTO: 85 FL (ref 82–98)
MRSA NOSE QL CULT: NORMAL
PLATELET # BLD AUTO: 301 THOUSANDS/UL (ref 149–390)
PMV BLD AUTO: 9.1 FL (ref 8.9–12.7)
POTASSIUM SERPL-SCNC: 4.3 MMOL/L (ref 3.5–5.3)
RBC # BLD AUTO: 4.11 MILLION/UL (ref 3.88–5.62)
RYE IGE QN: NEGATIVE
SODIUM SERPL-SCNC: 140 MMOL/L (ref 136–145)
TOTAL IGE SMQN RAST: 103 KU/L (ref 0–113)
WBC # BLD AUTO: 5.49 THOUSAND/UL (ref 4.31–10.16)

## 2018-07-09 PROCEDURE — 99232 SBSQ HOSP IP/OBS MODERATE 35: CPT | Performed by: INTERNAL MEDICINE

## 2018-07-09 PROCEDURE — 71250 CT THORAX DX C-: CPT

## 2018-07-09 PROCEDURE — 80202 ASSAY OF VANCOMYCIN: CPT | Performed by: PHYSICIAN ASSISTANT

## 2018-07-09 PROCEDURE — 94640 AIRWAY INHALATION TREATMENT: CPT

## 2018-07-09 PROCEDURE — 99233 SBSQ HOSP IP/OBS HIGH 50: CPT | Performed by: HOSPITALIST

## 2018-07-09 PROCEDURE — 94760 N-INVAS EAR/PLS OXIMETRY 1: CPT

## 2018-07-09 PROCEDURE — 85027 COMPLETE CBC AUTOMATED: CPT | Performed by: INTERNAL MEDICINE

## 2018-07-09 PROCEDURE — 80048 BASIC METABOLIC PNL TOTAL CA: CPT | Performed by: INTERNAL MEDICINE

## 2018-07-09 RX ORDER — LORAZEPAM 1 MG/1
1 TABLET ORAL ONCE
Status: COMPLETED | OUTPATIENT
Start: 2018-07-09 | End: 2018-07-09

## 2018-07-09 RX ORDER — HYDROCODONE POLISTIREX AND CHLORPHENIRAMINE POLISTIREX 10; 8 MG/5ML; MG/5ML
5 SUSPENSION, EXTENDED RELEASE ORAL EVERY 12 HOURS PRN
Status: DISCONTINUED | OUTPATIENT
Start: 2018-07-09 | End: 2018-07-16 | Stop reason: HOSPADM

## 2018-07-09 RX ADMIN — HYDROCODONE POLISTIREX AND CHLORPHENIRAMINE POLISTIREX 5 ML: 10; 8 SUSPENSION, EXTENDED RELEASE ORAL at 20:28

## 2018-07-09 RX ADMIN — LORAZEPAM 1 MG: 1 TABLET ORAL at 23:50

## 2018-07-09 RX ADMIN — ALBUTEROL SULFATE 2 PUFF: 90 AEROSOL, METERED RESPIRATORY (INHALATION) at 23:12

## 2018-07-09 RX ADMIN — IPRATROPIUM BROMIDE 0.5 MG: 0.5 SOLUTION RESPIRATORY (INHALATION) at 19:30

## 2018-07-09 RX ADMIN — CEFEPIME HYDROCHLORIDE 2000 MG: 2 INJECTION, SOLUTION INTRAVENOUS at 08:22

## 2018-07-09 RX ADMIN — ENOXAPARIN SODIUM 40 MG: 40 INJECTION, SOLUTION INTRAVENOUS; SUBCUTANEOUS at 08:23

## 2018-07-09 RX ADMIN — LEVALBUTEROL 1.25 MG: 1.25 SOLUTION, CONCENTRATE RESPIRATORY (INHALATION) at 19:30

## 2018-07-09 RX ADMIN — BUDESONIDE AND FORMOTEROL FUMARATE DIHYDRATE 2 PUFF: 160; 4.5 AEROSOL RESPIRATORY (INHALATION) at 19:28

## 2018-07-09 RX ADMIN — SODIUM CHLORIDE 500 ML: 0.9 INJECTION, SOLUTION INTRAVENOUS at 23:19

## 2018-07-09 RX ADMIN — VANCOMYCIN HYDROCHLORIDE 1750 MG: 1 INJECTION, POWDER, LYOPHILIZED, FOR SOLUTION INTRAVENOUS at 12:12

## 2018-07-09 RX ADMIN — LEVALBUTEROL 1.25 MG: 1.25 SOLUTION, CONCENTRATE RESPIRATORY (INHALATION) at 07:55

## 2018-07-09 RX ADMIN — IPRATROPIUM BROMIDE 0.5 MG: 0.5 SOLUTION RESPIRATORY (INHALATION) at 13:31

## 2018-07-09 RX ADMIN — LEVALBUTEROL 1.25 MG: 1.25 SOLUTION, CONCENTRATE RESPIRATORY (INHALATION) at 13:31

## 2018-07-09 RX ADMIN — IPRATROPIUM BROMIDE 0.5 MG: 0.5 SOLUTION RESPIRATORY (INHALATION) at 10:31

## 2018-07-09 RX ADMIN — FLUTICASONE PROPIONATE 2 SPRAY: 50 SPRAY, METERED NASAL at 08:22

## 2018-07-09 RX ADMIN — ISODIUM CHLORIDE 3 ML: 0.03 SOLUTION RESPIRATORY (INHALATION) at 07:55

## 2018-07-09 RX ADMIN — VANCOMYCIN HYDROCHLORIDE 1750 MG: 1 INJECTION, POWDER, LYOPHILIZED, FOR SOLUTION INTRAVENOUS at 00:59

## 2018-07-09 RX ADMIN — BUDESONIDE AND FORMOTEROL FUMARATE DIHYDRATE 2 PUFF: 160; 4.5 AEROSOL RESPIRATORY (INHALATION) at 07:55

## 2018-07-09 RX ADMIN — TAMSULOSIN HYDROCHLORIDE 0.4 MG: 0.4 CAPSULE ORAL at 16:32

## 2018-07-09 RX ADMIN — CEFEPIME HYDROCHLORIDE 2000 MG: 2 INJECTION, SOLUTION INTRAVENOUS at 20:27

## 2018-07-09 RX ADMIN — MONTELUKAST SODIUM 10 MG: 10 TABLET, FILM COATED ORAL at 20:29

## 2018-07-09 RX ADMIN — ACETAMINOPHEN 650 MG: 325 TABLET, FILM COATED ORAL at 23:13

## 2018-07-09 NOTE — PROGRESS NOTES
Progress Note - Pulmonary   Yara Ceballos 58 y o  male MRN: [id number]  Unit/Bed#: -02 Encounter: [id number]    Assessment:  Recurrent pneumonia on chest x-ray  Severe COPD  Environmental allergies      Plan:  Based on his recent history of pneumonia x2 in the past month a m  also concerned that there could be some noninfectious process going on  I reviewed his low labs which show elevated IgE and significant allergies  His procalcitonin was elevated at 2 6 indicating probable bacterial infection  He is probably predisposed to this due to his uncontrolled severe COPD  I do think keeping him on cefepime and Vanco at this time is sufficient pending his MRSA swabs and sputum cultures  I discontinue the Levaquin  I will get a CT scan with high-resolution cuts to better delineate with going on his lungs  This will help us assess whether this is bacterial pneumonia versus some noninfectious process  On the differential for this would be a hypersensitivity pneumonitis versus ABPA versus Churg-José Miguel  I doubt any collagen vascular diseases he has got no systemic manifestations, also doubt any alveolar hemorrhage syndromes given his lack of renal involvement and lack of anemia  Will make more recommendations pending the results of his CT scan  Chief Complaint:   I feel better    Subjective:   Patient is still complaining of shortness of breath, dyspnea on exertion and dry cough  He did feel better after his June admission but after discontinuing his prednisone he started getting worse with fevers nonproductive cough and worsening dyspnea  He is currently denying any fever, chills, chest pain, leg swelling or productive cough  Objective:     Vitals: Blood pressure 131/81, pulse 81, temperature 98 °F (36 7 °C), temperature source Tympanic, resp  rate 20, height 6' (1 829 m), weight 118 kg (259 lb 0 7 oz), SpO2 97 %  ,Body mass index is 35 13 kg/m²        Intake/Output Summary (Last 24 hours) at 07/09/18 0953  Last data filed at 07/09/18 0902   Gross per 24 hour   Intake              702 ml   Output             1500 ml   Net             -798 ml       Invasive Devices     Peripheral Intravenous Line            Peripheral IV 07/07/18 Left Antecubital 1 day                Physical Exam: /81   Pulse 81   Temp 98 °F (36 7 °C) (Tympanic)   Resp 20   Ht 6' (1 829 m)   Wt 118 kg (259 lb 0 7 oz)   SpO2 97%   BMI 35 13 kg/m²   General appearance: alert and oriented, in no acute distress  Neck: no adenopathy, no carotid bruit, no JVD, supple, symmetrical, trachea midline and thyroid not enlarged, symmetric, no tenderness/mass/nodules  Lungs: rhonchi and wheezes  Heart: regular rate and rhythm, S1, S2 normal, no murmur, click, rub or gallop  Abdomen: soft, non-tender; bowel sounds normal; no masses,  no organomegaly  Extremities: extremities normal, warm and well-perfused; no cyanosis, clubbing, or edema     Labs:   CBC:   Lab Results   Component Value Date    WBC 5 49 07/09/2018    HGB 11 3 (L) 07/09/2018    HCT 35 1 (L) 07/09/2018    MCV 85 07/09/2018     07/09/2018    MCH 27 5 07/09/2018    MCHC 32 2 07/09/2018    RDW 14 0 07/09/2018    MPV 9 1 07/09/2018   , CMP:   Lab Results   Component Value Date     07/09/2018    K 4 3 07/09/2018     07/09/2018    CO2 26 07/09/2018    ANIONGAP 7 07/09/2018    BUN 11 07/09/2018    CREATININE 0 77 07/09/2018    GLUCOSE 128 07/09/2018    CALCIUM 8 3 07/09/2018    EGFR 96 07/09/2018     Imaging and other studies: I have personally reviewed pertinent films in PACS

## 2018-07-09 NOTE — PROGRESS NOTES
Progress Note - Hernán Hearing 1955, 58 y o  male MRN: [id number]    Unit/Bed#: -02 Encounter: [id number]    Primary Care Provider: Musa Holden MD   Date and time admitted to hospital: 2018  6:53 PM        Acute on chronic respiratory failure with hypoxia [facility])   Assessment & Plan    -patient was on 5 L nasal cannula on admission and is now down to 2-3 L        Moderate persistent asthma without complication   Assessment & Plan    -continue Symbicort, Flonase, Atrovent, Xopenex, Singulair        * Pneumonia of both lungs due to infectious organism   Assessment & Plan    -continue cefepime, vancomycin  -stop Levaquin as Legionella antigen negative  -strep pneumoniae antigen negative  -appreciate Pulmonary  -CT chest ordered to rule out underlying interstitial lung process          VTE Pharmacologic Prophylaxis:   Pharmacologic: Enoxaparin (Lovenox)  Mechanical VTE Prophylaxis in Place: Yes    Patient Centered Rounds: I have performed bedside rounds with nursing staff today  Discussions with Specialists or Other Care Team Provider:  Pulmonary    Education and Discussions with Family / Patient:  Patient    Time Spent for Care: 20 minutes  More than 50% of total time spent on counseling and coordination of care as described above  Current Length of Stay: 2 day(s)    Current Patient Status: Inpatient   Certification Statement: The patient will continue to require additional inpatient hospital stay due to Pneumonia    Discharge Plan:  2-3 days    Code Status: Level 1 - Full Code      Subjective:   Patient reports shortness of breath has improved significantly  Objective:     Vitals:   Temp (24hrs), Av °F (36 7 °C), Min:97 5 °F (36 4 °C), Max:99 3 °F (37 4 °C)    HR:  [73-95] 81  Resp:  [18-20] 20  BP: (122-135)/(66-81) 131/81  SpO2:  [90 %-97 %] 97 %  Body mass index is 35 13 kg/m²  Input and Output Summary (last 24 hours):        Intake/Output Summary (Last 24 hours) at 18 Kaylin  79  filed at 07/09/18 0902   Gross per 24 hour   Intake              702 ml   Output             1500 ml   Net             -798 ml       Physical Exam:     Physical Exam  Gen: NAD, AAOx3, well developed, well nourished  Eyes: EOMI, PERRLA, no scleral icterus  ENMT:  Oropharynx clear of erythema or exudates, no nasal discharge, no otic discharge, moist mucous membranes  Neck:  Supple  Lymph:  No anterior or posterior cervical or supraclavicular lymphadenopathy  Cardiovascular:  Regular rate and rhythm, normal S1-S2, no murmurs, rubs, or gallops  Lungs:  Clear to auscultation bilaterally, no wheezes, or rales, or rhonchi  Abdomen:  Positive bowel sounds, soft, nontender, nondistended, no palpable organomegaly   Skin:  Intact, no obvious lesions or rashes, no edema  Neuro: Cranial nerves 2-12 are intact, non-focal, 5/5 strength in all 4 extremities      Additional Data:     Labs:      Results from last 7 days  Lab Units 07/09/18  0445 07/08/18  0456   WBC Thousand/uL 5 49 7 25   HEMOGLOBIN g/dL 11 3* 11 9*   HEMATOCRIT % 35 1* 37 0   PLATELETS Thousands/uL 301 326   NEUTROS PCT %  --  70   LYMPHS PCT %  --  23   MONOS PCT %  --  4   EOS PCT %  --  2       Results from last 7 days  Lab Units 07/09/18  0445 07/08/18  0456   SODIUM mmol/L 140 140   POTASSIUM mmol/L 4 3 4 0   CHLORIDE mmol/L 107 106   CO2 mmol/L 26 27   BUN mg/dL 11 12   CREATININE mg/dL 0 77 0 99   CALCIUM mg/dL 8 3 8 2*   TOTAL PROTEIN g/dL  --  6 9   BILIRUBIN TOTAL mg/dL  --  0 30   ALK PHOS U/L  --  45*   ALT U/L  --  26   AST U/L  --  34   GLUCOSE RANDOM mg/dL 128 82       Results from last 7 days  Lab Units 07/07/18  1922   INR  1 08           Results from last 7 days  Lab Units 07/03/18  0724   HEMOGLOBIN A1C % 6 0*         * I Have Reviewed All Lab Data Listed Above  * Additional Pertinent Lab Tests Reviewed:  [facility]    Recent Cultures (last 7 days):       Results from last 7 days  Lab Units 07/08/18  0839 07/07/18  2251 07/07/18  1922   BLOOD CULTURE   --   --  No Growth at 24 hrs  No Growth at 24 hrs  SPUTUM CULTURE  Test not performed  Suggest repeat specimen  --   --    GRAM STAIN RESULT  >10 squamous epithelial cells/lpf, indicating orophayngeal contamination  1+ Polys  3+ Gram negative rods  3+ Gram positive rods  2+ Gram positive cocci in pairs  1+ Gram positive cocci in chains  --   --    LEGIONELLA URINARY ANTIGEN   --  Negative  --        Last 24 Hours Medication List:     Current Facility-Administered Medications:  acetaminophen 650 mg Oral Q6H PRN Farrel Evener, PA-C    albuterol 2 puff Inhalation Q6H PRN Farrel Evener, PA-C    benzonatate 100 mg Oral TID PRN Farrel Evenkyree, PA-C    budesonide-formoterol 2 puff Inhalation BID Farrel Evenkyree, PA-C    cefepime 2,000 mg Intravenous Q12H Farrel Brandon, PA-C Last Rate: 2,000 mg (07/09/18 4858)   enoxaparin 40 mg Subcutaneous Daily Farrel EvenCATALINO adorno-ARIAN    fluticasone 2 spray Nasal Daily Farrel Brandon, PA-C    guaiFENesin 200 mg Oral Q4H PRN Farrel Evenkyree, PA-C    ipratropium 0 5 mg Nebulization TID Daksha Naqvi, DO    levalbuterol 1 25 mg Nebulization TID Hector Wilfrido, DO    montelukast 10 mg Oral HS Farrel Brandon, PA-ARIAN    sodium chloride (PF) 3 mL Intravenous PRN Eugenie Wood, DO    sodium chloride 3 mL Nebulization TID Hector Hang, DO    tamsulosin 0 4 mg Oral Daily With Obdulio Dinero, DO    vancomycin 15 mg/kg Intravenous Q12H Naren Bryan PA-C Last Rate: 1,750 mg (07/09/18 0059)        Today, Patient Was Seen By: Valeria Davey MD    ** Please Note: Dictation voice to text software may have been used in the creation of this document   **

## 2018-07-09 NOTE — ASSESSMENT & PLAN NOTE
-continue cefepime, vancomycin  -stop Levaquin as Legionella antigen negative  -strep pneumoniae antigen negative  -appreciate Pulmonary  -CT chest ordered to rule out underlying interstitial lung process
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
## 2018-07-10 PROBLEM — J18.9 HAP (HOSPITAL-ACQUIRED PNEUMONIA): Status: ACTIVE | Noted: 2018-07-07

## 2018-07-10 LAB
ATRIAL RATE: 111 BPM
P AXIS: 54 DEGREES
PR INTERVAL: 130 MS
PROCALCITONIN SERPL-MCNC: 0.63 NG/ML
QRS AXIS: 81 DEGREES
QRSD INTERVAL: 76 MS
QT INTERVAL: 316 MS
QTC INTERVAL: 429 MS
T WAVE AXIS: 45 DEGREES
VANCOMYCIN TROUGH SERPL-MCNC: 16.3 UG/ML (ref 10–20)
VENTRICULAR RATE: 111 BPM

## 2018-07-10 PROCEDURE — 82164 ANGIOTENSIN I ENZYME TEST: CPT | Performed by: INTERNAL MEDICINE

## 2018-07-10 PROCEDURE — 84145 PROCALCITONIN (PCT): CPT | Performed by: INTERNAL MEDICINE

## 2018-07-10 PROCEDURE — 86606 ASPERGILLUS ANTIBODY: CPT | Performed by: INTERNAL MEDICINE

## 2018-07-10 PROCEDURE — 94760 N-INVAS EAR/PLS OXIMETRY 1: CPT

## 2018-07-10 PROCEDURE — 93010 ELECTROCARDIOGRAM REPORT: CPT | Performed by: INTERNAL MEDICINE

## 2018-07-10 PROCEDURE — 99232 SBSQ HOSP IP/OBS MODERATE 35: CPT | Performed by: INTERNAL MEDICINE

## 2018-07-10 PROCEDURE — 94640 AIRWAY INHALATION TREATMENT: CPT

## 2018-07-10 PROCEDURE — 99233 SBSQ HOSP IP/OBS HIGH 50: CPT | Performed by: HOSPITALIST

## 2018-07-10 RX ORDER — METHYLPREDNISOLONE SODIUM SUCCINATE 40 MG/ML
40 INJECTION, POWDER, LYOPHILIZED, FOR SOLUTION INTRAMUSCULAR; INTRAVENOUS EVERY 8 HOURS SCHEDULED
Status: DISCONTINUED | OUTPATIENT
Start: 2018-07-10 | End: 2018-07-10

## 2018-07-10 RX ORDER — LORAZEPAM 1 MG/1
1 TABLET ORAL
Status: DISCONTINUED | OUTPATIENT
Start: 2018-07-10 | End: 2018-07-16 | Stop reason: HOSPADM

## 2018-07-10 RX ADMIN — BUDESONIDE AND FORMOTEROL FUMARATE DIHYDRATE 2 PUFF: 160; 4.5 AEROSOL RESPIRATORY (INHALATION) at 19:37

## 2018-07-10 RX ADMIN — LEVALBUTEROL 1.25 MG: 1.25 SOLUTION, CONCENTRATE RESPIRATORY (INHALATION) at 19:39

## 2018-07-10 RX ADMIN — IPRATROPIUM BROMIDE 0.5 MG: 0.5 SOLUTION RESPIRATORY (INHALATION) at 13:40

## 2018-07-10 RX ADMIN — FLUTICASONE PROPIONATE 2 SPRAY: 50 SPRAY, METERED NASAL at 08:32

## 2018-07-10 RX ADMIN — IPRATROPIUM BROMIDE 0.5 MG: 0.5 SOLUTION RESPIRATORY (INHALATION) at 19:39

## 2018-07-10 RX ADMIN — CEFEPIME HYDROCHLORIDE 2000 MG: 2 INJECTION, SOLUTION INTRAVENOUS at 20:54

## 2018-07-10 RX ADMIN — IPRATROPIUM BROMIDE 0.5 MG: 0.5 SOLUTION RESPIRATORY (INHALATION) at 08:24

## 2018-07-10 RX ADMIN — LEVALBUTEROL 1.25 MG: 1.25 SOLUTION, CONCENTRATE RESPIRATORY (INHALATION) at 13:40

## 2018-07-10 RX ADMIN — ENOXAPARIN SODIUM 40 MG: 40 INJECTION, SOLUTION INTRAVENOUS; SUBCUTANEOUS at 08:32

## 2018-07-10 RX ADMIN — METHYLPREDNISOLONE SODIUM SUCCINATE 40 MG: 40 INJECTION, POWDER, FOR SOLUTION INTRAMUSCULAR; INTRAVENOUS at 04:10

## 2018-07-10 RX ADMIN — BUDESONIDE AND FORMOTEROL FUMARATE DIHYDRATE 2 PUFF: 160; 4.5 AEROSOL RESPIRATORY (INHALATION) at 08:24

## 2018-07-10 RX ADMIN — TAMSULOSIN HYDROCHLORIDE 0.4 MG: 0.4 CAPSULE ORAL at 16:50

## 2018-07-10 RX ADMIN — MONTELUKAST SODIUM 10 MG: 10 TABLET, FILM COATED ORAL at 21:07

## 2018-07-10 RX ADMIN — CEFEPIME HYDROCHLORIDE 2000 MG: 2 INJECTION, SOLUTION INTRAVENOUS at 08:32

## 2018-07-10 RX ADMIN — LEVALBUTEROL 1.25 MG: 1.25 SOLUTION, CONCENTRATE RESPIRATORY (INHALATION) at 08:24

## 2018-07-10 RX ADMIN — VANCOMYCIN HYDROCHLORIDE 1750 MG: 1 INJECTION, POWDER, LYOPHILIZED, FOR SOLUTION INTRAVENOUS at 01:00

## 2018-07-10 RX ADMIN — LORAZEPAM 1 MG: 1 TABLET ORAL at 21:06

## 2018-07-10 NOTE — PROGRESS NOTES
Progress Note - Malou Nova 1955, 58 y o  male MRN: [id number]    Unit/Bed#: -02 Encounter: [id number]    Primary Care Provider: Tere Cabrera MD   Date and time admitted to hospital: 7/7/2018  6:53 PM        COPD (chronic obstructive pulmonary disease) ([facility] [address] )   Assessment & Plan    -due to tobacco abuse and allergic asthma        Sepsis ([facility] [address] )   Assessment & Plan    POA, due to PNA  Resolved at this time  Acute on chronic respiratory failure with hypoxia (HCC)   Assessment & Plan    -currently on 5 L nasal cannula         Moderate persistent asthma without complication   Assessment & Plan    -continue Symbicort, Flonase, Atrovent, Xopenex, Singulair        * Pneumonia of both lungs due to infectious organism   Assessment & Plan    -continue cefepime  -stop Vanco as MRSA screen NEG  -strep pneumoniae and legionella antigens negative  -appreciate Pulmonary  -CT chest 7/9/18: Diffuse bilateral groundglass density throughout both lungs with patchy areas of more consolidative airspace opacity within both upper lobes   Abnormalities have a more central and upper lobe predominance   The appearance is nonspecific with differential considerations including hypersensitivity pneumonitis, allergic bronchopulmonary aspergillosis and atypical infection (such as fungal disease) among others  Less likely considerations include Sarcoidosis, collagen vascular disorders and nonspecific interstitial pneumonia (NIP)  Ultimately, sputum samples and/or bronchoalveolar lavage in conjunction with clinical history and consideration of lung biopsy may be required for definitive diagnosis  10 x 5 mm left lower lobe nodule, new from prior study, presumed pseudonodule   Follow-up CT chest in 3 months may be obtained to ensure resolution  Mild mediastinal adenopathy has increased, presumably reactive  Tiny left and probable trace right pleural effusions  -as CT reports proves, DDx broad at this time   Bronch tomorrow, may then need VATS  -c/s ID          VTE Pharmacologic Prophylaxis:   Pharmacologic: Enoxaparin (Lovenox)  Mechanical VTE Prophylaxis in Place: Yes    Patient Centered Rounds: I have performed bedside rounds with nursing staff today  Discussions with Specialists or Other Care Team Provider: Pulmonary     Education and Discussions with Family / Patient: Pt    Time Spent for Care: 20 minutes  More than 50% of total time spent on counseling and coordination of care as described above  Current Length of Stay: 3 day(s)    Current Patient Status: Inpatient   Certification Statement: The patient will continue to require additional inpatient hospital stay due to PNA, ? ILD    Discharge Plan: minimum 2 more days    Code Status: Level 1 - Full Code      Subjective:   Patient complains of shortness of breath and cough over night as well as fever  Objective:     Vitals:   Temp (24hrs), Av 3 °F (37 4 °C), Min:97 3 °F (36 3 °C), Max:102 1 °F (38 9 °C)    HR:  [] 89  Resp:  [20-26] 20  BP: (109-164)/(59-86) 141/79  SpO2:  [86 %-97 %] 96 %  Body mass index is 35 79 kg/m²  Input and Output Summary (last 24 hours):        Intake/Output Summary (Last 24 hours) at 07/10/18 0949  Last data filed at 07/10/18 7320   Gross per 24 hour   Intake           5788 3 ml   Output              875 ml   Net           4913 3 ml       Physical Exam:     Physical Exam  Gen: NAD, AAOx3, well developed, well nourished  Eyes: EOMI, PERRLA, no scleral icterus  ENMT:  Oropharynx clear of erythema or exudates, no nasal discharge, no otic discharge, moist mucous membranes  Neck:  Supple  Lymph:  No anterior or posterior cervical or supraclavicular lymphadenopathy  Cardiovascular:  Regular rate and rhythm, normal S1-S2, no murmurs, rubs, or gallops  Lungs:  Clear to auscultation bilaterally, no wheezes, or rales, or rhonchi  Abdomen:  Positive bowel sounds, soft, nontender, nondistended, no palpable organomegaly   Skin:  Intact, no obvious lesions or rashes, no edema  Neuro: Cranial nerves 2-12 are intact, non-focal, 5/5 strength in all 4 extremities    Additional Data:     Labs:      Results from last 7 days  Lab Units 07/09/18  0445 07/08/18  0456   WBC Thousand/uL 5 49 7 25   HEMOGLOBIN g/dL 11 3* 11 9*   HEMATOCRIT % 35 1* 37 0   PLATELETS Thousands/uL 301 326   NEUTROS PCT %  --  70   LYMPHS PCT %  --  23   MONOS PCT %  --  4   EOS PCT %  --  2       Results from last 7 days  Lab Units 07/09/18  0445 07/08/18  0456   SODIUM mmol/L 140 140   POTASSIUM mmol/L 4 3 4 0   CHLORIDE mmol/L 107 106   CO2 mmol/L 26 27   BUN mg/dL 11 12   CREATININE mg/dL 0 77 0 99   CALCIUM mg/dL 8 3 8 2*   TOTAL PROTEIN g/dL  --  6 9   BILIRUBIN TOTAL mg/dL  --  0 30   ALK PHOS U/L  --  45*   ALT U/L  --  26   AST U/L  --  34   GLUCOSE RANDOM mg/dL 128 82       Results from last 7 days  Lab Units 07/07/18  1922   INR  1 08                 * I Have Reviewed All Lab Data Listed Above  * Additional Pertinent Lab Tests Reviewed: [facility] Admission Reviewed    Recent Cultures (last 7 days):       Results from last 7 days  Lab Units 07/08/18  0839 07/07/18  2251 07/07/18  1922   BLOOD CULTURE   --   --  No Growth at 48 hrs  No Growth at 48 hrs  SPUTUM CULTURE  Test not performed  Suggest repeat specimen  --   --    GRAM STAIN RESULT  >10 squamous epithelial cells/lpf, indicating orophayngeal contamination    1+ Polys  3+ Gram negative rods  3+ Gram positive rods  2+ Gram positive cocci in pairs  1+ Gram positive cocci in chains  --   --    LEGIONELLA URINARY ANTIGEN   --  Negative  --        Last 24 Hours Medication List:     Current Facility-Administered Medications:  acetaminophen 650 mg Oral Q6H PRN Nancy Mcfarlane PA-C    albuterol 2 puff Inhalation Q6H PRN Nancy Mcfarlane PA-C    benzonatate 100 mg Oral TID PRN Nancy Mcfarlane PA-C    budesonide-formoterol 2 puff Inhalation BID Nancy Mcfarlane PA-C    cefepime 2,000 mg Intravenous Q12H Juan A Jeter PA-C Last Rate: 2,000 mg (07/10/18 5500)   enoxaparin 40 mg Subcutaneous Daily Juan A Jeter PA-C    fluticasone 2 spray Nasal Daily Juan A Jeter PA-C    guaiFENesin 200 mg Oral Q4H PRN Juan A Jeter PA-C    hydrocodone-chlorpheniramine polistirex 5 mL Oral Q12H PRN GENNY Page    ipratropium 0 5 mg Nebulization TID Daksha Naqvi DO    levalbuterol 1 25 mg Nebulization TID Erna Vigil DO    montelukast 10 mg Oral HS Juan A Jeter PA-C    sodium chloride (PF) 3 mL Intravenous PRN Eugenie Wood,     tamsulosin 0 4 mg Oral Daily With Obdulio Dinero DO         Today, Patient Was Seen By: Lu Richter MD    ** Please Note: Dictation voice to text software may have been used in the creation of this document   **

## 2018-07-10 NOTE — PLAN OF CARE
INFECTION - ADULT     Absence or prevention of progression during hospitalization Not Progressing    Remains being treated with IV cefepime and IV Vanco  Vanco trough WNL  T max 102 1     RESPIRATORY - ADULT     Achieves optimal ventilation and oxygenation Not Progressing    Required increased O2 support  Felt Short of breath through-out shift at intervals  Desaturated with exertion  Assessment and treatment on-going         DISCHARGE PLANNING     Discharge to home or other facility with appropriate resources Progressing        DISCHARGE PLANNING - CARE MANAGEMENT     Discharge to post-acute care or home with appropriate resources Progressing        Knowledge Deficit     Patient/family/caregiver demonstrates understanding of disease process, treatment plan, medications, and discharge instructions Progressing        PAIN - ADULT     Verbalizes/displays adequate comfort level or baseline comfort level Progressing        Potential for Falls     Patient will remain free of falls Progressing        SAFETY ADULT     Maintain or return to baseline ADL function Progressing     Maintain or return mobility status to optimal level Progressing

## 2018-07-10 NOTE — ASSESSMENT & PLAN NOTE
-continue cefepime  -stop Vanco as MRSA screen NEG  -strep pneumoniae and legionella antigens negative  -appreciate Pulmonary  -CT chest 7/9/18: Diffuse bilateral groundglass density throughout both lungs with patchy areas of more consolidative airspace opacity within both upper lobes   Abnormalities have a more central and upper lobe predominance   The appearance is nonspecific with differential considerations including hypersensitivity pneumonitis, allergic bronchopulmonary aspergillosis and atypical infection (such as fungal disease) among others  Less likely considerations include Sarcoidosis, collagen vascular disorders and nonspecific interstitial pneumonia (NIP)  Ultimately, sputum samples and/or bronchoalveolar lavage in conjunction with clinical history and consideration of lung biopsy may be required for definitive diagnosis  10 x 5 mm left lower lobe nodule, new from prior study, presumed pseudonodule   Follow-up CT chest in 3 months may be obtained to ensure resolution  Mild mediastinal adenopathy has increased, presumably reactive  Tiny left and probable trace right pleural effusions  -as CT reports proves, DDx broad at this time  Bronch tomorrow, may then need VATS    -c/s ID

## 2018-07-10 NOTE — PROGRESS NOTES
Progress Note - Pulmonary   Tl Boards 58 y o  male MRN: [id number]  Unit/Bed#: -02 Encounter: [id number]    Assessment:  Bilateral alveolar infiltrates DDX:  Bacterial pneumonia, opportunistic infection, hypersensitivity pneumonitis, ABPA, [place], sarcoidosis  Acute hypoxic respiratory failure  Severe COPD due to allergic asthma    Plan:  Explained to the patient that this is likely not a bacterial infection given his persistent fevers lack of clinical response with regards to his hypoxia however I would continue his cefepime and vancomycin at current dose  Most likely this is due to some sort of allergic lung disease as listed above probably responsive to steroids however if we give steroids we will not be able to proceed with diagnostic workup  Patient is agreeable to hold off on steroids for now in our to pursue a more accurate diagnosis  We talked about the fact that bronchoscopy will likely not be enough and he will need to proceed with VATS with lung biopsy  Given the noninvasive nature will start with bronchoscopy tomorrow to rule out infection, opportunistic infection, bleeding, eosinophilic pneumonia  If these are negative and the blood work does not showed late on his diagnosis will need to proceed with VATS with thoracic surgery  Chief Complaint:   No better    Subjective:   Patient had a bad night with fever and significant desaturations after coughing spell  He continues to feel short of breath with exertion  He is unable to expectorate any mucus  Objective:     Vitals: Blood pressure 141/79, pulse 89, temperature 98 2 °F (36 8 °C), temperature source Oral, resp  rate 20, height 6' (1 829 m), weight 120 kg (263 lb 14 3 oz), SpO2 96 %  ,Body mass index is 35 79 kg/m²        Intake/Output Summary (Last 24 hours) at 07/10/18 0927  Last data filed at 07/10/18 0832   Gross per 24 hour   Intake           5788 3 ml   Output              875 ml   Net           4913 3 ml       Invasive Devices     Peripheral Intravenous Line            Peripheral IV 07/09/18 Left Arm less than 1 day                Physical Exam: /79 (BP Location: Right arm)   Pulse 89   Temp 98 2 °F (36 8 °C) (Oral)   Resp 20   Ht 6' (1 829 m)   Wt 120 kg (263 lb 14 3 oz)   SpO2 96%   BMI 35 79 kg/m²   General appearance: alert and oriented, in no acute distress  Neck: no adenopathy, no carotid bruit, no JVD, supple, symmetrical, trachea midline and thyroid not enlarged, symmetric, no tenderness/mass/nodules  Lungs: diminished breath sounds  Heart: regular rate and rhythm, S1, S2 normal, no murmur, click, rub or gallop  Abdomen: soft, non-tender; bowel sounds normal; no masses,  no organomegaly  Extremities: extremities normal, warm and well-perfused; no cyanosis, clubbing, or edema     Labs: CBC: No results found for: WBC, HGB, HCT, MCV, PLT, ADJUSTEDWBC, MCH, MCHC, RDW, MPV, NRBC, CMP: No results found for: NA, K, CL, CO2, ANIONGAP, BUN, CREATININE, GLUCOSE, CALCIUM, AST, ALT, ALKPHOS, PROT, ALBUMIN, BILITOT, EGFR  Imaging and other studies: I have personally reviewed pertinent films in PACS

## 2018-07-10 NOTE — PROGRESS NOTES
Pt spo2 decreased briefly to 86% then recovered 90%  Pt tachypnea, shallow breathing  Encouraged slow deep breaths  Says "I recovered easier this time" he desaturates with exertion  Spoke to [employer] aware of temp 102 1 after tyl, -130s, and desaturation  O2 increased to 6L nasal cannula  Assessment on-going      with resting  Pt in no acute distress

## 2018-07-10 NOTE — CONSULTS
Consultation - Infectious Disease   Maribeth Booth 58 y o  male MRN: [id number]  Unit/Bed#: -02 Encounter: [id number]      Assessment/Plan     Assessment:  58y o  year old male with PMH of COPD, asthma who presents with dyspnea which started 1 week ago and associated with productive cough  1  Fever with diffuse bilateral groundglass density throughout both lungs - CT has the appearance suggesting atypical process such as hypersensitivity pneumonitis, allergic bronchopulmonary aspergillosis  Less likely bacterial infection since prior imaging also showed persistent bilateral pulmonary airspace last month treated with ceftriaxone and azithromycin at that time  No h/o immunosuppression to suggest atypica fungal infection  Blood cx are showing no growth to date  Plan:  1  Continue cefepime for now while waiting for bronchoscopy with cx to be obtained     History of Present Illness   Physician Requesting Consult: Nabeel Perez DO  Reason for Consult / Principal Problem: pneumonia  Hx and PE limited by: none  HPI: Maribeth Booth is a 58y o  year old male with PMH of COPD, asthma who presents with dyspnea which started 1 week ago and associated with productive cough  He also had fever and chills but denies any sore throat, rhinorrhea, abdominal pain, N/V/D, dysuria, or skin rashes  No recent sick contacts or travel  He was recently admitted here last month for pneumonia and was treated with ceftriaxone and azithromycin  On admission, he was afebrile without leukocytosis but now developed temp of 102 1 yesterday  CT chest showed diffuse bilateral groundglass density throughout both lungs with patchy areas of more consolidative airspace opacity within both upper lobes  He was started on cefepime and blood cx are showing no growth to date  Sputum cx suggested oropharyngeal contamination      Inpatient consult to Infectious Diseases  Consult performed by: Dusty Blackman, Christiana White ordered by: Zaid Nelson of [employer]   Constitutional: Positive for chills, fatigue and fever  Respiratory: Positive for cough and shortness of breath  Cardiovascular: Negative for chest pain  Gastrointestinal: Negative for abdominal pain, nausea and vomiting  Genitourinary: Negative for dysuria  Skin: Negative for rash  Historical Information   Past Medical History:   Diagnosis Date    Abscess, cheek     Last Assessed: 2/23/2016    Asthma     Chronic headaches     Constipation     COPD (chronic obstructive pulmonary disease) (HCC)     Cough     Difficulty attaining erection     Dyspnea     Enlarged prostate     Hemorrhoids     Migraine     Seasonal allergies     Slow urinary stream     Wheezing      Past Surgical History:   Procedure Laterality Date    ABSCESS DRAINAGE      SKIN LESION EXCISION      Excision of lesion in vestibule of mouth;  Last Assessed: 2/23/2016     Social History   History   Alcohol Use    Yes     Comment: socially / 1-4 drinks/week     History   Drug Use No     History   Smoking Status    Former Smoker    Packs/day: 1 50    Years: 20 00    Types: Cigarettes    Quit date: 2/5/1993   Smokeless Tobacco    Never Used     Comment: never a smoker per [employer]     Family History: non-contributory    Meds/Allergies   all current active meds have been reviewed    Allergies   Allergen Reactions    Eggs Or Egg-Derived Products Diarrhea     Pt states he becomes nauseated and hasd diarrhea    Diphenhydramine Hyperactivity and Hypertension     Category: INSOMNIA;        Objective       Intake/Output Summary (Last 24 hours) at 07/10/18 1339  Last data filed at 07/10/18 1136   Gross per 24 hour   Intake           6318 3 ml   Output             1475 ml   Net           4843 3 ml       Invasive Devices:   Peripheral IV 07/09/18 Left Arm (Active)   Site Assessment Intact 7/10/2018  7:25 AM   Dressing Type Transparent 7/10/2018  7:25 AM   Line Status Capped;Flushed 7/10/2018  7:25 AM   Dressing Status Reinforced 7/10/2018  7:25 AM   Dressing Change Due 07/13/18 7/9/2018 11:50 PM       Physical Exam   Constitutional: He appears well-developed and well-nourished  Cardiovascular: Normal rate and regular rhythm  Pulmonary/Chest: Effort normal and breath sounds normal  He has no wheezes  He has no rales  Abdominal: Soft  Bowel sounds are normal  There is no tenderness  Musculoskeletal: He exhibits no edema  Skin: Skin is warm and dry  No erythema  Vitals reviewed  Lab Results: I have personally reviewed pertinent labs  Imaging Studies: I have personally reviewed pertinent reports  EKG, Pathology, and Other Studies: I have personally reviewed pertinent reports  Counseling / Coordination of Care  Total floor / unit time spent today 35 minutes  Greater than 50% of total time was spent with the patient and / or family counseling and / or coordination of care   A description of the counseling / coordination of care:

## 2018-07-11 ENCOUNTER — ANESTHESIA (INPATIENT)
Dept: PERIOP | Facility: HOSPITAL | Age: 63
DRG: 974 | End: 2018-07-11
Payer: COMMERCIAL

## 2018-07-11 ENCOUNTER — ANESTHESIA EVENT (INPATIENT)
Dept: PERIOP | Facility: HOSPITAL | Age: 63
DRG: 974 | End: 2018-07-11
Payer: COMMERCIAL

## 2018-07-11 LAB
ACE SERPL-CCNC: 40 U/L (ref 14–82)
BASOPHILS # BLD AUTO: 0.01 THOUSANDS/ΜL (ref 0–0.1)
BASOPHILS NFR BLD AUTO: 0 % (ref 0–1)
C-ANCA TITR SER IF: NORMAL TITER
EOSINOPHIL # BLD AUTO: 0 THOUSAND/ΜL (ref 0–0.61)
EOSINOPHIL NFR BLD AUTO: 0 % (ref 0–6)
ERYTHROCYTE [DISTWIDTH] IN BLOOD BY AUTOMATED COUNT: 14 % (ref 11.6–15.1)
HCT VFR BLD AUTO: 35.9 % (ref 36.5–49.3)
HGB BLD-MCNC: 11.5 G/DL (ref 12–17)
IMM GRANULOCYTES # BLD AUTO: 0.09 THOUSAND/UL (ref 0–0.2)
IMM GRANULOCYTES NFR BLD AUTO: 2 % (ref 0–2)
LYMPHOCYTES # BLD AUTO: 1.1 THOUSANDS/ΜL (ref 0.6–4.47)
LYMPHOCYTES NFR BLD AUTO: 20 % (ref 14–44)
MCH RBC QN AUTO: 27.3 PG (ref 26.8–34.3)
MCHC RBC AUTO-ENTMCNC: 32 G/DL (ref 31.4–37.4)
MCV RBC AUTO: 85 FL (ref 82–98)
MONOCYTES # BLD AUTO: 0.46 THOUSAND/ΜL (ref 0.17–1.22)
MONOCYTES NFR BLD AUTO: 9 % (ref 4–12)
MYELOPEROXIDASE AB SER IA-ACNC: <9 U/ML (ref 0–9)
NEUTROPHILS # BLD AUTO: 3.74 THOUSANDS/ΜL (ref 1.85–7.62)
NEUTS SEG NFR BLD AUTO: 69 % (ref 43–75)
NRBC BLD AUTO-RTO: 0 /100 WBCS
P-ANCA ATYPICAL TITR SER IF: NORMAL TITER
P-ANCA TITR SER IF: NORMAL TITER
PLATELET # BLD AUTO: 332 THOUSANDS/UL (ref 149–390)
PMV BLD AUTO: 9 FL (ref 8.9–12.7)
PROTEINASE3 AB SER IA-ACNC: <3.5 U/ML (ref 0–3.5)
RBC # BLD AUTO: 4.21 MILLION/UL (ref 3.88–5.62)
WBC # BLD AUTO: 5.4 THOUSAND/UL (ref 4.31–10.16)

## 2018-07-11 PROCEDURE — 94760 N-INVAS EAR/PLS OXIMETRY 1: CPT

## 2018-07-11 PROCEDURE — 99232 SBSQ HOSP IP/OBS MODERATE 35: CPT | Performed by: INTERNAL MEDICINE

## 2018-07-11 PROCEDURE — 87186 SC STD MICRODIL/AGAR DIL: CPT | Performed by: INTERNAL MEDICINE

## 2018-07-11 PROCEDURE — 87281 PNEUMOCYSTIS CARINII AG IF: CPT | Performed by: INTERNAL MEDICINE

## 2018-07-11 PROCEDURE — 88112 CYTOPATH CELL ENHANCE TECH: CPT | Performed by: PATHOLOGY

## 2018-07-11 PROCEDURE — 88305 TISSUE EXAM BY PATHOLOGIST: CPT | Performed by: PATHOLOGY

## 2018-07-11 PROCEDURE — 87015 SPECIMEN INFECT AGNT CONCNTJ: CPT | Performed by: INTERNAL MEDICINE

## 2018-07-11 PROCEDURE — 87070 CULTURE OTHR SPECIMN AEROBIC: CPT | Performed by: INTERNAL MEDICINE

## 2018-07-11 PROCEDURE — 87077 CULTURE AEROBIC IDENTIFY: CPT | Performed by: INTERNAL MEDICINE

## 2018-07-11 PROCEDURE — 0B9J8ZX DRAINAGE OF LEFT LOWER LUNG LOBE, VIA NATURAL OR ARTIFICIAL OPENING ENDOSCOPIC, DIAGNOSTIC: ICD-10-PCS | Performed by: INTERNAL MEDICINE

## 2018-07-11 PROCEDURE — 87252 VIRUS INOCULATION TISSUE: CPT | Performed by: INTERNAL MEDICINE

## 2018-07-11 PROCEDURE — 85025 COMPLETE CBC W/AUTO DIFF WBC: CPT | Performed by: NURSE PRACTITIONER

## 2018-07-11 PROCEDURE — 94640 AIRWAY INHALATION TREATMENT: CPT

## 2018-07-11 PROCEDURE — 87102 FUNGUS ISOLATION CULTURE: CPT | Performed by: INTERNAL MEDICINE

## 2018-07-11 PROCEDURE — 99233 SBSQ HOSP IP/OBS HIGH 50: CPT | Performed by: HOSPITALIST

## 2018-07-11 PROCEDURE — 31624 DX BRONCHOSCOPE/LAVAGE: CPT | Performed by: INTERNAL MEDICINE

## 2018-07-11 RX ORDER — SODIUM CHLORIDE 9 MG/ML
INJECTION, SOLUTION INTRAVENOUS CONTINUOUS PRN
Status: DISCONTINUED | OUTPATIENT
Start: 2018-07-11 | End: 2018-07-11 | Stop reason: SURG

## 2018-07-11 RX ORDER — LIDOCAINE HYDROCHLORIDE 20 MG/ML
INJECTION, SOLUTION EPIDURAL; INFILTRATION; INTRACAUDAL; PERINEURAL
Status: COMPLETED
Start: 2018-07-11 | End: 2018-07-11

## 2018-07-11 RX ORDER — LIDOCAINE HYDROCHLORIDE 40 MG/ML
5 INJECTION, SOLUTION RETROBULBAR; TOPICAL ONCE
Status: COMPLETED | OUTPATIENT
Start: 2018-07-11 | End: 2018-07-11

## 2018-07-11 RX ORDER — LIDOCAINE HYDROCHLORIDE 40 MG/ML
INJECTION, SOLUTION RETROBULBAR; TOPICAL
Status: COMPLETED
Start: 2018-07-11 | End: 2018-07-11

## 2018-07-11 RX ORDER — PROPOFOL 10 MG/ML
INJECTION, EMULSION INTRAVENOUS AS NEEDED
Status: DISCONTINUED | OUTPATIENT
Start: 2018-07-11 | End: 2018-07-11 | Stop reason: SURG

## 2018-07-11 RX ADMIN — LEVALBUTEROL 1.25 MG: 1.25 SOLUTION, CONCENTRATE RESPIRATORY (INHALATION) at 14:00

## 2018-07-11 RX ADMIN — GUAIFENESIN 200 MG: 100 SOLUTION ORAL at 16:57

## 2018-07-11 RX ADMIN — LEVALBUTEROL 1.25 MG: 1.25 SOLUTION, CONCENTRATE RESPIRATORY (INHALATION) at 07:50

## 2018-07-11 RX ADMIN — LORAZEPAM 1 MG: 1 TABLET ORAL at 21:13

## 2018-07-11 RX ADMIN — IPRATROPIUM BROMIDE 0.5 MG: 0.5 SOLUTION RESPIRATORY (INHALATION) at 19:46

## 2018-07-11 RX ADMIN — IPRATROPIUM BROMIDE 0.5 MG: 0.5 SOLUTION RESPIRATORY (INHALATION) at 07:53

## 2018-07-11 RX ADMIN — FLUTICASONE PROPIONATE 2 SPRAY: 50 SPRAY, METERED NASAL at 13:54

## 2018-07-11 RX ADMIN — LIDOCAINE HYDROCHLORIDE: 20 JELLY TOPICAL at 11:00

## 2018-07-11 RX ADMIN — MONTELUKAST SODIUM 10 MG: 10 TABLET, FILM COATED ORAL at 21:13

## 2018-07-11 RX ADMIN — CEFEPIME HYDROCHLORIDE 2000 MG: 2 INJECTION, SOLUTION INTRAVENOUS at 19:38

## 2018-07-11 RX ADMIN — ENOXAPARIN SODIUM 40 MG: 40 INJECTION, SOLUTION INTRAVENOUS; SUBCUTANEOUS at 13:54

## 2018-07-11 RX ADMIN — SODIUM CHLORIDE: 0.9 INJECTION, SOLUTION INTRAVENOUS at 11:04

## 2018-07-11 RX ADMIN — ACETAMINOPHEN 650 MG: 325 TABLET, FILM COATED ORAL at 19:37

## 2018-07-11 RX ADMIN — BUDESONIDE AND FORMOTEROL FUMARATE DIHYDRATE 2 PUFF: 160; 4.5 AEROSOL RESPIRATORY (INHALATION) at 07:51

## 2018-07-11 RX ADMIN — TAMSULOSIN HYDROCHLORIDE 0.4 MG: 0.4 CAPSULE ORAL at 16:43

## 2018-07-11 RX ADMIN — PROPOFOL 30 MG: 10 INJECTION, EMULSION INTRAVENOUS at 11:15

## 2018-07-11 RX ADMIN — BUDESONIDE AND FORMOTEROL FUMARATE DIHYDRATE 2 PUFF: 160; 4.5 AEROSOL RESPIRATORY (INHALATION) at 19:52

## 2018-07-11 RX ADMIN — LIDOCAINE HYDROCHLORIDE: 20 JELLY TOPICAL at 12:17

## 2018-07-11 RX ADMIN — LEVALBUTEROL 1.25 MG: 1.25 SOLUTION, CONCENTRATE RESPIRATORY (INHALATION) at 19:48

## 2018-07-11 RX ADMIN — IPRATROPIUM BROMIDE 0.5 MG: 0.5 SOLUTION RESPIRATORY (INHALATION) at 14:00

## 2018-07-11 RX ADMIN — PROPOFOL 100 MG: 10 INJECTION, EMULSION INTRAVENOUS at 11:05

## 2018-07-11 RX ADMIN — CEFEPIME HYDROCHLORIDE 2000 MG: 2 INJECTION, SOLUTION INTRAVENOUS at 09:01

## 2018-07-11 RX ADMIN — LIDOCAINE HYDROCHLORIDE 5 ML: 40 INJECTION, SOLUTION RETROBULBAR; TOPICAL at 12:20

## 2018-07-11 NOTE — PROGRESS NOTES
Ambulated to bathroom  Became very sob and tachy in the 130s  Back to bed  O2 sat in the low 80s but recovered with time  Did use O2 during ambulation

## 2018-07-11 NOTE — PROGRESS NOTES
Progress Note - Infectious Disease   Rakesh Males 58 y o  male MRN: [id number]  Unit/Bed#: OR POOL Encounter: [id number]    Assessment:   58y o  year old male with PMH of COPD, asthma who presents with dyspnea which started 1 week ago and associated with productive cough      1  Fever with diffuse bilateral groundglass density throughout both lungs - CT has the appearance suggesting atypical process such as hypersensitivity pneumonitis, allergic bronchopulmonary aspergillosis  Less likely bacterial infection since prior imaging also showed persistent bilateral pulmonary airspace last month treated with ceftriaxone and azithromycin at that time  No h/o immunosuppression to suggest atypica fungal infection  Blood cx are showing no growth to date      Plan:  1  Continue cefepime for now while waiting for bronchoscopy and will follow cx results  Subjective/Objective   Chief Complaint: abnormal CT chets    Subjective: feels well and denies any complaints  Plan for bronch today  Objective:     HR:  [] 86  Resp:  [16-22] 18  BP: (120-144)/(62-87) 120/76  SpO2:  [87 %-97 %] 94 %  Temp (24hrs), Av 2 °F (36 8 °C), Min:97 7 °F (36 5 °C), Max:98 8 °F (37 1 °C)  Current: Temperature: 98 8 °F (37 1 °C)     Physical Exam:  Constitutional: He appears well-developed and well-nourished  Cardiovascular: Normal rate and regular rhythm  Pulmonary/Chest: Effort normal and breath sounds normal  He has no wheezes  He has no rales  Abdominal: Soft  Bowel sounds are normal  There is no tenderness  Musculoskeletal: He exhibits no edema  Skin: Skin is warm and dry  No erythema  Vitals reviewed  Invasive Devices     Peripheral Intravenous Line            Peripheral IV 18 Left Arm 1 day                Lab, Imaging and other studies: I have personally reviewed pertinent reports

## 2018-07-11 NOTE — ANESTHESIA PREPROCEDURE EVALUATION
Review of Systems/Medical History  Patient summary reviewed  Chart reviewed  No history of anesthetic complications     Cardiovascular  EKG reviewed, Exercise tolerance (METS): >4,     Pulmonary  Pneumonia, COPD severe- O2 dependent , Asthma , poorly controlled Last rescue: today Asthma type of rescue: chronic medication usage, Shortness of breath, Recent URI , Oxygen dependent nasal cannula,        GI/Hepatic  Negative GI/hepatic ROS          Prostatic disorder, benign prostatic hyperplasia       Endo/Other    Obesity    GYN  Negative gynecology ROS          Hematology  Anemia ,     Musculoskeletal  Negative musculoskeletal ROS        Neurology    Headaches,    Psychology   Negative psychology ROS              Physical Exam    Airway    Mallampati score: II  TM Distance: >3 FB  Neck ROM: full     Dental   Comment: Upper bridge, implants,     Cardiovascular  Rhythm: regular, Rate: normal,     Pulmonary  Decreased breath sounds, Wheezes,     Other Findings        Anesthesia Plan  ASA Score- 3     Anesthesia Type- IV sedation with anesthesia with ASA Monitors  Additional Monitors:   Airway Plan:         Plan Factors-    Induction- intravenous  Postoperative Plan-     Informed Consent- Anesthetic plan and risks discussed with patient  I personally reviewed this patient with the CRNA  Discussed and agreed on the Anesthesia Plan with the CRNA  Rosella Goldmann

## 2018-07-11 NOTE — PLAN OF CARE
RESPIRATORY - ADULT     Achieves optimal ventilation and oxygenation Not Progressing          CARDIOVASCULAR - ADULT     Absence of cardiac dysrhythmias or at baseline rhythm Progressing        DISCHARGE PLANNING     Discharge to home or other facility with appropriate resources Progressing        DISCHARGE PLANNING - CARE MANAGEMENT     Discharge to post-acute care or home with appropriate resources Progressing        INFECTION - ADULT     Absence or prevention of progression during hospitalization Progressing        Knowledge Deficit     Patient/family/caregiver demonstrates understanding of disease process, treatment plan, medications, and discharge instructions Progressing        PAIN - ADULT     Verbalizes/displays adequate comfort level or baseline comfort level Progressing        Potential for Falls     Patient will remain free of falls Progressing        SAFETY ADULT     Maintain or return to baseline ADL function Progressing     Maintain or return mobility status to optimal level Progressing            PT remains on 5l nasal cannula  Unable to wean down this shift  Attempted to titrate to 4l, pt Spo2 87-89% with no dyspnea or distress  Increased back to 5L Nasal cannula  Plan for bronchoscopy today  Remains on cefepime IV  Afebrile throughout shift

## 2018-07-11 NOTE — PROCEDURES
Bronchoscopy  Date/Time: 7/11/2018 11:28 AM  Performed by: Ivan Ford by: Tasha Gomez     Patient location:  Other (comment)  Consent:     Consent obtained:  Written    Consent given by:  Patient    Risks discussed: Adverse reaction to sedation and bleeding    Alternatives discussed:  No treatment and delayed treatment  Universal protocol:     Procedure explained and questions answered to patient or proxy's satisfaction: yes      Relevant documents present and verified: yes      Test results available and properly labeled: yes      Radiology Images displayed and confirmed  If images not available, report reviewed: yes      Immediately prior to procedure a time out was called: yes      Patient identity confirmed:  Verbally with patient and arm band  Indications:     Procedure Purpose: diagnostic and therapeutic      Indications: pneumonia/infiltrate    Sedation:     Sedation type:  Per anesthesia  Upper Airway:     Oropharnyx: normal      Epiglottis: normal      Vocal cords movement: normal      Trachea: abnormal (comment)      Wendy:  Abnormal (comment)  Airway:     Airway:  Copious thick white secretions throughout  Marked cobblestoning and hyperemia of airways throughout  BAL done from left lower lobe   Patient tolerated procedure well

## 2018-07-11 NOTE — ASSESSMENT & PLAN NOTE
-currently on 5 L nasal cannula   -Bronch 7/11/18: Murray Lanza from Dr Kisha Owens' procedure note, "Copious thick white secretions throughout  Marked cobblestoning and hyperemia of airways throughout    BAL done from left lower lobe "  -May need VATS

## 2018-07-11 NOTE — PROGRESS NOTES
Progress Note - Pulmonary   Dorcus Days 58 y o  male MRN: [id number]  Unit/Bed#: [facility] 217-02 Encounter: [id number]    Assessment:  Acute hypoxic rest  Bilateral infiltrates  Moderate persistent asthma    Plan:  Proceed with fiberoptic bronchoscopy today risks and benefits were outlined to patient  Will continue antibiotics for now and follow up lab work and bronchoscopy results    Chief Complaint:   No new complaints    Subjective:   Patient feels breathing is slightly improved no episodes of desaturation overnight, improved cough    Objective:     Vitals: Blood pressure 137/83, pulse 79, temperature 97 8 °F (36 6 °C), temperature source Oral, resp  rate 22, height 6' (1 829 m), weight 114 kg (250 lb 10 6 oz), SpO2 94 %  ,Body mass index is 34 kg/m²        Intake/Output Summary (Last 24 hours) at 07/11/18 0841  Last data filed at 07/11/18 0001   Gross per 24 hour   Intake             1097 ml   Output             2750 ml   Net            -1653 ml       Invasive Devices     Peripheral Intravenous Line            Peripheral IV 07/09/18 Left Arm 1 day                Physical Exam: /83 (BP Location: Right arm)   Pulse 79   Temp 97 8 °F (36 6 °C) (Oral)   Resp 22   Ht 6' (1 829 m)   Wt 114 kg (250 lb 10 6 oz)   SpO2 94%   BMI 34 00 kg/m²     General Appearance:    Alert, cooperative, no distress, appears stated age   Head:    Normocephalic, without obvious abnormality, atraumatic   Eyes:    PERRL, conjunctiva/corneas clear, EOM's intact, fundi     benign, both eyes        Ears:    Normal TM's and external ear canals, both ears   Nose:   Nares normal, septum midline, mucosa normal, no drainage    or sinus tenderness   Throat:   Lips, mucosa, and tongue normal; teeth and gums normal   Neck:   Supple, symmetrical, trachea midline, no adenopathy;        thyroid:  No enlargement/tenderness/nodules; no carotid    bruit or JVD   Back:     Symmetric, no curvature, ROM normal, no CVA tenderness   Lungs:     Clear to auscultation bilaterally, respirations unlabored   Chest wall:    No tenderness or deformity   Heart:    Regular rate and rhythm, S1 and S2 normal, no murmur, rub   or gallop   Abdomen:     Soft, non-tender, bowel sounds active all four quadrants,     no masses, no organomegaly   Genitalia:    Normal male without lesion, discharge or tenderness   Rectal:    Normal tone, normal prostate, no masses or tenderness;    guaiac negative stool   Extremities:   Extremities normal, atraumatic, no cyanosis or edema   Pulses:   2+ and symmetric all extremities   Skin:   Skin color, texture, turgor normal, no rashes or lesions   Lymph nodes:   Cervical, supraclavicular, and axillary nodes normal   Neurologic:   CNII-XII intact   Normal strength, sensation and reflexes       throughout        Labs:   CBC:   Lab Results   Component Value Date    WBC 5 40 07/11/2018    HGB 11 5 (L) 07/11/2018    HCT 35 9 (L) 07/11/2018    MCV 85 07/11/2018     07/11/2018    MCH 27 3 07/11/2018    MCHC 32 0 07/11/2018    RDW 14 0 07/11/2018    MPV 9 0 07/11/2018    NRBC 0 07/11/2018   , CMP: No results found for: NA, K, CL, CO2, ANIONGAP, BUN, CREATININE, GLUCOSE, CALCIUM, AST, ALT, ALKPHOS, PROT, ALBUMIN, BILITOT, EGFR  Imaging and other studies: I have personally reviewed pertinent films in PACS

## 2018-07-11 NOTE — PROGRESS NOTES
Pt requesting the "sleeping pill" and asked if he could take it with the tucinex  Discouraged this, and he verbalized because of my "respirations   " and was understanding  Came up with plan that he would have ativan, and if needed could take robitussin for cough  Pt does say "I'm not coughing that much today"  Pt aware of plan for Bronchoscopy tomorrow  Questions and concerns addressed  Support offered

## 2018-07-11 NOTE — PROGRESS NOTES
Back from bronch at 1200  Vss afebrile  Denies pain  Tolerating liquids  Call from Dr Dina Dolan and ok to eat

## 2018-07-11 NOTE — ASSESSMENT & PLAN NOTE
-continue cefepime  -stopped Vanco as MRSA screen NEG  -strep pneumoniae and legionella antigens negative  -appreciate Pulmonary  -CT chest 7/9/18: Diffuse bilateral groundglass density throughout both lungs with patchy areas of more consolidative airspace opacity within both upper lobes   Abnormalities have a more central and upper lobe predominance   The appearance is nonspecific with differential considerations including hypersensitivity pneumonitis, allergic bronchopulmonary aspergillosis and atypical infection (such as fungal disease) among others  Less likely considerations include Sarcoidosis, collagen vascular disorders and nonspecific interstitial pneumonia (NIP)  Ultimately, sputum samples and/or bronchoalveolar lavage in conjunction with clinical history and consideration of lung biopsy may be required for definitive diagnosis  10 x 5 mm left lower lobe nodule, new from prior study, presumed pseudonodule   Follow-up CT chest in 3 months may be obtained to ensure resolution  Mild mediastinal adenopathy has increased, presumably reactive  Tiny left and probable trace right pleural effusions  -as CT reports proves, DDx broad at this time     -appreciate ID/pulm

## 2018-07-11 NOTE — PROGRESS NOTES
Progress Note - Mohan Biggs 1955, 58 y o  male MRN: [id number]    Unit/Bed#: -02 Encounter: [id number]    Primary Care Provider: Sadia Bernard MD   Date and time admitted to hospital: 7/7/2018  6:53 PM        COPD (chronic obstructive pulmonary disease) ([facility] [address] )   Assessment & Plan    -due to tobacco abuse and allergic asthma  -Continue Atrovent, Xopenex, and Singulair        Sepsis ([facility] )   Assessment & Plan    POA, due to PNA  Resolved at this time  Acute on chronic respiratory failure with hypoxia [facility])   Assessment & Plan    -currently on 5 L nasal cannula   -Bronch 7/11/18: Jaxon Oleary from Dr Pablito Santos' procedure note, "Copious thick white secretions throughout  Marked cobblestoning and hyperemia of airways throughout  BAL done from left lower lobe "  -May need VATS        Moderate persistent asthma without complication   Assessment & Plan    -continue Symbicort, Flonase, Atrovent, Xopenex, Singulair        * Pneumonia of both lungs due to infectious organism   Assessment & Plan    -continue cefepime  -stopped Vanco as MRSA screen NEG  -strep pneumoniae and legionella antigens negative  -appreciate Pulmonary  -CT chest 7/9/18: Diffuse bilateral groundglass density throughout both lungs with patchy areas of more consolidative airspace opacity within both upper lobes   Abnormalities have a more central and upper lobe predominance   The appearance is nonspecific with differential considerations including hypersensitivity pneumonitis, allergic bronchopulmonary aspergillosis and atypical infection (such as fungal disease) among others  Less likely considerations include Sarcoidosis, collagen vascular disorders and nonspecific interstitial pneumonia (NIP)  Ultimately, sputum samples and/or bronchoalveolar lavage in conjunction with clinical history and consideration of lung biopsy may be required for definitive diagnosis  10 x 5 mm left lower lobe nodule, new from prior study, presumed pseudonodule   Follow-up CT chest in 3 months may be obtained to ensure resolution  Mild mediastinal adenopathy has increased, presumably reactive  Tiny left and probable trace right pleural effusions  -as CT reports proves, DDx broad at this time  -appreciate ID/pulm          VTE Pharmacologic Prophylaxis:   Pharmacologic: Enoxaparin (Lovenox)  Mechanical VTE Prophylaxis in Place: Yes    Patient Centered Rounds: I have performed bedside rounds with nursing staff today  Discussions with Specialists or Other Care Team Provider: Pulm     Education and Discussions with Family / Patient: Pt    Time Spent for Care: 20 minutes  More than 50% of total time spent on counseling and coordination of care as described above  Current Length of Stay: 4 day(s)    Current Patient Status: Inpatient   Certification Statement: The patient will continue to require additional inpatient hospital stay due to Acute hypoxemic respiratory failure    Discharge Plan:  2-3 days    Code Status: Level 1 - Full Code      Subjective:   Patient's cough was improved over night but he has had coughing after his bronchoscopy  He denies shortness of breath at rest     Objective:     Vitals:   Temp (24hrs), Av 1 °F (36 7 °C), Min:97 5 °F (36 4 °C), Max:98 8 °F (37 1 °C)    HR:  [] 84  Resp:  [16-30] 30  BP: (116-145)/(62-88) 145/88  SpO2:  [87 %-97 %] 94 %  Body mass index is 34 kg/m²  Input and Output Summary (last 24 hours):        Intake/Output Summary (Last 24 hours) at 18 1327  Last data filed at 18 1120   Gross per 24 hour   Intake              767 ml   Output             1850 ml   Net            -1083 ml       Physical Exam:     Physical Exam  Gen: NAD, AAOx3, well developed, well nourished  Eyes: EOMI, PERRLA, no scleral icterus  ENMT:  Oropharynx clear of erythema or exudates, no nasal discharge, no otic discharge, moist mucous membranes  Neck:  Supple  Lymph:  No anterior or posterior cervical or supraclavicular lymphadenopathy  Cardiovascular:  Regular rate and rhythm, normal S1-S2, no murmurs, rubs, or gallops  Lungs:  Very faint and intermittent rales in the bases and mid lung fields  Abdomen:  Positive bowel sounds, soft, nontender, nondistended, no palpable organomegaly   Skin:  Intact, no obvious lesions or rashes, no edema  Neuro: Cranial nerves 2-12 are intact, non-focal, 5/5 strength in all 4 extremities    Additional Data:     Labs:      Results from last 7 days  Lab Units 07/11/18  0432   WBC Thousand/uL 5 40   HEMOGLOBIN g/dL 11 5*   HEMATOCRIT % 35 9*   PLATELETS Thousands/uL 332   NEUTROS PCT % 69   LYMPHS PCT % 20   MONOS PCT % 9   EOS PCT % 0       Results from last 7 days  Lab Units 07/09/18  0445 07/08/18  0456   SODIUM mmol/L 140 140   POTASSIUM mmol/L 4 3 4 0   CHLORIDE mmol/L 107 106   CO2 mmol/L 26 27   BUN mg/dL 11 12   CREATININE mg/dL 0 77 0 99   CALCIUM mg/dL 8 3 8 2*   TOTAL PROTEIN g/dL  --  6 9   BILIRUBIN TOTAL mg/dL  --  0 30   ALK PHOS U/L  --  45*   ALT U/L  --  26   AST U/L  --  34   GLUCOSE RANDOM mg/dL 128 82       Results from last 7 days  Lab Units 07/07/18  1922   INR  1 08                 * I Have Reviewed All Lab Data Listed Above  * Additional Pertinent Lab Tests Reviewed: [facility] Admission Reviewed    Recent Cultures (last 7 days):       Results from last 7 days  Lab Units 07/08/18  0839 07/07/18  2251 07/07/18  1922   BLOOD CULTURE   --   --  No Growth at 72 hrs  No Growth at 72 hrs  SPUTUM CULTURE  Test not performed  Suggest repeat specimen  --   --    GRAM STAIN RESULT  >10 squamous epithelial cells/lpf, indicating orophayngeal contamination    1+ Polys  3+ Gram negative rods  3+ Gram positive rods  2+ Gram positive cocci in pairs  1+ Gram positive cocci in chains  --   --    LEGIONELLA URINARY ANTIGEN   --  Negative  --        Last 24 Hours Medication List:     Current Facility-Administered Medications:  [MAR Hold] acetaminophen 650 mg Oral Q6H PRN [facility], PA-C    [MAR Hold] albuterol 2 puff Inhalation Q6H PRN [facility], [facility] Hold] benzonatate 100 mg Oral TID PRN [facility], [facility] Hold] budesonide-formoterol 2 puff Inhalation BID [facility], [facility] Hold] cefepime 2,000 mg Intravenous Q12H [facility], [facility] Last Rate: 2,000 mg (07/11/18 0901)   [MAR Hold] enoxaparin 40 mg Subcutaneous Daily [facility], [facility] Hold] fluticasone 2 spray Nasal Daily [facility], [facility] Hold] guaiFENesin 200 mg Oral Q4H PRN [facility], [facility] Hold] hydrocodone-chlorpheniramine polistirex 5 mL Oral Q12H PRN [email or web address], CRNP    [facility] Hold] ipratropium 0 5 mg Nebulization TID Daksha Donya, DO    [MAR Hold] levalbuterol 1 25 mg Nebulization TID Laurcinthyaa Angie, DO    [MAR Hold] LORazepam 1 mg Oral HS PRN [employer], CRNP    [MAR Hold] montelukast 10 mg Oral HS [facility], [facility] Hold] sodium chloride (PF) 3 mL Intravenous PRN Eugenie Wood DO    [MAR Hold] tamsulosin 0 4 mg Oral Daily With [address] Luz DO         Today, Patient Was Seen By: Daleen Bence, MD    ** Please Note: Dictation voice to text software may have been used in the creation of this document   **

## 2018-07-12 ENCOUNTER — APPOINTMENT (INPATIENT)
Dept: RADIOLOGY | Facility: HOSPITAL | Age: 63
DRG: 974 | End: 2018-07-12
Payer: COMMERCIAL

## 2018-07-12 LAB
BASOPHILS # BLD AUTO: 0.02 THOUSANDS/ΜL (ref 0–0.1)
BASOPHILS NFR BLD AUTO: 0 % (ref 0–1)
EOSINOPHIL # BLD AUTO: 0.25 THOUSAND/ΜL (ref 0–0.61)
EOSINOPHIL NFR BLD AUTO: 3 % (ref 0–6)
ERYTHROCYTE [DISTWIDTH] IN BLOOD BY AUTOMATED COUNT: 14 % (ref 11.6–15.1)
HCT VFR BLD AUTO: 38.7 % (ref 36.5–49.3)
HGB BLD-MCNC: 12.2 G/DL (ref 12–17)
IMM GRANULOCYTES # BLD AUTO: 0.09 THOUSAND/UL (ref 0–0.2)
IMM GRANULOCYTES NFR BLD AUTO: 1 % (ref 0–2)
LYMPHOCYTES # BLD AUTO: 1.18 THOUSANDS/ΜL (ref 0.6–4.47)
LYMPHOCYTES NFR BLD AUTO: 16 % (ref 14–44)
MCH RBC QN AUTO: 26.9 PG (ref 26.8–34.3)
MCHC RBC AUTO-ENTMCNC: 31.5 G/DL (ref 31.4–37.4)
MCV RBC AUTO: 85 FL (ref 82–98)
MONOCYTES # BLD AUTO: 0.31 THOUSAND/ΜL (ref 0.17–1.22)
MONOCYTES NFR BLD AUTO: 4 % (ref 4–12)
NEUTROPHILS # BLD AUTO: 5.52 THOUSANDS/ΜL (ref 1.85–7.62)
NEUTS SEG NFR BLD AUTO: 76 % (ref 43–75)
NRBC BLD AUTO-RTO: 0 /100 WBCS
P JIROVECII AG SPEC QL IF: ABNORMAL
PLATELET # BLD AUTO: 333 THOUSANDS/UL (ref 149–390)
PMV BLD AUTO: 9 FL (ref 8.9–12.7)
RBC # BLD AUTO: 4.54 MILLION/UL (ref 3.88–5.62)
WBC # BLD AUTO: 7.37 THOUSAND/UL (ref 4.31–10.16)

## 2018-07-12 PROCEDURE — 87040 BLOOD CULTURE FOR BACTERIA: CPT | Performed by: INTERNAL MEDICINE

## 2018-07-12 PROCEDURE — 86702 HIV-2 ANTIBODY: CPT | Performed by: INTERNAL MEDICINE

## 2018-07-12 PROCEDURE — 94760 N-INVAS EAR/PLS OXIMETRY 1: CPT

## 2018-07-12 PROCEDURE — 87389 HIV-1 AG W/HIV-1&-2 AB AG IA: CPT | Performed by: INTERNAL MEDICINE

## 2018-07-12 PROCEDURE — 87116 MYCOBACTERIA CULTURE: CPT | Performed by: INTERNAL MEDICINE

## 2018-07-12 PROCEDURE — 99232 SBSQ HOSP IP/OBS MODERATE 35: CPT | Performed by: INTERNAL MEDICINE

## 2018-07-12 PROCEDURE — 94640 AIRWAY INHALATION TREATMENT: CPT

## 2018-07-12 PROCEDURE — 85025 COMPLETE CBC W/AUTO DIFF WBC: CPT | Performed by: NURSE PRACTITIONER

## 2018-07-12 PROCEDURE — 86701 HIV-1ANTIBODY: CPT | Performed by: INTERNAL MEDICINE

## 2018-07-12 PROCEDURE — 99233 SBSQ HOSP IP/OBS HIGH 50: CPT | Performed by: HOSPITALIST

## 2018-07-12 PROCEDURE — 71046 X-RAY EXAM CHEST 2 VIEWS: CPT

## 2018-07-12 PROCEDURE — 87206 SMEAR FLUORESCENT/ACID STAI: CPT | Performed by: INTERNAL MEDICINE

## 2018-07-12 RX ADMIN — CEFEPIME HYDROCHLORIDE 2000 MG: 2 INJECTION, SOLUTION INTRAVENOUS at 20:03

## 2018-07-12 RX ADMIN — LORAZEPAM 1 MG: 1 TABLET ORAL at 21:26

## 2018-07-12 RX ADMIN — BENZONATATE 100 MG: 100 CAPSULE ORAL at 20:14

## 2018-07-12 RX ADMIN — FLUTICASONE PROPIONATE 2 SPRAY: 50 SPRAY, METERED NASAL at 09:30

## 2018-07-12 RX ADMIN — IPRATROPIUM BROMIDE 0.5 MG: 0.5 SOLUTION RESPIRATORY (INHALATION) at 20:51

## 2018-07-12 RX ADMIN — LEVALBUTEROL 1.25 MG: 1.25 SOLUTION, CONCENTRATE RESPIRATORY (INHALATION) at 07:35

## 2018-07-12 RX ADMIN — IPRATROPIUM BROMIDE 0.5 MG: 0.5 SOLUTION RESPIRATORY (INHALATION) at 14:17

## 2018-07-12 RX ADMIN — ENOXAPARIN SODIUM 40 MG: 40 INJECTION, SOLUTION INTRAVENOUS; SUBCUTANEOUS at 08:26

## 2018-07-12 RX ADMIN — ACETAMINOPHEN 650 MG: 325 TABLET, FILM COATED ORAL at 20:15

## 2018-07-12 RX ADMIN — CEFEPIME HYDROCHLORIDE 2000 MG: 2 INJECTION, SOLUTION INTRAVENOUS at 08:26

## 2018-07-12 RX ADMIN — IPRATROPIUM BROMIDE 0.5 MG: 0.5 SOLUTION RESPIRATORY (INHALATION) at 07:35

## 2018-07-12 RX ADMIN — BUDESONIDE AND FORMOTEROL FUMARATE DIHYDRATE 2 PUFF: 160; 4.5 AEROSOL RESPIRATORY (INHALATION) at 07:35

## 2018-07-12 RX ADMIN — SULFAMETHOXAZOLE AND TRIMETHOPRIM 560 MG OF TRIMETHOPRIM: 80; 16 INJECTION, SOLUTION, CONCENTRATE INTRAVENOUS at 12:55

## 2018-07-12 RX ADMIN — ACETAMINOPHEN 650 MG: 325 TABLET, FILM COATED ORAL at 14:23

## 2018-07-12 RX ADMIN — SULFAMETHOXAZOLE AND TRIMETHOPRIM 560 MG OF TRIMETHOPRIM: 80; 16 INJECTION, SOLUTION, CONCENTRATE INTRAVENOUS at 21:26

## 2018-07-12 RX ADMIN — LEVALBUTEROL 1.25 MG: 1.25 SOLUTION, CONCENTRATE RESPIRATORY (INHALATION) at 14:17

## 2018-07-12 RX ADMIN — LEVALBUTEROL 1.25 MG: 1.25 SOLUTION, CONCENTRATE RESPIRATORY (INHALATION) at 20:51

## 2018-07-12 RX ADMIN — MONTELUKAST SODIUM 10 MG: 10 TABLET, FILM COATED ORAL at 21:26

## 2018-07-12 RX ADMIN — BUDESONIDE AND FORMOTEROL FUMARATE DIHYDRATE 2 PUFF: 160; 4.5 AEROSOL RESPIRATORY (INHALATION) at 20:51

## 2018-07-12 RX ADMIN — TAMSULOSIN HYDROCHLORIDE 0.4 MG: 0.4 CAPSULE ORAL at 17:38

## 2018-07-12 NOTE — PROGRESS NOTES
Progress Note - Jean Rashid 1955, 58 y o  male MRN: [id number]    Unit/Bed#: -02 Encounter: [id number]    Primary Care Provider: Rodriguez Villaseñor MD   Date and time admitted to hospital: 7/7/2018  6:53 PM        COPD (chronic obstructive pulmonary disease) ([facility] [address] )   Assessment & Plan    -due to tobacco abuse and allergic asthma  -Continue Atrovent, Xopenex, and Singulair        Sepsis ([facility] [address] )   Assessment & Plan    POA, due to PNA  Resolved at this time  Acute on chronic respiratory failure with hypoxia [facility])   Assessment & Plan    -currently on 5 L nasal cannula   -Bronch 7/11/18: Myles Morel from Dr Lesley Howell' procedure note, "Copious thick white secretions throughout  Marked cobblestoning and hyperemia of airways throughout  BAL done from left lower lobe "  -follow for bronchial culture data through tomorrow  -cefepime to complete a 7 day course tomorrow  -follow bronchial specimen cytology  -May need VATS, will decide tomorrow        Moderate persistent asthma without complication   Assessment & Plan    -continue Symbicort, Flonase, Atrovent, Xopenex, Singulair        * Pneumonia of both lungs due to infectious organism   Assessment & Plan    -continue cefepime, day 6 of 7  -stopped Vanco as MRSA screen NEG  -strep pneumoniae and legionella antigens negative  -appreciate Pulmonary  -CT chest 7/9/18: Diffuse bilateral groundglass density throughout both lungs with patchy areas of more consolidative airspace opacity within both upper lobes   Abnormalities have a more central and upper lobe predominance   The appearance is nonspecific with differential considerations including hypersensitivity pneumonitis, allergic bronchopulmonary aspergillosis and atypical infection (such as fungal disease) among others  Less likely considerations include Sarcoidosis, collagen vascular disorders and nonspecific interstitial pneumonia (NIP)   Ultimately, sputum samples and/or bronchoalveolar lavage in conjunction with clinical history and consideration of lung biopsy may be required for definitive diagnosis  10 x 5 mm left lower lobe nodule, new from prior study, presumed pseudonodule   Follow-up CT chest in 3 months may be obtained to ensure resolution  Mild mediastinal adenopathy has increased, presumably reactive  Tiny left and probable trace right pleural effusions  -as CT reports proves, DDx broad at this time  -appreciate ID/pulm  -note that in retrospect the patient may not have had an infectious pneumonia  He remains febrile despite 6 days of cefepime  He will complete 7 days of cefepime  -procalcitonin on 7/10/2018 was 0 63  We will repeat a procalcitonin today  VTE Pharmacologic Prophylaxis:   Pharmacologic: Enoxaparin (Lovenox)  Mechanical VTE Prophylaxis in Place: Yes    Patient Centered Rounds: I have performed bedside rounds with nursing staff today  Discussions with Specialists or Other Care Team Provider: Pulm     Education and Discussions with Family / Patient: Pt    Time Spent for Care: 30 minutes  More than 50% of total time spent on counseling and coordination of care as described above  Current Length of Stay: 5 day(s)    Current Patient Status: Inpatient   Certification Statement: The patient will continue to require additional inpatient hospital stay due to ILD, hypoxemic resp failure    Discharge Plan: unclear at this time  Code Status: Level 1 - Full Code      Subjective:   Patient denies shortness of breath at rest   He has occasional cough  Objective:     Vitals:   Temp (24hrs), Av 9 °F (37 2 °C), Min:97 3 °F (36 3 °C), Max:101 5 °F (38 6 °C)    HR:  [] 102  Resp:  [16-30] 16  BP: (110-161)/(59-88) 142/78  SpO2:  [90 %-96 %] 95 %  Body mass index is 33 76 kg/m²  Input and Output Summary (last 24 hours):        Intake/Output Summary (Last 24 hours) at 18 1110  Last data filed at 18 0101   Gross per 24 hour   Intake              200 ml   Output 1600 ml   Net            -1400 ml       Physical Exam:     Physical Exam  Gen: NAD, AAOx3, well developed, well nourished  Eyes: EOMI, PERRLA, no scleral icterus  ENMT:  Oropharynx clear of erythema or exudates, no nasal discharge, no otic discharge, moist mucous membranes  Neck:  Supple  Lymph:  No anterior or posterior cervical or supraclavicular lymphadenopathy  Cardiovascular:  Regular rate and rhythm, normal S1-S2, no murmurs, rubs, or gallops  Lungs:  Decreased air exchange  No wheezes, rales, or rhonchi heard today  Abdomen:  Positive bowel sounds, soft, nontender, nondistended, no palpable organomegaly   Skin:  Intact, no obvious lesions or rashes, no edema  Neuro: Cranial nerves 2-12 are intact, non-focal, 5/5 strength in all 4 extremities    Additional Data:     Labs:      Results from last 7 days  Lab Units 07/12/18  0515   WBC Thousand/uL 7 37   HEMOGLOBIN g/dL 12 2   HEMATOCRIT % 38 7   PLATELETS Thousands/uL 333   NEUTROS PCT % 76*   LYMPHS PCT % 16   MONOS PCT % 4   EOS PCT % 3       Results from last 7 days  Lab Units 07/09/18  0445 07/08/18  0456   SODIUM mmol/L 140 140   POTASSIUM mmol/L 4 3 4 0   CHLORIDE mmol/L 107 106   CO2 mmol/L 26 27   BUN mg/dL 11 12   CREATININE mg/dL 0 77 0 99   CALCIUM mg/dL 8 3 8 2*   TOTAL PROTEIN g/dL  --  6 9   BILIRUBIN TOTAL mg/dL  --  0 30   ALK PHOS U/L  --  45*   ALT U/L  --  26   AST U/L  --  34   GLUCOSE RANDOM mg/dL 128 82       Results from last 7 days  Lab Units 07/07/18  1922   INR  1 08                 * I Have Reviewed All Lab Data Listed Above  * Additional Pertinent Lab Tests Reviewed: [facility] Admission Reviewed    Recent Cultures (last 7 days):       Results from last 7 days  Lab Units 07/11/18  1118 07/08/18  0839 07/07/18  2251 07/07/18  1922   BLOOD CULTURE   --   --   --  No Growth After 4 Days  No Growth After 4 Days  SPUTUM CULTURE   --  Test not performed  Suggest repeat specimen    --   --    GRAM STAIN RESULT  No Polys or Bacteria seen >10 squamous epithelial cells/lpf, indicating orophayngeal contamination  1+ Polys  3+ Gram negative rods  3+ Gram positive rods  2+ Gram positive cocci in pairs  1+ Gram positive cocci in chains  --   --    LEGIONELLA URINARY ANTIGEN   --   --  Negative  --        Last 24 Hours Medication List:     Current Facility-Administered Medications:  acetaminophen 650 mg Oral Q6H PRN Moni Abdi, PA-C    albuterol 2 puff Inhalation Q6H PRN Moni Rga, PA-C    benzonatate 100 mg Oral TID PRN Moni Abdi, PA-C    budesonide-formoterol 2 puff Inhalation BID Moni Abdi, PA-C    cefepime 2,000 mg Intravenous Q12H MoniCATALINO Lawton-C Last Rate: 2,000 mg (07/12/18 0826)   enoxaparin 40 mg Subcutaneous Daily Moni Abdi, PA-C    fluticasone 2 spray Nasal Daily Monimelvin Patton, PA-C    guaiFENesin 200 mg Oral Q4H PRN Monimelvin Patton, PA-C    hydrocodone-chlorpheniramine polistirex 5 mL Oral Q12H PRN Abby Thomas, CRNP    ipratropium 0 5 mg Nebulization TID Daksha Naqvi, DO    levalbuterol 1 25 mg Nebulization TID Christine Dimas, DO    LORazepam 1 mg Oral HS PRN GENNY Edmond    montelukast 10 mg Oral HS CATALINO Elmore-C    sodium chloride (PF) 3 mL Intravenous PRN Eugenie Wood,     tamsulosin 0 4 mg Oral Daily With [address] Luz DO         Today, Patient Was Seen By: Dorothy Mayes MD    ** Please Note: Dictation voice to text software may have been used in the creation of this document   **

## 2018-07-12 NOTE — ASSESSMENT & PLAN NOTE
-continue cefepime, day 6 of 7  -stopped Vanco as MRSA screen NEG  -strep pneumoniae and legionella antigens negative  -appreciate Pulmonary  -CT chest 7/9/18: Diffuse bilateral groundglass density throughout both lungs with patchy areas of more consolidative airspace opacity within both upper lobes   Abnormalities have a more central and upper lobe predominance   The appearance is nonspecific with differential considerations including hypersensitivity pneumonitis, allergic bronchopulmonary aspergillosis and atypical infection (such as fungal disease) among others  Less likely considerations include Sarcoidosis, collagen vascular disorders and nonspecific interstitial pneumonia (NIP)  Ultimately, sputum samples and/or bronchoalveolar lavage in conjunction with clinical history and consideration of lung biopsy may be required for definitive diagnosis  10 x 5 mm left lower lobe nodule, new from prior study, presumed pseudonodule   Follow-up CT chest in 3 months may be obtained to ensure resolution  Mild mediastinal adenopathy has increased, presumably reactive  Tiny left and probable trace right pleural effusions  -as CT reports proves, DDx broad at this time  -appreciate ID/pulm  -note that in retrospect the patient may not have had an infectious pneumonia  He remains febrile despite 6 days of cefepime  He will complete 7 days of cefepime  -procalcitonin on 7/10/2018 was 0 63  We will repeat a procalcitonin today

## 2018-07-12 NOTE — ASSESSMENT & PLAN NOTE
-currently on 5 L nasal cannula   -Bronch 7/11/18: Albina Potter from Dr Aidee Ford' procedure note, "Copious thick white secretions throughout  Marked cobblestoning and hyperemia of airways throughout    BAL done from left lower lobe "  -follow for bronchial culture data through tomorrow  -cefepime to complete a 7 day course tomorrow  -follow bronchial specimen cytology  -May need VATS, will decide tomorrow

## 2018-07-12 NOTE — PROGRESS NOTES
Progress Note - Pulmonary   Shell Salguero 58 y o  male MRN: [id number]  Unit/Bed#: -02 Encounter: [id number]    Assessment:  Acute hypoxic respiratory failure   Interstitial lung disease of unknown etiology  Severe COPD with bronchodilator response      Plan:  Await fungal, PCP, AFB and cytology from bronchoscopy results  It appears there is no further bacterial infection on the Gram stain however he continues to have fevers  I recommended following up on procalcitonin from yesterday finishing 7 days of cefepime  Once we finished all of these results if they are nondiagnostic particularly considering his serologies have been nondiagnostic as well, I would recommend proceeding with VATS with lung biopsy for his interstitial lung process  He is at moderate risk given his COPD and obstructive lung disease  I would hold off on giving him steroids as they will interfere with his biopsy results however think he will benefit greatly from steroids  Will discuss with thoracic surgery  Chief Complaint:   No better    Subjective:   Patient denies any change in his symptoms he continues to cough dry cough with fevers and desaturations with ambulation  He continues to have shortness of breath with exertion  Objective:     Vitals: Blood pressure 142/78, pulse 102, temperature 99 °F (37 2 °C), resp  rate 16, height 6' (1 829 m), weight 113 kg (248 lb 14 4 oz), SpO2 95 %  ,Body mass index is 33 76 kg/m²        Intake/Output Summary (Last 24 hours) at 07/12/18 1101  Last data filed at 07/12/18 0101   Gross per 24 hour   Intake              200 ml   Output             1600 ml   Net            -1400 ml       Invasive Devices     Peripheral Intravenous Line            Peripheral IV 07/09/18 Left Arm 2 days                Physical Exam: /78   Pulse 102   Temp 99 °F (37 2 °C)   Resp 16   Ht 6' (1 829 m)   Wt 113 kg (248 lb 14 4 oz)   SpO2 95%   BMI 33 76 kg/m²   General appearance: alert  Lungs: diminished breath sounds, rhonchi and wheezes  Abdomen: soft, non-tender; bowel sounds normal; no masses,  no organomegaly  Extremities: extremities normal, warm and well-perfused; no cyanosis, clubbing, or edema     Labs:   CBC:   Lab Results   Component Value Date    WBC 7 37 07/12/2018    HGB 12 2 07/12/2018    HCT 38 7 07/12/2018    MCV 85 07/12/2018     07/12/2018    MCH 26 9 07/12/2018    MCHC 31 5 07/12/2018    RDW 14 0 07/12/2018    MPV 9 0 07/12/2018    NRBC 0 07/12/2018   , CMP: No results found for: NA, K, CL, CO2, ANIONGAP, BUN, CREATININE, GLUCOSE, CALCIUM, AST, ALT, ALKPHOS, PROT, ALBUMIN, BILITOT, EGFR  Imaging and other studies: I have personally reviewed pertinent films in PACS

## 2018-07-12 NOTE — PROGRESS NOTES
Progress Note - Infectious Disease   Syed Moran 58 y o  male MRN: [id number]  Unit/Bed#: -02 Encounter: [id number]    Assessment:   63 y  o  year old male with PMH of COPD, asthma who presents with dyspnea which started 1 week ago and associated with productive cough      1  Fever with diffuse bilateral groundglass density throughout both lungs - CT has the appearance suggesting atypical process such as hypersensitivity pneumonitis, allergic bronchopulmonary aspergillosis  S/p bronch now with copious thick white secretions noted  BAL bacterial, fungal, and AFB cx pending  No h/o immunosuppression to suggest atypical fungal infection  Blood cx are showing no growth to date  MRSA screen is negative       Plan:  1  Start TMP-SMX with fever still persisting and continue cefepime for now while waiting for PCP and BAL cx results  2  Repeat blood cx  Will follow fever curve  Subjective/Objective   Chief Complaint: abnormal CT chest    Subjective: feels well but was afebrile at 101 5 overnight  Underwent bronch yesterday  MEDS:  Cefepime: #6    HR:  [] 102  Resp:  [16-30] 16  BP: (110-161)/(59-88) 142/78  SpO2:  [90 %-96 %] 95 %  Temp (24hrs), Av 9 °F (37 2 °C), Min:97 3 °F (36 3 °C), Max:101 5 °F (38 6 °C)  Current: Temperature: 99 °F (37 2 °C)    Physical Exam:  Constitutional: He appears well-developed and well-nourished  Cardiovascular: Normal rate and regular rhythm     Pulmonary/Chest: Effort normal and breath sounds normal  He has no wheezes  He has no rales  Abdominal: Soft  Bowel sounds are normal  There is no tenderness  Musculoskeletal: He exhibits no edema  Skin: Skin is warm and dry  No erythema  Vitals reviewed        Invasive Devices     Peripheral Intravenous Line            Peripheral IV 18 Left Arm 2 days                Lab, Imaging and other studies: I have personally reviewed pertinent reports

## 2018-07-12 NOTE — PROGRESS NOTES
Spoke to Dr Huston Halsted regarding isolation precautions  She is not suspecting TB, isolation not necessary at this time
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
## 2018-07-13 PROBLEM — J18.9 HAP (HOSPITAL-ACQUIRED PNEUMONIA): Status: RESOLVED | Noted: 2018-07-07 | Resolved: 2018-07-13

## 2018-07-13 LAB
A FUMIGATUS1 AB SER QL ID: NEGATIVE
A PULLULANS AB SER QL: NEGATIVE
BACTERIA BLD CULT: NORMAL
BACTERIA BLD CULT: NORMAL
BASOPHILS # BLD AUTO: 0.02 THOUSANDS/ΜL (ref 0–0.1)
BASOPHILS NFR BLD AUTO: 0 % (ref 0–1)
EOSINOPHIL # BLD AUTO: 0.41 THOUSAND/ΜL (ref 0–0.61)
EOSINOPHIL NFR BLD AUTO: 7 % (ref 0–6)
ERYTHROCYTE [DISTWIDTH] IN BLOOD BY AUTOMATED COUNT: 14 % (ref 11.6–15.1)
HCT VFR BLD AUTO: 35.6 % (ref 36.5–49.3)
HGB BLD-MCNC: 11.4 G/DL (ref 12–17)
IMM GRANULOCYTES # BLD AUTO: 0.12 THOUSAND/UL (ref 0–0.2)
IMM GRANULOCYTES NFR BLD AUTO: 2 % (ref 0–2)
LACEYELLA SACCHARI AB SER QL: NEGATIVE
LYMPHOCYTES # BLD AUTO: 0.73 THOUSANDS/ΜL (ref 0.6–4.47)
LYMPHOCYTES NFR BLD AUTO: 12 % (ref 14–44)
MCH RBC QN AUTO: 27.1 PG (ref 26.8–34.3)
MCHC RBC AUTO-ENTMCNC: 32 G/DL (ref 31.4–37.4)
MCV RBC AUTO: 85 FL (ref 82–98)
MONOCYTES # BLD AUTO: 0.29 THOUSAND/ΜL (ref 0.17–1.22)
MONOCYTES NFR BLD AUTO: 5 % (ref 4–12)
NEUTROPHILS # BLD AUTO: 4.48 THOUSANDS/ΜL (ref 1.85–7.62)
NEUTS SEG NFR BLD AUTO: 74 % (ref 43–75)
NRBC BLD AUTO-RTO: 0 /100 WBCS
PIGEON SERUM AB QL ID: NEGATIVE
PLATELET # BLD AUTO: 299 THOUSANDS/UL (ref 149–390)
PMV BLD AUTO: 8.7 FL (ref 8.9–12.7)
RBC # BLD AUTO: 4.2 MILLION/UL (ref 3.88–5.62)
S RECTIVIRGULA AB SER QL ID: NEGATIVE
T VULGARIS AB SER QL ID: NEGATIVE
WBC # BLD AUTO: 6.05 THOUSAND/UL (ref 4.31–10.16)

## 2018-07-13 PROCEDURE — 99232 SBSQ HOSP IP/OBS MODERATE 35: CPT | Performed by: INTERNAL MEDICINE

## 2018-07-13 PROCEDURE — 99233 SBSQ HOSP IP/OBS HIGH 50: CPT | Performed by: HOSPITALIST

## 2018-07-13 PROCEDURE — 85025 COMPLETE CBC W/AUTO DIFF WBC: CPT | Performed by: NURSE PRACTITIONER

## 2018-07-13 PROCEDURE — 94640 AIRWAY INHALATION TREATMENT: CPT

## 2018-07-13 PROCEDURE — 94760 N-INVAS EAR/PLS OXIMETRY 1: CPT

## 2018-07-13 RX ORDER — PREDNISONE 20 MG/1
40 TABLET ORAL 2 TIMES DAILY WITH MEALS
Status: DISCONTINUED | OUTPATIENT
Start: 2018-07-13 | End: 2018-07-16 | Stop reason: HOSPADM

## 2018-07-13 RX ADMIN — LORAZEPAM 1 MG: 1 TABLET ORAL at 21:04

## 2018-07-13 RX ADMIN — BUDESONIDE AND FORMOTEROL FUMARATE DIHYDRATE 2 PUFF: 160; 4.5 AEROSOL RESPIRATORY (INHALATION) at 20:17

## 2018-07-13 RX ADMIN — LEVALBUTEROL 1.25 MG: 1.25 SOLUTION, CONCENTRATE RESPIRATORY (INHALATION) at 07:17

## 2018-07-13 RX ADMIN — TAMSULOSIN HYDROCHLORIDE 0.4 MG: 0.4 CAPSULE ORAL at 17:59

## 2018-07-13 RX ADMIN — SULFAMETHOXAZOLE AND TRIMETHOPRIM 560 MG OF TRIMETHOPRIM: 80; 16 INJECTION, SOLUTION, CONCENTRATE INTRAVENOUS at 21:11

## 2018-07-13 RX ADMIN — PREDNISONE 40 MG: 20 TABLET ORAL at 17:59

## 2018-07-13 RX ADMIN — IPRATROPIUM BROMIDE 0.5 MG: 0.5 SOLUTION RESPIRATORY (INHALATION) at 20:14

## 2018-07-13 RX ADMIN — PREDNISONE 40 MG: 20 TABLET ORAL at 09:41

## 2018-07-13 RX ADMIN — LEVALBUTEROL 1.25 MG: 1.25 SOLUTION, CONCENTRATE RESPIRATORY (INHALATION) at 13:39

## 2018-07-13 RX ADMIN — ENOXAPARIN SODIUM 40 MG: 40 INJECTION, SOLUTION INTRAVENOUS; SUBCUTANEOUS at 09:41

## 2018-07-13 RX ADMIN — BUDESONIDE AND FORMOTEROL FUMARATE DIHYDRATE 2 PUFF: 160; 4.5 AEROSOL RESPIRATORY (INHALATION) at 07:17

## 2018-07-13 RX ADMIN — FLUTICASONE PROPIONATE 2 SPRAY: 50 SPRAY, METERED NASAL at 09:41

## 2018-07-13 RX ADMIN — MONTELUKAST SODIUM 10 MG: 10 TABLET, FILM COATED ORAL at 21:02

## 2018-07-13 RX ADMIN — IPRATROPIUM BROMIDE 0.5 MG: 0.5 SOLUTION RESPIRATORY (INHALATION) at 07:17

## 2018-07-13 RX ADMIN — LEVALBUTEROL 1.25 MG: 1.25 SOLUTION, CONCENTRATE RESPIRATORY (INHALATION) at 20:14

## 2018-07-13 RX ADMIN — IPRATROPIUM BROMIDE 0.5 MG: 0.5 SOLUTION RESPIRATORY (INHALATION) at 13:39

## 2018-07-13 RX ADMIN — SULFAMETHOXAZOLE AND TRIMETHOPRIM 560 MG OF TRIMETHOPRIM: 80; 16 INJECTION, SOLUTION, CONCENTRATE INTRAVENOUS at 13:58

## 2018-07-13 RX ADMIN — SULFAMETHOXAZOLE AND TRIMETHOPRIM 560 MG OF TRIMETHOPRIM: 80; 16 INJECTION, SOLUTION, CONCENTRATE INTRAVENOUS at 04:52

## 2018-07-13 NOTE — ASSESSMENT & PLAN NOTE
-due to PCP, bronchial washings positive for PCP  -the patient had received cefepime for 6 days and has been transitioned to Bactrim and prednisone as of the afternoon of 7/12/2018  -currently on 5 L nasal cannula   -Bronch 7/11/18: Jazmyn Chapman from Dr Trav John' procedure note, "Copious thick white secretions throughout  Marked cobblestoning and hyperemia of airways throughout    BAL done from left lower lobe "

## 2018-07-13 NOTE — PROGRESS NOTES
Yara Ceballos  58 y o   male  1955  mrn [id number]    Assessment/Plan:   58 y  o  year old male with PMH of COPD, asthma who presents with dyspnea which started 1 week ago and associated with productive cough      1  Pneumocystitis pneumonia/Fever: No further fever and WBC count is nml  Pt was noted to have diffuse bilateral groundglass density throughout both lungs - CT has the appearance suggesting atypical process such as hypersensitivity pneumonitis, allergic bronchopulmonary aspergillosis  S/p bronch on 7/11/18 with copious thick white secretions noted  BAL bacterial, fungal, and AFB cx pending  DFA of bronch specimen for Pneumocystis was positive  Pt is currently on high dose Bactrim and Prednisone was started by Pulmonary today  Blood cx are showing no growth to date  MRSA screen was negative       Plan:  1  Cont Bactrim and steroids for tx of Pneumocystis pneumonia - Pt will need to complete 3 week course of tx - will switch to oral Bactrim when Pt is ready for D/C and steroids will be tapered (40 mg po BID x 5 days, then 40 mg po Qday x 5 days, then 20 mg po Qday x 11 days)  2  Awaiting final results of remainder of bronch studies  3  HIV test is pending    Subjective: Breathing is OK after breathing tx    Objective:  Tmax: 99 4  Lungs: +Crackles at the bases  Abd; +BS, soft, nontender  Ext: No calf tenderness    Labs:  CBC w/diff  Recent Labs      07/13/18   0454   WBC  6 05   HGB  11 4*   HCT  35 6*   PLT  299   NEUTOPHILPCT  74   LYMPHOPCT  12*   MONOPCT  5   EOSPCT  7*     BMP  No results for input(s): NA, K, CL, CO2, BUN, CREATININE, LABGLOM, GLUCOSE, CALCIUM in the last 72 hours  CMP  No results for input(s): NA, K, CL, CO2, BUN, CREATININE, CALCIUM, PROT, BILITOT, ALKPHOS, ALT, AST, GLUCOSE in the last 72 hours  Invalid input(s): LABALBU     labrc    Cultures:  Lab Results   Component Value Date    BLOODCX No Growth After 5 Days  07/07/2018    BLOODCX No Growth After 5 Days   07/07/2018 BLOODCX No Growth After 5 Days  06/03/2018    BLOODCX No Growth After 5 Days  06/03/2018     No results found for: WOUNDCULT  No results found for: [facility]  Lab Results   Component Value Date    SPUTUMCULTUR Test not performed  Suggest repeat specimen   07/08/2018    SPUTUMCULTUR 1+ Growth of  06/04/2018       MED:  Cefepime: #7  Bactrim: #2  Prednisone: #1        Current Facility-Administered Medications:     acetaminophen (TYLENOL) tablet 650 mg, 650 mg, Oral, Q6H PRN, CATALINO Saucedo-C, 650 mg at 07/12/18 2015    albuterol (PROVENTIL HFA,VENTOLIN HFA) inhaler 2 puff, 2 puff, Inhalation, Q6H PRN, DONYA SaucedoC, 2 puff at 07/09/18 2312    benzonatate (TESSALON PERLES) capsule 100 mg, 100 mg, Oral, TID PRN, [place]ano Padilla PA-C, 100 mg at 07/12/18 2014    budesonide-formoterol (SYMBICORT) 160-4 5 mcg/act inhaler 2 puff, 2 puff, Inhalation, BID, CATALINO Saucedo-C, 2 puff at 07/13/18 0717    enoxaparin (LOVENOX) subcutaneous injection 40 mg, 40 mg, Subcutaneous, Daily, CATALINO Saucedo-C, 40 mg at 07/13/18 0941    fluticasone (FLONASE) 50 mcg/act nasal spray 2 spray, 2 spray, Nasal, Daily, [place]oacano Padilla PA-C, 2 spray at 07/13/18 0941    guaiFENesin (ROBITUSSIN) oral solution 200 mg, 200 mg, Oral, Q4H PRN, CATALINO Saucedo-C, 200 mg at 07/11/18 1657    hydrocodone-chlorpheniramine polistirex (TUSSIONEX) 10-8 mg/5 mL ER suspension 5 mL, 5 mL, Oral, Q12H PRN, Oanh Diaz, CRNP, 5 mL at 07/09/18 2028    ipratropium (ATROVENT) 0 02 % inhalation solution 0 5 mg, 0 5 mg, Nebulization, TID, Daksha Meyertis, DO, 0 5 mg at 07/13/18 1339    levalbuterol (XOPENEX) inhalation solution 1 25 mg, 1 25 mg, Nebulization, TID, Christine Dimas, DO, 1 25 mg at 07/13/18 1339    LORazepam (ATIVAN) tablet 1 mg, 1 mg, Oral, HS PRN, GENNY Vallecillo, 1 mg at 07/12/18 2126    montelukast (SINGULAIR) tablet 10 mg, 10 mg, Oral, HS, [place]oacano Padilla PA-C, 10 mg at 07/12/18 2126    predniSONE tablet 40 mg, 40 mg, Oral, BID With Meals, Daksha Meyertis, DO, 40 mg at 07/13/18 0941    Insert peripheral IV, , , Once **AND** sodium chloride (PF) 0 9 % injection 3 mL, 3 mL, Intravenous, PRN, Eugenie Wood DO    sulfamethoxazole-trimethoprim (BACTRIM) 560 mg of trimethoprim in dextrose 5 % 500 mL IVPB, 5 mg/kg of trimethoprim, Intravenous, Q8H, Álvaro Morris MD, Last Rate: 333 3 mL/hr at 07/13/18 1358, 560 mg of trimethoprim at 07/13/18 1358    tamsulosin (FLOMAX) capsule 0 4 mg, 0 4 mg, Oral, Daily With Dinner, Christine Dimas DO, 0 4 mg at 07/12/18 1738    Principal Problem:    Pneumonia of both lungs due to infectious organism  Active Problems:     Moderate persistent asthma without complication    Acute on chronic respiratory failure with hypoxia (HCC)    Sepsis ([facility] )    COPD (chronic obstructive pulmonary disease) ([facility] )      Sherryle Krone, MD

## 2018-07-13 NOTE — PROGRESS NOTES
Progress Note - Sandi Julien 1955, 58 y o  male MRN: [id number]    Unit/Bed#: -02 Encounter: [id number]    Primary Care Provider: Marzena Reaves MD   Date and time admitted to hospital: 7/7/2018  6:53 PM        COPD (chronic obstructive pulmonary disease) ([facility] )   Assessment & Plan    -due to tobacco abuse and allergic asthma  -Continue Atrovent, Xopenex, and Singulair        Sepsis ([facility] [address] )   Assessment & Plan    POA, due to PNA  Resolved at this time  Acute on chronic respiratory failure with hypoxia (HCC)   Assessment & Plan    -due to PCP, bronchial washings positive for PCP  -the patient had received cefepime for 6 days and has been transitioned to Bactrim and prednisone as of the afternoon of 7/12/2018  -currently on 5 L nasal cannula   -Bronch 7/11/18: Tho Amin from Dr Maria Fernanda Damon' procedure note, "Copious thick white secretions throughout  Marked cobblestoning and hyperemia of airways throughout  BAL done from left lower lobe "          Moderate persistent asthma without complication   Assessment & Plan    -continue Symbicort, Flonase, Atrovent, Xopenex, Singulair        * Pneumonia of both lungs due to infectious organism   Assessment & Plan    -due to PCP  -Bactrim and prednisone were started 7/12/2018  -MRSA screen NEG  -strep pneumoniae and legionella antigens negative  -CT chest 7/9/18: Diffuse bilateral groundglass density throughout both lungs with patchy areas of more consolidative airspace opacity within both upper lobes   Abnormalities have a more central and upper lobe predominance   The appearance is nonspecific with differential considerations including hypersensitivity pneumonitis, allergic bronchopulmonary aspergillosis and atypical infection (such as fungal disease) among others  Less likely considerations include Sarcoidosis, collagen vascular disorders and nonspecific interstitial pneumonia (NIP)   Ultimately, sputum samples and/or bronchoalveolar lavage in conjunction with clinical history and consideration of lung biopsy may be required for definitive diagnosis  10 x 5 mm left lower lobe nodule, new from prior study, presumed pseudonodule   Follow-up CT chest in 3 months may be obtained to ensure resolution  Mild mediastinal adenopathy has increased, presumably reactive  Tiny left and probable trace right pleural effusions  -appreciate ID/pulm            VTE Pharmacologic Prophylaxis:   Pharmacologic: Enoxaparin (Lovenox)  Mechanical VTE Prophylaxis in Place: No    Patient Centered Rounds: I have performed bedside rounds with nursing staff today  Discussions with Specialists or Other Care Team Provider: Pulmonary     Education and Discussions with Family / Patient: Pt    Time Spent for Care: 15 minutes  More than 50% of total time spent on counseling and coordination of care as described above  Current Length of Stay: 6 day(s)    Current Patient Status: Inpatient   Certification Statement: The patient will continue to require additional inpatient hospital stay due to [facility]    Discharge Plan: 3 days    Code Status: Level 1 - Full Code      Subjective:   Patient denies shortness of breath at rest but does have mild dyspnea on exertion  Objective:     Vitals:   Temp (24hrs), Av 2 °F (36 8 °C), Min:98 1 °F (36 7 °C), Max:98 4 °F (36 9 °C)    HR:  [] 89  Resp:  [16-20] 18  BP: (118-126)/(65-76) 121/76  SpO2:  [92 %-95 %] 92 %  Body mass index is 33 43 kg/m²  Input and Output Summary (last 24 hours):        Intake/Output Summary (Last 24 hours) at 18 1019  Last data filed at 18 0546   Gross per 24 hour   Intake                0 ml   Output             1950 ml   Net            -1950 ml       Physical Exam:     Physical Exam  Gen: NAD, AAOx3, well developed, well nourished  Eyes: EOMI, PERRLA, no scleral icterus  ENMT:  Oropharynx clear of erythema or exudates, no nasal discharge, no otic discharge, moist mucous membranes  Neck:  Supple  Cardiovascular:  Regular rate and rhythm, normal S1-S2, no murmurs, rubs, or gallops  Lungs:  Decreased air exchange  No wheezes, rales, or rhonchi heard today  Abdomen:  Positive bowel sounds, soft, nontender, nondistended, no palpable organomegaly   Skin:  Intact, no obvious lesions or rashes, no edema  Neuro: Cranial nerves 2-12 are intact, non-focal, 5/5 strength in all 4 extremities    Additional Data:     Labs:      Results from last 7 days  Lab Units 07/13/18  0454   WBC Thousand/uL 6 05   HEMOGLOBIN g/dL 11 4*   HEMATOCRIT % 35 6*   PLATELETS Thousands/uL 299   NEUTROS PCT % 74   LYMPHS PCT % 12*   MONOS PCT % 5   EOS PCT % 7*       Results from last 7 days  Lab Units 07/09/18  0445 07/08/18  0456   SODIUM mmol/L 140 140   POTASSIUM mmol/L 4 3 4 0   CHLORIDE mmol/L 107 106   CO2 mmol/L 26 27   BUN mg/dL 11 12   CREATININE mg/dL 0 77 0 99   CALCIUM mg/dL 8 3 8 2*   TOTAL PROTEIN g/dL  --  6 9   BILIRUBIN TOTAL mg/dL  --  0 30   ALK PHOS U/L  --  45*   ALT U/L  --  26   AST U/L  --  34   GLUCOSE RANDOM mg/dL 128 82       Results from last 7 days  Lab Units 07/07/18  1922   INR  1 08                 * I Have Reviewed All Lab Data Listed Above  * Additional Pertinent Lab Tests Reviewed: [facility] Admission Reviewed    Recent Cultures (last 7 days):       Results from last 7 days  Lab Units 07/11/18  1118 07/08/18  0839 07/07/18  2251 07/07/18  1922   BLOOD CULTURE   --   --   --  No Growth After 5 Days  No Growth After 5 Days  SPUTUM CULTURE   --  Test not performed  Suggest repeat specimen  --   --    GRAM STAIN RESULT  No Polys or Bacteria seen >10 squamous epithelial cells/lpf, indicating orophayngeal contamination    1+ Polys  3+ Gram negative rods  3+ Gram positive rods  2+ Gram positive cocci in pairs  1+ Gram positive cocci in chains  --   --    LEGIONELLA URINARY ANTIGEN   --   --  Negative  --        Last 24 Hours Medication List:     Current Facility-Administered Medications:  acetaminophen 650 mg Oral Q6H PRN [facility], PA-C    albuterol 2 puff Inhalation Q6H PRN [facility], PA-C    benzonatate 100 mg Oral TID PRN [facility], PA-C    budesonide-formoterol 2 puff Inhalation BID [facility]    enoxaparin 40 mg Subcutaneous Daily [facility], PA-C    fluticasone 2 spray Nasal Daily [facility], PA-C    guaiFENesin 200 mg Oral Q4H PRN [facility], PA-C    hydrocodone-chlorpheniramine polistirex 5 mL Oral Q12H PRN [facility] GENNY Thomas    ipratropium 0 5 mg Nebulization TID Daksha Naqvi, DO    levalbuterol 1 25 mg Nebulization TID Krzysztof Hallam, DO    LORazepam 1 mg Oral HS PRN Kash Magaña, CRNP    montelukast 10 mg Oral HS [facility], PA-C    predniSONE 40 mg Oral BID With Meals Daksha Naqvi, DO    sodium chloride (PF) 3 mL Intravenous PRN Eugenie Wood,     sulfamethoxazole-trimethoprim 5 mg/kg of trimethoprim Intravenous Q8H Abimael Amor MD Last Rate: 560 mg of trimethoprim (07/13/18 0452)   tamsulosin 0 4 mg Oral Daily With 27[address] Vijay Luz DO         Today, Patient Was Seen By: Sarah Odell MD    ** Please Note: Dictation voice to text software may have been used in the creation of this document   **

## 2018-07-13 NOTE — PROCEDURES
Bronchoscopy Procedure Note    Date of Operation: 7/13/2018    Pre-op Diagnosis:  Pneumonia    Post-op Diagnosis:  Pneumonia    Surgeon: Ana Downey DO    Assistants:  None    Anesthesia: Monitored Local Anesthesia with Sedation    Operation: Flexible fiberoptic bronchoscopy, diagnostic Bal    Findings:  Abnormal mucosa with attenuated dark vasculature cobblestoning    Specimen:  Bal    Estimated Blood Loss:  None    Drains:  None    Complications:  None    Indications and History:  The patient is a 58 y o  male with recurrent pneumonia  The risks, benefits, complications, treatment options and expected outcomes were discussed with the patient  The possibilities of reaction to medication, pulmonary aspiration, perforation of a viscus, bleeding, failure to diagnose a condition and creating a complication requiring transfusion or operation were discussed with the patient who freely signed the consent  Description of Procedure: The patient was seen in the Holding Room and the site of surgery properly noted/marked  The patient was taken to endoscopy suite, identified as Venetta Mix and the procedure verified as Flexible Fiberoptic Bronchoscopy  A Time Out was held and the above information confirmed  After the induction of topical nasopharyngeal anesthesia, the patient was positioned supine and the bronchoscope was passed through the right nares   The vocal cords were visualized and  1% buffered lidocaine 5 ml was topically placed onto the cords  The cords were normal  The scope was then passed into the trachea  1% buffered lidocaine 5 ml was used topically on the zafar  Careful inspection of the tracheal lumen was accomplished  The scope was sequentially passed into the left main and then left upper and lower bronchi and segmental bronchi  Bal was done and there was copious white mucus specimen       The scope was then withdrawn and advanced into the right main bronchus and then into the RUL, RML, and RLL bronchi and segmental bronchi  From what was done and there was white mucus specimen  Endobronchial findings:  Diffuse black attenuated vasculature and cobblestone    The Patient was taken to the Endoscopy Recovery area in satisfactory condition      Attestation: I was present for the entire procedure    Daksha Naqvi DO

## 2018-07-13 NOTE — PROGRESS NOTES
Progress Note - Pulmonary   Bolivar Sotelo 58 y o  male MRN: [id number]  Unit/Bed#: -02 Encounter: [id number]    Assessment:  Diffuse bilateral infiltrates  PCP pneumonia  Severe COPD    Plan:  Continue Bactrim IV, add prednisone 40 mg twice a day for 5 days decreased to once a day for 5 days continue 20 mg for the remainder of 21 days  Discontinue cefepime and no need for VATS procedure    Chief Complaint:   No complaints    Subjective:   Patient has similar complaints no change in his breathing still dyspnea with exertion still audible wheeze still cough nonproductive of mucus    Objective:     Vitals: Blood pressure 121/76, pulse 89, temperature 98 2 °F (36 8 °C), resp  rate 18, height 6' (1 829 m), weight 112 kg (246 lb 7 6 oz), SpO2 92 %  ,Body mass index is 33 43 kg/m²        Intake/Output Summary (Last 24 hours) at 07/13/18 0902  Last data filed at 07/13/18 0546   Gross per 24 hour   Intake                0 ml   Output             1950 ml   Net            -1950 ml       Invasive Devices     Peripheral Intravenous Line            Peripheral IV 07/09/18 Left Arm 3 days                Physical Exam: General appearance: alert and oriented, in no acute distress  Neck: no adenopathy, no carotid bruit, no JVD, supple, symmetrical, trachea midline and thyroid not enlarged, symmetric, no tenderness/mass/nodules  Lungs: rhonchi and wheezes  Heart: regular rate and rhythm, S1, S2 normal, no murmur, click, rub or gallop  Abdomen: soft, non-tender; bowel sounds normal; no masses,  no organomegaly  Extremities: extremities normal, warm and well-perfused; no cyanosis, clubbing, or edema     Labs:   CBC:   Lab Results   Component Value Date    WBC 6 05 07/13/2018    HGB 11 4 (L) 07/13/2018    HCT 35 6 (L) 07/13/2018    MCV 85 07/13/2018     07/13/2018    MCH 27 1 07/13/2018    MCHC 32 0 07/13/2018    RDW 14 0 07/13/2018    MPV 8 7 (L) 07/13/2018    NRBC 0 07/13/2018   , CMP: No results found for: NA, K, CL, CO2, ANIONGAP, BUN, CREATININE, GLUCOSE, CALCIUM, AST, ALT, ALKPHOS, PROT, ALBUMIN, BILITOT, EGFR  Imaging and other studies: I have personally reviewed pertinent films in PACS

## 2018-07-13 NOTE — ASSESSMENT & PLAN NOTE
-due to PCP  -Bactrim and prednisone were started 7/12/2018  -MRSA screen NEG  -strep pneumoniae and legionella antigens negative  -CT chest 7/9/18: Diffuse bilateral groundglass density throughout both lungs with patchy areas of more consolidative airspace opacity within both upper lobes   Abnormalities have a more central and upper lobe predominance   The appearance is nonspecific with differential considerations including hypersensitivity pneumonitis, allergic bronchopulmonary aspergillosis and atypical infection (such as fungal disease) among others  Less likely considerations include Sarcoidosis, collagen vascular disorders and nonspecific interstitial pneumonia (NIP)  Ultimately, sputum samples and/or bronchoalveolar lavage in conjunction with clinical history and consideration of lung biopsy may be required for definitive diagnosis  10 x 5 mm left lower lobe nodule, new from prior study, presumed pseudonodule   Follow-up CT chest in 3 months may be obtained to ensure resolution  Mild mediastinal adenopathy has increased, presumably reactive  Tiny left and probable trace right pleural effusions    -appreciate ID/pulm

## 2018-07-14 PROBLEM — A41.9 SEPSIS (HCC): Status: RESOLVED | Noted: 2018-07-07 | Resolved: 2018-07-14

## 2018-07-14 PROBLEM — J96.21 ACUTE ON CHRONIC RESPIRATORY FAILURE WITH HYPOXIA (HCC): Status: RESOLVED | Noted: 2018-06-03 | Resolved: 2018-07-14

## 2018-07-14 PROBLEM — B59 PNEUMOCYSTIS CARINII PNEUMONIA (HCC): Status: ACTIVE | Noted: 2018-07-07

## 2018-07-14 PROBLEM — J44.9 COPD (CHRONIC OBSTRUCTIVE PULMONARY DISEASE) (HCC): Chronic | Status: ACTIVE | Noted: 2018-07-07

## 2018-07-14 PROBLEM — J45.40 MODERATE PERSISTENT ASTHMA WITHOUT COMPLICATION: Chronic | Status: ACTIVE | Noted: 2018-04-05

## 2018-07-14 PROBLEM — J96.11 CHRONIC RESPIRATORY FAILURE WITH HYPOXIA (HCC): Status: ACTIVE | Noted: 2018-07-14

## 2018-07-14 PROBLEM — J96.11 CHRONIC RESPIRATORY FAILURE WITH HYPOXIA (HCC): Chronic | Status: ACTIVE | Noted: 2018-07-14

## 2018-07-14 LAB
A FLAVUS AB SER QL ID: NEGATIVE
A FUMIGATUS AB SER QL ID: NEGATIVE
A NIGER AB SER QL ID: NEGATIVE
ANION GAP SERPL CALCULATED.3IONS-SCNC: 8 MMOL/L (ref 4–13)
BUN SERPL-MCNC: 10 MG/DL (ref 5–25)
CALCIUM SERPL-MCNC: 7.9 MG/DL (ref 8.3–10.1)
CHLORIDE SERPL-SCNC: 104 MMOL/L (ref 100–108)
CO2 SERPL-SCNC: 25 MMOL/L (ref 21–32)
CREAT SERPL-MCNC: 0.96 MG/DL (ref 0.6–1.3)
GFR SERPL CREATININE-BSD FRML MDRD: 83 ML/MIN/1.73SQ M
GLUCOSE SERPL-MCNC: 147 MG/DL (ref 65–140)
POTASSIUM SERPL-SCNC: 4.1 MMOL/L (ref 3.5–5.3)
SODIUM SERPL-SCNC: 137 MMOL/L (ref 136–145)

## 2018-07-14 PROCEDURE — 94640 AIRWAY INHALATION TREATMENT: CPT

## 2018-07-14 PROCEDURE — 99233 SBSQ HOSP IP/OBS HIGH 50: CPT | Performed by: NURSE PRACTITIONER

## 2018-07-14 PROCEDURE — 80048 BASIC METABOLIC PNL TOTAL CA: CPT | Performed by: NURSE PRACTITIONER

## 2018-07-14 PROCEDURE — 94760 N-INVAS EAR/PLS OXIMETRY 1: CPT

## 2018-07-14 PROCEDURE — 99232 SBSQ HOSP IP/OBS MODERATE 35: CPT | Performed by: INTERNAL MEDICINE

## 2018-07-14 RX ADMIN — SULFAMETHOXAZOLE AND TRIMETHOPRIM 560 MG OF TRIMETHOPRIM: 80; 16 INJECTION, SOLUTION, CONCENTRATE INTRAVENOUS at 21:00

## 2018-07-14 RX ADMIN — IPRATROPIUM BROMIDE 0.5 MG: 0.5 SOLUTION RESPIRATORY (INHALATION) at 20:29

## 2018-07-14 RX ADMIN — BUDESONIDE AND FORMOTEROL FUMARATE DIHYDRATE 2 PUFF: 160; 4.5 AEROSOL RESPIRATORY (INHALATION) at 20:26

## 2018-07-14 RX ADMIN — MONTELUKAST SODIUM 10 MG: 10 TABLET, FILM COATED ORAL at 21:00

## 2018-07-14 RX ADMIN — FLUTICASONE PROPIONATE 2 SPRAY: 50 SPRAY, METERED NASAL at 08:22

## 2018-07-14 RX ADMIN — LEVALBUTEROL 1.25 MG: 1.25 SOLUTION, CONCENTRATE RESPIRATORY (INHALATION) at 20:29

## 2018-07-14 RX ADMIN — LEVALBUTEROL 1.25 MG: 1.25 SOLUTION, CONCENTRATE RESPIRATORY (INHALATION) at 15:10

## 2018-07-14 RX ADMIN — PREDNISONE 40 MG: 20 TABLET ORAL at 08:22

## 2018-07-14 RX ADMIN — TAMSULOSIN HYDROCHLORIDE 0.4 MG: 0.4 CAPSULE ORAL at 17:10

## 2018-07-14 RX ADMIN — LORAZEPAM 1 MG: 1 TABLET ORAL at 21:02

## 2018-07-14 RX ADMIN — IPRATROPIUM BROMIDE 0.5 MG: 0.5 SOLUTION RESPIRATORY (INHALATION) at 15:10

## 2018-07-14 RX ADMIN — PREDNISONE 40 MG: 20 TABLET ORAL at 17:10

## 2018-07-14 RX ADMIN — BUDESONIDE AND FORMOTEROL FUMARATE DIHYDRATE 2 PUFF: 160; 4.5 AEROSOL RESPIRATORY (INHALATION) at 09:45

## 2018-07-14 RX ADMIN — IPRATROPIUM BROMIDE 0.5 MG: 0.5 SOLUTION RESPIRATORY (INHALATION) at 09:46

## 2018-07-14 RX ADMIN — ENOXAPARIN SODIUM 40 MG: 40 INJECTION, SOLUTION INTRAVENOUS; SUBCUTANEOUS at 08:22

## 2018-07-14 RX ADMIN — LEVALBUTEROL 1.25 MG: 1.25 SOLUTION, CONCENTRATE RESPIRATORY (INHALATION) at 09:46

## 2018-07-14 RX ADMIN — SULFAMETHOXAZOLE AND TRIMETHOPRIM 560 MG OF TRIMETHOPRIM: 80; 16 INJECTION, SOLUTION, CONCENTRATE INTRAVENOUS at 13:38

## 2018-07-14 RX ADMIN — SULFAMETHOXAZOLE AND TRIMETHOPRIM 560 MG OF TRIMETHOPRIM: 80; 16 INJECTION, SOLUTION, CONCENTRATE INTRAVENOUS at 05:39

## 2018-07-14 NOTE — PROGRESS NOTES
Progress Note - Emmett Longoria 58 y o  male MRN: [id number]    Unit/Bed#: [address] [place] [address] 206-02 Encounter: [id number]    Assessment/Plan:    PCP pneumonia  continue IV Bactrim and steroids per Pulmonary await HIV test patient is currently stable on 4 liters oxygen continue inhalers and encourage deep breast with cough    COPD    related to tobacco abuse, continue Symbicort nebulizer treatments singular    Acute respiratory failure now stable on 4 liters oxygen related to pneumonia    BPH    continue Flomax, voiding well      Subjective:   Feels much better, less short of breath plus cough denies chest pain nausea vomiting diarrhea no fevers chills appetite is okay    Objective:     Vitals: Blood pressure 117/70, pulse 80, temperature 97 8 °F (36 6 °C), temperature source Oral, resp  rate 18, height 6' (1 829 m), weight 112 kg (246 lb 7 6 oz), SpO2 94 %  ,Body mass index is 33 43 kg/m²  Results from last 7 days  Lab Units 07/13/18  0454  07/07/18  1922   WBC Thousand/uL 6 05  < >  --    HEMOGLOBIN g/dL 11 4*  < >  --    HEMATOCRIT % 35 6*  < >  --    PLATELETS Thousands/uL 299  < >  --    INR   --   --  1 08   < > = values in this interval not displayed  Results from last 7 days  Lab Units 07/14/18  0620  07/08/18  0456   SODIUM mmol/L 137  < > 140   POTASSIUM mmol/L 4 1  < > 4 0   CHLORIDE mmol/L 104  < > 106   CO2 mmol/L 25  < > 27   BUN mg/dL 10  < > 12   CREATININE mg/dL 0 96  < > 0 99   CALCIUM mg/dL 7 9*  < > 8 2*   TOTAL PROTEIN g/dL  --   --  6 9   BILIRUBIN TOTAL mg/dL  --   --  0 30   ALK PHOS U/L  --   --  45*   ALT U/L  --   --  26   AST U/L  --   --  34   GLUCOSE RANDOM mg/dL 147*  < > 82   < > = values in this interval not displayed      Scheduled Meds:    Current Facility-Administered Medications:  acetaminophen 650 mg Oral Q6H PRN Josuejyoti Schmitt PA-C    albuterol 2 puff Inhalation Q6H PRN Josuejyoti Schmitt PA-C    benzonatate 100 mg Oral TID PRN Josue Schmitt PA-C budesonide-formoterol 2 puff Inhalation BID Josue Ask, PA-C    enoxaparin 40 mg Subcutaneous Daily Josue Ask, PA-C    fluticasone 2 spray Nasal Daily Josue Ask, PA-C    guaiFENesin 200 mg Oral Q4H PRN Josue Ask, PA-C    hydrocodone-chlorpheniramine polistirex 5 mL Oral Q12H PRN Herbert SarahGENNY Erickson    ipratropium 0 5 mg Nebulization TID Daksha Naqvi, DO    levalbuterol 1 25 mg Nebulization TID Alvino Harris, DO    LORazepam 1 mg Oral HS PRN GENNY Nieves    montelukast 10 mg Oral HS Josue Ask, PA-C    predniSONE 40 mg Oral BID With Meals Daksha Naqvi, DO    sodium chloride (PF) 3 mL Intravenous PRN Eugenie Wood, DO    sulfamethoxazole-trimethoprim 5 mg/kg of trimethoprim Intravenous Q8H Angelika Florence MD Last Rate: 560 mg of trimethoprim (07/14/18 0539)   tamsulosin 0 4 mg Oral Daily With Obdulio Dinero, DO        Continuous Infusions:     Physical exam:  General appearance:  Alert no distress interaction appropriate   Head/Eyes:  Nonicteric PERRL EOMI  Neck:  Supple  Lungs:  Very decreased BS bilateral no wheezing rhonchi or rales  Heart: normal S1 S2 regular  Abdomen: Soft nontender with bowel sounds  Extremities: no edema  Skin: no rash    Invasive Devices     Peripheral Intravenous Line            Peripheral IV 07/13/18 Right Forearm less than 1 day                  VTE Pharmacologic Prophylaxis:  Lovenox   VTE Mechanical Prophylaxis:  SCDs                    Counseling / Coordination of Care  Total floor / unit time spent today  30   minutes  Greater than 50% of total time was spent with the patient and / or family counseling and / or coordination of care    A description of the counseling / coordination of care:

## 2018-07-14 NOTE — PROGRESS NOTES
Elma Mantle  58 y o   male  1955  mrn [id number]    Assessment/Plan:   58 y  o  year old male with PMH of COPD, asthma who presents with dyspnea which started 1 week ago and associated with productive cough      1  Pneumocystitis pneumonia/Fever: Fevers have resolved on steroids and WBC count is nml  Pt was noted to have diffuse bilateral groundglass density throughout both lungs - CT had the appearance suggesting atypical process such as hypersensitivity pneumonitis, allergic bronchopulmonary aspergillosis  S/p bronch on 7/11/18 with copious thick white secretions noted  BAL fungal and AFB cx's are pending  DFA of bronch specimen for Pneumocystis was positive  Bronch cx is positive for Stenotrophomonas which will be covered with Bactrim Pt is currently on high dose Bactrim and Prednisone for tx of Pneumocystitis pneumonia  Blood cx's are showing no growth to date  MRSA screen was negative  Pt still has wheezing and requires O2 supplementation      Plan:  1  Cont Bactrim and steroids for tx of Pneumocystis pneumonia - Pt will need to complete 3 week course of tx - will switch to oral Bactrim when Pt is ready for D/C and steroids will be tapered (40 mg po BID x 5 days, then 40 mg po Qday x 5 days, then 20 mg po Qday x 11 days)  2  Awaiting final results of remainder of bronch studies  3  HIV test is pending  4  Would cont IV Bactrim through the weekend and repeat CXR on Monday to see if there has been some improvement - If Pt has improved, could D/C Pt on oral Bactrim with steroid taper on Monday    Subjective: Breathing is OK   Still wheezing    Objective:  Tmax: 98 6  Lungs: +Scattered wheezing  Abd: +BS, soft, nontender    Labs:  CBC w/diff  Recent Labs      07/13/18   0454   WBC  6 05   HGB  11 4*   HCT  35 6*   PLT  299   NEUTOPHILPCT  74   LYMPHOPCT  12*   MONOPCT  5   EOSPCT  7*     BMP  Recent Labs      07/14/18   0620   NA  137   K  4 1   CL  104   CO2  25   BUN  10   CREATININE  0 96   GLUCOSE  147* CALCIUM  7 9*     CMP  Recent Labs      07/14/18   0620   NA  137   K  4 1   CL  104   CO2  25   BUN  10   CREATININE  0 96   CALCIUM  7 9*   GLUCOSE  147*         labrc    Cultures:  Lab Results   Component Value Date    BLOODCX No Growth at 24 hrs  07/12/2018    BLOODCX No Growth at 24 hrs  07/12/2018    BLOODCX No Growth After 5 Days  07/07/2018    BLOODCX No Growth After 5 Days  07/07/2018    BLOODCX No Growth After 5 Days  06/03/2018    BLOODCX No Growth After 5 Days  06/03/2018     No results found for: WOUNDCULT  No results found for: [facility]  Lab Results   Component Value Date    SPUTUMCULTUR Test not performed  Suggest repeat specimen   07/08/2018    SPUTUMCULTUR 1+ Growth of  06/04/2018       MED:  Bactrim: #3  Prednisone: #2  Abx: #8         Current Facility-Administered Medications:     acetaminophen (TYLENOL) tablet 650 mg, 650 mg, Oral, Q6H PRN, Shirley Reed, PA-C, 650 mg at 07/12/18 2015    albuterol (PROVENTIL HFA,VENTOLIN HFA) inhaler 2 puff, 2 puff, Inhalation, Q6H PRN, Shirley Reed, PA-C, 2 puff at 07/09/18 2312    benzonatate (TESSALON PERLES) capsule 100 mg, 100 mg, Oral, TID PRN, Shirley Reed, PA-C, 100 mg at 07/12/18 2014    budesonide-formoterol (SYMBICORT) 160-4 5 mcg/act inhaler 2 puff, 2 puff, Inhalation, BID, Shirley Reed, PA-C, 2 puff at 07/14/18 0945    enoxaparin (LOVENOX) subcutaneous injection 40 mg, 40 mg, Subcutaneous, Daily, Shirleyglenna Reed, PA-C, 40 mg at 07/14/18 6424    fluticasone (FLONASE) 50 mcg/act nasal spray 2 spray, 2 spray, Nasal, Daily, Shirley Reed, PA-C, 2 spray at 07/14/18 3290    guaiFENesin (ROBITUSSIN) oral solution 200 mg, 200 mg, Oral, Q4H PRN, Shirleyglenna Reed, PA-C, 200 mg at 07/11/18 1657    hydrocodone-chlorpheniramine polistirex (TUSSIONEX) 10-8 mg/5 mL ER suspension 5 mL, 5 mL, Oral, Q12H PRN, GENNY Wilde, 5 mL at 07/09/18 2028    ipratropium (ATROVENT) 0 02 % inhalation solution 0 5 mg, 0 5 mg, Nebulization, TID, Daksha Bratis, DO, 0 5 mg at 07/14/18 0946    levalbuterol (XOPENEX) inhalation solution 1 25 mg, 1 25 mg, Nebulization, TID, Christine Fly, DO, 1 25 mg at 07/14/18 0946    LORazepam (ATIVAN) tablet 1 mg, 1 mg, Oral, HS PRN, GENNY Arriaga, 1 mg at 07/13/18 2104    montelukast (SINGULAIR) tablet 10 mg, 10 mg, Oral, HS, Gilbert Sanchez PA-C, 10 mg at 07/13/18 2102    predniSONE tablet 40 mg, 40 mg, Oral, BID With Meals, Daksha Bratis, DO, 40 mg at 07/14/18 6902    Insert peripheral IV, , , Once **AND** sodium chloride (PF) 0 9 % injection 3 mL, 3 mL, Intravenous, PRN, Eugenie Wood DO    sulfamethoxazole-trimethoprim (BACTRIM) 560 mg of trimethoprim in dextrose 5 % 500 mL IVPB, 5 mg/kg of trimethoprim, Intravenous, Q8H, Radha Tanner MD, Last Rate: 333 3 mL/hr at 07/14/18 0539, 560 mg of trimethoprim at 07/14/18 0539    tamsulosin (FLOMAX) capsule 0 4 mg, 0 4 mg, Oral, Daily With Crystal LaPorte Fly, DO, 0 4 mg at 07/13/18 1759    Principal Problem:    Pneumocystis carinii pneumonia (HCC)  Active Problems:     Moderate persistent asthma without complication    [facility]ch[facility] ([facility] )    Chronic respiratory failure with hypoxia ([facility] )      April Yi MD

## 2018-07-14 NOTE — PROGRESS NOTES
Progress Note - Critical Care   Elma Grier 58 y o  male MRN: [id number]  Unit/Bed#: [address] [place] [address] 206-02 Encounter: [id number]    Attending Physician: Jairon Shannon MD      ______________________________________________________________________  Assessment and Plan:   Principal Problem:    Pneumocystis carinii pneumonia [facility])  Active Problems: Moderate persistent asthma without complication    COPD (chronic obstructive pulmonary disease) ([facility])    Chronic respiratory failure with hypoxia (HCC)  Resolved Problems:    Acute on chronic respiratory failure with hypoxia (HCC)    Sepsis ([facility])    HAP (hospital-acquired pneumonia)      PCP PNA--bactrim D 3/21, prednisone 40mg po bid x 5 days, then 40mg po qd x 5 day, then prednisone 20mg qd x 11 days, xopenex/atrovent, repeat imaging 4-6 wks document resolution, outpt pulm followup, check ambulatory pulse ox, HIV pending, ID following remains afebrile, repeat BC NG    COPD w/o aE--cont xopenex/atrovent,     Acute/Chronic hypoxic respiratory failure on home 3L NC--currently on home 3L    LLL nodule--outpt pulm f/u CT in 3 mnths    Disposition: check ambulatory pulse ox, f/u ID consider transition to oral bactrim for d/c     Code Status: Level 1 - Full Code    Counseling / Coordination of Care  Total time spent today 25 minutes  Greater than 50% of total time was spent with the patient and / or family counseling and / or coordination of care  A description of the counseling / coordination of care: plan of care  ______________________________________________________________________    Chief Complaint: denies c/o cp/sob/mucous production  Asking when he can go home    24 Hour Events:   No events    Review of Systems   All other systems reviewed and are negative     ______________________________________________________________________    Physical Exam:   Physical Exam   Constitutional: He is oriented to person, place, and time  No distress     HENT:   Head: Normocephalic and atraumatic  Mouth/Throat: Oropharynx is clear and moist    Eyes: Pupils are equal, round, and reactive to light  No scleral icterus  Neck: Neck supple  No JVD present  Cardiovascular: Normal rate, regular rhythm, normal heart sounds and intact distal pulses  Exam reveals no gallop and no friction rub  No murmur heard  Pulmonary/Chest: Effort normal  No respiratory distress  He has decreased breath sounds  He has no wheezes  He has no rales  Abdominal: Soft  Bowel sounds are normal  He exhibits no distension  There is no tenderness  Musculoskeletal: Normal range of motion  He exhibits no edema  Neurological: He is alert and oriented to person, place, and time  No cranial nerve deficit  Skin: Skin is warm and dry  No rash noted  No erythema  No pallor  ______________________________________________________________________  Vitals:    18 1513 18 2300 18 0817   BP: 135/75  135/73 117/70   BP Location: Right arm  Right arm Left arm   Pulse: 99  86 80   Resp: 17  18 18   Temp: 98 6 °F (37 °C)  98 1 °F (36 7 °C) 97 8 °F (36 6 °C)   TempSrc: Oral  Oral Oral   SpO2: 92% 93% 95% 96%   Weight:       Height:           Temperature:   Temp (24hrs), Av 1 °F (36 7 °C), Min:97 8 °F (36 6 °C), Max:98 6 °F (37 °C)    Current Temperature: 97 8 °F (36 6 °C)  Weights:   IBW: 77 6 kg    Body mass index is 33 43 kg/m²  Weight (last 2 days)     Date/Time   Weight    18 0624  112 (246 47)    18 0727  113 (248 9)            Hemodynamic Monitoring:  N/A     Non-Invasive/Invasive Ventilation Settings:  Respiratory    Lab Data (Last 4 hours)    None         O2/Vent Data (Last 4 hours)    None              No results found for: PHART, TWM1NZQ, PO2ART, AYQ3ZNP, Z2DQJPJO, BEART, SOURCE  SpO2: SpO2: 96 %  Intake and Outputs:  I/O        0700 07/13 0701 - 07/14 0700 07/14 0701 - 07/15 0700    P  O   480     Total Intake(mL/kg)  480 (4 3)     Urine (mL/kg/hr) 1950 (0 7) 325 (0 1)     Total Output 1950 325      Net -1950 +155             Unmeasured Urine Occurrence 3 x 1 x           Nutrition:        Diet Orders            Start     Ordered    07/12/18 0946  Room Service  Once     Question:  Type of Service  Answer:  Room Service-Appropriate    07/12/18 9959    07/07/18 2124  Diet Regular; Regular House  Diet effective now     Question Answer Comment   Diet Type Regular    Regular Regular House    RD to adjust diet per protocol? Yes        07/07/18 2123          Labs:     Results from last 7 days  Lab Units 07/13/18  0454 07/12/18  0515 07/11/18  0432   WBC Thousand/uL 6 05 7 37 5 40   HEMOGLOBIN g/dL 11 4* 12 2 11 5*   HEMATOCRIT % 35 6* 38 7 35 9*   PLATELETS Thousands/uL 299 333 332   NEUTROS PCT % 74 76* 69   MONOS PCT % 5 4 9       Results from last 7 days  Lab Units 07/14/18  0620 07/09/18  0445 07/08/18  0456 07/07/18  1922   SODIUM mmol/L 137 140 140 136   POTASSIUM mmol/L 4 1 4 3 4 0 4 2   CHLORIDE mmol/L 104 107 106 101   CO2 mmol/L 25 26 27 27   BUN mg/dL 10 11 12 13   CREATININE mg/dL 0 96 0 77 0 99 1 03   CALCIUM mg/dL 7 9* 8 3 8 2* 8 6   TOTAL PROTEIN g/dL  --   --  6 9 7 6   BILIRUBIN TOTAL mg/dL  --   --  0 30 0 40   ALK PHOS U/L  --   --  45* 51   ALT U/L  --   --  26 30   AST U/L  --   --  34 38   GLUCOSE RANDOM mg/dL 147* 128 82 106       Results from last 7 days  Lab Units 07/08/18 0456   MAGNESIUM mg/dL 2 0     No results found for: PHOS     Results from last 7 days  Lab Units 07/07/18 1922   INR  1 08   PTT seconds 30       0  Lab Value Date/Time   TROPONINI <0 02 07/07/2018 1921       Results from last 7 days  Lab Units 07/07/18 1921   LACTIC ACID mmol/L 1 3     ABG:No results found for: PHART, MRQ3WGS, PO2ART, CHU4PIT, N7PSIUVS, BEART, SOURCE  Imaging:  I have personally reviewed pertinent reports     and I have personally reviewed pertinent films in PACS    Micro:  Lab Results   Component Value Date    BLOODCX No Growth at 24 hrs  07/12/2018 BLOODCX No Growth at 24 hrs  07/12/2018    BLOODCX No Growth After 5 Days  07/07/2018    BLOODCX No Growth After 5 Days  07/07/2018    SPUTUMCULTUR Test not performed  Suggest repeat specimen  07/08/2018    SPUTUMCULTUR 1+ Growth of  06/04/2018     Allergies:    Allergies   Allergen Reactions    Eggs Or Egg-Derived Products Diarrhea     Pt states he becomes nauseated and hasd diarrhea    Diphenhydramine Hyperactivity and Hypertension     Category: INSOMNIA;      Medications:   Scheduled Meds:    Current Facility-Administered Medications:  acetaminophen 650 mg Oral Q6H PRN Young Eliceo, PA-C    albuterol 2 puff Inhalation Q6H PRN Young Eliceo, PA-C    benzonatate 100 mg Oral TID PRN Young Eliceo, PA-C    budesonide-formoterol 2 puff Inhalation BID Young Eliceo, PA-C    enoxaparin 40 mg Subcutaneous Daily Young Eliceo, PA-C    fluticasone 2 spray Nasal Daily Young Eliceo, PA-C    guaiFENesin 200 mg Oral Q4H PRN Young Eliceo, PA-C    hydrocodone-chlorpheniramine polistirex 5 mL Oral Q12H PRN Prince Aysha Thomas, CRNP    ipratropium 0 5 mg Nebulization TID Daksha Naqvi, DO    levalbuterol 1 25 mg Nebulization TID Jazmin Randolph, DO    LORazepam 1 mg Oral HS PRN Joseluis Armendariz CRNP    montelukast 10 mg Oral HS Young Eliceo, PA-C    predniSONE 40 mg Oral BID With Meals Daksha Naqvi, DO    sodium chloride (PF) 3 mL Intravenous PRN Eugenie Wood, DO    sulfamethoxazole-trimethoprim 5 mg/kg of trimethoprim Intravenous Q8H Catalina Mccarty MD Last Rate: 560 mg of trimethoprim (07/14/18 0539)   tamsulosin 0 4 mg Oral Daily With Obdulio Dinero, DO      Continuous Infusions:   PRN Meds:    acetaminophen 650 mg Q6H PRN   albuterol 2 puff Q6H PRN   benzonatate 100 mg TID PRN   guaiFENesin 200 mg Q4H PRN   hydrocodone-chlorpheniramine polistirex 5 mL Q12H PRN   LORazepam 1 mg HS PRN   sodium chloride (PF) 3 mL PRN     VTE Pharmacologic Prophylaxis: Enoxaparin (Lovenox)  VTE Mechanical Prophylaxis: sequential compression device  Invasive lines and devices: Invasive Devices     Peripheral Intravenous Line            Peripheral IV 07/13/18 Right Forearm less than 1 day                     Portions of the record may have been created with voice recognition software  Occasional wrong word or "sound a like" substitutions may have occurred due to the inherent limitations of voice recognition software  Read the chart carefully and recognize, using context, where substitutions have occurred      GENNY Eral

## 2018-07-15 PROCEDURE — 94760 N-INVAS EAR/PLS OXIMETRY 1: CPT

## 2018-07-15 PROCEDURE — 99232 SBSQ HOSP IP/OBS MODERATE 35: CPT | Performed by: GENERAL PRACTICE

## 2018-07-15 PROCEDURE — 94640 AIRWAY INHALATION TREATMENT: CPT

## 2018-07-15 RX ORDER — SENNOSIDES 8.6 MG
2 TABLET ORAL DAILY PRN
Status: DISCONTINUED | OUTPATIENT
Start: 2018-07-15 | End: 2018-07-16 | Stop reason: HOSPADM

## 2018-07-15 RX ADMIN — SULFAMETHOXAZOLE AND TRIMETHOPRIM 560 MG OF TRIMETHOPRIM: 80; 16 INJECTION, SOLUTION, CONCENTRATE INTRAVENOUS at 05:15

## 2018-07-15 RX ADMIN — MONTELUKAST SODIUM 10 MG: 10 TABLET, FILM COATED ORAL at 21:26

## 2018-07-15 RX ADMIN — PREDNISONE 40 MG: 20 TABLET ORAL at 08:32

## 2018-07-15 RX ADMIN — ENOXAPARIN SODIUM 40 MG: 40 INJECTION, SOLUTION INTRAVENOUS; SUBCUTANEOUS at 08:32

## 2018-07-15 RX ADMIN — SULFAMETHOXAZOLE AND TRIMETHOPRIM 560 MG OF TRIMETHOPRIM: 80; 16 INJECTION, SOLUTION, CONCENTRATE INTRAVENOUS at 21:22

## 2018-07-15 RX ADMIN — LEVALBUTEROL 1.25 MG: 1.25 SOLUTION, CONCENTRATE RESPIRATORY (INHALATION) at 15:45

## 2018-07-15 RX ADMIN — PREDNISONE 40 MG: 20 TABLET ORAL at 16:26

## 2018-07-15 RX ADMIN — TAMSULOSIN HYDROCHLORIDE 0.4 MG: 0.4 CAPSULE ORAL at 16:26

## 2018-07-15 RX ADMIN — BUDESONIDE AND FORMOTEROL FUMARATE DIHYDRATE 2 PUFF: 160; 4.5 AEROSOL RESPIRATORY (INHALATION) at 20:24

## 2018-07-15 RX ADMIN — LEVALBUTEROL 1.25 MG: 1.25 SOLUTION, CONCENTRATE RESPIRATORY (INHALATION) at 20:23

## 2018-07-15 RX ADMIN — IPRATROPIUM BROMIDE 0.5 MG: 0.5 SOLUTION RESPIRATORY (INHALATION) at 20:23

## 2018-07-15 RX ADMIN — SENNOSIDES 17.2 MG: 8.6 TABLET, FILM COATED ORAL at 21:26

## 2018-07-15 RX ADMIN — FLUTICASONE PROPIONATE 2 SPRAY: 50 SPRAY, METERED NASAL at 08:34

## 2018-07-15 RX ADMIN — IPRATROPIUM BROMIDE 0.5 MG: 0.5 SOLUTION RESPIRATORY (INHALATION) at 08:52

## 2018-07-15 RX ADMIN — SULFAMETHOXAZOLE AND TRIMETHOPRIM 560 MG OF TRIMETHOPRIM: 80; 16 INJECTION, SOLUTION, CONCENTRATE INTRAVENOUS at 13:39

## 2018-07-15 RX ADMIN — LORAZEPAM 1 MG: 1 TABLET ORAL at 21:26

## 2018-07-15 RX ADMIN — IPRATROPIUM BROMIDE 0.5 MG: 0.5 SOLUTION RESPIRATORY (INHALATION) at 15:44

## 2018-07-15 RX ADMIN — LEVALBUTEROL 1.25 MG: 1.25 SOLUTION, CONCENTRATE RESPIRATORY (INHALATION) at 08:52

## 2018-07-15 RX ADMIN — BUDESONIDE AND FORMOTEROL FUMARATE DIHYDRATE 2 PUFF: 160; 4.5 AEROSOL RESPIRATORY (INHALATION) at 08:52

## 2018-07-15 NOTE — PROGRESS NOTES
Progress Note - Rona Weems 1955, 58 y o  male MRN: [id number]    Unit/Bed#: [address] [place] [address] Encounter: [id number]    Primary Care Provider: Ruby Grace MD   Date and time admitted to hospital: 7/7/2018  6:53 PM        * Pneumocystis carinii pneumonia ([facility] )   Assessment & Plan    -Bactrim and prednisone were started 7/12/2018  -MRSA screen NEG  -strep pneumoniae and legionella antigens negative  -CT chest 7/9/18: Diffuse bilateral groundglass density throughout both lungs with patchy areas of more consolidative airspace opacity within both upper lobes   Abnormalities have a more central and upper lobe predominance   The appearance is nonspecific with differential considerations including hypersensitivity pneumonitis, allergic bronchopulmonary aspergillosis and atypical infection (such as fungal disease) among others  Less likely considerations include Sarcoidosis, collagen vascular disorders and nonspecific interstitial pneumonia (NIP)  Ultimately, sputum samples and/or bronchoalveolar lavage in conjunction with clinical history and consideration of lung biopsy may be required for definitive diagnosis  10 x 5 mm left lower lobe nodule, new from prior study, presumed pseudonodule   Follow-up CT chest in 3 months may be obtained to ensure resolution  Mild mediastinal adenopathy has increased, presumably reactive  Tiny left and probable trace right pleural effusions    -appreciate ID/pulm  -if CXR tomorrow stable, can d/c on po bactrim and steroid taper as in ID note          Chronic respiratory failure with hypoxia ([facility] )   Assessment & Plan    On at least 2L O2 RTC        COPD (chronic obstructive pulmonary disease) ([facility] )   Assessment & Plan    -due to tobacco abuse and allergic asthma  -Continue Atrovent, Xopenex, and Singulair        Moderate persistent asthma without complication   Assessment & Plan    -continue Symbicort, Flonase, Atrovent, Xopenex, Singulair          VTE Pharmacologic Prophylaxis: Pharmacologic: Enoxaparin (Lovenox)  Mechanical VTE Prophylaxis in Place: Yes    Patient Centered Rounds: I have performed bedside rounds with nursing staff today  Discussions with Specialists or Other Care Team Provider: no    Education and Discussions with Family / Patient: pt    Time Spent for Care: 30 minutes  More than 50% of total time spent on counseling and coordination of care as described above  Current Length of Stay: 8 day(s)    Current Patient Status: Inpatient   Certification Statement: The patient will continue to require additional inpatient hospital stay due to need to make sure cxr stable    Discharge Plan: possibly tomorrow based on cxr    Code Status: Level 1 - Full Code      Subjective:   No acute complaints    Objective:     Vitals:   Temp (24hrs), Av °F (36 7 °C), Min:97 5 °F (36 4 °C), Max:98 6 °F (37 °C)    HR:  [65-91] 65  Resp:  [18-20] 18  BP: ()/(51-64) 110/64  SpO2:  [95 %-97 %] 96 %  Body mass index is 33 28 kg/m²  Input and Output Summary (last 24 hours): Intake/Output Summary (Last 24 hours) at 07/15/18 1547  Last data filed at 07/15/18 1506   Gross per 24 hour   Intake              420 ml   Output              950 ml   Net             -530 ml       Physical Exam:     Physical Exam   Constitutional: He is oriented to person, place, and time  No distress  HENT:   Head: Normocephalic and atraumatic  Eyes: Conjunctivae and EOM are normal    Neck: Normal range of motion  Neck supple  Cardiovascular: Normal rate and regular rhythm  Pulmonary/Chest: Effort normal and breath sounds normal  He has no wheezes  He has no rales  Abdominal: Soft  Bowel sounds are normal  He exhibits no distension  There is no tenderness  Musculoskeletal: Normal range of motion  He exhibits no edema  Neurological: He is alert and oriented to person, place, and time  Skin: Skin is warm and dry  He is not diaphoretic         Additional Data:     Labs:      Results from last 7 days  Lab Units 07/13/18  0454   WBC Thousand/uL 6 05   HEMOGLOBIN g/dL 11 4*   HEMATOCRIT % 35 6*   PLATELETS Thousands/uL 299   NEUTROS PCT % 74   LYMPHS PCT % 12*   MONOS PCT % 5   EOS PCT % 7*       Results from last 7 days  Lab Units 07/14/18  0620   SODIUM mmol/L 137   POTASSIUM mmol/L 4 1   CHLORIDE mmol/L 104   CO2 mmol/L 25   BUN mg/dL 10   CREATININE mg/dL 0 96   CALCIUM mg/dL 7 9*   GLUCOSE RANDOM mg/dL 147*           * I Have Reviewed All Lab Data Listed Above  * Additional Pertinent Lab Tests Reviewed: [facility] Admission Reviewed        Recent Cultures (last 7 days):       Results from last 7 days  Lab Units 07/12/18  1142 07/11/18  1118   BLOOD CULTURE  No Growth at 72 hrs    No Growth at 72 hrs   --    GRAM STAIN RESULT   --  No Polys or Bacteria seen       Last 24 Hours Medication List:     Current Facility-Administered Medications:  acetaminophen 650 mg Oral Q6H PRN [facility], PA-ARIAN    albuterol 2 puff Inhalation Q6H PRN [facility], PA-C    benzonatate 100 mg Oral TID PRN [facility] PA-C    budesonide-formoterol 2 puff Inhalation BID [facility] PA-ARIAN    enoxaparin 40 mg Subcutaneous Daily [facility], PA-C    fluticasone 2 spray Nasal Daily [facility], PA-C    guaiFENesin 200 mg Oral Q4H PRN [facility], PA-C    hydrocodone-chlorpheniramine polistirex 5 mL Oral Q12H PRN GENNY Montez    ipratropium 0 5 mg Nebulization TID Daksha Naqvi, DO    levalbuterol 1 25 mg Nebulization TID Nabeel Perez, DO    LORazepam 1 mg Oral HS PRN GENNY Siegel    montelukast 10 mg Oral HS [facility], PA-C    predniSONE 40 mg Oral BID With Meals Daksha Naqvi, DO    sodium chloride (PF) 3 mL Intravenous PRN Eugenie Wood, DO    sulfamethoxazole-trimethoprim 5 mg/kg of trimethoprim Intravenous Q8H Milton Da Silva MD Last Rate: 560 mg of trimethoprim (07/15/18 1339)   tamsulosin 0 4 mg Oral Daily With 8766 Vijay Luz DO         Today, Patient Was Seen By: DO Romero    ** Please Note: Dictation voice to text software may have been used in the creation of this document   **

## 2018-07-15 NOTE — ASSESSMENT & PLAN NOTE
-Bactrim and prednisone were started 7/12/2018  -MRSA screen NEG  -strep pneumoniae and legionella antigens negative  -CT chest 7/9/18: Diffuse bilateral groundglass density throughout both lungs with patchy areas of more consolidative airspace opacity within both upper lobes   Abnormalities have a more central and upper lobe predominance   The appearance is nonspecific with differential considerations including hypersensitivity pneumonitis, allergic bronchopulmonary aspergillosis and atypical infection (such as fungal disease) among others  Less likely considerations include Sarcoidosis, collagen vascular disorders and nonspecific interstitial pneumonia (NIP)  Ultimately, sputum samples and/or bronchoalveolar lavage in conjunction with clinical history and consideration of lung biopsy may be required for definitive diagnosis  10 x 5 mm left lower lobe nodule, new from prior study, presumed pseudonodule   Follow-up CT chest in 3 months may be obtained to ensure resolution  Mild mediastinal adenopathy has increased, presumably reactive  Tiny left and probable trace right pleural effusions    -appreciate ID/pulm  -if CXR tomorrow stable, can d/c on po bactrim and steroid taper as in ID note

## 2018-07-16 ENCOUNTER — APPOINTMENT (INPATIENT)
Dept: RADIOLOGY | Facility: HOSPITAL | Age: 63
DRG: 974 | End: 2018-07-16
Payer: COMMERCIAL

## 2018-07-16 VITALS
OXYGEN SATURATION: 98 % | BODY MASS INDEX: 33.26 KG/M2 | DIASTOLIC BLOOD PRESSURE: 68 MMHG | HEIGHT: 72 IN | HEART RATE: 68 BPM | TEMPERATURE: 98.7 F | SYSTOLIC BLOOD PRESSURE: 118 MMHG | WEIGHT: 245.59 LBS | RESPIRATION RATE: 19 BRPM

## 2018-07-16 DIAGNOSIS — B59 PNEUMONIA OF BOTH UPPER LOBES DUE TO PNEUMOCYSTIS JIROVECII (HCC): Primary | ICD-10-CM

## 2018-07-16 LAB
GLUCOSE SERPL-MCNC: 138 MG/DL (ref 65–140)
HIV 1+2 AB+HIV1 P24 AG SERPL QL IA: REACTIVE

## 2018-07-16 PROCEDURE — 94640 AIRWAY INHALATION TREATMENT: CPT

## 2018-07-16 PROCEDURE — 94760 N-INVAS EAR/PLS OXIMETRY 1: CPT

## 2018-07-16 PROCEDURE — 99232 SBSQ HOSP IP/OBS MODERATE 35: CPT | Performed by: INTERNAL MEDICINE

## 2018-07-16 PROCEDURE — 99239 HOSP IP/OBS DSCHRG MGMT >30: CPT | Performed by: INTERNAL MEDICINE

## 2018-07-16 PROCEDURE — 71046 X-RAY EXAM CHEST 2 VIEWS: CPT

## 2018-07-16 PROCEDURE — 82948 REAGENT STRIP/BLOOD GLUCOSE: CPT

## 2018-07-16 RX ORDER — PREDNISONE 20 MG/1
40 TABLET ORAL 2 TIMES DAILY WITH MEALS
Qty: 30 TABLET | Refills: 1 | Status: SHIPPED | OUTPATIENT
Start: 2018-07-16 | End: 2018-07-16

## 2018-07-16 RX ORDER — PROMETHAZINE HYDROCHLORIDE AND CODEINE PHOSPHATE 6.25; 1 MG/5ML; MG/5ML
5 SYRUP ORAL EVERY 6 HOURS PRN
Qty: 120 ML | Refills: 0 | Status: SHIPPED | OUTPATIENT
Start: 2018-07-16 | End: 2018-08-10 | Stop reason: SDDI

## 2018-07-16 RX ORDER — PREDNISONE 20 MG/1
40 TABLET ORAL 2 TIMES DAILY WITH MEALS
Qty: 30 TABLET | Refills: 0 | Status: SHIPPED | OUTPATIENT
Start: 2018-07-16 | End: 2018-08-10 | Stop reason: SDDI

## 2018-07-16 RX ORDER — HYDROCODONE POLISTIREX AND CHLORPHENIRAMINE POLISTIREX 10; 8 MG/5ML; MG/5ML
5 SUSPENSION, EXTENDED RELEASE ORAL EVERY 12 HOURS PRN
Qty: 120 ML | Refills: 0 | Status: SHIPPED | OUTPATIENT
Start: 2018-07-16 | End: 2018-07-17

## 2018-07-16 RX ORDER — PANTOPRAZOLE SODIUM 40 MG/1
40 TABLET, DELAYED RELEASE ORAL DAILY
Qty: 30 TABLET | Refills: 0 | Status: SHIPPED | OUTPATIENT
Start: 2018-07-16 | End: 2018-07-18 | Stop reason: ALTCHOICE

## 2018-07-16 RX ORDER — SULFAMETHOXAZOLE AND TRIMETHOPRIM 800; 160 MG/1; MG/1
2 TABLET ORAL EVERY 12 HOURS SCHEDULED
Qty: 72 TABLET | Refills: 0 | Status: SHIPPED | OUTPATIENT
Start: 2018-07-16 | End: 2018-08-03

## 2018-07-16 RX ADMIN — PREDNISONE 40 MG: 20 TABLET ORAL at 08:21

## 2018-07-16 RX ADMIN — IPRATROPIUM BROMIDE 0.5 MG: 0.5 SOLUTION RESPIRATORY (INHALATION) at 13:25

## 2018-07-16 RX ADMIN — IPRATROPIUM BROMIDE 0.5 MG: 0.5 SOLUTION RESPIRATORY (INHALATION) at 08:06

## 2018-07-16 RX ADMIN — SULFAMETHOXAZOLE AND TRIMETHOPRIM 560 MG OF TRIMETHOPRIM: 80; 16 INJECTION, SOLUTION, CONCENTRATE INTRAVENOUS at 05:33

## 2018-07-16 RX ADMIN — BUDESONIDE AND FORMOTEROL FUMARATE DIHYDRATE 2 PUFF: 160; 4.5 AEROSOL RESPIRATORY (INHALATION) at 08:07

## 2018-07-16 RX ADMIN — FLUTICASONE PROPIONATE 2 SPRAY: 50 SPRAY, METERED NASAL at 08:23

## 2018-07-16 RX ADMIN — ENOXAPARIN SODIUM 40 MG: 40 INJECTION, SOLUTION INTRAVENOUS; SUBCUTANEOUS at 08:21

## 2018-07-16 RX ADMIN — LEVALBUTEROL 1.25 MG: 1.25 SOLUTION, CONCENTRATE RESPIRATORY (INHALATION) at 08:06

## 2018-07-16 RX ADMIN — LEVALBUTEROL 1.25 MG: 1.25 SOLUTION, CONCENTRATE RESPIRATORY (INHALATION) at 13:25

## 2018-07-16 NOTE — DISCHARGE SUMMARY
Discharge Summary  João Menchaca 58 y o  male MRN: [id number]  Unit/Bed#: [address] [place] [address] 206-02 Encounter: [id number]    Admission Date:   Admission Orders     Ordered        07/07/18 2022  Inpatient Admission (expected length of stay for this patient is greater than two midnights)  Once               Discharge Date: 07/16/18    Admitting Diagnosis: Shortness of breath [R06 02]  HAP (hospital-acquired pneumonia) [J18 9]    HPI:  See history and physical    Procedures Performed:   Orders Placed This Encounter   Procedures    Bronchoscopy    ED ECG Documentation Only       Hospital Course:  Patient admitted the hospital with persistent pneumonia  This was bilateral in nature  She was recently discharged from the hospital   Patient initially underwent bronchoscopy and direct fluorescent antibody staining showed Pneumocystis zafar  Thus the patient was transitioned to Bactrim as well as high-dose prednisone  He was follow along the way by Infectious Disease Medicine as well as Pulmonary  Of note the patient was question by me on discharge and he denies any potentially AIDS related risk  activities  His HIV test is pending  He be discharged on 18 more days double strength Bactrim 2 p o  b i d  for a total 21 days as well as a tapering dose of prednisone which will be 40 mg b i d  for 2 more days 40 mg daily for 5 days then 20 mg daily for 11 more days  Patient would maintain on 2-4 L of oxygen  The patient's feels comfortable titrating that himself  He will be asked to be followed up in the office by his primary care physician by the end of this week  Significant Findings, Care, Treatment and Services Provided:  Bronchoscopy    Discharge Diagnosis: Principal Problem:    Pneumocystis carinii pneumonia ([facility] )  Active Problems:     Moderate persistent asthma without complication    COPD (chronic obstructive pulmonary disease) (HCC)    Chronic respiratory failure with hypoxia ([facility])  Resolved Problems:    Acute on chronic respiratory failure with hypoxia (HCC)    Sepsis ([facility] [address] )    HAP (hospital-acquired pneumonia)        Condition at Discharge: stable     Discharge instructions/Information to patient and family:   See after visit summary for information provided to patient and family  Provisions for Follow-Up Care:  See after visit summary for information related to follow-up care and any pertinent home health orders  Disposition: Home    Discharge Medications:  See after visit summary for reconciled discharge medications provided to patient and family  This note has been constructed using a voice recognition system         Jayy Alvarenga MD

## 2018-07-16 NOTE — PROGRESS NOTES
Progress Note - Pulmonary   Rona Faustina 58 y o  male MRN: [id number]  Unit/Bed#: [address] [place] [address] 206-02 Encounter: [id number]      Assessment:  1  Acute on chronic hypoxic respiratory failure on 3L of oxygen  2  PJP pneumonia with bilateral infiltrates  3  COPD without acute exacerbation  4  LLL pulmonary nodule     Plan:  1  Continue Bactrim for 21 days course  2  Continue steroids at 40mg PO BID for additional day followed by 40mg daily for 5 days, then 20mg x11 days  3  Continue xopenex and atroven   4  Will need repeat CXR in 4-6 weeks  5  Continue oxygen at home for 3-4L  6  Awaiting HIV testing, to follow up with ID as outpatient  7  Will follow up with me on 7/27  Will have a call from our office for official time  Subjective:   Patient seen and examined  No new events overnight  He states that the cough has improved and his breathing overall has also improved  Objective:   Vitals: Blood pressure 118/68, pulse 66, temperature 98 7 °F (37 1 °C), temperature source Oral, resp  rate 18, height 6' (1 829 m), weight 111 kg (245 lb 9 5 oz), SpO2 97 % , RA, Body mass index is 33 31 kg/m²  Intake/Output Summary (Last 24 hours) at 07/16/18 1058  Last data filed at 07/16/18 0719   Gross per 24 hour   Intake                0 ml   Output             1000 ml   Net            -1000 ml     Physical Exam  Gen: Awake, alert, oriented x 3, no acute distress  HEENT: Mucous membranes moist, no oral lesions, no thrush  NECK: No accessory muscle use, JVP not elevated  Cardiac: Regular, single S1, single S2, no murmurs, no rubs, no gallops  Lungs: Decreased breath sounds, crackles in lung bases, no wheezing or rhonchi  Abdomen: normoactive bowel sounds, soft nontender, nondistended, no rebound or rigidity, no guarding  Extremities: no cyanosis, no clubbing, no edema    Labs: I have personally reviewed pertinent lab results      Results from last 7 days  Lab Units 07/13/18  0454 07/12/18  0515 07/11/18  8561 WBC Thousand/uL 6 05 7 37 5 40   HEMOGLOBIN g/dL 11 4* 12 2 11 5*   HEMATOCRIT % 35 6* 38 7 35 9*   PLATELETS Thousands/uL 299 333 332   NEUTROS PCT % 74 76* 69   MONOS PCT % 5 4 9      Results from last 7 days  Lab Units 07/14/18  0620   SODIUM mmol/L 137   POTASSIUM mmol/L 4 1   CHLORIDE mmol/L 104   CO2 mmol/L 25   BUN mg/dL 10   CREATININE mg/dL 0 96   CALCIUM mg/dL 7 9*   GLUCOSE RANDOM mg/dL 147*         Meds/Allergies   Current Facility-Administered Medications   Medication Dose Route Frequency    acetaminophen (TYLENOL) tablet 650 mg  650 mg Oral Q6H PRN    albuterol (PROVENTIL HFA,VENTOLIN HFA) inhaler 2 puff  2 puff Inhalation Q6H PRN    benzonatate (TESSALON PERLES) capsule 100 mg  100 mg Oral TID PRN    budesonide-formoterol (SYMBICORT) 160-4 5 mcg/act inhaler 2 puff  2 puff Inhalation BID    enoxaparin (LOVENOX) subcutaneous injection 40 mg  40 mg Subcutaneous Daily    fluticasone (FLONASE) 50 mcg/act nasal spray 2 spray  2 spray Nasal Daily    guaiFENesin (ROBITUSSIN) oral solution 200 mg  200 mg Oral Q4H PRN    hydrocodone-chlorpheniramine polistirex (TUSSIONEX) 10-8 mg/5 mL ER suspension 5 mL  5 mL Oral Q12H PRN    ipratropium (ATROVENT) 0 02 % inhalation solution 0 5 mg  0 5 mg Nebulization TID    levalbuterol (XOPENEX) inhalation solution 1 25 mg  1 25 mg Nebulization TID    LORazepam (ATIVAN) tablet 1 mg  1 mg Oral HS PRN    montelukast (SINGULAIR) tablet 10 mg  10 mg Oral HS    predniSONE tablet 40 mg  40 mg Oral BID With Meals    senna (SENOKOT) tablet 17 2 mg  2 tablet Oral Daily PRN    sodium chloride (PF) 0 9 % injection 3 mL  3 mL Intravenous PRN    sulfamethoxazole-trimethoprim (BACTRIM) 560 mg of trimethoprim in dextrose 5 % 500 mL IVPB  5 mg/kg of trimethoprim Intravenous Q8H    tamsulosin (FLOMAX) capsule 0 4 mg  0 4 mg Oral Daily With Dinner     Prescriptions Prior to Admission   Medication    albuterol (PROVENTIL HFA,VENTOLIN HFA) 90 mcg/act inhaler    budesonide-formoterol (SYMBICORT) 160-4 5 mcg/act inhaler    fluticasone (FLONASE) 50 mcg/act nasal spray    montelukast (SINGULAIR) 10 mg tablet    tamsulosin (FLOMAX) 0 4 mg         Microbiology:  Lab Results   Component Value Date    BLOODCX No Growth at 72 hrs  07/12/2018    BLOODCX No Growth at 72 hrs  07/12/2018    BLOODCX No Growth After 5 Days  07/07/2018    BLOODCX No Growth After 5 Days  07/07/2018    SPUTUMCULTUR Test not performed  Suggest repeat specimen  07/08/2018    SPUTUMCULTUR 1+ Growth of  06/04/2018       Imaging and other studies: I have personally reviewed pertinent reports  CXR - IMPRESSION:  Persistent diffuse bilateral lung consolidation  Small bilateral pleural effusions      DO Rodrigue [facility]

## 2018-07-16 NOTE — PROGRESS NOTES
Progress Note - Infectious Disease   Celeste Canas 58 y o  male MRN: [id number]  Unit/Bed#: [address] [place] [address]  Encounter: [id number]    Assessment:   58 y  o  year old male with PMH of COPD, asthma who presents with dyspnea which started 1 week ago and associated with productive cough      1  Pneumocystitis pneumonia/Fever: Fevers have resolved on steroids and WBC count is nml  Pt was noted to have diffuse bilateral groundglass density throughout both lungs - CT had the appearance suggesting atypical process such as hypersensitivity pneumonitis, allergic bronchopulmonary aspergillosis  S/p bronch on 18 with copious thick white secretions noted  BAL fungal and AFB cx's are pending  DFA of bronch specimen for Pneumocystis was positive  Bronch cx is positive for Stenotrophomonas which will be covered with Bactrim Pt is currently on high dose Bactrim and Prednisone for tx of Pneumocystitis pneumonia  Blood cx's are showing no growth to date  MRSA screen was negative  Pt still has wheezing and requires O2 supplementation      Plan:  1  Stable for discharge on PO Bactrim to complete 3 week course of tx  Steroids will be tapered (40 mg po BID x 5 days, then 40 mg po Qday x 5 days, then 20 mg po Qday x 11 days)  2  Awaiting final results of remainder of bronch studies  3  HIV test is pending    Subjective/Objective   Chief Complaint: PCP pneumonia    Subjective: feels well and denies any worsening cough or pneumonia    Objective:     HR:  [65-66] 66  Resp:  [18] 18  BP: ()/(50-68) 118/68  SpO2:  [94 %-97 %] 97 %  Temp (24hrs), Av 6 °F (37 °C), Min:98 4 °F (36 9 °C), Max:98 7 °F (37 1 °C)  Current: Temperature: 98 7 °F (37 1 °C)    Physical Exam:  Constitutional: He appears well-developed and well-nourished  Cardiovascular: Normal rate and regular rhythm     Pulmonary/Chest: Effort normal and breath sounds normal  He has no wheezes  He has no rales  Abdominal: Soft   Bowel sounds are normal  There is no tenderness  Musculoskeletal: He exhibits no edema  Skin: Skin is warm and dry  No erythema  Vitals reviewed  Invasive Devices     Peripheral Intravenous Line            Peripheral IV 07/15/18 Left Forearm less than 1 day                Lab, Imaging and other studies: I have personally reviewed pertinent reports

## 2018-07-17 ENCOUNTER — TRANSITIONAL CARE MANAGEMENT (OUTPATIENT)
Dept: FAMILY MEDICINE CLINIC | Facility: HOSPITAL | Age: 63
End: 2018-07-17

## 2018-07-17 LAB
BACTERIA BLD CULT: NORMAL
BACTERIA BLD CULT: NORMAL
HIV 2 AB SERPL QL IA: NEGATIVE
HIV1 AB SERPL QL IA: POSITIVE
LABORATORY COMMENT REPORT: NORMAL
SL AMB NOTE:: ABNORMAL

## 2018-07-18 ENCOUNTER — OFFICE VISIT (OUTPATIENT)
Dept: FAMILY MEDICINE CLINIC | Facility: HOSPITAL | Age: 63
End: 2018-07-18
Payer: COMMERCIAL

## 2018-07-18 VITALS
WEIGHT: 237 LBS | RESPIRATION RATE: 16 BRPM | DIASTOLIC BLOOD PRESSURE: 80 MMHG | OXYGEN SATURATION: 94 % | SYSTOLIC BLOOD PRESSURE: 118 MMHG | HEIGHT: 72 IN | HEART RATE: 94 BPM | BODY MASS INDEX: 32.1 KG/M2

## 2018-07-18 DIAGNOSIS — B20 HIV (HUMAN IMMUNODEFICIENCY VIRUS INFECTION) (HCC): Primary | ICD-10-CM

## 2018-07-18 DIAGNOSIS — N40.1 BENIGN PROSTATIC HYPERPLASIA WITH NOCTURIA: ICD-10-CM

## 2018-07-18 DIAGNOSIS — B59 PNEUMONIA OF BOTH LUNGS DUE TO PNEUMOCYSTIS JIROVECII, UNSPECIFIED PART OF LUNG (HCC): Primary | ICD-10-CM

## 2018-07-18 DIAGNOSIS — R35.1 NOCTURIA ASSOCIATED WITH BENIGN PROSTATIC HYPERPLASIA: ICD-10-CM

## 2018-07-18 DIAGNOSIS — R35.1 BENIGN PROSTATIC HYPERPLASIA WITH NOCTURIA: ICD-10-CM

## 2018-07-18 DIAGNOSIS — B20 HIV DISEASE (HCC): ICD-10-CM

## 2018-07-18 DIAGNOSIS — J45.40 MODERATE PERSISTENT ASTHMA WITHOUT COMPLICATION: Chronic | ICD-10-CM

## 2018-07-18 DIAGNOSIS — B20 HUMAN IMMUNODEFICIENCY VIRUS (HIV) DISEASE (HCC): ICD-10-CM

## 2018-07-18 DIAGNOSIS — B59 PNEUMONIA OF BOTH LUNGS DUE TO PNEUMOCYSTIS JIROVECII, UNSPECIFIED PART OF LUNG (HCC): ICD-10-CM

## 2018-07-18 DIAGNOSIS — N40.1 NOCTURIA ASSOCIATED WITH BENIGN PROSTATIC HYPERPLASIA: ICD-10-CM

## 2018-07-18 PROCEDURE — 99495 TRANSJ CARE MGMT MOD F2F 14D: CPT | Performed by: INTERNAL MEDICINE

## 2018-07-18 RX ORDER — TAMSULOSIN HYDROCHLORIDE 0.4 MG/1
0.4 CAPSULE ORAL
Qty: 30 CAPSULE | Refills: 11
Start: 2018-07-18 | End: 2018-07-25 | Stop reason: SDUPTHER

## 2018-07-18 NOTE — PROGRESS NOTES
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 Subjective:     Patient ID: Mei Melgoza is a 58 y o  male  This is a hospital follow-up for PJ pneumonia, HIV also came back +        Review of Systems   Constitutional: Negative for fever  HENT: Negative for congestion  Eyes: Negative for visual disturbance  Respiratory: Positive for cough  Negative for shortness of breath and wheezing  Cardiovascular: Negative for chest pain, palpitations and leg swelling  Gastrointestinal: Negative for abdominal pain, blood in stool and diarrhea  Endocrine: Negative for polydipsia and polyphagia  Genitourinary: Negative for difficulty urinating and dysuria  Musculoskeletal: Negative for joint swelling, myalgias and neck stiffness  Skin: Negative for rash  Neurological: Negative for weakness, numbness and headaches  Hematological: Negative for adenopathy  Psychiatric/Behavioral: Negative for dysphoric mood  All other systems reviewed and are negative  Objective:     Physical Exam   Constitutional: He is oriented to person, place, and time  He appears well-developed  No distress  HENT:   Head: Normocephalic     Mouth/Throat: Oropharynx is clear and moist    Eyes: Conjunctivae are normal    Neck: Neck supple  Cardiovascular: Normal rate and regular rhythm  Pulmonary/Chest: Effort normal  No respiratory distress  He has no wheezes  He has no rales  Abdominal: Soft  Bowel sounds are normal  He exhibits no distension  There is no tenderness  Musculoskeletal: He exhibits no tenderness  Lymphadenopathy:     He has no cervical adenopathy  Neurological: He is alert and oriented to person, place, and time  No cranial nerve deficit  Skin: Skin is warm and dry  No rash noted  Psychiatric: He has a normal mood and affect  Vitals reviewed  Vitals:    07/18/18 0928   BP: 118/80   Pulse: 94   Resp: 16   SpO2: 94%   Weight: 108 kg (237 lb)   Height: 6' (1 829 m)       Transitional Care Management Review:  Alexandre Reyes is a 58 y o  male here for TCM follow up  During the TCM phone call patient stated:    Date and time hospital follow up call was made:  7/17/2018  3:41 PM  Hospital care reviewed:  Records reviewed  [facility]  Date of admission:  7/7/18  Date of discharge:  6/16/18  Diagnosis:  Pneumocystis carinii pneumonia   Disposition:  Home  Were the patients medicaitons reviewed and updated:  Yes  Current symptoms:  None  Scheduled for follow up?:  Yes  Patients specialists:  Pulmonlolgist  Pulmonologist's name:  Ashley Ward, DO  Did you obtain your prescribed medications:  Yes  Do you need help managing your perscriptions or medications:  No  Is transportation to your appointments needed:  No  I have advised the patient to call PCP with any new or worsening symptoms (please type in name along with any credentials): HELLEN Ann MA  Living Arrangements:  Spouse or Significiant other  Support System:  Spouse, Friends, Family  Are you recieving outpatient services:  No  Are you recieving home care services:  No  Comments:  PT states that he's doing fine                Alyson Kaplan MD

## 2018-07-20 LAB
ALBUMIN SERPL-MCNC: 4 G/DL (ref 3.6–4.8)
ALBUMIN/GLOB SERPL: 1 {RATIO} (ref 1.2–2.2)
ALP SERPL-CCNC: 52 IU/L (ref 39–117)
ALT SERPL-CCNC: 43 IU/L (ref 0–44)
AST SERPL-CCNC: 18 IU/L (ref 0–40)
BASOPHILS # BLD AUTO: 0 X10E3/UL (ref 0–0.2)
BASOPHILS NFR BLD AUTO: 0 %
BILIRUB SERPL-MCNC: 0.4 MG/DL (ref 0–1.2)
BUN SERPL-MCNC: 28 MG/DL (ref 8–27)
BUN/CREAT SERPL: 24 (ref 10–24)
CALCIUM SERPL-MCNC: 9.5 MG/DL (ref 8.6–10.2)
CD3 CELLS # BLD: 465 /UL (ref 622–2402)
CD3 CELLS NFR BLD: 51.7 % (ref 57.5–86.2)
CD3+CD4+ CELLS # BLD: 72 /UL (ref 359–1519)
CD3+CD4+ CELLS NFR BLD: 8 % (ref 30.8–58.5)
CD3+CD4+ CELLS/CD3+CD8+ CLL BLD: 0.18 % (ref 0.92–3.72)
CD3+CD8+ CELLS # BLD: 393 /UL (ref 109–897)
CD3+CD8+ CELLS NFR BLD: 43.7 % (ref 12–35.5)
CHLORIDE SERPL-SCNC: 95 MMOL/L (ref 96–106)
CO2 SERPL-SCNC: 23 MMOL/L (ref 20–29)
CREAT SERPL-MCNC: 1.16 MG/DL (ref 0.76–1.27)
EOSINOPHIL # BLD AUTO: 0 X10E3/UL (ref 0–0.4)
EOSINOPHIL NFR BLD AUTO: 0 %
ERYTHROCYTE [DISTWIDTH] IN BLOOD BY AUTOMATED COUNT: 14.5 % (ref 12.3–15.4)
GLOBULIN SER-MCNC: 3.9 G/DL (ref 1.5–4.5)
GLUCOSE SERPL-MCNC: 110 MG/DL (ref 65–99)
HCT VFR BLD AUTO: 44.7 % (ref 37.5–51)
HGB BLD-MCNC: 15.2 G/DL (ref 13–17.7)
IMM GRANULOCYTES # BLD: 0.1 X10E3/UL (ref 0–0.1)
IMM GRANULOCYTES NFR BLD: 1 %
LYMPHOCYTES # BLD AUTO: 0.9 X10E3/UL (ref 0.7–3.1)
LYMPHOCYTES NFR BLD AUTO: 11 %
MCH RBC QN AUTO: 28.3 PG (ref 26.6–33)
MCHC RBC AUTO-ENTMCNC: 34 G/DL (ref 31.5–35.7)
MCV RBC AUTO: 83 FL (ref 79–97)
MONOCYTES # BLD AUTO: 0.5 X10E3/UL (ref 0.1–0.9)
MONOCYTES NFR BLD AUTO: 6 %
NEUTROPHILS # BLD AUTO: 7.4 X10E3/UL (ref 1.4–7)
NEUTROPHILS NFR BLD AUTO: 82 %
PLATELET # BLD AUTO: 359 X10E3/UL (ref 150–379)
POTASSIUM SERPL-SCNC: 5.4 MMOL/L (ref 3.5–5.2)
PROT SERPL-MCNC: 7.9 G/DL (ref 6–8.5)
RBC # BLD AUTO: 5.37 X10E6/UL (ref 4.14–5.8)
RPR SER QL: NON REACTIVE
SL AMB EGFR AFRICAN AMERICAN: 77 ML/MIN/1.73
SL AMB EGFR NON AFRICAN AMERICAN: 66 ML/MIN/1.73
SODIUM SERPL-SCNC: 134 MMOL/L (ref 134–144)
VIRUS SPEC CULT: NORMAL
WBC # BLD AUTO: 8.9 X10E3/UL (ref 3.4–10.8)

## 2018-07-25 DIAGNOSIS — R35.1 NOCTURIA ASSOCIATED WITH BENIGN PROSTATIC HYPERPLASIA: ICD-10-CM

## 2018-07-25 DIAGNOSIS — N40.1 NOCTURIA ASSOCIATED WITH BENIGN PROSTATIC HYPERPLASIA: ICD-10-CM

## 2018-07-25 RX ORDER — TAMSULOSIN HYDROCHLORIDE 0.4 MG/1
0.4 CAPSULE ORAL
Qty: 90 CAPSULE | Refills: 1 | Status: SHIPPED | OUTPATIENT
Start: 2018-07-25 | End: 2018-12-20 | Stop reason: SDUPTHER

## 2018-07-27 ENCOUNTER — OFFICE VISIT (OUTPATIENT)
Dept: PULMONOLOGY | Facility: HOSPITAL | Age: 63
End: 2018-07-27
Payer: COMMERCIAL

## 2018-07-27 VITALS
OXYGEN SATURATION: 96 % | HEART RATE: 84 BPM | RESPIRATION RATE: 14 BRPM | HEIGHT: 72 IN | WEIGHT: 244 LBS | SYSTOLIC BLOOD PRESSURE: 112 MMHG | BODY MASS INDEX: 33.05 KG/M2 | DIASTOLIC BLOOD PRESSURE: 60 MMHG

## 2018-07-27 DIAGNOSIS — G47.33 OSA (OBSTRUCTIVE SLEEP APNEA): ICD-10-CM

## 2018-07-27 DIAGNOSIS — B20 HIV DISEASE (HCC): ICD-10-CM

## 2018-07-27 DIAGNOSIS — J96.01 ACUTE RESPIRATORY FAILURE WITH HYPOXIA (HCC): ICD-10-CM

## 2018-07-27 DIAGNOSIS — J44.9 ASTHMA-COPD OVERLAP SYNDROME (HCC): ICD-10-CM

## 2018-07-27 DIAGNOSIS — B59 PNEUMONIA OF BOTH LUNGS DUE TO PNEUMOCYSTIS JIROVECII, UNSPECIFIED PART OF LUNG (HCC): Primary | ICD-10-CM

## 2018-07-27 LAB — LEGIONELLA SPEC CULT: NORMAL

## 2018-07-27 PROCEDURE — 94618 PULMONARY STRESS TESTING: CPT | Performed by: INTERNAL MEDICINE

## 2018-07-27 PROCEDURE — 99215 OFFICE O/P EST HI 40 MIN: CPT | Performed by: INTERNAL MEDICINE

## 2018-07-27 NOTE — LETTER
July 27, 2018     Chere Seip, MD [id number]    Patient: Tramaine Hendrickson   YOB: 1955   Date of Visit: 7/27/2018       Dear Dr Aleck Skiff:    Thank you for referring Rm Lira to me for evaluation  Below are my notes for this consultation  If you have questions, please do not hesitate to call me  I look forward to following your patient along with you  Sincerely,        Jennifer Cano DO        CC: MD Teresa Del Cid MD Yetta Risk, DO  7/27/2018  5:08 PM  Sign at close encounter  Progress note - Pulmonary Medicine   Tramaine Hendrickson 58 y o  male MRN: [id number]       Impression & Plan:   57 y/o M with PMHx of BPH, Hay fever, alcohol abuse, ZAIRA and never tried CPAP, obesity who comes in for follow up of COPD/asthma overlap and chronic hypoxic respiratory failure  1  Acute hypoxic respiratory failure - Secondary to PJP pneumonia and Stenotrophomonas recently diagnosed on bronchoscopy      -  Continue Bactrim at treatment dose and Prednisone at current dose for 10 more days as per ID         -  Repeat CXR 2-3 weeks after treatment     2  Newly diagnosed HIV (CD4 - 76)       -  Continue Bactrim until CD4 improves        - now on Genvoya therapy  Repeat as per ID  3     He has severe obstructive lung disease, obesity and there were interstitial changes on most recent CXR which could represent pulmonary edema or ILD       -  continue 3L of supplemental oxygen with ambulation       -  I again emphasized diet and weight loss as his obesity is likely contributing to restrictive lung disease       -  Repeat CXR pending     4   Severe COPD and asthma overlap -  Severe obstruction noted with bronchodilator response   IgE mildly elevated but [place] panel with many different allergens       -  I   discussed the concern I have for him living with cats and dogs given his allergies           -  he has held symbicort and albuterol and is feeling well  For now will continue to hold         -  D/C singulair as well       -  Will also D/C oxygen at next visit, presuming after D/C of steroids he is still feeling well  5   History of ZAIRA -  He never pursued treatment of this in the past   I discussed it with him and he will think about pursuit of it  I explained the risk of leaving it untreated  Especially with HIV, the likelihood of developing pulmonary hypertension is likely if left untreated     ______________________________________________________________________    HPI:    Shona Corbett presents today for follow-up after hospitalization  He was diagnosed with PJP pneumonia and has recently been found to be HIV positive  In addition on bronchoscopy he was also noted to have stenotrophomonas  He still has 9 more days of treatment with bactrim and prednisone  However, he has noted significant clinical improvement  He states that he no longer needs his oxygen or inhalers  He does not feel limited with his breathing at all  He denies wheezing, chest tightness or cough  He feels a lot better overall  Review of Systems:  Review of Systems   Constitutional: Negative for appetite change and fever  HENT: Negative for ear pain, postnasal drip, rhinorrhea, sneezing, sore throat and trouble swallowing  Respiratory: Negative for chest tightness, shortness of breath and wheezing  Cardiovascular: Negative for chest pain  Gastrointestinal: Negative for abdominal distention, abdominal pain, nausea and vomiting  Endocrine: Negative  Genitourinary: Negative for difficulty urinating and frequency  Musculoskeletal: Negative for arthralgias and myalgias  Skin: Negative for color change and wound  Allergic/Immunologic: Negative  Neurological: Negative for seizures and headaches  Hematological: Negative for adenopathy  Does not bruise/bleed easily  Psychiatric/Behavioral: Negative for agitation and hallucinations  Social history updates:  History   Smoking Status    Former Smoker    Packs/day: 1 50    Years: 20 00    Types: Cigarettes    Quit date: 2/5/1993   Smokeless Tobacco    Never Used     Comment: never a smoker per [employer]     Social History     Social History    Marital status: /Civil Union     Spouse name: N/A    Number of children: N/A    Years of education: N/A     Occupational History    Not on file  Social History Main Topics    Smoking status: Former Smoker     Packs/day: 1 50     Years: 20 00     Types: Cigarettes     Quit date: 2/5/1993    Smokeless tobacco: Never Used      Comment: never a smoker per [employer]    Alcohol use Yes      Comment: socially / 1-4 drinks/week    Drug use: No    Sexual activity: No     Other Topics Concern    Not on file     Social History Narrative    Daily caffeine consumption, 2-3 servings a day    Lives with wife    Feels safe at home  Sees dentist reg    No living will       PhysicalExamination:  Vitals:   /60   Pulse 84   Resp 14   Ht 6' (1 829 m)   Wt 111 kg (244 lb)   SpO2 96%   BMI 33 09 kg/m²    General: Awake alert and oriented x 3, conversant without conversational dyspnea, NAD, normal affect  HEENT:  PERRL, Sclera noninjected, nonicteric OU, Nares patent,  no craniofacial abnormalities, Mucous membranes, moist, no oral lesions, normal dentition, Mallampati class 4  NECK: Trachea midline, no accessory muscle use, no stridor, no cervical or supraclavicular adenopathy, JVP not elevated  CARDIAC: Reg, single s1/S2, no m/r/g  PULM: CTA bilaterally no wheezing, rhonchi or rales  ABD: Normoactive bowel sounds, soft nontender, nondistended, no rebound, no rigidity, no guarding  EXT: No cyanosis, no clubbing, no edema, normal capillary refill  NEURO: no focal neurologic deficits, AAOx3, moving all extremities appropriately      Diagnostic Data:  Labs:   I personally reviewed the most recent laboratory data pertinent to today's visit  I have personally reviewed pertinent lab results  Lab Results   Component Value Date    WBC 6 05 07/13/2018    HGB 11 4 (L) 07/13/2018    HCT 35 6 (L) 07/13/2018    MCV 85 07/13/2018     07/13/2018     Lab Results   Component Value Date    GLUCOSE 147 (H) 07/14/2018    CALCIUM 7 9 (L) 07/14/2018     07/14/2018    K 4 1 07/14/2018    CO2 25 07/14/2018     07/14/2018    BUN 10 07/14/2018    CREATININE 0 96 07/14/2018     Lab Results   Component Value Date     07/08/2018     Lab Results   Component Value Date    ALT 26 07/08/2018    AST 34 07/08/2018    ALKPHOS 45 (L) 07/08/2018    BILITOT 0 30 07/08/2018        6 minute walk in office today  Starting saturation - 95%   Starting HR - 84 bpm  Lowest saturation - 94%    Highest HR - 114 bpm  Ambulated - 168 m without the need of oxygen      PFT results: The most recent pulmonary function tests were reviewed  3/4/18 Study Interpretation:   · Severe airflow limitation  Significant improvement in airflow on vital capacity following the administration of bronchodilators      6 minute walk test in office 6/21/18  Saturation - 91% at start, dropped to 80% with ambulation, maintained at 90% with 3L nasal cannula     Imaging:  I personally reviewed the images on the [facility] system pertinent to today's visit  CXR - 6/6/18   IMPRESSION:  Persistent bilateral pulmonary airspace disease and interstitial prominence in a pattern most compatible with pneumonia and with no significant interval change since 6/3/2018     Other studies:  Echo -  SUMMARY  LEFT VENTRICLE:  Size was normal   Systolic function was normal  Ejection fraction was estimated to be 55 %    Wall thickness was normal   Left ventricular diastolic function parameters were normal      RIGHT VENTRICLE:  The size was normal   Systolic function was normal      LEFT ATRIUM:  The atrium was mildly dilated      TRICUSPID VALVE:  There was trace regurgitation      AORTA:  The root exhibited mild clyde Rodriguez See, DO

## 2018-07-27 NOTE — PROGRESS NOTES
Progress note - Pulmonary Medicine   Rona Weems 58 y o  male MRN: [id number]       Impression & Plan:   59 y/o M with PMHx of BPH, Hay fever, alcohol abuse, ZAIRA and never tried CPAP, obesity who comes in for follow up of COPD/asthma overlap and chronic hypoxic respiratory failure  1  Acute hypoxic respiratory failure - Secondary to PJP pneumonia and Stenotrophomonas recently diagnosed on bronchoscopy      -  Continue Bactrim at treatment dose and Prednisone at current dose for 10 more days as per ID         -  Repeat CXR 2-3 weeks after treatment     2  Newly diagnosed HIV (CD4 - 76)       -  Continue Bactrim until CD4 improves        - now on Genvoya therapy  Repeat as per ID  3     He has severe obstructive lung disease, obesity and there were interstitial changes on most recent CXR which could represent pulmonary edema or ILD       -  continue 3L of supplemental oxygen with ambulation       -  I again emphasized diet and weight loss as his obesity is likely contributing to restrictive lung disease       -  Repeat CXR pending     4   Severe COPD and asthma overlap -  Severe obstruction noted with bronchodilator response   IgE mildly elevated but [place] panel with many different allergens       -  I  discussed the concern I have for him living with cats and dogs given his allergies       -  he has held symbicort and albuterol and is feeling well  For now will continue to hold         -  D/C singulair as well       -  Will also D/C oxygen at next visit, presuming after D/C of steroids he is still feeling well  5   History of ZAIRA -  He never pursued treatment of this in the past   I discussed it with him and he will think about pursuit of it  I explained the risk of leaving it untreated    Especially with HIV, the likelihood of developing pulmonary hypertension is likely if left untreated     ______________________________________________________________________    HPI:    Rona Weems presents today for follow-up after hospitalization  He was diagnosed with PJP pneumonia and has recently been found to be HIV positive  In addition on bronchoscopy he was also noted to have stenotrophomonas  He still has 9 more days of treatment with bactrim and prednisone  However, he has noted significant clinical improvement  He states that he no longer needs his oxygen or inhalers  He does not feel limited with his breathing at all  He denies wheezing, chest tightness or cough  He feels a lot better overall  Review of Systems:  Review of Systems   Constitutional: Negative for appetite change and fever  HENT: Negative for ear pain, postnasal drip, rhinorrhea, sneezing, sore throat and trouble swallowing  Respiratory: Negative for chest tightness, shortness of breath and wheezing  Cardiovascular: Negative for chest pain  Gastrointestinal: Negative for abdominal distention, abdominal pain, nausea and vomiting  Endocrine: Negative  Genitourinary: Negative for difficulty urinating and frequency  Musculoskeletal: Negative for arthralgias and myalgias  Skin: Negative for color change and wound  Allergic/Immunologic: Negative  Neurological: Negative for seizures and headaches  Hematological: Negative for adenopathy  Does not bruise/bleed easily  Psychiatric/Behavioral: Negative for agitation and hallucinations  Social history updates:  History   Smoking Status    Former Smoker    Packs/day: 1 50    Years: 20 00    Types: Cigarettes    Quit date: 2/5/1993   Smokeless Tobacco    Never Used     Comment: never a smoker per [employer]     Social History     Social History    Marital status: /Civil Union     Spouse name: N/A    Number of children: N/A    Years of education: N/A     Occupational History    Not on file       Social History Main Topics    Smoking status: Former Smoker     Packs/day: 1 50     Years: 20 00     Types: Cigarettes     Quit date: 2/5/1993   Jagruti Lovelace Smokeless tobacco: Never Used      Comment: never a smoker per [employer]    Alcohol use Yes      Comment: socially / 1-4 drinks/week    Drug use: No    Sexual activity: No     Other Topics Concern    Not on file     Social History Narrative    Daily caffeine consumption, 2-3 servings a day    Lives with wife    Feels safe at home  Sees dentist reg    No living will       PhysicalExamination:  Vitals:   /60   Pulse 84   Resp 14   Ht 6' (1 829 m)   Wt 111 kg (244 lb)   SpO2 96%   BMI 33 09 kg/m²   General: Awake alert and oriented x 3, conversant without conversational dyspnea, NAD, normal affect  HEENT:  PERRL, Sclera noninjected, nonicteric OU, Nares patent,  no craniofacial abnormalities, Mucous membranes, moist, no oral lesions, normal dentition, Mallampati class 4  NECK: Trachea midline, no accessory muscle use, no stridor, no cervical or supraclavicular adenopathy, JVP not elevated  CARDIAC: Reg, single s1/S2, no m/r/g  PULM: CTA bilaterally no wheezing, rhonchi or rales  ABD: Normoactive bowel sounds, soft nontender, nondistended, no rebound, no rigidity, no guarding  EXT: No cyanosis, no clubbing, no edema, normal capillary refill  NEURO: no focal neurologic deficits, AAOx3, moving all extremities appropriately      Diagnostic Data:  Labs: I personally reviewed the most recent laboratory data pertinent to today's visit  I have personally reviewed pertinent lab results    Lab Results   Component Value Date    WBC 6 05 07/13/2018    HGB 11 4 (L) 07/13/2018    HCT 35 6 (L) 07/13/2018    MCV 85 07/13/2018     07/13/2018     Lab Results   Component Value Date    GLUCOSE 147 (H) 07/14/2018    CALCIUM 7 9 (L) 07/14/2018     07/14/2018    K 4 1 07/14/2018    CO2 25 07/14/2018     07/14/2018    BUN 10 07/14/2018    CREATININE 0 96 07/14/2018     Lab Results   Component Value Date     07/08/2018     Lab Results   Component Value Date    ALT 26 07/08/2018    AST 34 07/08/2018 ALKPHOS 45 (L) 07/08/2018    BILITOT 0 30 07/08/2018        6 minute walk in office today  Starting saturation - 95%   Starting HR - 84 bpm  Lowest saturation - 94%    Highest HR - 114 bpm  Ambulated - 168 m without the need of oxygen      PFT results: The most recent pulmonary function tests were reviewed  3/4/18 Study Interpretation:   · Severe airflow limitation  Significant improvement in airflow on vital capacity following the administration of bronchodilators      6 minute walk test in office 6/21/18  Saturation - 91% at start, dropped to 80% with ambulation, maintained at 90% with 3L nasal cannula     Imaging:  I personally reviewed the images on the [facility] system pertinent to today's visit  CXR - 6/6/18   IMPRESSION:  Persistent bilateral pulmonary airspace disease and interstitial prominence in a pattern most compatible with pneumonia and with no significant interval change since 6/3/2018     Other studies:  Echo -  SUMMARY  LEFT VENTRICLE:  Size was normal   Systolic function was normal  Ejection fraction was estimated to be 55 %    Wall thickness was normal   Left ventricular diastolic function parameters were normal      RIGHT VENTRICLE:  The size was normal   Systolic function was normal      LEFT ATRIUM:  The atrium was mildly dilated      TRICUSPID VALVE:  There was trace regurgitation      AORTA:  The root exhibited mild dilatation       Hyacinth Lee DO

## 2018-07-31 DIAGNOSIS — J40 BRONCHITIS: ICD-10-CM

## 2018-07-31 RX ORDER — FLUTICASONE PROPIONATE 50 MCG
2 SPRAY, SUSPENSION (ML) NASAL DAILY
Qty: 48 G | Refills: 1 | Status: SHIPPED | OUTPATIENT
Start: 2018-07-31 | End: 2022-01-20

## 2018-08-03 LAB
BACTERIA BRONCH AEROBE CULT: ABNORMAL
BACTERIA BRONCH AEROBE CULT: ABNORMAL
GRAM STN SPEC: ABNORMAL

## 2018-08-10 ENCOUNTER — APPOINTMENT (EMERGENCY)
Dept: RADIOLOGY | Facility: HOSPITAL | Age: 63
End: 2018-08-10
Payer: COMMERCIAL

## 2018-08-10 ENCOUNTER — HOSPITAL ENCOUNTER (EMERGENCY)
Facility: HOSPITAL | Age: 63
Discharge: HOME | End: 2018-08-10
Attending: EMERGENCY MEDICINE | Admitting: EMERGENCY MEDICINE
Payer: COMMERCIAL

## 2018-08-10 VITALS
SYSTOLIC BLOOD PRESSURE: 128 MMHG | HEIGHT: 72 IN | DIASTOLIC BLOOD PRESSURE: 70 MMHG | OXYGEN SATURATION: 93 % | HEART RATE: 92 BPM | TEMPERATURE: 98.8 F | RESPIRATION RATE: 18 BRPM | WEIGHT: 250 LBS | BODY MASS INDEX: 33.86 KG/M2

## 2018-08-10 DIAGNOSIS — R05.9 COUGH: Primary | ICD-10-CM

## 2018-08-10 DIAGNOSIS — R50.9 FEVER: ICD-10-CM

## 2018-08-10 LAB
ALBUMIN SERPL BCP-MCNC: 2.8 G/DL (ref 3.5–5)
ALP SERPL-CCNC: 57 U/L (ref 46–116)
ALT SERPL W P-5'-P-CCNC: 23 U/L (ref 12–78)
ANION GAP SERPL CALCULATED.3IONS-SCNC: 9 MMOL/L (ref 4–13)
APTT PPP: 33 SECONDS (ref 24–36)
AST SERPL W P-5'-P-CCNC: 16 U/L (ref 5–45)
BASOPHILS # BLD AUTO: 0.02 THOUSANDS/ΜL (ref 0–0.1)
BASOPHILS NFR BLD AUTO: 0 % (ref 0–1)
BILIRUB SERPL-MCNC: 0.4 MG/DL (ref 0.2–1)
BUN SERPL-MCNC: 18 MG/DL (ref 5–25)
CALCIUM SERPL-MCNC: 8.9 MG/DL (ref 8.3–10.1)
CHLORIDE SERPL-SCNC: 102 MMOL/L (ref 100–108)
CO2 SERPL-SCNC: 26 MMOL/L (ref 21–32)
CREAT SERPL-MCNC: 0.94 MG/DL (ref 0.6–1.3)
EOSINOPHIL # BLD AUTO: 0.8 THOUSAND/ΜL (ref 0–0.61)
EOSINOPHIL NFR BLD AUTO: 11 % (ref 0–6)
ERYTHROCYTE [DISTWIDTH] IN BLOOD BY AUTOMATED COUNT: 16.5 % (ref 11.6–15.1)
GFR SERPL CREATININE-BSD FRML MDRD: 85 ML/MIN/1.73SQ M
GLUCOSE SERPL-MCNC: 109 MG/DL (ref 65–140)
HCT VFR BLD AUTO: 38.4 % (ref 36.5–49.3)
HGB BLD-MCNC: 12.3 G/DL (ref 12–17)
IMM GRANULOCYTES # BLD AUTO: 0.04 THOUSAND/UL (ref 0–0.2)
IMM GRANULOCYTES NFR BLD AUTO: 1 % (ref 0–2)
INR PPP: 1.05 (ref 0.86–1.17)
LACTATE SERPL-SCNC: 1.5 MMOL/L (ref 0.5–2)
LYMPHOCYTES # BLD AUTO: 1.78 THOUSANDS/ΜL (ref 0.6–4.47)
LYMPHOCYTES NFR BLD AUTO: 25 % (ref 14–44)
MCH RBC QN AUTO: 27.7 PG (ref 26.8–34.3)
MCHC RBC AUTO-ENTMCNC: 32 G/DL (ref 31.4–37.4)
MCV RBC AUTO: 87 FL (ref 82–98)
MONOCYTES # BLD AUTO: 0.81 THOUSAND/ΜL (ref 0.17–1.22)
MONOCYTES NFR BLD AUTO: 11 % (ref 4–12)
NEUTROPHILS # BLD AUTO: 3.82 THOUSANDS/ΜL (ref 1.85–7.62)
NEUTS SEG NFR BLD AUTO: 52 % (ref 43–75)
NRBC BLD AUTO-RTO: 0 /100 WBCS
PLATELET # BLD AUTO: 225 THOUSANDS/UL (ref 149–390)
PMV BLD AUTO: 9.1 FL (ref 8.9–12.7)
POTASSIUM SERPL-SCNC: 4.1 MMOL/L (ref 3.5–5.3)
PROT SERPL-MCNC: 7.2 G/DL (ref 6.4–8.2)
PROTHROMBIN TIME: 13.1 SECONDS (ref 11.8–14.2)
RBC # BLD AUTO: 4.44 MILLION/UL (ref 3.88–5.62)
SODIUM SERPL-SCNC: 137 MMOL/L (ref 136–145)
WBC # BLD AUTO: 7.27 THOUSAND/UL (ref 4.31–10.16)

## 2018-08-10 PROCEDURE — 87147 CULTURE TYPE IMMUNOLOGIC: CPT | Performed by: EMERGENCY MEDICINE

## 2018-08-10 PROCEDURE — 85730 THROMBOPLASTIN TIME PARTIAL: CPT | Performed by: EMERGENCY MEDICINE

## 2018-08-10 PROCEDURE — 36415 COLL VENOUS BLD VENIPUNCTURE: CPT | Performed by: EMERGENCY MEDICINE

## 2018-08-10 PROCEDURE — 83605 ASSAY OF LACTIC ACID: CPT | Performed by: EMERGENCY MEDICINE

## 2018-08-10 PROCEDURE — 85610 PROTHROMBIN TIME: CPT | Performed by: EMERGENCY MEDICINE

## 2018-08-10 PROCEDURE — 83625 ASSAY OF LDH ENZYMES: CPT | Performed by: EMERGENCY MEDICINE

## 2018-08-10 PROCEDURE — 83615 LACTATE (LD) (LDH) ENZYME: CPT | Performed by: EMERGENCY MEDICINE

## 2018-08-10 PROCEDURE — 99284 EMERGENCY DEPT VISIT MOD MDM: CPT

## 2018-08-10 PROCEDURE — 85025 COMPLETE CBC W/AUTO DIFF WBC: CPT | Performed by: EMERGENCY MEDICINE

## 2018-08-10 PROCEDURE — 87040 BLOOD CULTURE FOR BACTERIA: CPT | Performed by: EMERGENCY MEDICINE

## 2018-08-10 PROCEDURE — 80053 COMPREHEN METABOLIC PANEL: CPT | Performed by: EMERGENCY MEDICINE

## 2018-08-10 PROCEDURE — 71046 X-RAY EXAM CHEST 2 VIEWS: CPT

## 2018-08-10 RX ORDER — BUDESONIDE AND FORMOTEROL FUMARATE DIHYDRATE 160; 4.5 UG/1; UG/1
2 AEROSOL RESPIRATORY (INHALATION) 2 TIMES DAILY
COMMUNITY
Start: 2018-08-01 | End: 2018-09-10

## 2018-08-10 RX ORDER — ELVITEGRAVIR, COBICISTAT, EMTRICITABINE, AND TENOFOVIR ALAFENAMIDE 150; 150; 200; 10 MG/1; MG/1; MG/1; MG/1
1 TABLET ORAL DAILY
COMMUNITY
Start: 2018-08-02 | End: 2019-05-23 | Stop reason: ALTCHOICE

## 2018-08-10 RX ORDER — SODIUM CHLORIDE 9 MG/ML
125 INJECTION, SOLUTION INTRAVENOUS CONTINUOUS
Status: DISCONTINUED | OUTPATIENT
Start: 2018-08-10 | End: 2018-08-10 | Stop reason: HOSPADM

## 2018-08-10 RX ORDER — SULFAMETHOXAZOLE AND TRIMETHOPRIM 800; 160 MG/1; MG/1
1 TABLET ORAL DAILY
COMMUNITY
End: 2019-01-28 | Stop reason: ALTCHOICE

## 2018-08-10 NOTE — ED PROVIDER NOTES
History  Chief Complaint   Patient presents with    Fever Immunocompromised     Patient was discharged  after treatment for PNA  Has new fever and cough  This is a 79-year-old male who presents for evaluation of fever up to 101 7 with diaphoresis and cough with clear sputum  He does have a history of HIV and was admitted here last month for Pneumocystis pneumonia he is currently on Bactrim once today he was on 2 pills twice a day up until 1 week ago denies any abdominal pain or vomiting he does have nausea no diarrhea no urinary symptoms or skin infections  History provided by:  Patient  Medical Problem   Location:  Fever and cough  Quality:  Clear sputum  Severity:  Moderate  Duration:  1 day  Timing:  Constant  Chronicity:  Recurrent  Context:  History of HIV with Pneumocystis  Associated symptoms: fever and nausea        Prior to Admission Medications   Prescriptions Last Dose Informant Patient Reported? Taking?    GENVOYA tablet 8/10/2018 at Unknown time  Yes Yes   Sig: Take 1 tablet by mouth daily (At lunch)   SYMBICORT 160-4 5 MCG/ACT inhaler Past Week at Unknown time  Yes Yes   Sig: Inhale 2 puffs 2 (two) times a day   Turmeric Curcumin 500 MG CAPS 8/10/2018 at Unknown time  Yes Yes   Sig: Take 1 capsule by mouth daily   albuterol (PROVENTIL HFA,VENTOLIN HFA) 90 mcg/act inhaler 8/10/2018 at Unknown time  Yes Yes   Sig: Inhale 2 puffs every 6 (six) hours as needed for wheezing   fluticasone (FLONASE) 50 mcg/act nasal spray 8/10/2018 at Unknown time  No Yes   Si sprays into each nostril daily   milk thistle 175 MG tablet 8/10/2018 at Unknown time  Yes Yes   Sig: Take 1,000 mg by mouth 2 (two) times a day   sulfamethoxazole-trimethoprim (BACTRIM DS) 800-160 mg per tablet 8/10/2018 at Unknown time  Yes Yes   Sig: Take 1 tablet by mouth daily   tamsulosin (FLOMAX) 0 4 mg 8/10/2018 at Unknown time  No Yes   Sig: Take 1 capsule (0 4 mg total) by mouth daily with dinner for 30 days Facility-Administered Medications: None       Past Medical History:   Diagnosis Date    Abscess, cheek     Last Assessed: 2/23/2016    Asthma     Chronic headaches     Constipation     [facility]     Cough     Difficulty attaining erection     Dyspnea     Enlarged prostate     Hemorrhoids     [facility] immunodeficiency virus infection) [facility] [address] [facility]     Migraine     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[facility])     Seasonal allergies     Slow urinary stream     Wheezing        Past Surgical History:   Procedure Laterality Date    ABSCESS DRAINAGE      MS BRONCHOSCOPY,DIAGNOSTIC N/A 7/11/2018    Procedure: BRONCHOSCOPY FLEXIBLE;  Surgeon: Jenny Todd DO;  Location: [facility];  Service: Pulmonary    SKIN LESION EXCISION      Excision of lesion in vestibule of mouth; Last Assessed: 2/23/2016       Family History   Problem Relation Age of Onset    Hypertension Mother     Glaucoma Mother     Cancer Father         Prostate    Diabetes Maternal Uncle     Glaucoma Maternal Uncle     Substance Abuse Neg Hx         neg fam hx    Mental illness Neg Hx         neg fam hx     I have reviewed and agree with the history as documented  Social History   Substance Use Topics    Smoking status: Former Smoker     Packs/day: 1 50     Years: 20 00     Types: Cigarettes     Quit date: 2/5/1993    Smokeless tobacco: Never Used    Alcohol use Yes      Comment: socially / 1-4 drinks/week        Review of Systems   Constitutional: Positive for diaphoresis and fever  Gastrointestinal: Positive for nausea  All other systems reviewed and are negative  Physical Exam  Physical Exam   Constitutional: He is oriented to person, place, and time  He appears well-developed and well-nourished  No distress  HENT:   Head: Normocephalic and atraumatic     Right Ear: External ear normal    Left Ear: External ear normal    Nose: Nose normal    Mouth/Throat: Oropharynx is clear and moist  Eyes: EOM are normal  Pupils are equal, round, and reactive to light  No scleral icterus  Neck: Neck supple  No tracheal deviation present  Cardiovascular: Regular rhythm and intact distal pulses  Exam reveals no gallop and no friction rub  No murmur heard  Tachycardic   Pulmonary/Chest: Effort normal and breath sounds normal  No stridor  No respiratory distress  He has no wheezes  He has no rales  Abdominal: Soft  Bowel sounds are normal  He exhibits no distension  There is no tenderness  There is no rebound and no guarding  Musculoskeletal: Normal range of motion  He exhibits no edema, tenderness or deformity  Neurological: He is alert and oriented to person, place, and time  No cranial nerve deficit  Skin: Skin is warm  No rash noted  He is diaphoretic  Psychiatric: He has a normal mood and affect  His behavior is normal  Thought content normal    Nursing note and vitals reviewed  Vital Signs  ED Triage Vitals [08/10/18 1938]   Temperature Pulse Respirations Blood Pressure SpO2   98 8 °F (37 1 °C) (!) 108 18 135/73 94 %      Temp Source Heart Rate Source Patient Position - Orthostatic VS BP Location FiO2 (%)   Oral Monitor Sitting Left arm --      Pain Score       No Pain           Vitals:    08/10/18 1938   BP: 135/73   Pulse: (!) 108   Patient Position - Orthostatic VS: Sitting       Visual Acuity      ED Medications  Medications   sodium chloride 0 9 % infusion (not administered)       Diagnostic Studies  Results Reviewed     Procedure Component Value Units Date/Time    Lactic acid, plasma [[id number]]  (Normal) Collected:  08/10/18 2005    Lab Status:  Final result Specimen:  Blood from Arm, Right Updated:  08/10/18 2100     LACTIC ACID 1 5 mmol/L     Narrative:         Result may be elevated if tourniquet was used during collection      Comprehensive metabolic panel [[id number]]  (Abnormal) Collected:  08/10/18 2005    Lab Status:  Final result Specimen:  Blood from Arm, Right Updated: 08/10/18 2057     Sodium 137 mmol/L      Potassium 4 1 mmol/L      Chloride 102 mmol/L      CO2 26 mmol/L      Anion Gap 9 mmol/L      BUN 18 mg/dL      Creatinine 0 94 mg/dL      Glucose 109 mg/dL      Calcium 8 9 mg/dL      AST 16 U/L      ALT 23 U/L      Alkaline Phosphatase 57 U/L      Total Protein 7 2 g/dL      Albumin 2 8 (L) g/dL      Total Bilirubin 0 40 mg/dL      eGFR 85 ml/min/1 73sq m     Narrative:         National Kidney Disease Education Program recommendations are as follows:  GFR calculation is accurate only with a steady state creatinine  Chronic Kidney disease less than 60 ml/min/1 73 sq  meters  Kidney failure less than 15 ml/min/1 73 sq  meters  Protime-INR [[id number]]  (Normal) Collected:  08/10/18 2005    Lab Status:  Final result Specimen:  Blood from Arm, Right Updated:  08/10/18 2054     Protime 13 1 seconds      INR 1 05    APTT [[id number]]  (Normal) Collected:  08/10/18 2005    Lab Status:  Final result Specimen:  Blood from Arm, Right Updated:  08/10/18 2054     PTT 33 seconds     CBC and differential [[id number]]  (Abnormal) Collected:  08/10/18 2005    Lab Status:  Final result Specimen:  Blood from Arm, Right Updated:  08/10/18 2042     WBC 7 27 Thousand/uL      RBC 4 44 Million/uL      Hemoglobin 12 3 g/dL      Hematocrit 38 4 %      MCV 87 fL      MCH 27 7 pg      MCHC 32 0 g/dL      RDW 16 5 (H) %      MPV 9 1 fL      Platelets 304 Thousands/uL      nRBC 0 /100 WBCs      Neutrophils Relative 52 %      Immat GRANS % 1 %      Lymphocytes Relative 25 %      Monocytes Relative 11 %      Eosinophils Relative 11 (H) %      Basophils Relative 0 %      Neutrophils Absolute 3 82 Thousands/µL      Immature Grans Absolute 0 04 Thousand/uL      Lymphocytes Absolute 1 78 Thousands/µL      Monocytes Absolute 0 81 Thousand/µL      Eosinophils Absolute 0 80 (H) Thousand/µL      Basophils Absolute 0 02 Thousands/µL     Blood culture #1 [[id number]] Collected:  08/10/18 2013    Lab Status:   In process Specimen:  Blood from Arm, Left Updated:  08/10/18 2040    Blood culture #2 [[id number]] Collected:  08/10/18 2005    Lab Status: In process Specimen:  Blood from Arm, Right Updated:  08/10/18 2040    Lactate dehydrogenase, isoenzymes [[id number]] Collected:  08/10/18 2005    Lab Status: In process Specimen:  Blood from Arm, Right Updated:  08/10/18 2038    UA w Reflex to Microscopic w Reflex to Culture [[id number]]     Lab Status:  No result Specimen:  Urine                  XR chest 2 views   Final Result by Olman Chambers MD (08/10 2034)   Interval improvement in bilateral airspace opacities consistent with improving multilobar pneumonia  Workstation performed: [id number]                    Procedures  Procedures       Phone Contacts  ED Phone Contact    ED Course  ED Course as of Aug 10 2128   Fri Aug 10, 2018   2124  Discussed case with Dr Rusty Epps from Infectious diseases  At this time patient's vitals look good without fever or hypoxia chest x-ray is improving and his labs look unremarkable  At this time he  recommends continuing the same medication including his HIV medication and daily dose Bactrim patient may have secondary viral infection he will follow up with the patient this week in the clinic  He was instructed to call or return if his symptoms worsen  Blood cultures were also drawn and will be followed                                  MDM  Number of Diagnoses or Management Options  Diagnosis management comments: Cough in a patient with COPD asthma and history of HIV will check chest x-ray to rule out pneumonia and basic lab work/septic evaluation       Amount and/or Complexity of Data Reviewed  Clinical lab tests: ordered  Tests in the radiology section of CPT®: ordered      CritCare Time    Disposition  Final diagnoses:   Cough   Fever - resolving pneumonia     Time reflects when diagnosis was documented in both MDM as applicable and the Disposition within this note     Time User Action Codes Description Comment    8/10/2018  9:26 PM Othelia Crease Add [R05] Cough     8/10/2018  9:26 PM Othelia Crease Add [R50 9] Fever     8/10/2018  9:26 PM Othelia Crease Modify [R50 9] Fever resolving pneumonia      ED Disposition     ED Disposition Condition Comment    Discharge  Tramaine Hendrickson discharge to home/self care  Condition at discharge: Stable        Follow-up Information     Follow up With Specialties Details Why Contact Info    Teresa Carey MD Infectious Diseases In 1 week  for recheck  call him if your symptoms worsened [address] [place] [address]            Patient's Medications   Discharge Prescriptions    No medications on file     No discharge procedures on file      ED Provider  Electronically Signed by           Radha Sparks DO  08/10/18 3415

## 2018-08-11 LAB
LDH SERPL-CCNC: 264 IU/L (ref 121–224)
LDH1 CFR SERPL ELPH: 25 % (ref 17–32)
LDH2 CFR SERPL ELPH: 34 % (ref 25–40)
LDH3 CFR SERPL ELPH: 24 % (ref 17–27)
LDH4 CFR SERPL ELPH: 12 % (ref 5–13)
LDH5 CFR SERPL ELPH: 5 % (ref 4–20)

## 2018-08-11 NOTE — DISCHARGE INSTRUCTIONS
Acute Cough   WHAT YOU NEED TO KNOW:   What is an acute cough? An acute cough can last up to 3 weeks  Common causes of an acute cough include a cold, allergies, or a lung infection  How is the cause of an acute cough diagnosed? Your healthcare provider will examine you and listen to your lungs  Tell your healthcare provider if you cough up any mucus, or have a fever or shortness of breath  Also tell your provider what makes the cough better or worse  Depending on your symptoms, you may need a chest x-ray  A sample of mucus may be collected and tested for infection  How is an acute cough treated? An acute cough usually goes away on its own  Ask your healthcare provider about medicines you can take to decrease your cough  You may need medicine to stop the cough, decrease swelling in your airways, or help open your airways  Medicine may also be given to help you cough up mucus  If you have an infection caused by bacteria, you may need antibiotics  What can I do to manage my cough? · Do not smoke and stay away from others who smoke  Nicotine and other chemicals in cigarettes and cigars can cause lung damage and make your cough worse  Ask your healthcare provider for information if you currently smoke and need help to quit  E-cigarettes or smokeless tobacco still contain nicotine  Talk to your healthcare provider before you use these products  · Drink extra liquids as directed  Liquids will help thin and loosen mucus so you can cough it up  Liquids will also help prevent dehydration  Examples of good liquids to drink include water, fruit juice, and broth  Do not drink liquids that contain caffeine  Caffeine can increase your risk for dehydration  Ask your healthcare provider how much liquid to drink each day  · Rest as directed  Do not do activities that make your cough worse, such as exercise  · Use a humidifier or vaporizer    Use a cool mist humidifier or a vaporizer to increase air moisture in your home  This may make it easier for you to breathe and help decrease your cough  · Eat 2 to 5 mL of honey 2 times each day  Honey can help thin mucus and decrease your cough  · Use cough drops or lozenges  These can help decrease throat irritation and your cough  When should I seek immediate care? · You have trouble breathing or feel short of breath  · You cough up blood, or you see blood in your mucus  · You faint or feel weak or dizzy  · You have chest pain when you cough or take a deep breath  · You have new wheezing  When should I contact my healthcare provider? · You have a fever  · Your cough lasts longer than 4 weeks  · Your symptoms do not improve with treatment  · You have questions or concerns about your condition or care  CARE AGREEMENT:   You have the right to help plan your care  Learn about your health condition and how it may be treated  Discuss treatment options with your caregivers to decide what care you want to receive  You always have the right to refuse treatment  The above information is an  only  It is not intended as medical advice for individual conditions or treatments  Talk to your doctor, nurse or pharmacist before following any medical regimen to see if it is safe and effective for you  © 2017 [employer]  Information is for End User's use only and may not be sold, redistributed or otherwise used for commercial purposes  All illustrations and images included in CareNotes® are the copyrighted property of A [employer] A M , Inc  or Arnaldo José Miguel  Fever in Adults   AMBULATORY CARE:   A fever  is an increase in your body temperature  Normal body temperature is 98 6°F (37°C)  Fever is generally defined as greater than 100 4°F (38°C)  Common causes include an infection, injury, or disease such as arthritis    Other signs and symptoms may include any of the following:   · Chills and shivers     · Muscle stiffness    · Weight loss    · Night sweats    · Fever that comes and goes    · Fever that is higher in the morning  Seek care immediately if:   · Your fever does not go away or gets worse even after treatment  · You have a stiff neck and a bad headache  · You are confused  You may not be able to think clearly or remember things like you normally do  · Your heart beats faster than usual even after treatment  · You have shortness of breath or chest pain when you breathe  · You urinate small amounts or not at all  · Your skin, lips, or nails turn blue  Contact your healthcare provider if:   · You have abdominal pain or you feel bloated  · You have nausea or are vomiting  · You have pain or burning when you urinate, or you have pain in your back  · You have questions or concerns about your condition or care  Treatment for a fever  may include any of the following:  · NSAIDs , such as ibuprofen, help decrease swelling, pain, and fever  This medicine is available with or without a doctor's order  NSAIDs can cause stomach bleeding or kidney problems in certain people  If you take blood thinner medicine, always ask if NSAIDs are safe for you  Always read the medicine label and follow directions  Do not give these medicines to children under 10months of age without direction from your child's healthcare provider  · Acetaminophen  decreases pain and fever  It is available without a doctor's order  Ask how much to take and how often to take it  Follow directions  Read the labels of all other medicines you are using to see if they also contain acetaminophen, or ask your doctor or pharmacist  Acetaminophen can cause liver damage if not taken correctly  Do not use more than 4 grams (4,000 milligrams) total of acetaminophen in one day  · Antibiotics  may be given if you have an infection caused by bacteria  · Take your medicine as directed    Contact your healthcare provider if you think your medicine is not helping or if you have side effects  Tell him of her if you are allergic to any medicine  Keep a list of the medicines, vitamins, and herbs you take  Include the amounts, and when and why you take them  Bring the list or the pill bottles to follow-up visits  Carry your medicine list with you in case of an emergency  Self-care:   · Drink more liquids as directed  A fever makes you sweat  This can increase your risk for dehydration  Liquids can help prevent dehydration  ¨ Drink at least 6 to 8 eight-ounce cups of clear liquids each day  Drink water, juice, or broth  Do not drink sports drinks  They may contain caffeine  ¨ Ask your healthcare provider if you should drink an oral rehydration solution (ORS)  An ORS has the right amounts of water, salts, and sugar you need to replace body fluids  · Dress in lightweight clothes  Shivers may be a sign that your fever is rising  Do not put extra blankets or clothes on  This may cause your fever to rise even higher  Dress in light, comfortable clothing  Use a lightweight blanket or sheet when you sleep  Change your clothes, blanket, or sheets if they get wet  · Cool yourself safely  Take a bath in cool or lukewarm water  Use an ice pack wrapped in a small towel or wet a washcloth with cool water  Place the ice pack or wet washcloth on your forehead or the back of your neck  Follow up with your healthcare provider as directed:  Write down your questions so you remember to ask them during your visits  © 2017 [facility] Information is for End User's use only and may not be sold, redistributed or otherwise used for commercial purposes  All illustrations and images included in CareNotes® are the copyrighted property of [employer]  or Arnaldo Valdez  The above information is an  only  It is not intended as medical advice for individual conditions or treatments   Talk to your doctor, nurse or pharmacist before following any medical regimen to see if it is safe and effective for you

## 2018-08-12 NOTE — PROGRESS NOTES
Spoke with patient, states he feels improved however fever still present  This is likely a culture contaminant as only 1/2 cultures are positive thus far  Will continue to follow  Pt is urged to f/u with ID specialist tomorrow, if unable to and still febrile, recommend return to ED for evaluation

## 2018-08-13 LAB — FUNGUS SPEC CULT: NORMAL

## 2018-08-13 NOTE — PROGRESS NOTES
Spoke with patient today, no fevers today  He states he will be f/u with Dr Jonny Oshea for recheck  I advised him to return to ER if he continues with fevers

## 2018-08-14 LAB
BACTERIA BLD CULT: ABNORMAL
GRAM STN SPEC: ABNORMAL

## 2018-08-16 LAB — BACTERIA BLD CULT: NORMAL

## 2018-08-17 ENCOUNTER — VBI (OUTPATIENT)
Dept: ADMINISTRATIVE | Facility: OTHER | Age: 63
End: 2018-08-17

## 2018-08-28 LAB
MYCOBACTERIUM SPEC CULT: NORMAL
RHODAMINE-AURAMINE STN SPEC: NORMAL

## 2018-09-07 ENCOUNTER — HOSPITAL ENCOUNTER (OUTPATIENT)
Dept: RADIOLOGY | Facility: HOSPITAL | Age: 63
Discharge: HOME | End: 2018-09-07
Attending: INTERNAL MEDICINE
Payer: COMMERCIAL

## 2018-09-07 ENCOUNTER — TRANSCRIBE ORDERS (OUTPATIENT)
Dept: ADMINISTRATIVE | Facility: HOSPITAL | Age: 63
End: 2018-09-07

## 2018-09-07 DIAGNOSIS — J96.10 CHRONIC RESPIRATORY FAILURE, UNSPECIFIED WHETHER WITH HYPOXIA OR HYPERCAPNIA (HCC): Primary | ICD-10-CM

## 2018-09-07 DIAGNOSIS — J96.10 CHRONIC RESPIRATORY FAILURE, UNSPECIFIED WHETHER WITH HYPOXIA OR HYPERCAPNIA (HCC): ICD-10-CM

## 2018-09-07 PROCEDURE — 71046 X-RAY EXAM CHEST 2 VIEWS: CPT

## 2018-09-10 ENCOUNTER — DOCUMENTATION (OUTPATIENT)
Dept: PULMONOLOGY | Facility: CLINIC | Age: 63
End: 2018-09-10

## 2018-09-10 ENCOUNTER — OFFICE VISIT (OUTPATIENT)
Dept: PULMONOLOGY | Facility: CLINIC | Age: 63
End: 2018-09-10
Payer: COMMERCIAL

## 2018-09-10 VITALS
HEIGHT: 72 IN | BODY MASS INDEX: 33.32 KG/M2 | TEMPERATURE: 97.7 F | DIASTOLIC BLOOD PRESSURE: 72 MMHG | WEIGHT: 246 LBS | HEART RATE: 81 BPM | OXYGEN SATURATION: 97 % | SYSTOLIC BLOOD PRESSURE: 112 MMHG

## 2018-09-10 DIAGNOSIS — J45.909 UNCOMPLICATED ASTHMA, UNSPECIFIED ASTHMA SEVERITY, UNSPECIFIED WHETHER PERSISTENT: ICD-10-CM

## 2018-09-10 DIAGNOSIS — G47.33 OSA (OBSTRUCTIVE SLEEP APNEA): ICD-10-CM

## 2018-09-10 DIAGNOSIS — J96.01 ACUTE RESPIRATORY FAILURE WITH HYPOXEMIA (HCC): Primary | ICD-10-CM

## 2018-09-10 DIAGNOSIS — J45.40 MODERATE PERSISTENT ASTHMA WITHOUT COMPLICATION: Chronic | ICD-10-CM

## 2018-09-10 DIAGNOSIS — B20 HIV DISEASE (HCC): ICD-10-CM

## 2018-09-10 PROBLEM — J96.11 CHRONIC RESPIRATORY FAILURE WITH HYPOXIA (HCC): Chronic | Status: RESOLVED | Noted: 2018-07-14 | Resolved: 2018-09-10

## 2018-09-10 PROBLEM — J44.9 COPD (CHRONIC OBSTRUCTIVE PULMONARY DISEASE) (HCC): Chronic | Status: RESOLVED | Noted: 2018-07-07 | Resolved: 2018-09-10

## 2018-09-10 PROCEDURE — 94060 EVALUATION OF WHEEZING: CPT | Performed by: INTERNAL MEDICINE

## 2018-09-10 PROCEDURE — 99214 OFFICE O/P EST MOD 30 MIN: CPT | Performed by: INTERNAL MEDICINE

## 2018-09-10 NOTE — PROGRESS NOTES
Progress note - Pulmonary Medicine   Kelli Munroe 58 y o  male MRN: [id number]       Impression & Plan:   57 y/o M with PMHx of BPH, Hay fever, alcohol abuse, ZAIRA and never tried CPAP, obesity who comes in for follow up of PJP pneumonia, HIV and severe obstructive lung disease  1   Acute hypoxic respiratory failure now resolved - Secondary to PJP pneumonia and Stenotrophomonas recently diagnosed on bronchoscopy  CXR with significant improvement  -  Completed Bactrim and prednisone       -  No additional therapy required at this time  Would consider repeat CT chest in the future to ensure complete resolution         -  Will D/C oxygen as he feels even better than last time and his 6 minute walk did not require the use of oxygen at last visit  2    HIV (CD4 - 75)       - now on Genvoya therapy  Repeat labs pending as per ID        3    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  Previously   PFTs demonstrated severe obstruction noted with bronchodilator response   IgE mildly elevated but [place] panel with many different allergens  -  Will reduced Symbicort to ICS along  Trial of Asmanex 220 2 puffs BID to see if this is adequate to control disease      -  Spiriva and Singular held  -  Continue PRN albuterol    4  History of ZAIRA - Discussed this with him to see if he is open for pursuit of it  At this time he elects not to have it done  HPI:    Kelli Munroe presents today for follow-up for his respiratory disease  He states that he has been feeling very well  He denies significant dyspnea or cough  He does not feel limited form a breathing standpoint  He is thinking that he would like to reduce his medications further and will discontinue his oxygen  He will start a trial of asmanex in place of symbicort  He continues to hold on spiriva and singulair          Review of Systems:  Review of Systems   Constitutional: Negative for appetite change and fever  HENT: Negative for ear pain, postnasal drip, rhinorrhea, sneezing, sore throat and trouble swallowing  Eyes: Negative  Respiratory: Negative for apnea, cough, choking, shortness of breath and wheezing  Cardiovascular: Negative for chest pain  Gastrointestinal: Negative  Endocrine: Negative  Genitourinary: Negative  Musculoskeletal: Negative for back pain and myalgias  Skin: Negative  Allergic/Immunologic: Negative  Neurological: Negative  Negative for tremors, seizures, syncope and headaches  Psychiatric/Behavioral: Negative  Social history updates:  History   Smoking Status    Former Smoker    Packs/day: 1 50    Years: 20 00    Types: Cigarettes    Quit date: 2/5/1993   Smokeless Tobacco    Never Used     Social History     Social History    Marital status: /Civil Union     Spouse name: N/A    Number of children: N/A    Years of education: N/A     Occupational History    Not on file  Social History Main Topics    Smoking status: Former Smoker     Packs/day: 1 50     Years: 20 00     Types: Cigarettes     Quit date: 2/5/1993    Smokeless tobacco: Never Used    Alcohol use Yes      Comment: socially / 1-4 drinks/week    Drug use: No    Sexual activity: No     Other Topics Concern    Not on file     Social History Narrative    Daily caffeine consumption, 2-3 servings a day    Lives with wife    Feels safe at home      Sees dentist reg    No living will       PhysicalExamination:  Vitals:   /72 (BP Location: Right arm, Patient Position: Sitting)   Pulse 81   Temp 97 7 °F (36 5 °C) (Tympanic)   Ht 6' (1 829 m)   Wt 112 kg (246 lb)   SpO2 97%   BMI 33 36 kg/m²   General: Awake alert and oriented x 3, conversant without conversational dyspnea, NAD, normal affect  HEENT:  PERRL, Sclera noninjected, nonicteric OU, Nares patent,  no craniofacial abnormalities, Mucous membranes, moist, no oral lesions, normal dentition  NECK: Trachea midline, no accessory muscle use, no stridor, no cervical or supraclavicular adenopathy, JVP not elevated  CARDIAC: Reg, single s1/S2, no m/r/g  PULM: CTA bilaterally no wheezing, rhonchi or rales  ABD: Normoactive bowel sounds, soft nontender, nondistended, no rebound, no rigidity, no guarding  EXT: No cyanosis, no clubbing, no edema, normal capillary refill  NEURO: no focal neurologic deficits, AAOx3, moving all extremities appropriately    Diagnostic Data:  Labs: I personally reviewed the most recent laboratory data pertinent to today's visit  I have personally reviewed pertinent lab results  Lab Results   Component Value Date    WBC 7 27 08/10/2018    HGB 12 3 08/10/2018    HCT 38 4 08/10/2018    MCV 87 08/10/2018     08/10/2018     Lab Results   Component Value Date    GLUCOSE 100 (H) 06/09/2017    CALCIUM 8 9 08/10/2018     08/10/2018    K 4 1 08/10/2018    CO2 26 08/10/2018     08/10/2018    BUN 18 08/10/2018    CREATININE 0 94 08/10/2018     Lab Results   Component Value Date     07/08/2018     Lab Results   Component Value Date    ALT 23 08/10/2018    AST 16 08/10/2018    ALKPHOS 57 08/10/2018    BILITOT 0 3 06/09/2017     6 minute walk in office 7/27/18  Starting saturation - 95%   Starting HR - 84 bpm  Lowest saturation - 94%    Highest HR - 114 bpm  Ambulated - 168 m without the need of oxygen        PFT results: The most recent pulmonary function tests were reviewed  Spirometry today  Pre  FEV1/FVC - 61%  FEV1 - 3 03 (81%)  FVC - 4 97 (100%)  Post  FEV1/FVC - 68%  FEV1 - 3 26 (88%)  FVC - 4 79 (96%)  Change - 8%    3/4/18 Study Interpretation:   · Severe airflow limitation    Significant improvement in airflow on vital capacity following the administration of bronchodilators      6 minute walk test in office 6/21/18  Saturation - 91% at start, dropped to 80% with ambulation, maintained at 90% with 3L nasal cannula     Imaging:  I personally reviewed the images on the [facility] system pertinent to today's visit  CXR - 6/6/18   IMPRESSION:  Persistent bilateral pulmonary airspace disease and interstitial prominence in a pattern most compatible with pneumonia and with no significant interval change since 6/3/2018     Other studies:  Echo -  SUMMARY  LEFT VENTRICLE:  Size was normal   Systolic function was normal  Ejection fraction was estimated to be 55 %    Wall thickness was normal   Left ventricular diastolic function parameters were normal      RIGHT VENTRICLE:  The size was normal   Systolic function was normal      LEFT ATRIUM:  The atrium was mildly dilated      TRICUSPID VALVE:  There was trace regurgitation      AORTA:  The root exhibited mild dilatation       Hyacinth Lee DO

## 2018-09-10 NOTE — LETTER
September 10, 2018     Napolean Kussmaul, MD [id number]    Patient: Tl Mejia   YOB: 1955   Date of Visit: 9/10/2018       Dear Dr Coco Winston:    Thank you for referring Sarah East to me for evaluation  Below are my notes for this consultation  If you have questions, please do not hesitate to call me  I look forward to following your patient along with you  Sincerely,        Randolph Pacheco DO        CC: No Recipients  Randolph Pacheco DO  9/10/2018  8:36 PM  Sign at close encounter  Progress note - Pulmonary Medicine   Tl Mejia 58 y o  male MRN: [id number]       Impression & Plan:   57 y/o M with PMHx of BPH, Hay fever, alcohol abuse, ZAIRA and never tried CPAP, obesity who comes in for follow up of PJP pneumonia, HIV and severe obstructive lung disease  1   Acute hypoxic respiratory failure now resolved - Secondary to PJP pneumonia and Stenotrophomonas recently diagnosed on bronchoscopy  CXR with significant improvement  -    Completed Bactrim and prednisone       -  No additional therapy required at this time  Would consider repeat CT chest in the future to ensure complete resolution         -  Will D/C oxygen as he feels even better than last time and his 6 minute walk did not require the use of oxygen at last visit  2    HIV (CD4 - 75)       - now on Genvoya therapy  Repeat  labs pending as per ID        3     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  Previously   PFTs demonstrated severe obstruction noted with bronchodilator response   IgE mildly elevated but [place] panel with many different allergens  -  Will reduced Symbicort to ICS along  Trial of Asmanex 220 2 puffs BID to see if this is adequate to control disease      -  Spiriva and Singular held  -  Continue PRN albuterol    4  History of ZAIRA - Discussed this with him to see if he is open for pursuit of it    At this time he elects not to have it done  HPI:    Mei Melgoza presents today for follow-up for his respiratory disease  He states that he has been feeling very well  He denies significant dyspnea or cough  He does not feel limited form a breathing standpoint  He is thinking that he would like to reduce his medications further and will discontinue his oxygen  He will start a trial of asmanex in place of symbicort  He continues to hold on spiriva and singulair  Review of Systems:  Review of Systems   Constitutional: Negative for appetite change and fever  HENT: Negative for ear pain, postnasal drip, rhinorrhea, sneezing, sore throat and trouble swallowing  Eyes: Negative  Respiratory: Negative for apnea, cough, choking, shortness of breath and wheezing  Cardiovascular: Negative for chest pain  Gastrointestinal: Negative  Endocrine: Negative  Genitourinary: Negative  Musculoskeletal: Negative for back pain and myalgias  Skin: Negative  Allergic/Immunologic: Negative  Neurological: Negative  Negative for tremors, seizures, syncope and headaches  Psychiatric/Behavioral: Negative  Social history updates:  History   Smoking Status    Former Smoker    Packs/day: 1 50    Years: 20 00    Types: Cigarettes    Quit date: 2/5/1993   Smokeless Tobacco    Never Used     Social History     Social History    Marital status: /Civil Union     Spouse name: N/A    Number of children: N/A    Years of education: N/A     Occupational History    Not on file       Social History Main Topics    Smoking status: Former Smoker     Packs/day: 1 50     Years: 20 00     Types: Cigarettes     Quit date: 2/5/1993    Smokeless tobacco: Never Used    Alcohol use Yes      Comment: socially / 1-4 drinks/week    Drug use: No    Sexual activity: No     Other Topics Concern    Not on file     Social History Narrative    Daily caffeine consumption, 2-3 servings a day    Lives with wife    Feels safe at home  Sees dentist reg    No living will       PhysicalExamination:  Vitals:   /72 (BP Location: Right arm, Patient Position: Sitting)   Pulse 81   Temp 97 7 °F (36 5 °C) (Tympanic)   Ht 6' (1 829 m)   Wt 112 kg (246 lb)   SpO2 97%   BMI 33 36 kg/m²    General: Awake alert and oriented x 3, conversant without conversational dyspnea, NAD, normal affect  HEENT:  PERRL, Sclera noninjected, nonicteric OU, Nares patent,  no craniofacial abnormalities, Mucous membranes, moist, no oral lesions, normal dentition  NECK: Trachea midline, no accessory muscle use, no stridor, no cervical or supraclavicular adenopathy, JVP not elevated  CARDIAC: Reg, single s1/S2, no m/r/g  PULM: CTA bilaterally no wheezing, rhonchi or rales  ABD: Normoactive bowel sounds, soft nontender, nondistended, no rebound, no rigidity, no guarding  EXT: No cyanosis, no clubbing, no edema, normal capillary refill  NEURO: no focal neurologic deficits, AAOx3, moving all extremities appropriately    Diagnostic Data:  Labs: I personally reviewed the most recent laboratory data pertinent to today's visit  I have personally reviewed pertinent lab results  Lab Results   Component Value Date    WBC 7 27 08/10/2018    HGB 12 3 08/10/2018    HCT 38 4 08/10/2018    MCV 87 08/10/2018     08/10/2018     Lab Results   Component Value Date    GLUCOSE 100 (H) 06/09/2017    CALCIUM 8 9 08/10/2018     08/10/2018    K 4 1 08/10/2018    CO2 26 08/10/2018     08/10/2018    BUN 18 08/10/2018    CREATININE 0 94 08/10/2018     Lab Results   Component Value Date     07/08/2018     Lab Results   Component Value Date    ALT 23 08/10/2018    AST 16 08/10/2018    ALKPHOS 57 08/10/2018    BILITOT 0 3 06/09/2017     6 minute walk in office 7/27/18  Starting saturation - 95%   Starting HR - 84 bpm  Lowest saturation - 94%    Highest HR - 114 bpm  Ambulated - 168 m without the need of oxygen        PFT results:   The most recent pulmonary function tests were reviewed  Spirometry today  Pre  FEV1/FVC - 61%  FEV1 - 3 03 (81%)  FVC - 4 97 (100%)  Post  FEV1/FVC - 68%  FEV1 - 3 26 (88%)  FVC - 4 79 (96%)  Change - 8%    3/4/18 Study Interpretation:   · Severe airflow limitation  Significant improvement in airflow on vital capacity following the administration of bronchodilators      6 minute walk test in office 6/21/18  Saturation - 91% at start, dropped to 80% with ambulation, maintained at 90% with 3L nasal cannula     Imaging:  I personally reviewed the images on the [facility] system pertinent to today's visit  CXR - 6/6/18   IMPRESSION:  Persistent bilateral pulmonary airspace disease and interstitial prominence in a pattern most compatible with pneumonia and with no significant interval change since 6/3/2018     Other studies:  Echo -  SUMMARY  LEFT VENTRICLE:  Size was normal   Systolic function was normal  Ejection fraction was estimated to be 55 %    Wall thickness was normal   Left ventricular diastolic function parameters were normal      RIGHT VENTRICLE:  The size was normal   Systolic function was normal      LEFT ATRIUM:  The atrium was mildly dilated      TRICUSPID VALVE:  There was trace regurgitation      AORTA:  The root exhibited mild dilatation       Hodan Medrano DO

## 2018-09-18 NOTE — TELEPHONE ENCOUNTER
Jose Blas    ED Visit Information     Ed visit date: 08/10/2018  Diagnosis Description: Cough; Fever [resolving pneumonia]   In Network? Yes [facility]  Discharge status: Home  Discharged with meds ? No  Number of ED visits to date: 1  ED Severity:2     Outreach Information    Outreach successful: Yes 1  Date letter mailed:N/a  Date Finalized:08/17/2018   Care Coordination    Follow up appointment with pcp: no None scheduled  Transportation issues ? No    [facility] [employer] type:  7 Day Outreach  Emergent necessity warranted by diagnosis:  Yes  [facility] PCP:  Yes  Transportation:  Friend/Family Transport  Called PCP first?:  No  Feels able to call PCP for urgent problems ?:  Yes  Understands what emergencies can be handled by PCP ?:  Yes  Ever any problems getting appointment with PCP for minor emergency/urgency problems?:  No  Practice Contacted Patient ?:  No  Pt had ED follow up with pcp/staff ?:  No    Seen for follow-up out of network ?:  No  Urgent care Education?:  No  08/17/2018 11:47 AM Phone (Christie Tirado) [facility] [phone] (H)  Call Complete - Personal communication with patient:    Patient stated that he is feeling well s/p ED visit on 08/10/2018 for fever and cough  Patient was discharge without medication and was advised to f/u with infectious diseases 1 week after ED visit  Patient stated that his cough is persists but fever has resolved  Patient is scheduled wit Dr Ruma Hook in October and does not feel that he requires a sooner appt at this time  Patient is aware of his nearest [place] [id number] urgent care facility

## 2018-10-05 ENCOUNTER — OFFICE VISIT (OUTPATIENT)
Dept: FAMILY MEDICINE CLINIC | Facility: HOSPITAL | Age: 63
End: 2018-10-05
Payer: COMMERCIAL

## 2018-10-05 VITALS
SYSTOLIC BLOOD PRESSURE: 110 MMHG | RESPIRATION RATE: 16 BRPM | HEART RATE: 94 BPM | BODY MASS INDEX: 34 KG/M2 | HEIGHT: 72 IN | WEIGHT: 251 LBS | DIASTOLIC BLOOD PRESSURE: 78 MMHG

## 2018-10-05 DIAGNOSIS — Z23 NEED FOR INFLUENZA VACCINATION: ICD-10-CM

## 2018-10-05 DIAGNOSIS — J45.909 UNCOMPLICATED ASTHMA, UNSPECIFIED ASTHMA SEVERITY, UNSPECIFIED WHETHER PERSISTENT: ICD-10-CM

## 2018-10-05 DIAGNOSIS — Z23 NEED FOR SHINGLES VACCINE: ICD-10-CM

## 2018-10-05 DIAGNOSIS — G25.0 BENIGN ESSENTIAL TREMOR: ICD-10-CM

## 2018-10-05 DIAGNOSIS — J45.40 MODERATE PERSISTENT ASTHMA WITHOUT COMPLICATION: Primary | Chronic | ICD-10-CM

## 2018-10-05 PROCEDURE — 90682 RIV4 VACC RECOMBINANT DNA IM: CPT

## 2018-10-05 PROCEDURE — 90471 IMMUNIZATION ADMIN: CPT

## 2018-10-05 PROCEDURE — 1036F TOBACCO NON-USER: CPT | Performed by: INTERNAL MEDICINE

## 2018-10-05 PROCEDURE — 99214 OFFICE O/P EST MOD 30 MIN: CPT | Performed by: INTERNAL MEDICINE

## 2018-10-05 RX ORDER — PRIMIDONE 50 MG/1
25 TABLET ORAL
Qty: 15 TABLET | Refills: 1 | Status: SHIPPED | OUTPATIENT
Start: 2018-10-05 | End: 2019-01-28 | Stop reason: ALTCHOICE

## 2018-10-05 NOTE — PATIENT INSTRUCTIONS
If you do not notice any improvement in year tremor with primidone 25 mg in the evening in 1 week then increase the dose to 50 mg in the evening  Call me if there is no improvement on 50 mg either

## 2018-10-05 NOTE — ASSESSMENT & PLAN NOTE
Pt has had tremor for years in arms, father and sister has it as well, worse when using screwdriver for example,    Suspect benign essential tremor, will try primidone as pt has asthma and SBP is 110

## 2018-10-05 NOTE — PROGRESS NOTES
Assessment/Plan: Moderate persistent asthma without complication  Breathing is improving gradually on asmanex, hasn't used albuterol recently  Benign essential tremor  Pt has had tremor for years in arms, father and sister has it as well, worse when using screwdriver for example,    Suspect benign essential tremor, will try primidone as pt has asthma and SBP is 110  Diagnoses and all orders for this visit:    Moderate persistent asthma without complication    Need for influenza vaccination  -     influenza vaccine, [id number], quadrivalent, recombinant, PF, 0 5 mL, for patients 18 yr+ (FLUBLOK)    Benign essential tremor  -     primidone (MYSOLINE) 50 mg tablet; Take 0 5 tablets (25 mg total) by mouth daily at bedtime    Uncomplicated asthma, unspecified asthma severity, unspecified whether persistent  -     mometasone (ASMANEX TWISTHALER) 220 MCG/INH inhaler; Inhale 2 puffs 2 (two) times a day for 90 days Rinse mouth after use  Subjective:      Patient ID: Andre Ander is a 58 y o  male  Asthma   There is no chest tightness, cough, difficulty breathing, hemoptysis, shortness of breath or sputum production  This is a chronic problem  The current episode started more than 1 year ago  The problem has been gradually improving  Pertinent negatives include no chest pain or fever  His past medical history is significant for asthma  The following portions of the patient's history were reviewed and updated as appropriate: current medications, past family history, past medical history, past social history, past surgical history and problem list     Review of Systems   Constitutional: Negative for chills and fever  Respiratory: Negative for cough, hemoptysis, sputum production and shortness of breath  Cardiovascular: Negative for chest pain  Gastrointestinal: Negative for abdominal pain  Endocrine: Negative for heat intolerance  Neurological: Positive for tremors   Negative for weakness and numbness  Objective:    /78   Pulse 94   Resp 16   Ht 6' (1 829 m)   Wt 114 kg (251 lb)   BMI 34 04 kg/m²      Physical Exam   Constitutional: He is oriented to person, place, and time  He appears well-developed  No distress  HENT:   Head: Normocephalic  Mouth/Throat: Oropharynx is clear and moist    Eyes: Conjunctivae are normal    Neck: Neck supple  Cardiovascular: Normal rate and regular rhythm  Pulmonary/Chest: Effort normal  No respiratory distress  He has no wheezes  He has no rales  Lymphadenopathy:     He has no cervical adenopathy  Neurological: He is alert and oriented to person, place, and time  He displays normal reflexes  No cranial nerve deficit  He exhibits normal muscle tone  Mild tremor of the right hand   Skin: Skin is warm and dry  No rash noted  Psychiatric: He has a normal mood and affect  Vitals reviewed            Tonie Brothers MD

## 2018-11-12 DIAGNOSIS — J45.909 ACUTE ASTHMATIC BRONCHITIS: ICD-10-CM

## 2018-11-12 RX ORDER — BUDESONIDE AND FORMOTEROL FUMARATE DIHYDRATE 160; 4.5 UG/1; UG/1
AEROSOL RESPIRATORY (INHALATION)
Qty: 1 INHALER | Refills: 5 | Status: SHIPPED | OUTPATIENT
Start: 2018-11-12 | End: 2019-01-28 | Stop reason: ALTCHOICE

## 2018-12-20 DIAGNOSIS — N40.1 NOCTURIA ASSOCIATED WITH BENIGN PROSTATIC HYPERPLASIA: ICD-10-CM

## 2018-12-20 DIAGNOSIS — R35.1 NOCTURIA ASSOCIATED WITH BENIGN PROSTATIC HYPERPLASIA: ICD-10-CM

## 2018-12-21 RX ORDER — TAMSULOSIN HYDROCHLORIDE 0.4 MG/1
CAPSULE ORAL
Qty: 90 CAPSULE | Refills: 2 | Status: SHIPPED | OUTPATIENT
Start: 2018-12-21 | End: 2019-01-28 | Stop reason: SDDI

## 2019-01-25 RX ORDER — MOMETASONE FUROATE 200 UG/1
AEROSOL RESPIRATORY (INHALATION)
COMMUNITY
Start: 2018-12-12 | End: 2019-01-28 | Stop reason: SDUPTHER

## 2019-01-28 ENCOUNTER — OFFICE VISIT (OUTPATIENT)
Dept: PULMONOLOGY | Facility: CLINIC | Age: 64
End: 2019-01-28
Payer: COMMERCIAL

## 2019-01-28 VITALS
HEIGHT: 72 IN | WEIGHT: 269 LBS | BODY MASS INDEX: 36.44 KG/M2 | RESPIRATION RATE: 14 BRPM | TEMPERATURE: 97.1 F | OXYGEN SATURATION: 95 % | DIASTOLIC BLOOD PRESSURE: 80 MMHG | SYSTOLIC BLOOD PRESSURE: 140 MMHG | HEART RATE: 70 BPM

## 2019-01-28 DIAGNOSIS — G47.33 OSA (OBSTRUCTIVE SLEEP APNEA): ICD-10-CM

## 2019-01-28 DIAGNOSIS — J45.40 MODERATE PERSISTENT ASTHMA WITHOUT COMPLICATION: Chronic | ICD-10-CM

## 2019-01-28 DIAGNOSIS — J30.81 CHRONIC ALLERGIC RHINITIS DUE TO ANIMAL HAIR AND DANDER: ICD-10-CM

## 2019-01-28 DIAGNOSIS — R91.8 PULMONARY INFILTRATES: Primary | ICD-10-CM

## 2019-01-28 PROCEDURE — 99213 OFFICE O/P EST LOW 20 MIN: CPT | Performed by: INTERNAL MEDICINE

## 2019-01-28 NOTE — LETTER
January 29, 2019     [place], Lydia Eugene  [address] [place] [id number]    Patient: Luciano Tavarez   YOB: 1955   Date of Visit: 1/28/2019       Dear Dr Sharif Deutsch: Thank you for referring Rani Ghosh to me for evaluation  Below are my notes for this consultation  If you have questions, please do not hesitate to call me  I look forward to following your patient along with you  Sincerely,        Elly Holguin DO        CC: No Recipients  Elly Holguin DO  1/29/2019  6:31 PM  Sign at close encounter  Progress note - Pulmonary Medicine   Luciano Tavarez 71 y o  male MRN: [id number]       Impression & Plan:   72 y/o M with PMHx of BPH, Hay fever, alcohol abuse, ZAIRA and never tried CPAP, obesity who comes in for follow up of PJP pneumonia, HIV and severe obstructive lung disease  1   Acute hypoxic respiratory failure now resolved - Secondary to PJP pneumonia and Stenotrophomonas recently diagnosed on bronchoscopy  Completed Bactrim and prednisone  - Clinically at baseline        -  Repeat CXR to ensure complete resolution      2  HIV (previous CD4 - 76 and now 400)       - Continue HAART therapy  ID following        3    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  Previously  PFTs demonstrated severe obstruction noted with bronchodilator response   IgE mildly elevated but [place] panel with many different allergens       -    He has been off all inhalers for a few months and does not have any clinical consequences  He states that he does not feel like he needs them  -  Continue PRN albuterol     4  History of ZAIRA - He still has no interest in pursuit of treatment      ______________________________________________________________________    HPI:    Luciano Tavarez presents today for follow-up with his PJP pneumonia and asthma  He states that he is feeling great    He has stopped all his inhalers and feels like he is back at his baseline  He denies chest tightness, wheezing or cough  Review of Systems:  Review of Systems   Constitutional: Negative  HENT: Negative for congestion, postnasal drip and sinus pressure  Eyes: Negative  Respiratory: Negative  Cardiovascular: Negative  Gastrointestinal: Negative  Endocrine: Negative  Genitourinary: Negative  Musculoskeletal: Negative  Skin: Negative  Neurological: Negative  Hematological: Negative  Psychiatric/Behavioral: Negative  Social history updates:  History   Smoking Status    Former Smoker    Packs/day: 1 50    Years: 20 00    Types: Cigarettes    Quit date: 2/5/1993   Smokeless Tobacco    Never Used     Social History     Social History    Marital status: /Civil Union     Spouse name: N/A    Number of children: N/A    Years of education: N/A     Occupational History    Not on file  Social History Main Topics    Smoking status: Former Smoker     Packs/day: 1 50     Years: 20 00     Types: Cigarettes     Quit date: 2/5/1993    Smokeless tobacco: Never Used    Alcohol use Yes      Comment: socially / 1-4 drinks/week    Drug use: No    Sexual activity: No     Other Topics Concern    Not on file     Social History Narrative    Daily caffeine consumption, 2-3 servings a day    Lives with wife    Feels safe at home      Sees dentist reg    No living will       PhysicalExamination:  Vitals:   /80   Pulse 70   Temp (!) 97 1 °F (36 2 °C)   Resp 14   Ht 6' (1 829 m)   Wt 122 kg (269 lb)   SpO2 95%   BMI 36 48 kg/m²    General: Awake alert and oriented x 3, conversant without conversational dyspnea, NAD, normal affect  HEENT:  PERRL, Sclera noninjected, nonicteric OU, Nares patent,  no craniofacial abnormalities, Mucous membranes, moist, no oral lesions, normal dentition, Mallampati class 3  NECK: Trachea midline, no accessory muscle use, no stridor, no cervical or supraclavicular adenopathy, JVP not elevated  CARDIAC: Reg, single s1/S2, no m/r/g  PULM: CTA bilaterally no wheezing, rhonchi or rales  ABD: Normoactive bowel sounds, soft nontender, nondistended, no rebound, no rigidity, no guarding  EXT: No cyanosis, no clubbing, no edema, normal capillary refill  NEURO: no focal neurologic deficits, AAOx3, moving all extremities appropriately    Diagnostic Data:  Labs: I personally reviewed the most recent laboratory data pertinent to today's visit  I have personally reviewed pertinent lab results  Lab Results   Component Value Date    WBC 7 27 08/10/2018    HGB 12 3 08/10/2018    HCT 38 4 08/10/2018    MCV 87 08/10/2018     08/10/2018     Lab Results   Component Value Date    GLUCOSE 100 (H) 06/09/2017    CALCIUM 8 9 08/10/2018     06/09/2017    K 4 1 08/10/2018    CO2 26 08/10/2018     08/10/2018    BUN 18 08/10/2018    CREATININE 0 94 08/10/2018     Lab Results   Component Value Date     07/08/2018     Lab Results   Component Value Date    ALT 23 08/10/2018    AST 16 08/10/2018    ALKPHOS 57 08/10/2018    BILITOT 0 3 06/09/2017       6 minute walk in office 7/27/18  Starting saturation - 95%   Starting HR - 84 bpm  Lowest saturation - 94%    Highest HR - 114 bpm  Ambulated - 168 m without the need of oxygen        PFT results: The most recent pulmonary function tests were reviewed  Spirometry today  Pre  FEV1/FVC - 61%  FEV1 - 3 03 (81%)  FVC - 4 97 (100%)  Post  FEV1/FVC - 68%  FEV1 - 3 26 (88%)  FVC - 4 79 (96%)  Change - 8%     3/4/18 Study Interpretation:   · Severe airflow limitation    Significant improvement in airflow on vital capacity following the administration of bronchodilators      6 minute walk test in office 6/21/18  Saturation - 91% at start, dropped to 80% with ambulation, maintained at 90% with 3L nasal cannula     Imaging:  I personally reviewed the images on the [facility] system pertinent to today's visit  CXR - 6/6/18   IMPRESSION:  Persistent bilateral pulmonary airspace disease and interstitial prominence in a pattern most compatible with pneumonia and with no significant interval change since 6/3/2018     Other studies:  Echo -  SUMMARY  LEFT VENTRICLE:  Size was normal   Systolic function was normal  Ejection fraction was estimated to be 55 %    Wall thickness was normal   Left ventricular diastolic function parameters were normal      RIGHT VENTRICLE:  The size was normal   Systolic function was normal      LEFT ATRIUM:  The atrium was mildly dilated      TRICUSPID VALVE:  There was trace regurgitation      AORTA:  The root exhibited mild dilatation       Radha Rodríguez, DO

## 2019-01-29 NOTE — PROGRESS NOTES
Progress note - Pulmonary Medicine   Doreen Tiwari 71 y o  male MRN: [id number]       Impression & Plan:   70 y/o M with PMHx of BPH, Hay fever, alcohol abuse, ZAIRA and never tried CPAP, obesity who comes in for follow up of PJP pneumonia, HIV and severe obstructive lung disease  1   Acute hypoxic respiratory failure now resolved - Secondary to PJP pneumonia and Stenotrophomonas recently diagnosed on bronchoscopy  Completed Bactrim and prednisone  - Clinically at baseline        -  Repeat CXR to ensure complete resolution      2  HIV (previous CD4 - 76 and now 400)       - Continue HAART therapy  ID following        3    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  Previously  PFTs demonstrated severe obstruction noted with bronchodilator response   IgE mildly elevated but [place] panel with many different allergens       -  He has been off all inhalers for a few months and does not have any clinical consequences  He states that he does not feel like he needs them  -  Continue PRN albuterol     4  History of ZAIRA - He still has no interest in pursuit of treatment      ______________________________________________________________________    HPI:    Doreen Tiwari presents today for follow-up with his PJP pneumonia and asthma  He states that he is feeling great  He has stopped all his inhalers and feels like he is back at his baseline  He denies chest tightness, wheezing or cough  Review of Systems:  Review of Systems   Constitutional: Negative  HENT: Negative for congestion, postnasal drip and sinus pressure  Eyes: Negative  Respiratory: Negative  Cardiovascular: Negative  Gastrointestinal: Negative  Endocrine: Negative  Genitourinary: Negative  Musculoskeletal: Negative  Skin: Negative  Neurological: Negative  Hematological: Negative  Psychiatric/Behavioral: Negative          Social history updates:  History   Smoking Status  Former Smoker    Packs/day: 1 50    Years: 20 00    Types: Cigarettes    Quit date: 2/5/1993   Smokeless Tobacco    Never Used     Social History     Social History    Marital status: /Civil Union     Spouse name: N/A    Number of children: N/A    Years of education: N/A     Occupational History    Not on file  Social History Main Topics    Smoking status: Former Smoker     Packs/day: 1 50     Years: 20 00     Types: Cigarettes     Quit date: 2/5/1993    Smokeless tobacco: Never Used    Alcohol use Yes      Comment: socially / 1-4 drinks/week    Drug use: No    Sexual activity: No     Other Topics Concern    Not on file     Social History Narrative    Daily caffeine consumption, 2-3 servings a day    Lives with wife    Feels safe at home  Sees dentist reg    No living will       PhysicalExamination:  Vitals:   /80   Pulse 70   Temp (!) 97 1 °F (36 2 °C)   Resp 14   Ht 6' (1 829 m)   Wt 122 kg (269 lb)   SpO2 95%   BMI 36 48 kg/m²   General: Awake alert and oriented x 3, conversant without conversational dyspnea, NAD, normal affect  HEENT:  PERRL, Sclera noninjected, nonicteric OU, Nares patent,  no craniofacial abnormalities, Mucous membranes, moist, no oral lesions, normal dentition, Mallampati class 3  NECK: Trachea midline, no accessory muscle use, no stridor, no cervical or supraclavicular adenopathy, JVP not elevated  CARDIAC: Reg, single s1/S2, no m/r/g  PULM: CTA bilaterally no wheezing, rhonchi or rales  ABD: Normoactive bowel sounds, soft nontender, nondistended, no rebound, no rigidity, no guarding  EXT: No cyanosis, no clubbing, no edema, normal capillary refill  NEURO: no focal neurologic deficits, AAOx3, moving all extremities appropriately    Diagnostic Data:  Labs: I personally reviewed the most recent laboratory data pertinent to today's visit  I have personally reviewed pertinent lab results    Lab Results   Component Value Date    WBC 7 27 08/10/2018 HGB 12 3 08/10/2018    HCT 38 4 08/10/2018    MCV 87 08/10/2018     08/10/2018     Lab Results   Component Value Date    GLUCOSE 100 (H) 06/09/2017    CALCIUM 8 9 08/10/2018     06/09/2017    K 4 1 08/10/2018    CO2 26 08/10/2018     08/10/2018    BUN 18 08/10/2018    CREATININE 0 94 08/10/2018     Lab Results   Component Value Date     07/08/2018     Lab Results   Component Value Date    ALT 23 08/10/2018    AST 16 08/10/2018    ALKPHOS 57 08/10/2018    BILITOT 0 3 06/09/2017       6 minute walk in office 7/27/18  Starting saturation - 95%   Starting HR - 84 bpm  Lowest saturation - 94%    Highest HR - 114 bpm  Ambulated - 168 m without the need of oxygen        PFT results: The most recent pulmonary function tests were reviewed  Spirometry today  Pre  FEV1/FVC - 61%  FEV1 - 3 03 (81%)  FVC - 4 97 (100%)  Post  FEV1/FVC - 68%  FEV1 - 3 26 (88%)  FVC - 4 79 (96%)  Change - 8%     3/4/18 Study Interpretation:   · Severe airflow limitation  Significant improvement in airflow on vital capacity following the administration of bronchodilators      6 minute walk test in office 6/21/18  Saturation - 91% at start, dropped to 80% with ambulation, maintained at 90% with 3L nasal cannula     Imaging:  I personally reviewed the images on the [facility] system pertinent to today's visit  CXR - 6/6/18   IMPRESSION:  Persistent bilateral pulmonary airspace disease and interstitial prominence in a pattern most compatible with pneumonia and with no significant interval change since 6/3/2018     Other studies:  Echo -  SUMMARY  LEFT VENTRICLE:  Size was normal   Systolic function was normal  Ejection fraction was estimated to be 55 %    Wall thickness was normal   Left ventricular diastolic function parameters were normal      RIGHT VENTRICLE:  The size was normal   Systolic function was normal      LEFT ATRIUM:  The atrium was mildly dilated      TRICUSPID VALVE:  There was trace regurgitation      AORTA:  The root exhibited mild dilatation       Anais Chandra DO

## 2019-02-19 ENCOUNTER — HOSPITAL ENCOUNTER (OUTPATIENT)
Dept: RADIOLOGY | Facility: HOSPITAL | Age: 64
Discharge: HOME | End: 2019-02-19
Attending: INTERNAL MEDICINE
Payer: COMMERCIAL

## 2019-02-19 ENCOUNTER — OFFICE VISIT (OUTPATIENT)
Dept: FAMILY MEDICINE CLINIC | Facility: HOSPITAL | Age: 64
End: 2019-02-19
Payer: COMMERCIAL

## 2019-02-19 VITALS
BODY MASS INDEX: 36.98 KG/M2 | HEIGHT: 72 IN | DIASTOLIC BLOOD PRESSURE: 76 MMHG | HEART RATE: 87 BPM | WEIGHT: 273 LBS | RESPIRATION RATE: 16 BRPM | SYSTOLIC BLOOD PRESSURE: 118 MMHG

## 2019-02-19 DIAGNOSIS — B20 HIV DISEASE (HCC): ICD-10-CM

## 2019-02-19 DIAGNOSIS — Z13.6 SCREENING FOR CARDIOVASCULAR CONDITION: ICD-10-CM

## 2019-02-19 DIAGNOSIS — E66.9 OBESITY (BMI 35.0-39.9 WITHOUT COMORBIDITY): ICD-10-CM

## 2019-02-19 DIAGNOSIS — Z00.00 WELCOME TO MEDICARE PREVENTIVE VISIT: Primary | ICD-10-CM

## 2019-02-19 DIAGNOSIS — Z23 NEED FOR SHINGLES VACCINE: ICD-10-CM

## 2019-02-19 DIAGNOSIS — Z12.5 SCREENING FOR PROSTATE CANCER: ICD-10-CM

## 2019-02-19 DIAGNOSIS — R91.8 PULMONARY INFILTRATES: ICD-10-CM

## 2019-02-19 PROCEDURE — 71046 X-RAY EXAM CHEST 2 VIEWS: CPT

## 2019-02-19 PROCEDURE — 3725F SCREEN DEPRESSION PERFORMED: CPT | Performed by: INTERNAL MEDICINE

## 2019-02-19 PROCEDURE — G0402 INITIAL PREVENTIVE EXAM: HCPCS | Performed by: INTERNAL MEDICINE

## 2019-02-19 PROCEDURE — 1036F TOBACCO NON-USER: CPT | Performed by: INTERNAL MEDICINE

## 2019-02-19 NOTE — PATIENT INSTRUCTIONS
Check to see if shingrix vaccine is covered! Obesity   AMBULATORY CARE:   Obesity  is when your body mass index (BMI) is greater than 30  Your healthcare provider will use your height and weight to measure your BMI  The risks of obesity include  many health problems, such as injuries or physical disability  You may need tests to check for the following:  · Diabetes     · High blood pressure or high cholesterol     · Heart disease     · Gallbladder or liver disease     · Cancer of the colon, breast, prostate, liver, or kidney     · Sleep apnea     · Arthritis or gout  Seek care immediately if:   · You have a severe headache, confusion, or difficulty speaking  · You have weakness on one side of your body  · You have chest pain, sweating, or shortness of breath  Contact your healthcare provider if:   · You have symptoms of gallbladder or liver disease, such as pain in your upper abdomen  · You have knee or hip pain and discomfort while walking  · You have symptoms of diabetes, such as intense hunger and thirst, and frequent urination  · You have symptoms of sleep apnea, such as snoring or daytime sleepiness  · You have questions or concerns about your condition or care  Treatment for obesity  focuses on helping you lose weight to improve your health  Even a small decrease in BMI can reduce the risk for many health problems  Your healthcare provider will help you set a weight-loss goal   · Lifestyle changes  are the first step in treating obesity  These include making healthy food choices and getting regular physical activity  Your healthcare provider may suggest a weight-loss program that involves coaching, education, and therapy  · Medicine  may help you lose weight when it is used with a healthy diet and physical activity  · Surgery  can help you lose weight if you are very obese and have other health problems  There are several types of weight-loss surgery   Ask your healthcare provider for more information  Be successful losing weight:   · Set small, realistic goals  An example of a small goal is to walk for 20 minutes 5 days a week  Anther goal is to lose 5% of your body weight  · Tell friends, family members, and coworkers about your goals  and ask for their support  Ask a friend to lose weight with you, or join a weight-loss support group  · Identify foods or triggers that may cause you to overeat , and find ways to avoid them  Remove tempting high-calorie foods from your home and workplace  Place a bowl of fresh fruit on your kitchen counter  If stress causes you to eat, then find other ways to cope with stress  · Keep a diary to track what you eat and drink  Also write down how many minutes of physical activity you do each day  Weigh yourself once a week and record it in your diary  Eating changes: You will need to eat 500 to 1,000 fewer calories each day than you currently eat to lose 1 to 2 pounds a week  The following changes will help you cut calories:  · Eat smaller portions  Use small plates, no larger than 9 inches in diameter  Fill your plate half full of fruits and vegetables  Measure your food using measuring cups until you know what a serving size looks like  · Eat 3 meals and 1 or 2 snacks each day  Plan your meals in advance  [employer] and eat at home most of the time  Eat slowly  · Eat fruits and vegetables at every meal   They are low in calories and high in fiber, which makes you feel full  Do not add butter, margarine, or cream sauce to vegetables  Use herbs to season steamed vegetables  · Eat less fat and fewer fried foods  Eat more baked or grilled chicken and fish  These protein sources are lower in calories and fat than red meat  Limit fast food  Dress your salads with olive oil and vinegar instead of bottled dressing  · Limit the amount of sugar you eat  Do not drink sugary beverages  Limit alcohol    Activity changes:  Physical activity is good for your body in many ways  It helps you burn calories and build strong muscles  It decreases stress and depression, and improves your mood  It can also help you sleep better  Talk to your healthcare provider before you begin an exercise program   · Exercise for at least 30 minutes 5 days a week  Start slowly  Set aside time each day for physical activity that you enjoy and that is convenient for you  It is best to do both weight training and an activity that increases your heart rate, such as walking, bicycling, or swimming  · Find ways to be more active  Do yard work and housecleaning  Walk up the stairs instead of using elevators  Spend your leisure time going to events that require walking, such as outdoor festivals or fairs  This extra physical activity can help you lose weight and keep it off  Follow up with your healthcare provider as directed: You may need to meet with a dietitian  Write down your questions so you remember to ask them during your visits  © 2017 [employer] Information is for End User's use only and may not be sold, redistributed or otherwise used for commercial purposes  All illustrations and images included in CareNotes® are the copyrighted property of [employer]  or [employer]  The above information is an  only  It is not intended as medical advice for individual conditions or treatments  Talk to your doctor, nurse or pharmacist before following any medical regimen to see if it is safe and effective for you

## 2019-02-19 NOTE — PROGRESS NOTES
Assessment and Plan:  Problem List Items Addressed This Visit        Other    HIV disease ([facility] )    Relevant Orders    CBC    Comprehensive metabolic panel      Other Visit Diagnoses     Welcome to Medicare preventive visit    -  Primary    Screening for cardiovascular condition        Relevant Orders    Lipid panel    Screening for prostate cancer        Relevant Orders    PSA, Total Screen    Obesity (BMI 35 0-39 9 without comorbidity)        Need for shingles vaccine        Relevant Medications    Zoster Vac Recomb Adjuvanted 50 MCG/0 5ML SUSR        Health Maintenance Due   Topic Date Due    Hepatitis C Screening  1955    Medicare Annual Wellness Visit (AWV)  1955    BMI: Followup Plan  01/14/1972    HEPATITIS B VACCINES (1 of 3 - Risk 3-dose series) 01/14/1973         HPI:  Patient Active Problem List   Diagnosis    Benign prostatic hyperplasia with nocturia    Chronic allergic rhinitis due to animal hair and dander    Moderate persistent asthma without complication    HIV disease ([facility] )    ZAIRA (obstructive sleep apnea)    Benign essential tremor     Past Medical History:   Diagnosis Date    Abscess, cheek     Last Assessed: 2/23/2016    Asthma     Chronic headaches     Constipation     COPD (chronic obstructive pulmonary disease) (HCC)     Cough     Difficulty attaining erection     Dyspnea     Enlarged prostate     Hemorrhoids     HIV (human immunodeficiency virus infection) (HCC)     Migraine     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HCC)     Seasonal allergies     Slow urinary stream     Wheezing      Past Surgical History:   Procedure Laterality Date    ABSCESS DRAINAGE      AZ BRONCHOSCOPY,DIAGNOSTIC N/A 7/11/2018    Procedure: BRONCHOSCOPY FLEXIBLE;  Surgeon: Jacob Bryan DO;  Location:  MAIN OR;  Service: Pulmonary    SKIN LESION EXCISION      Excision of lesion in vestibule of mouth;  Last Assessed: 2/23/2016     Family History   Problem Relation Age of Onset    Hypertension Mother     Glaucoma Mother     Cancer Father         Prostate    Diabetes Maternal Uncle     Glaucoma Maternal Uncle     Substance Abuse Neg Hx         neg fam hx    Mental illness Neg Hx         neg fam hx     Social History     Tobacco Use   Smoking Status Former Smoker    Packs/day: 1 50    Years: 20 00    Pack years: 30 00    Types: Cigarettes    Last attempt to quit: 1993    Years since quittin 0   Smokeless Tobacco Never Used     Social History     Substance and Sexual Activity   Alcohol Use Yes    Comment: socially / 1-4 drinks/week      Social History     Substance and Sexual Activity   Drug Use No         Current Outpatient Medications   Medication Sig Dispense Refill    albuterol (PROVENTIL HFA,VENTOLIN HFA) 90 mcg/act inhaler Inhale 2 puffs every 6 (six) hours as needed for wheezing      fluticasone (FLONASE) 50 mcg/act nasal spray 2 sprays into each nostril daily 48 g 1    GENVOYA tablet Take 1 tablet by mouth daily (At lunch)      milk thistle 175 MG tablet Take 1,000 mg by mouth 2 (two) times a day      Turmeric Curcumin 500 MG CAPS Take 1 capsule by mouth daily      Zoster Vac Recomb Adjuvanted 50 MCG/0 5ML SUSR Inject 0 5 mL into a muscle once for 1 dose 1 each 1     No current facility-administered medications for this visit  No Known Allergies  Immunization History   Administered Date(s) Administered    Influenza, recombinant, quadrivalent,injectable, preservative free 10/05/2018    Pneumococcal Conjugate 13-Valent 2018    Tdap 11/10/2017       Patient Care Team:  Amilcar Dolan MD as PCP - General (Internal Medicine)    Medicare Screening Tests and Risk Assessments:  Glenetta Fleischer is here for his Welcome to Medicare visit  Health Risk Assessment:  Patient rates overall health as very good  Patient feels that their physical health rating is Much better  Eyesight was rated as Same  Hearing was rated as Same   Patient feels that their emotional and mental health rating is Same  Pain experienced by patient in the last 7 days has been Some  Patient's pain rating has been 4/10  Patient states that he has experienced no weight loss or gain in last 6 months  Emotional/Mental Health:  Patient has been feeling nervous/anxious  PHQ-9 Depression Screening:    Frequency of the following problems over the past two weeks:      1  Little interest or pleasure in doing things: 0 - not at all      2  Feeling down, depressed, or hopeless: 0 - not at all  PHQ-2 Score: 0          Broken Bones/Falls: Fall Risk Assessment:    In the past year, patient has experienced: No history of falling in past year  visual disturbance    Bladder/Bowel:  Patient has not leaked urine accidently in the last six months  Patient reports no loss of bowel control  Immunizations:  Patient has had a flu vaccination within the last year  Patient has received a pneumonia shot  Patient has not received a shingles shot  (Additional Comments: Pt has rx for shingles shot--shingrex, but he is going to call his ins comp first to see about coverage )    Home Safety:  Patient does not have trouble with stairs inside or outside of their home  Patient currently reports that there are no safety hazards present in home, working smoke alarms, working carbon monoxide detectors  Preventative Screenings:   prostate cancer screen performed, colon cancer screen completed, cholesterol screen completed, glaucoma eye exam completed, (Additional Comments: Pt to call and sched colonoscopy for this yr or next )    Nutrition:  Current diet: Regular, Low Cholesterol, Low Saturated Fat, Low Carb and Limited junk food with servings of the following:    Medications:  Patient is currently taking over-the-counter supplements  List of OTC medications includes: supps  Patient is able to manage medications  Lifestyle Choices:  Patient reports no tobacco use    Patient has smoked or used tobacco in the past  Patient has stopped his tobacco use  Tobacco use quit date: quit smoking in the 1980's or 1990's  Patient reports alcohol use  Alcohol use per week: social  Patient drives a vehicle  Patient wears seat belt  Current level of exercise of physical activity described by patient as: exercises with walking  Activities of Daily Living:  Can get out of bed by his or her self, able to dress self, able to make own meals, able to do own shopping, able to bathe self, can do own laundry/housekeeping, can manage own money, pay bills and track expenses    Previous Hospitalizations:  Hospitalization or ED visit in past 12 months  Additional Comments: Was in [facility]-- [facility]uly [facility]    Advanced Directives:  Patient has decided on a power of   Patient has spoken to designated power of   Patient has not completed advanced directive  Preventative Screening/Counseling:      Cardiovascular:      General: Risks and Benefits Discussed      Counseling: Healthy Diet     Due for Labs/Analytes/Optional EKG: Lipid Panel          Diabetes:      General: Risks and Benefits Discussed      Counseling: Healthy Diet      Due for labs: Blood Glucose          Colorectal Cancer:      General: Risks and Benefits Discussed and Screening Current          Prostate Cancer:      General: Risks and Benefits Discussed      Due for labs: PSA          Osteoporosis:      General: Screening Not Indicated          AAA:  Male patient with age over [de-identified] years  General: Risks and Benefits Discussed      Study: Screening US          Glaucoma:      General: Risks and Benefits Discussed      Comments: Pt will see Ophtalmology        HIV:      General: Risks and Benefits Discussed and Screening Current          Hepatitis C:      General: Risks and Benefits Discussed and Screening Current        Advanced Directives:   Patient has living will for healthcare, Information on ACP and/or AD provided   End of life assessment reviewed with patient  Provider agrees with end of life decisions   Additional Comments: Short term on machines    Immunizations:      Influenza: Risks & Benefits Discussed and Influenza UTD This Year      Pneumococcal: Risks & Benefits Discussed and Lifetime Vaccine Completed      Shingrix: Risks & Benefits Discussed             Visual Acuity Screening    Right eye Left eye Both eyes   Without correction: 20/25 20/30 20/25   With correction:          Physical Exam:  Review of Systems   Constitutional: Negative for fever  HENT: Negative for congestion  Eyes: Negative for visual disturbance  Respiratory: Negative for cough and shortness of breath  Cardiovascular: Negative for chest pain, palpitations and leg swelling  Gastrointestinal: Negative for abdominal pain, blood in stool, bowel incontinence and diarrhea  Endocrine: Negative for polydipsia and polyphagia  Genitourinary: Negative for difficulty urinating and dysuria  Musculoskeletal: Negative for joint swelling, myalgias and neck stiffness  Skin: Negative for rash  Neurological: Negative for weakness, numbness and headaches  Hematological: Negative for adenopathy  Psychiatric/Behavioral: Negative for dysphoric mood  The patient is not nervous/anxious  All other systems reviewed and are negative  Vitals:    02/19/19 0936   BP: 118/76   Pulse: 87   Resp: 16   Weight: 124 kg (273 lb)   Height: 6' (1 829 m)   Body mass index is 37 03 kg/m²  Physical Exam   Constitutional: He is oriented to person, place, and time  He appears well-developed and well-nourished  HENT:   Head: Normocephalic  Eyes: Conjunctivae are normal    Neck: Neck supple  Cardiovascular: Normal rate and regular rhythm  Pulmonary/Chest: No respiratory distress  He has no wheezes  He has no rales  Abdominal: There is no tenderness  Musculoskeletal: He exhibits no tenderness  Lymphadenopathy:     He has no cervical adenopathy  Neurological: He is alert and oriented to person, place, and time  No cranial nerve deficit  Skin: Skin is warm and dry  No erythema  Psychiatric: He has a normal mood and affect  BMI Counseling: Body mass index is 37 03 kg/m²  Discussed the patient's BMI with him  The BMI is above average  BMI counseling and education was provided to the patient  Nutrition recommendations include reducing portion sizes  Exercise recommendations include moderate aerobic physical activity for 150 minutes/week

## 2019-02-22 ENCOUNTER — TELEPHONE (OUTPATIENT)
Dept: PULMONOLOGY | Facility: CLINIC | Age: 64
End: 2019-02-22

## 2019-04-16 ENCOUNTER — HOSPITAL ENCOUNTER (OUTPATIENT)
Dept: RADIOLOGY | Facility: HOSPITAL | Age: 64
Discharge: HOME | End: 2019-04-16
Attending: INTERNAL MEDICINE
Payer: COMMERCIAL

## 2019-04-16 ENCOUNTER — OFFICE VISIT (OUTPATIENT)
Dept: FAMILY MEDICINE CLINIC | Facility: HOSPITAL | Age: 64
End: 2019-04-16
Payer: COMMERCIAL

## 2019-04-16 VITALS
HEIGHT: 72 IN | HEART RATE: 82 BPM | RESPIRATION RATE: 16 BRPM | BODY MASS INDEX: 35.89 KG/M2 | DIASTOLIC BLOOD PRESSURE: 92 MMHG | SYSTOLIC BLOOD PRESSURE: 170 MMHG | WEIGHT: 265 LBS | TEMPERATURE: 99.8 F

## 2019-04-16 DIAGNOSIS — J18.9 PNEUMONIA OF RIGHT LOWER LOBE DUE TO INFECTIOUS ORGANISM: ICD-10-CM

## 2019-04-16 DIAGNOSIS — J18.9 PNEUMONIA OF RIGHT LOWER LOBE DUE TO INFECTIOUS ORGANISM: Primary | ICD-10-CM

## 2019-04-16 DIAGNOSIS — J45.31 MILD PERSISTENT ASTHMA WITH ACUTE EXACERBATION: ICD-10-CM

## 2019-04-16 PROCEDURE — 99214 OFFICE O/P EST MOD 30 MIN: CPT | Performed by: INTERNAL MEDICINE

## 2019-04-16 PROCEDURE — 71046 X-RAY EXAM CHEST 2 VIEWS: CPT

## 2019-04-16 RX ORDER — PROMETHAZINE HYDROCHLORIDE AND CODEINE PHOSPHATE 6.25; 1 MG/5ML; MG/5ML
5 SYRUP ORAL EVERY 12 HOURS PRN
Qty: 120 ML | Refills: 0 | Status: SHIPPED | OUTPATIENT
Start: 2019-04-16 | End: 2019-05-23 | Stop reason: ALTCHOICE

## 2019-04-16 RX ORDER — PREDNISONE 10 MG/1
TABLET ORAL
Qty: 18 TABLET | Refills: 0 | Status: SHIPPED | OUTPATIENT
Start: 2019-04-16 | End: 2019-05-22 | Stop reason: ALTCHOICE

## 2019-04-16 RX ORDER — DOXYCYCLINE 100 MG/1
100 CAPSULE ORAL 2 TIMES DAILY
Qty: 14 CAPSULE | Refills: 0 | Status: SHIPPED | OUTPATIENT
Start: 2019-04-16 | End: 2019-04-23

## 2019-04-16 RX ORDER — TAMSULOSIN HYDROCHLORIDE 0.4 MG/1
0.4 CAPSULE ORAL
COMMUNITY
End: 2019-12-23 | Stop reason: ALTCHOICE

## 2019-04-18 ENCOUNTER — TELEPHONE (OUTPATIENT)
Dept: FAMILY MEDICINE CLINIC | Facility: HOSPITAL | Age: 64
End: 2019-04-18

## 2019-04-18 DIAGNOSIS — J18.9 PNEUMONIA OF RIGHT LOWER LOBE DUE TO INFECTIOUS ORGANISM: Primary | ICD-10-CM

## 2019-04-20 LAB
ALBUMIN SERPL-MCNC: 4.1 G/DL (ref 3.6–4.8)
ALBUMIN/GLOB SERPL: 1.4 {RATIO} (ref 1.2–2.2)
ALP SERPL-CCNC: 48 IU/L (ref 39–117)
ALT SERPL-CCNC: 30 IU/L (ref 0–44)
AST SERPL-CCNC: 28 IU/L (ref 0–40)
BASOPHILS # BLD AUTO: 0 X10E3/UL (ref 0–0.2)
BASOPHILS NFR BLD AUTO: 0 %
BILIRUB SERPL-MCNC: 0.3 MG/DL (ref 0–1.2)
BUN SERPL-MCNC: 16 MG/DL (ref 8–27)
BUN/CREAT SERPL: 18 (ref 10–24)
CALCIUM SERPL-MCNC: 9.3 MG/DL (ref 8.6–10.2)
CHLORIDE SERPL-SCNC: 101 MMOL/L (ref 96–106)
CHOLEST SERPL-MCNC: 181 MG/DL (ref 100–199)
CO2 SERPL-SCNC: 24 MMOL/L (ref 20–29)
CREAT SERPL-MCNC: 0.9 MG/DL (ref 0.76–1.27)
EOSINOPHIL # BLD AUTO: 0 X10E3/UL (ref 0–0.4)
EOSINOPHIL NFR BLD AUTO: 0 %
ERYTHROCYTE [DISTWIDTH] IN BLOOD BY AUTOMATED COUNT: 13.5 % (ref 12.3–15.4)
GLOBULIN SER-MCNC: 3 G/DL (ref 1.5–4.5)
GLUCOSE SERPL-MCNC: 88 MG/DL (ref 65–99)
HCT VFR BLD AUTO: 41.6 % (ref 37.5–51)
HDLC SERPL-MCNC: 59 MG/DL
HGB BLD-MCNC: 13.9 G/DL (ref 13–17.7)
IMM GRANULOCYTES # BLD: 0 X10E3/UL (ref 0–0.1)
IMM GRANULOCYTES NFR BLD: 0 %
LABCORP COMMENT: NORMAL
LDLC SERPL CALC-MCNC: 101 MG/DL (ref 0–99)
LYMPHOCYTES # BLD AUTO: 2.7 X10E3/UL (ref 0.7–3.1)
LYMPHOCYTES NFR BLD AUTO: 37 %
MCH RBC QN AUTO: 30.3 PG (ref 26.6–33)
MCHC RBC AUTO-ENTMCNC: 33.4 G/DL (ref 31.5–35.7)
MCV RBC AUTO: 91 FL (ref 79–97)
MONOCYTES # BLD AUTO: 0.5 X10E3/UL (ref 0.1–0.9)
MONOCYTES NFR BLD AUTO: 7 %
NEUTROPHILS # BLD AUTO: 4 X10E3/UL (ref 1.4–7)
NEUTROPHILS NFR BLD AUTO: 56 %
PLATELET # BLD AUTO: 254 X10E3/UL (ref 150–379)
POTASSIUM SERPL-SCNC: 4.4 MMOL/L (ref 3.5–5.2)
PROT SERPL-MCNC: 7.1 G/DL (ref 6–8.5)
PSA SERPL-MCNC: 1.3 NG/ML (ref 0–4)
RBC # BLD AUTO: 4.59 X10E6/UL (ref 4.14–5.8)
SL AMB EGFR AFRICAN AMERICAN: 103 ML/MIN/1.73
SL AMB EGFR NON AFRICAN AMERICAN: 89 ML/MIN/1.73
SL AMB VLDL CHOLESTEROL CALC: 21 MG/DL (ref 5–40)
SODIUM SERPL-SCNC: 140 MMOL/L (ref 134–144)
TRIGL SERPL-MCNC: 107 MG/DL (ref 0–149)
WBC # BLD AUTO: 7.2 X10E3/UL (ref 3.4–10.8)

## 2019-04-25 ENCOUNTER — TELEPHONE (OUTPATIENT)
Dept: INFECTIOUS DISEASES | Facility: CLINIC | Age: 64
End: 2019-04-25

## 2019-04-25 DIAGNOSIS — D72.89 OTHER SPECIFIED DISORDERS OF WHITE BLOOD CELLS: ICD-10-CM

## 2019-04-25 DIAGNOSIS — B20 HIV DISEASE (HCC): Primary | ICD-10-CM

## 2019-04-25 DIAGNOSIS — Z11.3 ENCOUNTER FOR SCREENING FOR INFECTIONS WITH A PREDOMINANTLY SEXUAL MODE OF TRANSMISSION: ICD-10-CM

## 2019-04-25 DIAGNOSIS — Z72.89 OTHER PROBLEMS RELATED TO LIFESTYLE: ICD-10-CM

## 2019-05-08 LAB
C TRACH RRNA SPEC QL NAA+PROBE: NEGATIVE
CMV IGG SERPL IA-ACNC: >10 U/ML (ref 0–0.59)
CMV IGM SERPL IA-ACNC: <30 AU/ML (ref 0–29.9)
GAMMA INTERFERON BACKGROUND BLD IA-ACNC: 0.16 IU/ML
HAV IGM SERPL QL IA: NEGATIVE
HBV CORE IGM SERPL QL IA: NEGATIVE
HBV SURFACE AG SERPL QL IA: NEGATIVE
HCV AB S/CO SERPL IA: 0.4 S/CO RATIO (ref 0–0.9)
HLA-B*57:01 SPEC QL: NEGATIVE
M TB IFN-G CD4+ T-CELLS BLD-ACNC: 0.12 IU/ML
M TB IFN-G CD4+ T-CELLS BLD-ACNC: 0.13 IU/ML
MITOGEN IGNF BLD-ACNC: >10 IU/ML
N GONORRHOEA RRNA SPEC QL NAA+PROBE: NEGATIVE
QUANTIFERON INCUBATION COMMENT: NORMAL
QUANTIFERON-TB GOLD PLUS: NEGATIVE
SERVICE CMNT-IMP: NORMAL
SL AMB COMMENTS: NORMAL
T GONDII IGG SERPL IA-ACNC: 82.5 IU/ML (ref 0–7.1)
T GONDII IGM SER IA-ACNC: 4.4 AU/ML (ref 0–7.9)

## 2019-05-22 ENCOUNTER — HOSPITAL ENCOUNTER (OUTPATIENT)
Dept: RADIOLOGY | Facility: HOSPITAL | Age: 64
Discharge: HOME | End: 2019-05-22
Attending: INTERNAL MEDICINE
Payer: COMMERCIAL

## 2019-05-22 ENCOUNTER — OFFICE VISIT (OUTPATIENT)
Dept: FAMILY MEDICINE CLINIC | Facility: HOSPITAL | Age: 64
End: 2019-05-22
Payer: COMMERCIAL

## 2019-05-22 ENCOUNTER — CLINICAL SUPPORT (OUTPATIENT)
Dept: FAMILY MEDICINE CLINIC | Facility: HOSPITAL | Age: 64
End: 2019-05-22
Payer: COMMERCIAL

## 2019-05-22 VITALS
BODY MASS INDEX: 36.03 KG/M2 | DIASTOLIC BLOOD PRESSURE: 74 MMHG | WEIGHT: 266 LBS | SYSTOLIC BLOOD PRESSURE: 106 MMHG | HEIGHT: 72 IN | HEART RATE: 83 BPM

## 2019-05-22 DIAGNOSIS — J18.9 PNEUMONIA OF RIGHT LOWER LOBE DUE TO INFECTIOUS ORGANISM: ICD-10-CM

## 2019-05-22 DIAGNOSIS — Z11.1 PPD SCREENING TEST: Primary | ICD-10-CM

## 2019-05-22 DIAGNOSIS — Z02.89 ENCOUNTER FOR COMPLETION OF FORM WITH PATIENT: ICD-10-CM

## 2019-05-22 DIAGNOSIS — Z02.1 ENCOUNTER FOR PRE-EMPLOYMENT HEALTH SCREENING EXAMINATION: ICD-10-CM

## 2019-05-22 DIAGNOSIS — J30.9 ALLERGIC RHINITIS, UNSPECIFIED SEASONALITY, UNSPECIFIED TRIGGER: Primary | ICD-10-CM

## 2019-05-22 PROCEDURE — 99214 OFFICE O/P EST MOD 30 MIN: CPT | Performed by: PHYSICIAN ASSISTANT

## 2019-05-22 PROCEDURE — 86580 TB INTRADERMAL TEST: CPT

## 2019-05-22 PROCEDURE — 71046 X-RAY EXAM CHEST 2 VIEWS: CPT

## 2019-05-22 PROCEDURE — 3008F BODY MASS INDEX DOCD: CPT | Performed by: PHYSICIAN ASSISTANT

## 2019-05-22 RX ORDER — MOMETASONE FUROATE 200 UG/1
AEROSOL RESPIRATORY (INHALATION)
Refills: 3 | COMMUNITY
Start: 2019-04-29 | End: 2020-01-03 | Stop reason: SDUPTHER

## 2019-05-23 ENCOUNTER — OFFICE VISIT (OUTPATIENT)
Dept: INFECTIOUS DISEASES | Facility: CLINIC | Age: 64
End: 2019-05-23
Payer: COMMERCIAL

## 2019-05-23 VITALS
TEMPERATURE: 96.8 F | SYSTOLIC BLOOD PRESSURE: 125 MMHG | RESPIRATION RATE: 15 BRPM | HEART RATE: 90 BPM | WEIGHT: 267 LBS | BODY MASS INDEX: 37.38 KG/M2 | HEIGHT: 71 IN | DIASTOLIC BLOOD PRESSURE: 80 MMHG

## 2019-05-23 DIAGNOSIS — B20 HIV DISEASE (HCC): Primary | ICD-10-CM

## 2019-05-23 DIAGNOSIS — Z87.01 HISTORY OF PNEUMOCYSTIS PNEUMONIA: ICD-10-CM

## 2019-05-23 DIAGNOSIS — G25.0 BENIGN ESSENTIAL TREMOR: ICD-10-CM

## 2019-05-23 DIAGNOSIS — J30.81 CHRONIC ALLERGIC RHINITIS DUE TO ANIMAL HAIR AND DANDER: ICD-10-CM

## 2019-05-23 PROCEDURE — 99204 OFFICE O/P NEW MOD 45 MIN: CPT | Performed by: INTERNAL MEDICINE

## 2019-05-24 ENCOUNTER — CLINICAL SUPPORT (OUTPATIENT)
Dept: FAMILY MEDICINE CLINIC | Facility: HOSPITAL | Age: 64
End: 2019-05-24

## 2019-05-24 DIAGNOSIS — Z11.1 ENCOUNTER FOR PPD SKIN TEST READING: Primary | ICD-10-CM

## 2019-05-24 LAB
INDURATION: 0 MM
TB SKIN TEST: NEGATIVE

## 2019-06-12 LAB
ALBUMIN SERPL-MCNC: 4.1 G/DL (ref 3.6–4.8)
ALBUMIN/GLOB SERPL: 1.6 {RATIO} (ref 1.2–2.2)
ALP SERPL-CCNC: 49 IU/L (ref 39–117)
ALT SERPL-CCNC: 31 IU/L (ref 0–44)
AST SERPL-CCNC: 41 IU/L (ref 0–40)
BASOPHILS # BLD AUTO: 0 X10E3/UL (ref 0–0.2)
BASOPHILS NFR BLD AUTO: 0 %
BILIRUB SERPL-MCNC: 0.4 MG/DL (ref 0–1.2)
BUN SERPL-MCNC: 14 MG/DL (ref 8–27)
BUN/CREAT SERPL: 14 (ref 10–24)
CALCIUM SERPL-MCNC: 9.3 MG/DL (ref 8.6–10.2)
CHLORIDE SERPL-SCNC: 103 MMOL/L (ref 96–106)
CO2 SERPL-SCNC: 23 MMOL/L (ref 20–29)
CREAT SERPL-MCNC: 0.97 MG/DL (ref 0.76–1.27)
EOSINOPHIL # BLD AUTO: 0.2 X10E3/UL (ref 0–0.4)
EOSINOPHIL NFR BLD AUTO: 4 %
ERYTHROCYTE [DISTWIDTH] IN BLOOD BY AUTOMATED COUNT: 14.6 % (ref 12.3–15.4)
GLOBULIN SER-MCNC: 2.5 G/DL (ref 1.5–4.5)
GLUCOSE SERPL-MCNC: 92 MG/DL (ref 65–99)
HCT VFR BLD AUTO: 39.6 % (ref 37.5–51)
HGB BLD-MCNC: 13.3 G/DL (ref 13–17.7)
HIV1 RNA # SERPL NAA+PROBE: 60 COPIES/ML
HIV1 RNA SERPL NAA+PROBE-LOG#: 1.78 LOG10COPY/ML
IMM GRANULOCYTES # BLD: 0 X10E3/UL (ref 0–0.1)
IMM GRANULOCYTES NFR BLD: 0 %
LYMPHOCYTES # BLD AUTO: 1.7 X10E3/UL (ref 0.7–3.1)
LYMPHOCYTES NFR BLD AUTO: 36 %
MCH RBC QN AUTO: 30.4 PG (ref 26.6–33)
MCHC RBC AUTO-ENTMCNC: 33.6 G/DL (ref 31.5–35.7)
MCV RBC AUTO: 91 FL (ref 79–97)
MONOCYTES # BLD AUTO: 0.4 X10E3/UL (ref 0.1–0.9)
MONOCYTES NFR BLD AUTO: 8 %
NEUTROPHILS # BLD AUTO: 2.5 X10E3/UL (ref 1.4–7)
NEUTROPHILS NFR BLD AUTO: 52 %
PLATELET # BLD AUTO: 245 X10E3/UL (ref 150–450)
POTASSIUM SERPL-SCNC: 4.4 MMOL/L (ref 3.5–5.2)
PROT SERPL-MCNC: 6.6 G/DL (ref 6–8.5)
RBC # BLD AUTO: 4.37 X10E6/UL (ref 4.14–5.8)
SL AMB EGFR AFRICAN AMERICAN: 94 ML/MIN/1.73
SL AMB EGFR NON AFRICAN AMERICAN: 82 ML/MIN/1.73
SODIUM SERPL-SCNC: 140 MMOL/L (ref 134–144)
WBC # BLD AUTO: 4.7 X10E3/UL (ref 3.4–10.8)

## 2019-07-25 ENCOUNTER — OFFICE VISIT (OUTPATIENT)
Dept: INFECTIOUS DISEASES | Facility: CLINIC | Age: 64
End: 2019-07-25
Payer: COMMERCIAL

## 2019-07-25 VITALS
HEART RATE: 64 BPM | DIASTOLIC BLOOD PRESSURE: 75 MMHG | TEMPERATURE: 97.9 F | RESPIRATION RATE: 17 BRPM | SYSTOLIC BLOOD PRESSURE: 138 MMHG | BODY MASS INDEX: 37.38 KG/M2 | HEIGHT: 72 IN | WEIGHT: 276 LBS

## 2019-07-25 DIAGNOSIS — Z87.01 HISTORY OF PNEUMOCYSTIS PNEUMONIA: ICD-10-CM

## 2019-07-25 DIAGNOSIS — G25.0 BENIGN ESSENTIAL TREMOR: ICD-10-CM

## 2019-07-25 DIAGNOSIS — B20 HIV DISEASE (HCC): Primary | ICD-10-CM

## 2019-07-25 PROCEDURE — 99214 OFFICE O/P EST MOD 30 MIN: CPT | Performed by: INTERNAL MEDICINE

## 2019-07-25 NOTE — PROGRESS NOTES
Progress Note - Infectious Disease   Sandi Julien 71 y o  male MRN: [id number]  Unit/Bed#:  Encounter: [id number]      Impression/Plan:  1  HIV-doing well on Biktarvy with a near undetectable viral load  No CD4 check this time as it is too soon after changing the medications  He seems to be tolerating the medications without difficulty  Continue ART  Will recheck labs in 2 months in 3 months      2  Chronic allergic rhinitis-remains on Flonase  No interaction with Biktarvy     3  Benign essential tremor-management as per the primary  With changed to [facility], should have limited any drug interaction problem     4  History of Pneumocystis pneumonia-no recurrent symptomatology in the patient's CD4 count has nicely recovered  Patient was provided medication, adherence and prevention education    Subjective:  Routine follow-up for HIV  Patient claims 100% adherence with     Patient denies any notable side effects  Overall the feeling well  The patient denies any fever chills or sweats, denies any nausea vomiting or diarrhea, denies any cough or shortness of breath  ROS: A complete 12 point ROS is negative other than that noted in the HPI    Followup portions patient history reviewed and updated as: Allergies, current medications, past medical history, past social history, past surgical history, and the problem list    Objective:  Vitals:  Vitals:    07/25/19 0908   BP: 138/75   Pulse: 64   Resp: 17   Temp: 97 9 °F (36 6 °C)   Weight: 125 kg (276 lb)   Height: 6' (1 829 m)       Physical Exam:   General Appearance:  Alert, interactive, appearing well,  nontoxic, no acute distress  Neck:   Supple without lymphadenopathy, no thyromegaly or masses   Throat: Oropharynx moist without lesions  Lungs:   Clear to auscultation bilaterally; no wheezes, rhonchi or rales; respirations unlabored   Heart:  RRR; no murmur, rub or gallop   Abdomen:   Soft, non-tender, non-distended, positive bowel sounds  Extremities: No clubbing, cyanosis or edema   Skin: No new rashes or lesions  No draining wounds noted  Labs, Imaging, & Other studies:   All pertinent labs and imaging studies were personally reviewed    Lab Results   Component Value Date     06/09/2017    K 4 4 06/06/2019     06/06/2019    CO2 23 06/06/2019    BUN 14 06/06/2019    CREATININE 0 97 06/06/2019    GLUCOSE 100 (H) 06/09/2017    CALCIUM 8 9 08/10/2018    AST 41 (H) 06/06/2019    ALT 31 06/06/2019    ALKPHOS 57 08/10/2018    PROT 7 9 06/09/2017    BILITOT 0 3 06/09/2017    EGFR 85 08/10/2018     Lab Results   Component Value Date    WBC 4 7 06/06/2019    HGB 13 3 06/06/2019    HCT 39 6 06/06/2019    MCV 91 06/06/2019     06/06/2019     Lab Results   Component Value Date    HEPCAB 0 4 05/03/2019     Lab Results   Component Value Date    HEPAIGM Negative 05/03/2019    HEPBIGM Negative 05/03/2019    HEPCAB 0 4 05/03/2019     Lab Results   Component Value Date    RPR Non Reactive 07/19/2018     CD4 ABS   Date/Time Value Ref Range Status   07/19/2018 10:12 AM 71 (L) 359 - 1,519 /uL Final     HIV-1 RNA by PCR, Qn   Date/Time Value Ref Range Status   06/06/2019 09:24 AM 60 copies/mL Final     Comment:     The reportable range for this assay is 20 to 10,000,000  copies HIV-1 RNA/mL               Current Outpatient Medications:     albuterol (PROVENTIL HFA,VENTOLIN HFA) 90 mcg/act inhaler, Inhale 2 puffs every 6 (six) hours as needed for wheezing, Disp: , Rfl:     ASMANEX  MCG/ACT AERO, , Disp: , Rfl: 3    bictegravir-emtricitab-tenofovir alafenamide (BIKTARVY) -25 MG tablet, Take 1 tablet by mouth daily with breakfast for 90 days, Disp: 30 tablet, Rfl: 2    fluticasone (FLONASE) 50 mcg/act nasal spray, 2 sprays into each nostril daily, Disp: 48 g, Rfl: 1    milk thistle 175 MG tablet, Take 1,000 mg by mouth 2 (two) times a day, Disp: , Rfl:     tamsulosin (FLOMAX) 0 4 mg, Take 0 4 mg by mouth daily with dinner, Disp: , Rfl:

## 2019-08-15 DIAGNOSIS — B20 HIV DISEASE (HCC): ICD-10-CM

## 2019-08-15 RX ORDER — BICTEGRAVIR SODIUM, EMTRICITABINE, AND TENOFOVIR ALAFENAMIDE FUMARATE 50; 200; 25 MG/1; MG/1; MG/1
TABLET ORAL
Qty: 30 TABLET | Refills: 2 | Status: SHIPPED | OUTPATIENT
Start: 2019-08-15 | End: 2019-11-09 | Stop reason: SDUPTHER

## 2019-10-11 LAB
ALBUMIN SERPL-MCNC: 4.1 G/DL (ref 3.6–4.8)
ALBUMIN/GLOB SERPL: 1.5 {RATIO} (ref 1.2–2.2)
ALP SERPL-CCNC: 53 IU/L (ref 39–117)
ALT SERPL-CCNC: 32 IU/L (ref 0–44)
AST SERPL-CCNC: 31 IU/L (ref 0–40)
BASOPHILS # BLD AUTO: 0 X10E3/UL (ref 0–0.2)
BASOPHILS NFR BLD AUTO: 0 %
BILIRUB SERPL-MCNC: 0.3 MG/DL (ref 0–1.2)
BUN SERPL-MCNC: 15 MG/DL (ref 8–27)
BUN/CREAT SERPL: 15 (ref 10–24)
CALCIUM SERPL-MCNC: 9.6 MG/DL (ref 8.6–10.2)
CD3+CD4+ CELLS # BLD: 537 /UL (ref 359–1519)
CD3+CD4+ CELLS NFR BLD: 18.5 % (ref 30.8–58.5)
CHLORIDE SERPL-SCNC: 103 MMOL/L (ref 96–106)
CO2 SERPL-SCNC: 25 MMOL/L (ref 20–29)
CREAT SERPL-MCNC: 0.98 MG/DL (ref 0.76–1.27)
EOSINOPHIL # BLD AUTO: 0.4 X10E3/UL (ref 0–0.4)
EOSINOPHIL NFR BLD AUTO: 5 %
ERYTHROCYTE [DISTWIDTH] IN BLOOD BY AUTOMATED COUNT: 13.6 % (ref 12.3–15.4)
GLOBULIN SER-MCNC: 2.7 G/DL (ref 1.5–4.5)
GLUCOSE SERPL-MCNC: 99 MG/DL (ref 65–99)
HCT VFR BLD AUTO: 41.6 % (ref 37.5–51)
HGB BLD-MCNC: 14.4 G/DL (ref 13–17.7)
HIV1 RNA # SERPL NAA+PROBE: 90 COPIES/ML
HIV1 RNA SERPL NAA+PROBE-LOG#: 1.95 LOG10COPY/ML
IMM GRANULOCYTES # BLD: 0 X10E3/UL (ref 0–0.1)
IMM GRANULOCYTES NFR BLD: 0 %
LYMPHOCYTES # BLD AUTO: 2.9 X10E3/UL (ref 0.7–3.1)
LYMPHOCYTES NFR BLD AUTO: 41 %
MCH RBC QN AUTO: 30.8 PG (ref 26.6–33)
MCHC RBC AUTO-ENTMCNC: 34.6 G/DL (ref 31.5–35.7)
MCV RBC AUTO: 89 FL (ref 79–97)
MONOCYTES # BLD AUTO: 0.5 X10E3/UL (ref 0.1–0.9)
MONOCYTES NFR BLD AUTO: 8 %
NEUTROPHILS # BLD AUTO: 3.3 X10E3/UL (ref 1.4–7)
NEUTROPHILS NFR BLD AUTO: 46 %
PLATELET # BLD AUTO: 277 X10E3/UL (ref 150–450)
POTASSIUM SERPL-SCNC: 5.1 MMOL/L (ref 3.5–5.2)
PROT SERPL-MCNC: 6.8 G/DL (ref 6–8.5)
RBC # BLD AUTO: 4.68 X10E6/UL (ref 4.14–5.8)
SL AMB EGFR AFRICAN AMERICAN: 93 ML/MIN/1.73
SL AMB EGFR NON AFRICAN AMERICAN: 81 ML/MIN/1.73
SODIUM SERPL-SCNC: 141 MMOL/L (ref 134–144)
WBC # BLD AUTO: 7.1 X10E3/UL (ref 3.4–10.8)

## 2019-10-17 DIAGNOSIS — J44.9 CHRONIC OBSTRUCTIVE PULMONARY DISEASE, UNSPECIFIED COPD TYPE (HCC): Primary | ICD-10-CM

## 2019-10-17 RX ORDER — ALBUTEROL SULFATE 90 UG/1
2 AEROSOL, METERED RESPIRATORY (INHALATION) EVERY 6 HOURS PRN
Qty: 1 INHALER | Refills: 5 | Status: SHIPPED | OUTPATIENT
Start: 2019-10-17 | End: 2020-04-10 | Stop reason: SDUPTHER

## 2019-10-31 ENCOUNTER — OFFICE VISIT (OUTPATIENT)
Dept: INFECTIOUS DISEASES | Facility: CLINIC | Age: 64
End: 2019-10-31
Payer: COMMERCIAL

## 2019-10-31 VITALS
DIASTOLIC BLOOD PRESSURE: 70 MMHG | WEIGHT: 272.6 LBS | HEART RATE: 80 BPM | SYSTOLIC BLOOD PRESSURE: 132 MMHG | BODY MASS INDEX: 36.92 KG/M2 | TEMPERATURE: 98 F | HEIGHT: 72 IN

## 2019-10-31 DIAGNOSIS — R05.9 COUGH: ICD-10-CM

## 2019-10-31 DIAGNOSIS — Z87.01 HISTORY OF PNEUMOCYSTIS PNEUMONIA: ICD-10-CM

## 2019-10-31 DIAGNOSIS — B20 HIV DISEASE (HCC): Primary | ICD-10-CM

## 2019-10-31 DIAGNOSIS — Z23 NEED FOR INFLUENZA VACCINATION: ICD-10-CM

## 2019-10-31 DIAGNOSIS — J30.81 CHRONIC ALLERGIC RHINITIS DUE TO ANIMAL HAIR AND DANDER: ICD-10-CM

## 2019-10-31 DIAGNOSIS — Z23 NEED FOR MENINGITIS VACCINATION: ICD-10-CM

## 2019-10-31 DIAGNOSIS — J45.40 MODERATE PERSISTENT ASTHMA WITHOUT COMPLICATION: Chronic | ICD-10-CM

## 2019-10-31 DIAGNOSIS — G25.0 BENIGN ESSENTIAL TREMOR: ICD-10-CM

## 2019-10-31 PROCEDURE — 90662 IIV NO PRSV INCREASED AG IM: CPT

## 2019-10-31 PROCEDURE — 90471 IMMUNIZATION ADMIN: CPT

## 2019-10-31 PROCEDURE — G0008 ADMIN INFLUENZA VIRUS VAC: HCPCS

## 2019-10-31 PROCEDURE — 99215 OFFICE O/P EST HI 40 MIN: CPT

## 2019-10-31 PROCEDURE — 90734 MENACWYD/MENACWYCRM VACC IM: CPT

## 2019-10-31 NOTE — PROGRESS NOTES
Progress Note - Infectious Disease   Kirstin Lovelace 71 y o  male MRN: [id number]  Unit/Bed#:  Encounter: [id number]      Impression/Plan:  1  HIV-doing well on Biktarvy with a near undetectable viral load and a CD4 count of greater than 500  He seems to be tolerating the medications without difficulty  Continue ART  Will recheck labs in 2 months in 3 months  As the patient is concerned about his viral load slowly increasing, will also recheck an HIV RNA in 1 month and see him back sooner if his viral load is greater than 100 copies      2  Chronic allergic rhinitis-remains on Flonase  No interaction with Biktarvy     3  Benign essential tremor-management as per the primary   With changed to [address], should have limited any drug interaction problem     4  History of Pneumocystis pneumonia-no recurrent symptomatology in the patient's CD4 count has nicely recovered  5  Cough-intermittent and felt to be related to allergies and asthma  His lungs currently sound clear and he is not having any fever or increased shortness of breath  He is certainly not a risk of recurrent Pneumocystis pneumonia with a high CD4 count If the symptoms would persist, and certainly if they worsen, would recheck a chest x-ray    Patient was provided medication, adherence and prevention education    Subjective:  Routine follow-up for HIV  Patient claims 100% adherence with Biktarvy    Patient denies any notable side effects  Overall the feeling well  The patient denies any fever chills or sweats, denies any nausea vomiting or diarrhea  He has been having some chest congestion intermittently with a scant cough but no increased shortness of breath  ROS: A complete 12 point ROS is negative other than that noted in the HPI    Followup portions patient history reviewed and updated as:   Allergies, current medications, past medical history, past social history, past surgical history, and the problem list    Objective:  Vitals:  Vitals: 10/31/19 1328   BP: 132/70   Pulse: 80   Temp: 98 °F (36 7 °C)   Weight: 124 kg (272 lb 9 6 oz)   Height: 6' (1 829 m)       Physical Exam:   General Appearance:  Alert, interactive, appearing well,  nontoxic, no acute distress  Neck:   Supple without lymphadenopathy, no thyromegaly or masses   Throat: Oropharynx moist without lesions  Lungs:   Clear to auscultation bilaterally; no wheezes, rhonchi or rales; respirations unlabored   Heart:  RRR; no murmur, rub or gallop   Abdomen:   Soft, non-tender, non-distended, positive bowel sounds  Extremities: No clubbing, cyanosis or edema   Skin: No new rashes or lesions  No draining wounds noted  Labs, Imaging, & Other studies:   All pertinent labs and imaging studies were personally reviewed    Lab Results   Component Value Date     06/09/2017    K 5 1 10/09/2019     10/09/2019    CO2 25 10/09/2019    BUN 15 10/09/2019    CREATININE 0 98 10/09/2019    GLUCOSE 100 (H) 06/09/2017    CALCIUM 8 9 08/10/2018    AST 31 10/09/2019    ALT 32 10/09/2019    ALKPHOS 57 08/10/2018    PROT 7 9 06/09/2017    BILITOT 0 3 06/09/2017    EGFR 85 08/10/2018     Lab Results   Component Value Date    WBC 7 1 10/09/2019    HGB 14 4 10/09/2019    HCT 41 6 10/09/2019    MCV 89 10/09/2019     10/09/2019     Lab Results   Component Value Date    HEPCAB 0 4 05/03/2019     Lab Results   Component Value Date    HEPAIGM Negative 05/03/2019    HEPBIGM Negative 05/03/2019    HEPCAB 0 4 05/03/2019     Lab Results   Component Value Date    RPR Non Reactive 07/19/2018     CD4 ABS   Date/Time Value Ref Range Status   10/09/2019 08:08  359 - 1,519 /uL Final     HIV-1 RNA by PCR, Qn   Date/Time Value Ref Range Status   10/09/2019 08:08 AM 90 copies/mL Final     Comment:     The reportable range for this assay is 20 to 10,000,000  copies HIV-1 RNA/mL               Current Outpatient Medications:     albuterol (PROVENTIL HFA,VENTOLIN HFA) 90 mcg/act inhaler, Inhale 2 puffs every 6 (six) hours as needed for wheezing, Disp: 1 Inhaler, Rfl: 5    ASMANEX  MCG/ACT AERO, , Disp: , Rfl: 3    BIKTARVY -25 MG tablet, TAKE 1 TABLET BY MOUTH ONCE DAILY WITH BREAKFAST, Disp: 30 tablet, Rfl: 2    fluticasone (FLONASE) 50 mcg/act nasal spray, 2 sprays into each nostril daily, Disp: 48 g, Rfl: 1    milk thistle 175 MG tablet, Take 1,000 mg by mouth 2 (two) times a day, Disp: , Rfl:     tamsulosin (FLOMAX) 0 4 mg, Take 0 4 mg by mouth daily with dinner, Disp: , Rfl:

## 2019-11-09 DIAGNOSIS — B20 HIV DISEASE (HCC): ICD-10-CM

## 2019-11-10 RX ORDER — BICTEGRAVIR SODIUM, EMTRICITABINE, AND TENOFOVIR ALAFENAMIDE FUMARATE 50; 200; 25 MG/1; MG/1; MG/1
TABLET ORAL
Qty: 30 TABLET | Refills: 2 | Status: SHIPPED | OUTPATIENT
Start: 2019-11-10 | End: 2020-01-06 | Stop reason: SDUPTHER

## 2019-12-02 LAB
HIV1 RNA # SERPL NAA+PROBE: 60 COPIES/ML
HIV1 RNA SERPL NAA+PROBE-LOG#: 1.78 LOG10COPY/ML

## 2019-12-23 ENCOUNTER — HOSPITAL ENCOUNTER (OUTPATIENT)
Dept: RADIOLOGY | Facility: HOSPITAL | Age: 64
Discharge: HOME | End: 2019-12-23
Payer: COMMERCIAL

## 2019-12-23 ENCOUNTER — OFFICE VISIT (OUTPATIENT)
Dept: FAMILY MEDICINE CLINIC | Facility: HOSPITAL | Age: 64
End: 2019-12-23
Payer: COMMERCIAL

## 2019-12-23 VITALS
HEART RATE: 68 BPM | DIASTOLIC BLOOD PRESSURE: 84 MMHG | RESPIRATION RATE: 16 BRPM | SYSTOLIC BLOOD PRESSURE: 150 MMHG | WEIGHT: 275 LBS | HEIGHT: 72 IN | BODY MASS INDEX: 37.25 KG/M2

## 2019-12-23 DIAGNOSIS — M25.50 ARTHRALGIA, UNSPECIFIED JOINT: ICD-10-CM

## 2019-12-23 DIAGNOSIS — Z87.828 PERSONAL HISTORY OF INJURY: ICD-10-CM

## 2019-12-23 DIAGNOSIS — M25.50 ARTHRALGIA, UNSPECIFIED JOINT: Primary | ICD-10-CM

## 2019-12-23 PROCEDURE — 1036F TOBACCO NON-USER: CPT | Performed by: PHYSICIAN ASSISTANT

## 2019-12-23 PROCEDURE — 99213 OFFICE O/P EST LOW 20 MIN: CPT | Performed by: PHYSICIAN ASSISTANT

## 2019-12-23 PROCEDURE — 73130 X-RAY EXAM OF HAND: CPT

## 2019-12-23 PROCEDURE — 3008F BODY MASS INDEX DOCD: CPT | Performed by: PHYSICIAN ASSISTANT

## 2019-12-23 RX ORDER — TAMSULOSIN HYDROCHLORIDE 0.4 MG/1
0.4 CAPSULE ORAL
COMMUNITY
End: 2020-02-07 | Stop reason: SDUPTHER

## 2019-12-23 RX ORDER — NAPROXEN 500 MG/1
500 TABLET ORAL 2 TIMES DAILY PRN
Qty: 40 TABLET | Refills: 2 | Status: SHIPPED | OUTPATIENT
Start: 2019-12-23 | End: 2020-09-01 | Stop reason: ALTCHOICE

## 2019-12-23 NOTE — PATIENT INSTRUCTIONS
Recommend Naprosyn twice a day on a full stomach  Obtain left hand xray and start vitamin D 2000 IU daily  Call office if sx  Worsen or persist in 2 weeks

## 2019-12-23 NOTE — PROGRESS NOTES
Assessment/Plan:         Diagnoses and all orders for this visit:    Arthralgia, unspecified joint  -     XR hand 3+ vw left; Future  -     naproxen (EC NAPROSYN) 500 MG EC tablet; Take 1 tablet (500 mg total) by mouth 2 (two) times a day as needed for mild pain    Personal history of injury affecting left hand, unsure of exact date-  About 1 year ago-    -     XR hand 3+ vw left; Future  -     naproxen (EC NAPROSYN) 500 MG EC tablet; Take 1 tablet (500 mg total) by mouth 2 (two) times a day as needed for mild pain    Other orders  -     tamsulosin (FLOMAX) 0 4 mg; Take 0 4 mg by mouth daily with dinner        Subjective:      Patient ID: Lawanda Red is a 71 y o  male  71year old white male c/o middle finger pain, of left hand, intermittent past 1 year  Did fall about 1 year ago, and used left hand to support self  Did try Ibuprofen which did not help  pain  No xrays done, was not seen  Review of Systems   Respiratory: Positive for shortness of breath  Negative for cough  Gastrointestinal: Negative for abdominal pain, nausea and vomiting  Musculoskeletal: Positive for arthralgias  Negative for back pain, myalgias, neck pain and neck stiffness  Has left hip and knee pain  Neurological: Positive for tremors  Negative for weakness and numbness  Objective:      /84   Pulse 68   Resp 16   Ht 6' (1 829 m)   Wt 125 kg (275 lb)   BMI 37 30 kg/m²          Physical Exam   Constitutional: He is oriented to person, place, and time  He appears well-developed and well-nourished  No distress  HENT:   Head: Normocephalic and atraumatic  Cardiovascular: Normal rate, regular rhythm and normal heart sounds  Pulmonary/Chest: Effort normal and breath sounds normal  No stridor  No respiratory distress  He has no wheezes  He has no rales  Musculoskeletal: He exhibits deformity  He exhibits no tenderness     Middle finger, left hand slightly swollen, proximal joint, slight impairment in flexing middle finger all the way, while making a fist      Neurological: He is alert and oriented to person, place, and time  No cranial nerve deficit  Coordination normal    Skin: He is not diaphoretic  Nursing note and vitals reviewed

## 2019-12-30 ENCOUNTER — TELEPHONE (OUTPATIENT)
Dept: PULMONOLOGY | Facility: CLINIC | Age: 64
End: 2019-12-30

## 2019-12-30 NOTE — TELEPHONE ENCOUNTER
Auth for Asmanex has been denied, appeal has been started  Patient has been notified  Appeal has been approved for the Asmanex HFA 200g  Auth number is [id number] and is valid from 12/23/2019 until 12/31/2020

## 2020-01-03 ENCOUNTER — TELEPHONE (OUTPATIENT)
Dept: FAMILY MEDICINE CLINIC | Facility: HOSPITAL | Age: 65
End: 2020-01-03

## 2020-01-03 DIAGNOSIS — J45.40 MODERATE PERSISTENT ASTHMA WITHOUT COMPLICATION: Primary | Chronic | ICD-10-CM

## 2020-01-03 RX ORDER — MOMETASONE FUROATE 200 UG/1
200 AEROSOL RESPIRATORY (INHALATION) 2 TIMES DAILY
Qty: 3 INHALER | Refills: 3 | Status: SHIPPED | OUTPATIENT
Start: 2020-01-03 | End: 2020-12-16 | Stop reason: SDUPTHER

## 2020-01-03 NOTE — TELEPHONE ENCOUNTER
Patient needs refills on his asmanex HFA to be sent to the [facility] pharmacy on file

## 2020-01-06 DIAGNOSIS — B20 HIV DISEASE (HCC): ICD-10-CM

## 2020-01-23 LAB
ALBUMIN SERPL-MCNC: 4.2 G/DL (ref 3.8–4.8)
ALBUMIN/GLOB SERPL: 1.6 {RATIO} (ref 1.2–2.2)
ALP SERPL-CCNC: 56 IU/L (ref 39–117)
ALT SERPL-CCNC: 25 IU/L (ref 0–44)
AST SERPL-CCNC: 27 IU/L (ref 0–40)
BASOPHILS # BLD AUTO: 0 X10E3/UL (ref 0–0.2)
BASOPHILS NFR BLD AUTO: 0 %
BILIRUB SERPL-MCNC: 0.4 MG/DL (ref 0–1.2)
BUN SERPL-MCNC: 16 MG/DL (ref 8–27)
BUN/CREAT SERPL: 13 (ref 10–24)
CALCIUM SERPL-MCNC: 9.1 MG/DL (ref 8.6–10.2)
CD3+CD4+ CELLS # BLD: 513 /UL (ref 359–1519)
CD3+CD4+ CELLS NFR BLD: 17.1 % (ref 30.8–58.5)
CHLORIDE SERPL-SCNC: 101 MMOL/L (ref 96–106)
CO2 SERPL-SCNC: 24 MMOL/L (ref 20–29)
CREAT SERPL-MCNC: 1.19 MG/DL (ref 0.76–1.27)
EOSINOPHIL # BLD AUTO: 0.3 X10E3/UL (ref 0–0.4)
EOSINOPHIL NFR BLD AUTO: 5 %
ERYTHROCYTE [DISTWIDTH] IN BLOOD BY AUTOMATED COUNT: 13.3 % (ref 11.6–15.4)
GLOBULIN SER-MCNC: 2.6 G/DL (ref 1.5–4.5)
GLUCOSE SERPL-MCNC: 93 MG/DL (ref 65–99)
HCT VFR BLD AUTO: 43.1 % (ref 37.5–51)
HGB BLD-MCNC: 14.6 G/DL (ref 13–17.7)
HIV1 RNA # SERPL NAA+PROBE: <20 COPIES/ML
HIV1 RNA SERPL NAA+PROBE-LOG#: NORMAL LOG10COPY/ML
IMM GRANULOCYTES # BLD: 0 X10E3/UL (ref 0–0.1)
IMM GRANULOCYTES NFR BLD: 0 %
LYMPHOCYTES # BLD AUTO: 3 X10E3/UL (ref 0.7–3.1)
LYMPHOCYTES NFR BLD AUTO: 41 %
MCH RBC QN AUTO: 30.8 PG (ref 26.6–33)
MCHC RBC AUTO-ENTMCNC: 33.9 G/DL (ref 31.5–35.7)
MCV RBC AUTO: 91 FL (ref 79–97)
MONOCYTES # BLD AUTO: 0.6 X10E3/UL (ref 0.1–0.9)
MONOCYTES NFR BLD AUTO: 8 %
NEUTROPHILS # BLD AUTO: 3.4 X10E3/UL (ref 1.4–7)
NEUTROPHILS NFR BLD AUTO: 46 %
PLATELET # BLD AUTO: 301 X10E3/UL (ref 150–450)
POTASSIUM SERPL-SCNC: 4.6 MMOL/L (ref 3.5–5.2)
PROT SERPL-MCNC: 6.8 G/DL (ref 6–8.5)
RBC # BLD AUTO: 4.74 X10E6/UL (ref 4.14–5.8)
SL AMB EGFR AFRICAN AMERICAN: 74 ML/MIN/1.73
SL AMB EGFR NON AFRICAN AMERICAN: 64 ML/MIN/1.73
SODIUM SERPL-SCNC: 139 MMOL/L (ref 134–144)
WBC # BLD AUTO: 7.3 X10E3/UL (ref 3.4–10.8)

## 2020-01-30 ENCOUNTER — OFFICE VISIT (OUTPATIENT)
Dept: INFECTIOUS DISEASES | Facility: CLINIC | Age: 65
End: 2020-01-30
Payer: COMMERCIAL

## 2020-01-30 VITALS
HEART RATE: 75 BPM | WEIGHT: 275 LBS | BODY MASS INDEX: 37.3 KG/M2 | DIASTOLIC BLOOD PRESSURE: 72 MMHG | SYSTOLIC BLOOD PRESSURE: 124 MMHG | TEMPERATURE: 98.5 F

## 2020-01-30 DIAGNOSIS — J45.40 MODERATE PERSISTENT ASTHMA WITHOUT COMPLICATION: Chronic | ICD-10-CM

## 2020-01-30 DIAGNOSIS — J30.81 CHRONIC ALLERGIC RHINITIS DUE TO ANIMAL HAIR AND DANDER: ICD-10-CM

## 2020-01-30 DIAGNOSIS — B20 HIV DISEASE (HCC): Primary | ICD-10-CM

## 2020-01-30 DIAGNOSIS — Z23 NEED FOR MENINGITIS VACCINATION: ICD-10-CM

## 2020-01-30 DIAGNOSIS — G25.0 BENIGN ESSENTIAL TREMOR: ICD-10-CM

## 2020-01-30 DIAGNOSIS — Z87.01 HISTORY OF PNEUMOCYSTIS PNEUMONIA: ICD-10-CM

## 2020-01-30 PROCEDURE — 99215 OFFICE O/P EST HI 40 MIN: CPT | Performed by: INTERNAL MEDICINE

## 2020-01-30 PROCEDURE — 90471 IMMUNIZATION ADMIN: CPT

## 2020-01-30 PROCEDURE — 90734 MENACWYD/MENACWYCRM VACC IM: CPT

## 2020-01-30 NOTE — PROGRESS NOTES
Progress Note - Infectious Disease   Jessica Pollen 76 y o  male MRN: [id number]  Unit/Bed#:  Encounter: [id number]      Impression/Plan:  1  HIV-doing well on Biktarvy with an undetectable viral load and a CD4 count of greater than 500  He seems to be tolerating the medications without difficulty   Continue ART   Will recheck labs in 2 months in 3 months  Will give 2nd dose of Menactra  Slight bump in creatinine noted but still in the normal range  Continue to monitor for now      2  Chronic allergic rhinitis-remains on Flonase   No interaction with Biktarvy      3  Benign essential tremor-management as per the primary   With changed to [address] have limited any drug interaction problem     4  History of Pneumocystis pneumonia-no recurrent symptomatology in the patient's CD4 count has nicely recovered      5  Asthma-no clinical evidence of an acute exacerbation at this time    Patient was provided medication, adherence and prevention education    Subjective:  Routine follow-up for HIV  Patient claims 100% adherence with Biktarvy  Patient denies any notable side effects  Overall the feeling well  The patient denies any fever chills or sweats, denies any nausea vomiting or diarrhea, denies any cough or shortness of breath  ROS: A complete 12 point ROS is negative other than that noted in the HPI    Followup portions patient history reviewed and updated as: Allergies, current medications, past medical history, past social history, past surgical history, and the problem list    Objective:  Vitals:  Vitals:    01/30/20 1512   BP: 124/72   Pulse: 75   Temp: 98 5 °F (36 9 °C)   Weight: 125 kg (275 lb)       Physical Exam:   General Appearance:  Alert, interactive, appearing well,  nontoxic, no acute distress  Neck:   Supple without lymphadenopathy, no thyromegaly or masses   Throat: Oropharynx moist without lesions      Lungs:   Clear to auscultation bilaterally; no wheezes, rhonchi or rales; respirations unlabored   Heart:  RRR; no murmur, rub or gallop   Abdomen:   Soft, non-tender, non-distended, positive bowel sounds  Extremities: No clubbing, cyanosis or edema   Skin: No new rashes or lesions  No draining wounds noted  Labs, Imaging, & Other studies:   All pertinent labs and imaging studies were personally reviewed    Lab Results   Component Value Date     06/09/2017    K 4 6 01/20/2020     01/20/2020    CO2 24 01/20/2020    BUN 16 01/20/2020    CREATININE 1 19 01/20/2020    GLUCOSE 100 (H) 06/09/2017    CALCIUM 8 9 08/10/2018    AST 27 01/20/2020    ALT 25 01/20/2020    ALKPHOS 57 08/10/2018    PROT 7 9 06/09/2017    BILITOT 0 3 06/09/2017    EGFR 85 08/10/2018     Lab Results   Component Value Date    WBC 7 3 01/20/2020    HGB 14 6 01/20/2020    HCT 43 1 01/20/2020    MCV 91 01/20/2020     01/20/2020     Lab Results   Component Value Date    HEPCAB 0 4 05/03/2019     Lab Results   Component Value Date    HEPAIGM Negative 05/03/2019    HEPBIGM Negative 05/03/2019    HEPCAB 0 4 05/03/2019     Lab Results   Component Value Date    RPR Non Reactive 07/19/2018     CD4 ABS   Date/Time Value Ref Range Status   01/20/2020 12:00  359 - 1,519 /uL Final     HIV-1 RNA by PCR, Qn   Date/Time Value Ref Range Status   01/20/2020 12:00 AM <20 copies/mL Final     Comment:     HIV-1 RNA detected  The reportable range for this assay is 20 to 10,000,000  copies HIV-1 RNA/mL               Current Outpatient Medications:     albuterol (PROVENTIL HFA,VENTOLIN HFA) 90 mcg/act inhaler, Inhale 2 puffs every 6 (six) hours as needed for wheezing, Disp: 1 Inhaler, Rfl: 5    ASMANEX  MCG/ACT AERO, Take 1 Act (200 mcg total) by mouth 2 (two) times a day 1 puff twice daily, Disp: 3 Inhaler, Rfl: 3    bictegravir-emtricitab-tenofovir alafenamide (BIKTARVY) -25 MG tablet, Take 1 tablet by mouth daily with breakfast, Disp: 90 tablet, Rfl: 0    fluticasone (FLONASE) 50 mcg/act nasal spray, 2 sprays into each nostril daily, Disp: 48 g, Rfl: 1    naproxen (EC NAPROSYN) 500 MG EC tablet, Take 1 tablet (500 mg total) by mouth 2 (two) times a day as needed for mild pain, Disp: 40 tablet, Rfl: 2    tamsulosin (FLOMAX) 0 4 mg, Take 0 4 mg by mouth daily with dinner, Disp: , Rfl:

## 2020-02-07 DIAGNOSIS — N40.1 BENIGN PROSTATIC HYPERPLASIA WITH NOCTURIA: Primary | ICD-10-CM

## 2020-02-07 DIAGNOSIS — R35.1 BENIGN PROSTATIC HYPERPLASIA WITH NOCTURIA: Primary | ICD-10-CM

## 2020-02-07 RX ORDER — TAMSULOSIN HYDROCHLORIDE 0.4 MG/1
0.4 CAPSULE ORAL
Qty: 90 CAPSULE | Refills: 3 | Status: SHIPPED | OUTPATIENT
Start: 2020-02-07 | End: 2021-01-18 | Stop reason: SDUPTHER

## 2020-02-12 ENCOUNTER — OFFICE VISIT (OUTPATIENT)
Dept: OBGYN CLINIC | Facility: CLINIC | Age: 65
End: 2020-02-12
Payer: COMMERCIAL

## 2020-02-12 VITALS
HEIGHT: 72 IN | WEIGHT: 275 LBS | SYSTOLIC BLOOD PRESSURE: 126 MMHG | BODY MASS INDEX: 37.25 KG/M2 | HEART RATE: 82 BPM | DIASTOLIC BLOOD PRESSURE: 80 MMHG

## 2020-02-12 DIAGNOSIS — S63.633A SPRAIN OF INTERPHALANGEAL JOINT OF LEFT MIDDLE FINGER, INITIAL ENCOUNTER: Primary | ICD-10-CM

## 2020-02-12 PROCEDURE — 1036F TOBACCO NON-USER: CPT | Performed by: ORTHOPAEDIC SURGERY

## 2020-02-12 PROCEDURE — 99203 OFFICE O/P NEW LOW 30 MIN: CPT | Performed by: ORTHOPAEDIC SURGERY

## 2020-02-12 PROCEDURE — 1160F RVW MEDS BY RX/DR IN RCRD: CPT | Performed by: ORTHOPAEDIC SURGERY

## 2020-02-12 PROCEDURE — 4040F PNEUMOC VAC/ADMIN/RCVD: CPT | Performed by: ORTHOPAEDIC SURGERY

## 2020-02-12 PROCEDURE — 3074F SYST BP LT 130 MM HG: CPT | Performed by: ORTHOPAEDIC SURGERY

## 2020-02-12 PROCEDURE — 3008F BODY MASS INDEX DOCD: CPT | Performed by: ORTHOPAEDIC SURGERY

## 2020-02-12 PROCEDURE — 3079F DIAST BP 80-89 MM HG: CPT | Performed by: ORTHOPAEDIC SURGERY

## 2020-02-12 NOTE — ASSESSMENT & PLAN NOTE
Findings consistent with left long finger PIP joint sprain  Discussed findings and treatment options with the patient  I reviewed patient's left long finger x-ray with him  I discussed prognosis of his injury  I will refer patient to hand therapy for treatment  I discussed with patient that the PIP joint injury is notorious for stiffness and persistent pain  I will see patient back in 6 weeks for re-evaluation  All patient's questions were answered to his satisfaction  This note is created using dictation transcription  It may contain typographical errors, grammatical errors, improperly dictated words, background noise and other errors

## 2020-02-12 NOTE — PROGRESS NOTES
Assessment:     1  Sprain of interphalangeal joint of left middle finger, initial encounter        Plan:     Problem List Items Addressed This Visit        Musculoskeletal and Integument    Sprain of interphalangeal joint of left middle finger - Primary     Findings consistent with left long finger PIP joint sprain  Discussed findings and treatment options with the patient  I reviewed patient's left long finger x-ray with him  I discussed prognosis of his injury  I will refer patient to hand therapy for treatment  I discussed with patient that the PIP joint injury is notorious for stiffness and persistent pain  I will see patient back in 6 weeks for re-evaluation  All patient's questions were answered to his satisfaction  This note is created using dictation transcription  It may contain typographical errors, grammatical errors, improperly dictated words, background noise and other errors  Relevant Orders    Ambulatory referral to PT/OT hand therapy         Subjective:     Patient ID: Benjamin Ledezma is a 76 y o  male  Chief Complaint:  50-year-old gentleman injured his left long finger about a year ago after he fell  He had swelling and pain in the long finger which gradually decreased  He he did not have any treatment  He noticed not able to fully extend his left long finger since the injury  He has pain with making a fist   He denies locking sensation  Pain is localized over the PIP joint  Information on patient's intake form was reviewed      Allergy:  No Known Allergies  Medications:  all current active meds have been reviewed  Past Medical History:  Past Medical History:   Diagnosis Date    Abscess, cheek     Last Assessed: 2/23/2016    Asthma     Chronic headaches     Constipation     COPD (chronic obstructive pulmonary disease) (HCC)     Cough     Difficulty attaining erection     Dyspnea     Enlarged prostate     Hemorrhoids     [facility] immunodeficiency virus infection) ([facility] )     Migraine     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 )     Seasonal allergies     Slow urinary stream     Wheezing      Past Surgical History:  Past Surgical History:   Procedure Laterality Date    ABSCESS DRAINAGE      AK BRONCHOSCOPY,DIAGNOSTIC N/A 2018    Procedure: BRONCHOSCOPY FLEXIBLE;  Surgeon: Braxton Cruz DO;  Location: [facility];  Service: Pulmonary    SKIN LESION EXCISION      Excision of lesion in vestibule of mouth; Last Assessed: 2016     Family History:  Family History   Problem Relation Age of Onset    Hypertension Mother     Glaucoma Mother     Cancer Father         Prostate    Diabetes Maternal Uncle     Glaucoma Maternal Uncle     Substance Abuse Neg Hx         neg fam hx    Mental illness Neg Hx         neg fam hx     Social History:  Social History     Substance and Sexual Activity   Alcohol Use Yes    Frequency: 2-3 times a week    Comment: socially / 1-4 drinks/week     Social History     Substance and Sexual Activity   Drug Use No     Social History     Tobacco Use   Smoking Status Former Smoker    Packs/day: 1 50    Years: 20 00    Pack years: 30 00    Types: Cigarettes    Last attempt to quit: 1993    Years since quittin 0   Smokeless Tobacco Never Used     Review of Systems   Constitutional: Negative  HENT: Negative  Eyes: Negative  Respiratory: Negative  Cardiovascular: Negative  Gastrointestinal: Negative  Endocrine: Negative  Genitourinary: Negative  Musculoskeletal: Positive for arthralgias (Left middle finger PIP joint) and joint swelling (Left middle finger PIP joint)  Skin: Negative  Neurological: Negative  Hematological: Negative  Psychiatric/Behavioral: Negative            Objective:  BP Readings from Last 1 Encounters:   20 126/80      Wt Readings from Last 1 Encounters:   20 125 kg (275 lb)      BMI:   Estimated body mass index is 37 3 kg/m² as calculated from the following:    Height as of this encounter: 6' (1 829 m)  Weight as of this encounter: 125 kg (275 lb)  BSA:   Estimated body surface area is 2 44 meters squared as calculated from the following:    Height as of this encounter: 6' (1 829 m)  Weight as of this encounter: 125 kg (275 lb)  Physical Exam   Constitutional: He is oriented to person, place, and time  He appears well-developed  HENT:   Head: Normocephalic and atraumatic  Eyes: Conjunctivae and EOM are normal    Neck: Neck supple  Pulmonary/Chest: Effort normal    Neurological: He is alert and oriented to person, place, and time  Skin: Skin is warm  Psychiatric: He has a normal mood and affect  Nursing note and vitals reviewed  Left Hand Exam     Tenderness   Left hand tenderness location: Around the PIP joint of long finger  Range of Motion   Hand   MP Middle: normal   PIP Middle: 80   DIP Middle: normal     Muscle Strength   The patient has normal left wrist strength  Other   Erythema: absent  Sensation: normal  Pulse: present    Comments:  Lacked about 5 degree of full extension with left long finger PIP joint            I have personally reviewed pertinent films in PACS and my interpretation is left long finger showed good alignment  No DJD or soft tissue calcification  No evidence of prior fracture

## 2020-02-18 ENCOUNTER — OFFICE VISIT (OUTPATIENT)
Dept: PULMONOLOGY | Facility: HOSPITAL | Age: 65
End: 2020-02-18
Payer: COMMERCIAL

## 2020-02-18 VITALS
TEMPERATURE: 97.7 F | WEIGHT: 270 LBS | BODY MASS INDEX: 36.57 KG/M2 | HEIGHT: 72 IN | OXYGEN SATURATION: 94 % | DIASTOLIC BLOOD PRESSURE: 80 MMHG | RESPIRATION RATE: 14 BRPM | HEART RATE: 84 BPM | SYSTOLIC BLOOD PRESSURE: 140 MMHG

## 2020-02-18 DIAGNOSIS — B20 HIV DISEASE (HCC): ICD-10-CM

## 2020-02-18 DIAGNOSIS — J45.40 MODERATE PERSISTENT ASTHMA WITHOUT COMPLICATION: Chronic | ICD-10-CM

## 2020-02-18 DIAGNOSIS — G47.33 OSA (OBSTRUCTIVE SLEEP APNEA): Primary | ICD-10-CM

## 2020-02-18 PROCEDURE — 99213 OFFICE O/P EST LOW 20 MIN: CPT | Performed by: INTERNAL MEDICINE

## 2020-02-18 PROCEDURE — 3077F SYST BP >= 140 MM HG: CPT | Performed by: INTERNAL MEDICINE

## 2020-02-18 PROCEDURE — 3079F DIAST BP 80-89 MM HG: CPT | Performed by: INTERNAL MEDICINE

## 2020-02-18 PROCEDURE — 1160F RVW MEDS BY RX/DR IN RCRD: CPT | Performed by: INTERNAL MEDICINE

## 2020-02-18 PROCEDURE — 1036F TOBACCO NON-USER: CPT | Performed by: INTERNAL MEDICINE

## 2020-02-18 PROCEDURE — 4040F PNEUMOC VAC/ADMIN/RCVD: CPT | Performed by: INTERNAL MEDICINE

## 2020-02-18 NOTE — LETTER
February 22, 2020     Cornelio Shetty, [address]  [address] [address]    Patient: Dex Silva   YOB: 1955   Date of Visit: 2/18/2020       Dear Dr Jessica Ervin: Thank you for referring Veronica Moreira to me for evaluation  Below are my notes for this consultation  If you have questions, please do not hesitate to call me  I look forward to following your patient along with you  Sincerely,        Jonathan Jack DO        CC: No Recipients  Joanthan Jack DO  2/22/2020  6:22 AM  Sign at close encounter    Progress note - Pulmonary Medicine   Dex Silva 76 y o  male MRN: [id number]       Impression & Plan:   75 y/o M with PMHx of BPH, Hay fever, alcohol abuse, ZAIRA and never tried CPAP, obesity who comes in for follow up of PJP pneumonia, HIV and severe obstructive lung disease     1   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   Previously  PFTs demonstrated severe obstruction noted with bronchodilator response   IgE mildly elevated but [facility] panel with many different allergens       -  He has been using asmanex intermittently due to dyspnea on exertion and wheezing  He states that he is using it approximately 1 puff daily  He prefers not to use any medications at all  -  I discussed with him the importance of remaining consistent with the therapy  He will try to be a bit better with it overall         -  Continue PRN albuterol    2  History of  PCP pneumonia and Stenotrophomonas recently diagnosed on bronchoscopy   Completed Bactrim and prednisone  Now clinically resolved      - Clinically at baseline        -  Repeat CXR to ensure complete resolution      3    HIV now with normal CD4 and undetectable viral load       - Continue HAART therapy    ID following        4  History of ZAIRA - He still has no interest in pursuit of treatment      ______________________________________________________________________    HPI: Elijah Rivera presents today for follow-up for his asthma, PCP pneumonia  He states that overall he is doing ok  However, he continues to need his albuterol more frequently  He states he will have these "panic attacks" once in a while where he feels like he cant breath  He denies significant cough, wheezing or chest tightness  Review of Systems:  Review of Systems   Constitutional: Negative  HENT: Negative  Respiratory: Positive for chest tightness, shortness of breath and wheezing  Cardiovascular: Negative  Gastrointestinal: Negative  Genitourinary: Negative  Musculoskeletal: Negative  Allergic/Immunologic: Negative  Neurological: Negative  Psychiatric/Behavioral: Negative            Social history updates:  Social History     Tobacco Use   Smoking Status Former Smoker    Packs/day: 1 50    Years: 20 00    Pack years: 30 00    Types: Cigarettes    Last attempt to quit: 1993    Years since quittin 0   Smokeless Tobacco Never Used     Social History     Socioeconomic History    Marital status: /Civil Union     Spouse name: Not on file    Number of children: Not on file    Years of education: Not on file    Highest education level: Not on file   Occupational History    Not on file   Social Needs    Financial resource strain: Not on file    Food insecurity:     Worry: Not on file     Inability: Not on file    Transportation needs:     Medical: No     Non-medical: No   Tobacco Use    Smoking status: Former Smoker     Packs/day: 1 50     Years: 20 00     Pack years: 30 00     Types: Cigarettes     Last attempt to quit: 1993     Years since quittin 0    Smokeless tobacco: Never Used   Substance and Sexual Activity    Alcohol use: Yes     Frequency: 2-3 times a week     Comment: socially / 1-4 drinks/week    Drug use: No    Sexual activity: Never   Lifestyle    Physical activity:     Days per week: Not on file     Minutes per session: Not on file    Stress: Not on file   Relationships    Social connections:     Talks on phone: Not on file     Gets together: Not on file     Attends [employer] service: Not on file     Active member of club or organization: Not on file     Attends meetings of clubs or organizations: Not on file     Relationship status: Not on file    Intimate partner violence:     Fear of current or ex partner: Not on file     Emotionally abused: Not on file     Physically abused: Not on file     Forced sexual activity: Not on file   Other Topics Concern    Not on file   Social History Narrative    Daily caffeine consumption, 2-3 servings a day    Lives with wife    Feels safe at home  Sees dentist reg    No living will       PhysicalExamination:  Vitals:   /80   Pulse 84   Temp 97 7 °F (36 5 °C)   Resp 14   Ht 6' (1 829 m)   Wt 122 kg (270 lb)   SpO2 94%   BMI 36 62 kg/m²    General: Pleasant, Awake alert and oriented x 3, conversant without conversational dyspnea, NAD, normal affect  HEENT:  PERRL, Sclera noninjected, nonicteric OU, Nares patent,  no craniofacial abnormalities, Mucous membranes, moist, no oral lesions, normal dentition, Mallampati class 3  NECK: Trachea midline, no accessory muscle use, no stridor, no cervical or supraclavicular adenopathy, JVP not elevated  CARDIAC: Reg, single s1/S2, no m/r/g  PULM: CTA bilaterally no wheezing, rhonchi or rales  ABD: Normoactive bowel sounds, soft nontender, nondistended, no rebound, no rigidity, no guarding  EXT: No cyanosis, no clubbing, no edema, normal capillary refill  NEURO: no focal neurologic deficits, AAOx3, moving all extremities appropriately      Diagnostic Data:  Labs: I personally reviewed the most recent laboratory data pertinent to today's visit  I have personally reviewed pertinent lab results    Lab Results   Component Value Date    WBC 7 3 01/20/2020    HGB 14 6 01/20/2020    HCT 43 1 01/20/2020    MCV 91 01/20/2020     01/20/2020     Lab Results   Component Value Date    GLUCOSE 100 (H) 06/09/2017    CALCIUM 8 9 08/10/2018     06/09/2017    K 4 6 01/20/2020    CO2 24 01/20/2020     01/20/2020    BUN 16 01/20/2020    CREATININE 1 19 01/20/2020     Lab Results   Component Value Date     07/08/2018     Lab Results   Component Value Date    ALT 25 01/20/2020    AST 27 01/20/2020    ALKPHOS 57 08/10/2018    BILITOT 0 3 06/09/2017          6 minute walk in office 7/27/18  Starting saturation - 95%   Starting HR - 84 bpm  Lowest saturation - 94%    Highest HR - 114 bpm  Ambulated - 168 m without the need of oxygen        PFT results: The most recent pulmonary function tests were reviewed  Spirometry today  Pre  FEV1/FVC - 61%  FEV1 - 3 03 (81%)  FVC - 4 97 (100%)  Post  FEV1/FVC - 68%  FEV1 - 3 26 (88%)  FVC - 4 79 (96%)  Change - 8%     3/4/18 Study Interpretation:   · Severe airflow limitation  Significant improvement in airflow on vital capacity following the administration of bronchodilators      6 minute walk test in office 6/21/18  Saturation - 91% at start, dropped to 80% with ambulation, maintained at 90% with 3L nasal cannula     Imaging:  I personally reviewed the images on the [facility] system pertinent to today's visit  CXR - 6/6/18   IMPRESSION:  Persistent bilateral pulmonary airspace disease and interstitial prominence in a pattern most compatible with pneumonia and with no significant interval change since 6/3/2018     Other studies:  Echo -  SUMMARY  LEFT VENTRICLE:  Size was normal   Systolic function was normal  Ejection fraction was estimated to be 55 %    Wall thickness was normal   Left ventricular diastolic function parameters were normal      RIGHT VENTRICLE:  The size was normal   Systolic function was normal      LEFT ATRIUM:  The atrium was mildly dilated      TRICUSPID VALVE:  There was trace regurgitation      AORTA:  The root exhibited mild dilatation       Vedalila Bile, DO

## 2020-02-22 NOTE — PROGRESS NOTES
Progress note - Pulmonary Medicine   Lawanda Red 76 y o  male MRN: [id number]       Impression & Plan:   77 y/o M with PMHx of BPH, Hay fever, alcohol abuse, ZAIRA and never tried CPAP, obesity who comes in for follow up of PJP pneumonia, HIV and severe obstructive lung disease     1   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   Previously  PFTs demonstrated severe obstruction noted with bronchodilator response   IgE mildly elevated but [place] panel with many different allergens       -  He has been using asmanex intermittently due to dyspnea on exertion and wheezing  He states that he is using it approximately 1 puff daily  He prefers not to use any medications at all  -  I discussed with him the importance of remaining consistent with the therapy  He will try to be a bit better with it overall         -  Continue PRN albuterol    2  History of  PCP pneumonia and Stenotrophomonas recently diagnosed on bronchoscopy   Completed Bactrim and prednisone  Now clinically resolved      - Clinically at baseline        -  Repeat CXR to ensure complete resolution      3    HIV now with normal CD4 and undetectable viral load       - Continue HAART therapy  ID following        4  History of ZAIRA - He still has no interest in pursuit of treatment      ______________________________________________________________________    HPI:    Lawanda Red presents today for follow-up for his asthma, PCP pneumonia  He states that overall he is doing ok  However, he continues to need his albuterol more frequently  He states he will have these "panic attacks" once in a while where he feels like he cant breath  He denies significant cough, wheezing or chest tightness  Review of Systems:  Review of Systems   Constitutional: Negative  HENT: Negative  Respiratory: Positive for chest tightness, shortness of breath and wheezing  Cardiovascular: Negative  Gastrointestinal: Negative  Genitourinary: Negative  Musculoskeletal: Negative  Allergic/Immunologic: Negative  Neurological: Negative  Psychiatric/Behavioral: Negative            Social history updates:  Social History     Tobacco Use   Smoking Status Former Smoker    Packs/day: 1 50    Years: 20 00    Pack years: 30 00    Types: Cigarettes    Last attempt to quit: 1993    Years since quittin 0   Smokeless Tobacco Never Used     Social History     Socioeconomic History    Marital status: /Civil Union     Spouse name: Not on file    Number of children: Not on file    Years of education: Not on file    Highest education level: Not on file   Occupational History    Not on file   Social Needs    Financial resource strain: Not on file    Food insecurity:     Worry: Not on file     Inability: Not on file    Transportation needs:     Medical: No     Non-medical: No   Tobacco Use    Smoking status: Former Smoker     Packs/day: 1 50     Years: 20 00     Pack years: 30 00     Types: Cigarettes     Last attempt to quit: 1993     Years since quittin 0    Smokeless tobacco: Never Used   Substance and Sexual Activity    Alcohol use: Yes     Frequency: 2-3 times a week     Comment: socially / 1-4 drinks/week    Drug use: No    Sexual activity: Never   Lifestyle    Physical activity:     Days per week: Not on file     Minutes per session: Not on file    Stress: Not on file   Relationships    Social connections:     Talks on phone: Not on file     Gets together: Not on file     Attends Ten[employer] service: Not on file     Active member of club or organization: Not on file     Attends meetings of clubs or organizations: Not on file     Relationship status: Not on file    Intimate partner violence:     Fear of current or ex partner: Not on file     Emotionally abused: Not on file     Physically abused: Not on file     Forced sexual activity: Not on file   Other Topics Concern    Not on file   Social History Narrative    Daily caffeine consumption, 2-3 servings a day    Lives with wife    Feels safe at home  Sees dentist reg    No living will       PhysicalExamination:  Vitals:   /80   Pulse 84   Temp 97 7 °F (36 5 °C)   Resp 14   Ht 6' (1 829 m)   Wt 122 kg (270 lb)   SpO2 94%   BMI 36 62 kg/m²   General: Pleasant, Awake alert and oriented x 3, conversant without conversational dyspnea, NAD, normal affect  HEENT:  PERRL, Sclera noninjected, nonicteric OU, Nares patent,  no craniofacial abnormalities, Mucous membranes, moist, no oral lesions, normal dentition, Mallampati class 3  NECK: Trachea midline, no accessory muscle use, no stridor, no cervical or supraclavicular adenopathy, JVP not elevated  CARDIAC: Reg, single s1/S2, no m/r/g  PULM: CTA bilaterally no wheezing, rhonchi or rales  ABD: Normoactive bowel sounds, soft nontender, nondistended, no rebound, no rigidity, no guarding  EXT: No cyanosis, no clubbing, no edema, normal capillary refill  NEURO: no focal neurologic deficits, AAOx3, moving all extremities appropriately      Diagnostic Data:  Labs: I personally reviewed the most recent laboratory data pertinent to today's visit  I have personally reviewed pertinent lab results    Lab Results   Component Value Date    WBC 7 3 01/20/2020    HGB 14 6 01/20/2020    HCT 43 1 01/20/2020    MCV 91 01/20/2020     01/20/2020     Lab Results   Component Value Date    GLUCOSE 100 (H) 06/09/2017    CALCIUM 8 9 08/10/2018     06/09/2017    K 4 6 01/20/2020    CO2 24 01/20/2020     01/20/2020    BUN 16 01/20/2020    CREATININE 1 19 01/20/2020     Lab Results   Component Value Date     07/08/2018     Lab Results   Component Value Date    ALT 25 01/20/2020    AST 27 01/20/2020    ALKPHOS 57 08/10/2018    BILITOT 0 3 06/09/2017          6 minute walk in office 7/27/18  Starting saturation - 95%   Starting HR - 84 bpm  Lowest saturation - 94%    Highest HR - 114 bpm  Ambulated - 168 m without the need of oxygen        PFT results: The most recent pulmonary function tests were reviewed  Spirometry today  Pre  FEV1/FVC - 61%  FEV1 - 3 03 (81%)  FVC - 4 97 (100%)  Post  FEV1/FVC - 68%  FEV1 - 3 26 (88%)  FVC - 4 79 (96%)  Change - 8%     3/4/18 Study Interpretation:   · Severe airflow limitation  Significant improvement in airflow on vital capacity following the administration of bronchodilators      6 minute walk test in office 6/21/18  Saturation - 91% at start, dropped to 80% with ambulation, maintained at 90% with 3L nasal cannula     Imaging:  I personally reviewed the images on the [facility] system pertinent to today's visit  CXR - 6/6/18   IMPRESSION:  Persistent bilateral pulmonary airspace disease and interstitial prominence in a pattern most compatible with pneumonia and with no significant interval change since 6/3/2018     Other studies:  Echo -  SUMMARY  LEFT VENTRICLE:  Size was normal   Systolic function was normal  Ejection fraction was estimated to be 55 %    Wall thickness was normal   Left ventricular diastolic function parameters were normal      RIGHT VENTRICLE:  The size was normal   Systolic function was normal      LEFT ATRIUM:  The atrium was mildly dilated      TRICUSPID VALVE:  There was trace regurgitation      AORTA:  The root exhibited mild dilatation       Angel Luis Valdovinos DO

## 2020-03-30 LAB
ALBUMIN SERPL-MCNC: 4.2 G/DL (ref 3.8–4.8)
ALBUMIN/GLOB SERPL: 1.7 {RATIO} (ref 1.2–2.2)
ALP SERPL-CCNC: 54 IU/L (ref 39–117)
ALT SERPL-CCNC: 25 IU/L (ref 0–44)
AST SERPL-CCNC: 30 IU/L (ref 0–40)
BASOPHILS # BLD AUTO: 0.1 X10E3/UL (ref 0–0.2)
BASOPHILS NFR BLD AUTO: 1 %
BILIRUB SERPL-MCNC: 0.5 MG/DL (ref 0–1.2)
BUN SERPL-MCNC: 13 MG/DL (ref 8–27)
BUN/CREAT SERPL: 13 (ref 10–24)
CALCIUM SERPL-MCNC: 9.2 MG/DL (ref 8.6–10.2)
CD3+CD4+ CELLS # BLD: 416 /UL (ref 359–1519)
CD3+CD4+ CELLS NFR BLD: 21.9 % (ref 30.8–58.5)
CHLORIDE SERPL-SCNC: 100 MMOL/L (ref 96–106)
CO2 SERPL-SCNC: 23 MMOL/L (ref 20–29)
CREAT SERPL-MCNC: 1.02 MG/DL (ref 0.76–1.27)
EOSINOPHIL # BLD AUTO: 0.7 X10E3/UL (ref 0–0.4)
EOSINOPHIL NFR BLD AUTO: 13 %
ERYTHROCYTE [DISTWIDTH] IN BLOOD BY AUTOMATED COUNT: 12 % (ref 11.6–15.4)
GLOBULIN SER-MCNC: 2.5 G/DL (ref 1.5–4.5)
GLUCOSE SERPL-MCNC: 103 MG/DL (ref 65–99)
HCT VFR BLD AUTO: 42 % (ref 37.5–51)
HGB BLD-MCNC: 14.6 G/DL (ref 13–17.7)
HIV1 RNA # SERPL NAA+PROBE: <20 COPIES/ML
HIV1 RNA SERPL NAA+PROBE-LOG#: NORMAL LOG10COPY/ML
IMM GRANULOCYTES # BLD: 0 X10E3/UL (ref 0–0.1)
IMM GRANULOCYTES NFR BLD: 0 %
LYMPHOCYTES # BLD AUTO: 1.9 X10E3/UL (ref 0.7–3.1)
LYMPHOCYTES NFR BLD AUTO: 33 %
MCH RBC QN AUTO: 30.7 PG (ref 26.6–33)
MCHC RBC AUTO-ENTMCNC: 34.8 G/DL (ref 31.5–35.7)
MCV RBC AUTO: 88 FL (ref 79–97)
MONOCYTES # BLD AUTO: 0.5 X10E3/UL (ref 0.1–0.9)
MONOCYTES NFR BLD AUTO: 9 %
NEUTROPHILS # BLD AUTO: 2.6 X10E3/UL (ref 1.4–7)
NEUTROPHILS NFR BLD AUTO: 44 %
PLATELET # BLD AUTO: 284 X10E3/UL (ref 150–450)
POTASSIUM SERPL-SCNC: 4.4 MMOL/L (ref 3.5–5.2)
PROT SERPL-MCNC: 6.7 G/DL (ref 6–8.5)
RBC # BLD AUTO: 4.75 X10E6/UL (ref 4.14–5.8)
SL AMB EGFR AFRICAN AMERICAN: 88 ML/MIN/1.73
SL AMB EGFR NON AFRICAN AMERICAN: 76 ML/MIN/1.73
SODIUM SERPL-SCNC: 136 MMOL/L (ref 134–144)
WBC # BLD AUTO: 5.8 X10E3/UL (ref 3.4–10.8)

## 2020-04-06 ENCOUNTER — TELEMEDICINE (OUTPATIENT)
Dept: UROLOGY | Facility: CLINIC | Age: 65
End: 2020-04-06
Payer: COMMERCIAL

## 2020-04-06 DIAGNOSIS — R35.1 BENIGN PROSTATIC HYPERPLASIA WITH NOCTURIA: Primary | ICD-10-CM

## 2020-04-06 DIAGNOSIS — N40.1 BENIGN PROSTATIC HYPERPLASIA WITH NOCTURIA: Primary | ICD-10-CM

## 2020-04-06 PROCEDURE — 99214 OFFICE O/P EST MOD 30 MIN: CPT | Performed by: UROLOGY

## 2020-04-09 ENCOUNTER — TELEMEDICINE (OUTPATIENT)
Dept: INFECTIOUS DISEASES | Facility: CLINIC | Age: 65
End: 2020-04-09
Payer: COMMERCIAL

## 2020-04-09 DIAGNOSIS — B20 HIV DISEASE (HCC): Primary | ICD-10-CM

## 2020-04-09 DIAGNOSIS — B20 HIV DISEASE (HCC): ICD-10-CM

## 2020-04-09 DIAGNOSIS — J30.81 CHRONIC ALLERGIC RHINITIS DUE TO ANIMAL HAIR AND DANDER: ICD-10-CM

## 2020-04-09 DIAGNOSIS — J45.40 MODERATE PERSISTENT ASTHMA WITHOUT COMPLICATION: Chronic | ICD-10-CM

## 2020-04-09 PROCEDURE — 99214 OFFICE O/P EST MOD 30 MIN: CPT | Performed by: INTERNAL MEDICINE

## 2020-04-09 PROCEDURE — 1160F RVW MEDS BY RX/DR IN RCRD: CPT | Performed by: INTERNAL MEDICINE

## 2020-04-10 DIAGNOSIS — J44.9 CHRONIC OBSTRUCTIVE PULMONARY DISEASE, UNSPECIFIED COPD TYPE (HCC): ICD-10-CM

## 2020-04-10 RX ORDER — ALBUTEROL SULFATE 90 UG/1
AEROSOL, METERED RESPIRATORY (INHALATION)
Qty: 18 INHALER | Refills: 2 | Status: SHIPPED | OUTPATIENT
Start: 2020-04-10 | End: 2020-08-26 | Stop reason: SDUPTHER

## 2020-04-20 ENCOUNTER — TELEPHONE (OUTPATIENT)
Dept: OTHER | Facility: OTHER | Age: 65
End: 2020-04-20

## 2020-04-20 ENCOUNTER — TELEPHONE (OUTPATIENT)
Dept: FAMILY MEDICINE CLINIC | Facility: HOSPITAL | Age: 65
End: 2020-04-20

## 2020-04-24 ENCOUNTER — TELEPHONE (OUTPATIENT)
Dept: OTHER | Facility: OTHER | Age: 65
End: 2020-04-24

## 2020-04-27 DIAGNOSIS — B20 HIV DISEASE (HCC): ICD-10-CM

## 2020-07-02 ENCOUNTER — TELEPHONE (OUTPATIENT)
Dept: INFECTIOUS DISEASES | Facility: CLINIC | Age: 65
End: 2020-07-02

## 2020-07-02 NOTE — TELEPHONE ENCOUNTER
Left message reminding patient he needs to have labs drawn this week for upcoming appt next week  If unable get done this week will need to reschedule appt when labs are done

## 2020-07-08 LAB
ALBUMIN SERPL-MCNC: 4.6 G/DL (ref 3.8–4.8)
ALBUMIN/GLOB SERPL: 2 {RATIO} (ref 1.2–2.2)
ALP SERPL-CCNC: 59 IU/L (ref 39–117)
ALT SERPL-CCNC: 37 IU/L (ref 0–44)
AST SERPL-CCNC: 34 IU/L (ref 0–40)
BASOPHILS # BLD AUTO: 0 X10E3/UL (ref 0–0.2)
BASOPHILS NFR BLD AUTO: 1 %
BILIRUB SERPL-MCNC: 0.4 MG/DL (ref 0–1.2)
BUN SERPL-MCNC: 24 MG/DL (ref 8–27)
BUN/CREAT SERPL: 18 (ref 10–24)
CALCIUM SERPL-MCNC: 9.5 MG/DL (ref 8.6–10.2)
CD3+CD4+ CELLS # BLD: 449 /UL (ref 359–1519)
CD3+CD4+ CELLS NFR BLD: 21.4 % (ref 30.8–58.5)
CHLORIDE SERPL-SCNC: 101 MMOL/L (ref 96–106)
CO2 SERPL-SCNC: 26 MMOL/L (ref 20–29)
CREAT SERPL-MCNC: 1.31 MG/DL (ref 0.76–1.27)
EOSINOPHIL # BLD AUTO: 0.4 X10E3/UL (ref 0–0.4)
EOSINOPHIL NFR BLD AUTO: 6 %
ERYTHROCYTE [DISTWIDTH] IN BLOOD BY AUTOMATED COUNT: 12.8 % (ref 11.6–15.4)
GLOBULIN SER-MCNC: 2.3 G/DL (ref 1.5–4.5)
GLUCOSE SERPL-MCNC: 98 MG/DL (ref 65–99)
HCT VFR BLD AUTO: 44.5 % (ref 37.5–51)
HGB BLD-MCNC: 14.6 G/DL (ref 13–17.7)
HIV1 RNA # SERPL NAA+PROBE: <20 COPIES/ML
HIV1 RNA SERPL NAA+PROBE-LOG#: NORMAL LOG10COPY/ML
IMM GRANULOCYTES # BLD: 0 X10E3/UL (ref 0–0.1)
IMM GRANULOCYTES NFR BLD: 0 %
LYMPHOCYTES # BLD AUTO: 2.1 X10E3/UL (ref 0.7–3.1)
LYMPHOCYTES NFR BLD AUTO: 30 %
MCH RBC QN AUTO: 30.5 PG (ref 26.6–33)
MCHC RBC AUTO-ENTMCNC: 32.8 G/DL (ref 31.5–35.7)
MCV RBC AUTO: 93 FL (ref 79–97)
MONOCYTES # BLD AUTO: 0.6 X10E3/UL (ref 0.1–0.9)
MONOCYTES NFR BLD AUTO: 8 %
NEUTROPHILS # BLD AUTO: 4 X10E3/UL (ref 1.4–7)
NEUTROPHILS NFR BLD AUTO: 55 %
PLATELET # BLD AUTO: 305 X10E3/UL (ref 150–450)
POTASSIUM SERPL-SCNC: 4.6 MMOL/L (ref 3.5–5.2)
PROT SERPL-MCNC: 6.9 G/DL (ref 6–8.5)
RBC # BLD AUTO: 4.78 X10E6/UL (ref 4.14–5.8)
SL AMB EGFR AFRICAN AMERICAN: 65 ML/MIN/1.73
SL AMB EGFR NON AFRICAN AMERICAN: 56 ML/MIN/1.73
SODIUM SERPL-SCNC: 142 MMOL/L (ref 134–144)
WBC # BLD AUTO: 7.2 X10E3/UL (ref 3.4–10.8)

## 2020-07-09 DIAGNOSIS — R89.9 ABNORMAL LABORATORY TEST RESULT: Primary | ICD-10-CM

## 2020-07-09 NOTE — PROGRESS NOTES
Left message for patient to call back  Patients creatinine has increased  Patient should be hydrating and not to take nsaids  Patient will need to recheck cmp in a week  Will forward results to pcp

## 2020-07-29 ENCOUNTER — OFFICE VISIT (OUTPATIENT)
Dept: UROLOGY | Facility: CLINIC | Age: 65
End: 2020-07-29
Payer: COMMERCIAL

## 2020-07-29 VITALS
HEART RATE: 97 BPM | SYSTOLIC BLOOD PRESSURE: 130 MMHG | WEIGHT: 266 LBS | DIASTOLIC BLOOD PRESSURE: 70 MMHG | BODY MASS INDEX: 36.03 KG/M2 | TEMPERATURE: 97.3 F | HEIGHT: 72 IN

## 2020-07-29 DIAGNOSIS — N40.1 BENIGN PROSTATIC HYPERPLASIA WITH NOCTURIA: Primary | ICD-10-CM

## 2020-07-29 DIAGNOSIS — R35.1 BENIGN PROSTATIC HYPERPLASIA WITH NOCTURIA: Primary | ICD-10-CM

## 2020-07-29 LAB — POST-VOID RESIDUAL VOLUME, ML POC: 17 ML

## 2020-07-29 PROCEDURE — 3075F SYST BP GE 130 - 139MM HG: CPT | Performed by: UROLOGY

## 2020-07-29 PROCEDURE — 51798 US URINE CAPACITY MEASURE: CPT | Performed by: UROLOGY

## 2020-07-29 PROCEDURE — 1160F RVW MEDS BY RX/DR IN RCRD: CPT | Performed by: UROLOGY

## 2020-07-29 PROCEDURE — 3008F BODY MASS INDEX DOCD: CPT | Performed by: UROLOGY

## 2020-07-29 PROCEDURE — 99214 OFFICE O/P EST MOD 30 MIN: CPT | Performed by: UROLOGY

## 2020-07-29 PROCEDURE — 51741 ELECTRO-UROFLOWMETRY FIRST: CPT | Performed by: UROLOGY

## 2020-07-29 PROCEDURE — 4040F PNEUMOC VAC/ADMIN/RCVD: CPT | Performed by: UROLOGY

## 2020-07-29 PROCEDURE — 3078F DIAST BP <80 MM HG: CPT | Performed by: UROLOGY

## 2020-07-29 NOTE — PROGRESS NOTES
Referring Physician: Gloria Bals MD  A copy of this note was sent to the referring physician  Diagnoses and all orders for this visit:    Benign prostatic hyperplasia with nocturia  -     Uroflow  -     POCT Measure PVR  -     PSA, Total Screen; Future  -     PSA, Total Screen; Future            Assessment and plan:       1  BPH  -on tamsulosin  -clinically doing well    2  Prostate cancer screening  -PSA 1 3 (2019)  - Negative VIJAY  - familial history of prostate cancer in his father    Patient is doing well from a urologic perspective on tamsulosin monotherapy  This is working well for him  Today I discussed alternative options including minimally invasive surgical techniques should the need arise down the line  For now he is happy on medical management  Regarding prostate cancer screening his rectal examination is negative  He does have a family history of prostate cancer in his father  I recommended annual PSA  Plan is as follows:  -continue tamsulosin  He declined any refills today  -he will obtain a PSA soon will perform a phone follow-up  -he will return in 1 year with a PSA prior to the visit with 1 of our advanced practitioners in the [facility] office for Iona Mcgrath MD      Chief Complaint     Follow-up BPH      History of Present Illness     Gerson Rogers is a 76 y o  male returns in follow-up for BPH and prostate cancer screening  He has a longstanding history of obstructive lower urinary tract symptoms  He has been on tamsulosin for some time and is generally doing well  He has occasional urinary urgency which can become bothersome given his occupation as a   He does have nocturia times 2-3  He is generally on bothered by the symptoms and tolerating the tamsulosin quite well  He does have a family history of prostate cancer    PSA a in 2019 was well within normal limits at 1 4    Detailed Urologic History     - please refer to HPI    Review of Systems     Review of Systems   Constitutional: Negative for activity change and fatigue  HENT: Negative for congestion  Eyes: Negative for visual disturbance  Respiratory: Negative for shortness of breath and wheezing  Cardiovascular: Negative for chest pain and leg swelling  Gastrointestinal: Negative for abdominal pain  Genitourinary: Negative for dysuria, flank pain, hematuria and urgency  Musculoskeletal: Negative for back pain  Allergic/Immunologic: Negative for immunocompromised state  Neurological: Negative for dizziness and numbness  Psychiatric/Behavioral: Negative for dysphoric mood  All other systems reviewed and are negative  AUA SYMPTOM SCORE      Most Recent Value   AUA SYMPTOM SCORE   How often have you had a sensation of not emptying your bladder completely after you finished urinating? 1   How often have you had to urinate again less than two hours after you finished urinating? 3   How often have you found you stopped and started again several times when you urinate? 1   How often have you found it difficult to postpone urination? 4   How often have you had a weak urinary stream?  1   How often have you had to push or strain to begin urination? 0   How many times did you most typically get up to urinate from the time you went to bed at night until the time you got up in the morning? 2   Quality of Life: If you were to spend the rest of your life with your urinary condition just the way it is now, how would you feel about that?  --   AUA SYMPTOM SCORE  12              Allergies     No Known Allergies    Physical Exam     Physical Exam   Constitutional: He is oriented to person, place, and time  He appears well-developed and well-nourished  No distress  HENT:   Head: Normocephalic and atraumatic  Eyes: EOM are normal    Neck: Normal range of motion     Cardiovascular:   Negative lower extremity edema   Pulmonary/Chest: Effort normal and breath sounds normal    Abdominal: Soft  Genitourinary:   Genitourinary Comments: Rectal exam reveals a 40-50 g gland no nodules or induration   Musculoskeletal: Normal range of motion  Neurological: He is alert and oriented to person, place, and time  Skin: Skin is warm  Psychiatric: He has a normal mood and affect   His behavior is normal            Vital Signs  Vitals:    07/29/20 1359   Temp: (!) 97 3 °F (36 3 °C)   Height: 6' (1 829 m)         Current Medications       Current Outpatient Medications:     albuterol (PROVENTIL HFA,VENTOLIN HFA) 90 mcg/act inhaler, USE 2 PUFFS EVERY 6 HRS AS NEEDED FOR WHEEZING AND SHORTNESS OF BREATH, Disp: 18 Inhaler, Rfl: 2    bictegravir-emtricitab-tenofovir alafenamide (Biktarvy) -25 MG tablet, Take 1 tablet by mouth daily with breakfast, Disp: 90 tablet, Rfl: 1    fluticasone (FLONASE) 50 mcg/act nasal spray, 2 sprays into each nostril daily, Disp: 48 g, Rfl: 1    naproxen (EC NAPROSYN) 500 MG EC tablet, Take 1 tablet (500 mg total) by mouth 2 (two) times a day as needed for mild pain, Disp: 40 tablet, Rfl: 2    tamsulosin (FLOMAX) 0 4 mg, Take 1 capsule (0 4 mg total) by mouth daily with dinner, Disp: 90 capsule, Rfl: 3    ASMANEX  MCG/ACT AERO, Take 1 Act (200 mcg total) by mouth 2 (two) times a day 1 puff twice daily, Disp: 3 Inhaler, Rfl: 3      Active Problems     Patient Active Problem List   Diagnosis    Benign prostatic hyperplasia with nocturia    Chronic allergic rhinitis due to animal hair and dander    Moderate persistent asthma without complication    HIV disease (HCC)    ZAIRA (obstructive sleep apnea)    Benign essential tremor    History of pneumocystis pneumonia    Sprain of interphalangeal joint of left middle finger         Past Medical History     Past Medical History:   Diagnosis Date    Abscess, cheek     Last Assessed: 2/23/2016    Asthma     Chronic headaches     Constipation     [facility]chronic obstructive pulmonary disease) ([facility]     Cough     Difficulty attaining erection     Dyspnea     Enlarged prostate     Hemorrhoids     HIV (human immunodeficiency virus infection) ([facility] )     Migraine     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HCC)     Seasonal allergies     Slow urinary stream     Wheezing          Surgical History     Past Surgical History:   Procedure Laterality Date    ABSCESS DRAINAGE      MD BRONCHOSCOPY,DIAGNOSTIC N/A 2018    Procedure: BRONCHOSCOPY FLEXIBLE;  Surgeon: Yudith Wu DO;  Location: [facility];  Service: Pulmonary    SKIN LESION EXCISION      Excision of lesion in vestibule of mouth; Last Assessed: 2016         Family History     Family History   Problem Relation Age of Onset    Hypertension Mother     Glaucoma Mother     Cancer Father         Prostate    Diabetes Maternal Uncle     Glaucoma Maternal Uncle     Substance Abuse Neg Hx         neg fam hx    Mental illness Neg Hx         neg fam hx         Social History     Social History     Social History     Tobacco Use   Smoking Status Current Some Day Smoker    Packs/day: 1 50    Years: 20 00    Pack years: 30 00    Types: Cigarettes    Last attempt to quit: 1993    Years since quittin 4   Smokeless Tobacco Never Used         Pertinent Lab Values     Lab Results   Component Value Date    CREATININE 1 31 (H) 2020       Lab Results   Component Value Date    PSA 1 3 2019    PSA 0 8 2017    PSA 0 8 2016       @RESULTRCNT(1H])@      Pertinent Imaging      - n/a    Portions of the record may have been created with voice recognition software   Occasional wrong word or "sound a like" substitutions may have occurred due to the inherent limitations of voice recognition software   Read the chart carefully and recognize, using context, where substitutions have occurred

## 2020-08-26 DIAGNOSIS — J44.9 CHRONIC OBSTRUCTIVE PULMONARY DISEASE, UNSPECIFIED COPD TYPE (HCC): ICD-10-CM

## 2020-08-27 RX ORDER — ALBUTEROL SULFATE 90 UG/1
2 AEROSOL, METERED RESPIRATORY (INHALATION) EVERY 6 HOURS PRN
Qty: 18 INHALER | Refills: 5 | Status: SHIPPED | OUTPATIENT
Start: 2020-08-27 | End: 2021-04-19

## 2020-09-01 ENCOUNTER — OFFICE VISIT (OUTPATIENT)
Dept: FAMILY MEDICINE CLINIC | Facility: HOSPITAL | Age: 65
End: 2020-09-01
Payer: COMMERCIAL

## 2020-09-01 VITALS
RESPIRATION RATE: 16 BRPM | BODY MASS INDEX: 36.57 KG/M2 | HEART RATE: 80 BPM | TEMPERATURE: 98.3 F | DIASTOLIC BLOOD PRESSURE: 60 MMHG | HEIGHT: 72 IN | SYSTOLIC BLOOD PRESSURE: 108 MMHG | WEIGHT: 270 LBS

## 2020-09-01 DIAGNOSIS — N40.1 BENIGN PROSTATIC HYPERPLASIA WITH NOCTURIA: ICD-10-CM

## 2020-09-01 DIAGNOSIS — Z00.00 MEDICARE ANNUAL WELLNESS VISIT, SUBSEQUENT: ICD-10-CM

## 2020-09-01 DIAGNOSIS — Z13.6 SCREENING FOR AAA (ABDOMINAL AORTIC ANEURYSM): ICD-10-CM

## 2020-09-01 DIAGNOSIS — Z23 ENCOUNTER FOR IMMUNIZATION: ICD-10-CM

## 2020-09-01 DIAGNOSIS — Z12.2 ENCOUNTER FOR SCREENING FOR LUNG CANCER: ICD-10-CM

## 2020-09-01 DIAGNOSIS — H26.9 CATARACT OF LEFT EYE, UNSPECIFIED CATARACT TYPE: Primary | ICD-10-CM

## 2020-09-01 DIAGNOSIS — J45.40 MODERATE PERSISTENT ASTHMA WITHOUT COMPLICATION: Chronic | ICD-10-CM

## 2020-09-01 DIAGNOSIS — R35.1 BENIGN PROSTATIC HYPERPLASIA WITH NOCTURIA: ICD-10-CM

## 2020-09-01 DIAGNOSIS — B20 HIV DISEASE (HCC): ICD-10-CM

## 2020-09-01 PROBLEM — S63.633A SPRAIN OF INTERPHALANGEAL JOINT OF LEFT MIDDLE FINGER: Status: RESOLVED | Noted: 2020-02-12 | Resolved: 2020-09-01

## 2020-09-01 PROCEDURE — 1170F FXNL STATUS ASSESSED: CPT | Performed by: INTERNAL MEDICINE

## 2020-09-01 PROCEDURE — 99213 OFFICE O/P EST LOW 20 MIN: CPT | Performed by: INTERNAL MEDICINE

## 2020-09-01 PROCEDURE — G0439 PPPS, SUBSEQ VISIT: HCPCS | Performed by: INTERNAL MEDICINE

## 2020-09-01 PROCEDURE — 1036F TOBACCO NON-USER: CPT | Performed by: INTERNAL MEDICINE

## 2020-09-01 PROCEDURE — 3288F FALL RISK ASSESSMENT DOCD: CPT | Performed by: INTERNAL MEDICINE

## 2020-09-01 PROCEDURE — 1125F AMNT PAIN NOTED PAIN PRSNT: CPT | Performed by: INTERNAL MEDICINE

## 2020-09-01 PROCEDURE — 3725F SCREEN DEPRESSION PERFORMED: CPT | Performed by: INTERNAL MEDICINE

## 2020-09-01 PROCEDURE — 1160F RVW MEDS BY RX/DR IN RCRD: CPT | Performed by: INTERNAL MEDICINE

## 2020-09-01 PROCEDURE — 1101F PT FALLS ASSESS-DOCD LE1/YR: CPT | Performed by: INTERNAL MEDICINE

## 2020-09-01 RX ORDER — ZOSTER VACCINE RECOMBINANT, ADJUVANTED 50 MCG/0.5
KIT INTRAMUSCULAR
Qty: 1 EACH | Refills: 1 | Status: SHIPPED | OUTPATIENT
Start: 2020-09-01 | End: 2022-01-20 | Stop reason: ALTCHOICE

## 2020-09-01 NOTE — PATIENT INSTRUCTIONS
Medicare Preventive Visit Patient Instructions  Thank you for completing your Welcome to Medicare Visit or Medicare Annual Wellness Visit today  Your next wellness visit will be due in one year (9/1/2021)  The screening/preventive services that you may require over the next 5-10 years are detailed below  Some tests may not apply to you based off risk factors and/or age  Screening tests ordered at today's visit but not completed yet may show as past due  Also, please note that scanned in results may not display below  Preventive Screenings:  Service Recommendations Previous Testing/Comments   Colorectal Cancer Screening  · Colonoscopy    · Fecal Occult Blood Test (FOBT)/Fecal Immunochemical Test (FIT)  · Fecal DNA/Cologuard Test  · Flexible Sigmoidoscopy Age: 54-65 years old   Colonoscopy: every 10 years (May be performed more frequently if at higher risk)  OR  FOBT/FIT: every 1 year  OR  Cologuard: every 3 years  OR  Sigmoidoscopy: every 5 years  Screening may be recommended earlier than age 48 if at higher risk for colorectal cancer  Also, an individualized decision between you and your healthcare provider will decide whether screening between the ages of 74-80 would be appropriate   Colonoscopy: 05/18/2016  FOBT/FIT: Not on file  Cologuard: Not on file  Sigmoidoscopy: Not on file    Screening Current     Prostate Cancer Screening Individualized decision between patient and health care provider in men between ages of 53-78   Medicare will cover every 12 months beginning on the day after your 50th birthday PSA: 1 3 ng/mL          Hepatitis C Screening Once for adults born between 1945 and 1965  More frequently in patients at high risk for Hepatitis C Hep C Antibody: 05/03/2019    Screening Current   Diabetes Screening 1-2 times per year if you're at risk for diabetes or have pre-diabetes Fasting glucose: No results in last 5 years   A1C: 6 0 %    Screening Current   Cholesterol Screening Once every 5 years if you don't have a lipid disorder  May order more often based on risk factors  Lipid panel: 04/19/2019    Screening Current      Other Preventive Screenings Covered by Medicare:  1  Abdominal Aortic Aneurysm (AAA) Screening: covered once if your at risk  You're considered to be at risk if you have a family history of AAA or a male between the age of 73-68 who smoking at least 100 cigarettes in your lifetime  2  Lung Cancer Screening: covers low dose CT scan once per year if you meet all of the following conditions: (1) Age 50-69; (2) No signs or symptoms of lung cancer; (3) Current smoker or have quit smoking within the last 15 years; (4) You have a tobacco smoking history of at least 30 pack years (packs per day x number of years you smoked); (5) You get a written order from a healthcare provider  3  Glaucoma Screening: covered annually if you're considered high risk: (1) You have diabetes OR (2) Family history of glaucoma OR (3)  aged 48 and older OR (3)  American aged 72 and older  3  Osteoporosis Screening: covered every 2 years if you meet one of the following conditions: (1) Have a vertebral abnormality; (2) On glucocorticoid therapy for more than 3 months; (3) Have primary hyperparathyroidism; (4) On osteoporosis medications and need to assess response to drug therapy  5  HIV Screening: covered annually if you're between the age of 12-76  Also covered annually if you are younger than 13 and older than 72 with risk factors for HIV infection  For pregnant patients, it is covered up to 3 times per pregnancy      Immunizations:  Immunization Recommendations   Influenza Vaccine Annual influenza vaccination during flu season is recommended for all persons aged >= 6 months who do not have contraindications   Pneumococcal Vaccine (Prevnar and Pneumovax)  * Prevnar = PCV13  * Pneumovax = PPSV23 Adults 25-60 years old: 1-3 doses may be recommended based on certain risk factors  Adults 72 years old: Prevnar (PCV13) vaccine recommended followed by Pneumovax (PPSV23) vaccine  If already received PPSV23 since turning 65, then PCV13 recommended at least one year after PPSV23 dose  Hepatitis B Vaccine 3 dose series if at intermediate or high risk (ex: diabetes, end stage renal disease, liver disease)   Tetanus (Td) Vaccine - COST NOT COVERED BY MEDICARE PART B Following completion of primary series, a booster dose should be given every 10 years to maintain immunity against tetanus  Td may also be given as tetanus wound prophylaxis  Tdap Vaccine - COST NOT COVERED BY MEDICARE PART B Recommended at least once for all adults  For pregnant patients, recommended with each pregnancy  Shingles Vaccine (Shingrix) - COST NOT COVERED BY MEDICARE PART B  2 shot series recommended in those aged 48 and above     Health Maintenance Due:      Topic Date Due    Hepatitis C Screening  Completed     Immunizations Due:      Topic Date Due    Hepatitis A Vaccine (1 of 2 - Risk 2-dose series) [birth year]/1955    Hepatitis B Vaccine (1 of 3 - Risk 3-dose series) 01/14/1973    Influenza Vaccine  07/01/2020     Advance Directives   What are advance directives? Advance directives are legal documents that state your wishes and plans for medical care  These plans are made ahead of time in case you lose your ability to make decisions for yourself  Advance directives can apply to any medical decision, such as the treatments you want, and if you want to donate organs  What are the types of advance directives? There are many types of advance directives, and each state has rules about how to use them  You may choose a combination of any of the following:  · Living will: This is a written record of the treatment you want  You can also choose which treatments you do not want, which to limit, and which to stop at a certain time  This includes surgery, medicine, IV fluid, and tube feedings     · Durable power of  for healthcare [facility]): This is a written record that states who you want to make healthcare choices for you when you are unable to make them for yourself  This person, called a proxy, is usually a family member or a friend  You may choose more than 1 proxy  · Do not resuscitate (DNR) order:  A DNR order is used in case your heart stops beating or you stop breathing  It is a request not to have certain forms of treatment, such as CPR  A DNR order may be included in other types of advance directives  · Medical directive: This covers the care that you want if you are in a coma, near death, or unable to make decisions for yourself  You can list the treatments you want for each condition  Treatment may include pain medicine, surgery, blood transfusions, dialysis, IV or tube feedings, and a ventilator (breathing machine)  · Values history: This document has questions about your views, beliefs, and how you feel and think about life  This information can help others choose the care that you would choose  Why are advance directives important? An advance directive helps you control your care  Although spoken wishes may be used, it is better to have your wishes written down  Spoken wishes can be misunderstood, or not followed  Treatments may be given even if you do not want them  An advance directive may make it easier for your family to make difficult choices about your care  Weight Management   Why it is important to manage your weight:  Being overweight increases your risk of health conditions such as heart disease, high blood pressure, type 2 diabetes, and certain types of cancer  It can also increase your risk for osteoarthritis, sleep apnea, and other respiratory problems  Aim for a slow, steady weight loss  Even a small amount of weight loss can lower your risk of health problems  How to lose weight safely:  A safe and healthy way to lose weight is to eat fewer calories and get regular exercise   You can lose up about 1 pound a week by decreasing the number of calories you eat by 500 calories each day  Healthy meal plan for weight management:  A healthy meal plan includes a variety of foods, contains fewer calories, and helps you stay healthy  A healthy meal plan includes the following:  · Eat whole-grain foods more often  A healthy meal plan should contain fiber  Fiber is the part of grains, fruits, and vegetables that is not broken down by your body  Whole-grain foods are healthy and provide extra fiber in your diet  Some examples of whole-grain foods are whole-wheat breads and pastas, oatmeal, brown rice, and bulgur  · Eat a variety of vegetables every day  Include dark, leafy greens such as spinach, kale, jaky greens, and mustard greens  Eat yellow and orange vegetables such as carrots, sweet potatoes, and winter squash  · Eat a variety of fruits every day  Choose fresh or canned fruit (canned in its own juice or light syrup) instead of juice  Fruit juice has very little or no fiber  · Eat low-fat dairy foods  Drink fat-free (skim) milk or 1% milk  Eat fat-free yogurt and low-fat cottage cheese  Try low-fat cheeses such as mozzarella and other reduced-fat cheeses  · Choose meat and other protein foods that are low in fat  Choose beans or other legumes such as split peas or lentils  Choose fish, skinless poultry (chicken or turkey), or lean cuts of red meat (beef or pork)  Before you cook meat or poultry, cut off any visible fat  · Use less fat and oil  Try baking foods instead of frying them  Add less fat, such as margarine, sour cream, regular salad dressing and mayonnaise to foods  Eat fewer high-fat foods  Some examples of high-fat foods include french fries, doughnuts, ice cream, and cakes  · Eat fewer sweets  Limit foods and drinks that are high in sugar  This includes candy, cookies, regular soda, and sweetened drinks  Exercise:  Exercise at least 30 minutes per day on most days of the week   Some examples of exercise include walking, biking, dancing, and swimming  You can also fit in more physical activity by taking the stairs instead of the elevator or parking farther away from stores  Ask your healthcare provider about the best exercise plan for you  © Copyright [employer] 2018 Information is for End User's use only and may not be sold, redistributed or otherwise used for commercial purposes  All illustrations and images included in CareNotes® are the copyrighted property of [employer]  or [facility] Preventive Visit Patient Instructions  Thank you for completing your Welcome to Medicare Visit or Medicare Annual Wellness Visit today  Your next wellness visit will be due in one year (9/1/2021)  The screening/preventive services that you may require over the next 5-10 years are detailed below  Some tests may not apply to you based off risk factors and/or age  Screening tests ordered at today's visit but not completed yet may show as past due  Also, please note that scanned in results may not display below  Preventive Screenings:  Service Recommendations Previous Testing/Comments   Colorectal Cancer Screening  · Colonoscopy    · Fecal Occult Blood Test (FOBT)/Fecal Immunochemical Test (FIT)  · Fecal DNA/Cologuard Test  · Flexible Sigmoidoscopy Age: 54-65 years old   Colonoscopy: every 10 years (May be performed more frequently if at higher risk)  OR  FOBT/FIT: every 1 year  OR  Cologuard: every 3 years  OR  Sigmoidoscopy: every 5 years  Screening may be recommended earlier than age 48 if at higher risk for colorectal cancer  Also, an individualized decision between you and your healthcare provider will decide whether screening between the ages of 74-80 would be appropriate   Colonoscopy: 05/18/2016  FOBT/FIT: Not on file  Cologuard: Not on file  Sigmoidoscopy: Not on file    Screening Current     Prostate Cancer Screening Individualized decision between patient and health care provider in men between ages of 53-78   Medicare will cover every 12 months beginning on the day after your 50th birthday PSA: 1 3 ng/mL          Hepatitis C Screening Once for adults born between 1945 and 1965  More frequently in patients at high risk for Hepatitis C Hep C Antibody: 05/03/2019    Screening Current   Diabetes Screening 1-2 times per year if you're at risk for diabetes or have pre-diabetes Fasting glucose: No results in last 5 years   A1C: 6 0 %    Screening Current   Cholesterol Screening Once every 5 years if you don't have a lipid disorder  May order more often based on risk factors  Lipid panel: 04/19/2019    Screening Current      Other Preventive Screenings Covered by Medicare:  6  Abdominal Aortic Aneurysm (AAA) Screening: covered once if your at risk  You're considered to be at risk if you have a family history of AAA or a male between the age of 73-68 who smoking at least 100 cigarettes in your lifetime  7  Lung Cancer Screening: covers low dose CT scan once per year if you meet all of the following conditions: (1) Age 50-69; (2) No signs or symptoms of lung cancer; (3) Current smoker or have quit smoking within the last 15 years; (4) You have a tobacco smoking history of at least 30 pack years (packs per day x number of years you smoked); (5) You get a written order from a healthcare provider  8  Glaucoma Screening: covered annually if you're considered high risk: (1) You have diabetes OR (2) Family history of glaucoma OR (3)  aged 48 and older OR (3)  American aged 72 and older  5  Osteoporosis Screening: covered every 2 years if you meet one of the following conditions: (1) Have a vertebral abnormality; (2) On glucocorticoid therapy for more than 3 months; (3) Have primary hyperparathyroidism; (4) On osteoporosis medications and need to assess response to drug therapy  10  HIV Screening: covered annually if you're between the age of 12-76   Also covered annually if you are younger than 13 and older than 72 with risk factors for HIV infection  For pregnant patients, it is covered up to 3 times per pregnancy  Immunizations:  Immunization Recommendations   Influenza Vaccine Annual influenza vaccination during flu season is recommended for all persons aged >= 6 months who do not have contraindications   Pneumococcal Vaccine (Prevnar and Pneumovax)  * Prevnar = PCV13  * Pneumovax = PPSV23 Adults 25-60 years old: 1-3 doses may be recommended based on certain risk factors  Adults 72 years old: Prevnar (PCV13) vaccine recommended followed by Pneumovax (PPSV23) vaccine  If already received PPSV23 since turning 65, then PCV13 recommended at least one year after PPSV23 dose  Hepatitis B Vaccine 3 dose series if at intermediate or high risk (ex: diabetes, end stage renal disease, liver disease)   Tetanus (Td) Vaccine - COST NOT COVERED BY MEDICARE PART B Following completion of primary series, a booster dose should be given every 10 years to maintain immunity against tetanus  Td may also be given as tetanus wound prophylaxis  Tdap Vaccine - COST NOT COVERED BY MEDICARE PART B Recommended at least once for all adults  For pregnant patients, recommended with each pregnancy  Shingles Vaccine (Shingrix) - COST NOT COVERED BY MEDICARE PART B  2 shot series recommended in those aged 48 and above     Health Maintenance Due:      Topic Date Due    Hepatitis C Screening  Completed     Immunizations Due:      Topic Date Due    Hepatitis A Vaccine (1 of 2 - Risk 2-dose series) [birth year]1955    Hepatitis B Vaccine (1 of 3 - Risk 3-dose series) 01/14/1973    Influenza Vaccine  07/01/2020     Advance Directives   What are advance directives? Advance directives are legal documents that state your wishes and plans for medical care  These plans are made ahead of time in case you lose your ability to make decisions for yourself   Advance directives can apply to any medical decision, such as the treatments you want, and if you want to donate organs  What are the types of advance directives? There are many types of advance directives, and each state has rules about how to use them  You may choose a combination of any of the following:  · Living will: This is a written record of the treatment you want  You can also choose which treatments you do not want, which to limit, and which to stop at a certain time  This includes surgery, medicine, IV fluid, and tube feedings  · Durable power of  for healthcare [facility]): This is a written record that states who you want to make healthcare choices for you when you are unable to make them for yourself  This person, called a proxy, is usually a family member or a friend  You may choose more than 1 proxy  · Do not resuscitate (DNR) order:  A DNR order is used in case your heart stops beating or you stop breathing  It is a request not to have certain forms of treatment, such as CPR  A DNR order may be included in other types of advance directives  · Medical directive: This covers the care that you want if you are in a coma, near death, or unable to make decisions for yourself  You can list the treatments you want for each condition  Treatment may include pain medicine, surgery, blood transfusions, dialysis, IV or tube feedings, and a ventilator (breathing machine)  · Values history: This document has questions about your views, beliefs, and how you feel and think about life  This information can help others choose the care that you would choose  Why are advance directives important? An advance directive helps you control your care  Although spoken wishes may be used, it is better to have your wishes written down  Spoken wishes can be misunderstood, or not followed  Treatments may be given even if you do not want them  An advance directive may make it easier for your family to make difficult choices about your care     Weight Management   Why it is important to manage your weight:  Being overweight increases your risk of health conditions such as heart disease, high blood pressure, type 2 diabetes, and certain types of cancer  It can also increase your risk for osteoarthritis, sleep apnea, and other respiratory problems  Aim for a slow, steady weight loss  Even a small amount of weight loss can lower your risk of health problems  How to lose weight safely:  A safe and healthy way to lose weight is to eat fewer calories and get regular exercise  You can lose up about 1 pound a week by decreasing the number of calories you eat by 500 calories each day  Healthy meal plan for weight management:  A healthy meal plan includes a variety of foods, contains fewer calories, and helps you stay healthy  A healthy meal plan includes the following:  · Eat whole-grain foods more often  A healthy meal plan should contain fiber  Fiber is the part of grains, fruits, and vegetables that is not broken down by your body  Whole-grain foods are healthy and provide extra fiber in your diet  Some examples of whole-grain foods are whole-wheat breads and pastas, oatmeal, brown rice, and bulgur  · Eat a variety of vegetables every day  Include dark, leafy greens such as spinach, kale, jaky greens, and mustard greens  Eat yellow and orange vegetables such as carrots, sweet potatoes, and winter squash  · Eat a variety of fruits every day  Choose fresh or canned fruit (canned in its own juice or light syrup) instead of juice  Fruit juice has very little or no fiber  · Eat low-fat dairy foods  Drink fat-free (skim) milk or 1% milk  Eat fat-free yogurt and low-fat cottage cheese  Try low-fat cheeses such as mozzarella and other reduced-fat cheeses  · Choose meat and other protein foods that are low in fat  Choose beans or other legumes such as split peas or lentils  Choose fish, skinless poultry (chicken or turkey), or lean cuts of red meat (beef or pork)   Before you cook meat or poultry, cut off any visible fat  · Use less fat and oil  Try baking foods instead of frying them  Add less fat, such as margarine, sour cream, regular salad dressing and mayonnaise to foods  Eat fewer high-fat foods  Some examples of high-fat foods include french fries, doughnuts, ice cream, and cakes  · Eat fewer sweets  Limit foods and drinks that are high in sugar  This includes candy, cookies, regular soda, and sweetened drinks  Exercise:  Exercise at least 30 minutes per day on most days of the week  Some examples of exercise include walking, biking, dancing, and swimming  You can also fit in more physical activity by taking the stairs instead of the elevator or parking farther away from stores  Ask your healthcare provider about the best exercise plan for you  © Copyright [employer] 2018 Information is for End User's use only and may not be sold, redistributed or otherwise used for commercial purposes   All illustrations and images included in CareNotes® are the copyrighted property of [employer]  or [address] [place] [address]

## 2020-09-01 NOTE — PROGRESS NOTES
Assessment and Plan:     Problem List Items Addressed This Visit     None      Visit Diagnoses     Medicare annual wellness visit, subsequent        Encounter for screening for lung cancer        Relevant Orders    CT lung screening program    Screening for AAA (abdominal aortic aneurysm)        Relevant Orders    US abdominal aorta screening aaa    Encounter for immunization        Relevant Medications    Zoster Vac Recomb Adjuvanted (Shingrix) 50 MCG/0 5ML SUSR           Preventive health issues were discussed with patient, and age appropriate screening tests were ordered as noted in patient's After Visit Summary  Personalized health advice and appropriate referrals for health education or preventive services given if needed, as noted in patient's After Visit Summary  History of Present Illness:     Patient presents for Welcome to Medicare visit  Patient Care Team:  Chantale Ortiz MD as PCP - General (Internal Medicine)     Review of Systems:     Review of Systems   Constitutional: Negative for fever  Respiratory: Negative for cough and shortness of breath  Cardiovascular: Negative for chest pain  Gastrointestinal: Negative for abdominal pain  Genitourinary: Negative for dysuria and hematuria  Psychiatric/Behavioral: Negative for dysphoric mood        Problem List:     Patient Active Problem List   Diagnosis    Benign prostatic hyperplasia with nocturia    Chronic allergic rhinitis due to animal hair and dander    Moderate persistent asthma without complication    HIV disease ([facility] [address] [facility]    ZAIRA (obstructive sleep apnea)    Benign essential tremor    History of pneumocystis pneumonia      Past Medical and Surgical History:     Past Medical History:   Diagnosis Date    Abscess, cheek     Last Assessed: 2/23/2016    Asthma     Chronic headaches     Constipation     COPD (chronic obstructive pulmonary disease) (HCC)     Cough     Difficulty attaining erection     Dyspnea     Enlarged prostate     Hemorrhoids     HIV (human immunodeficiency virus infection) ([facility] [address] )     Migraine     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HCC)     Seasonal allergies     Slow urinary stream     Wheezing      Past Surgical History:   Procedure Laterality Date    ABSCESS DRAINAGE      [place] BRONCHOSCOPY,DIAGNOSTIC N/A 7/11/2018    Procedure: BRONCHOSCOPY FLEXIBLE;  Surgeon: Pal Vaughn DO;  Location: [facility];  Service: Pulmonary    SKIN LESION EXCISION      Excision of lesion in vestibule of mouth;  Last Assessed: 2/23/2016      Family History:     Family History   Problem Relation Age of Onset    Hypertension Mother     Glaucoma Mother     Cancer Father         Prostate    Diabetes Maternal Uncle     Glaucoma Maternal Uncle     Substance Abuse Neg Hx         neg fam hx    Mental illness Neg Hx         neg fam hx      Social History:     E-Cigarette/Vaping    E-Cigarette Use Never User      E-Cigarette/Vaping Substances    Nicotine No     THC No     CBD No     Flavoring No     Other No     Unknown No      Social History     Socioeconomic History    Marital status: /Civil Union     Spouse name: None    Number of children: None    Years of education: None    Highest education level: None   Occupational History    None   Social Needs    Financial resource strain: None    Food insecurity     Worry: None     Inability: None    Transportation needs     Medical: No     Non-medical: No   Tobacco Use    Smoking status: Former Smoker     Packs/day: 1 50     Years: 20 00     Pack years: 30 00     Types: Cigarettes     Last attempt to quit: 2/5/2010     Years since quitting: 10 5    Smokeless tobacco: Never Used   Substance and Sexual Activity    Alcohol use: Yes     Frequency: 2-3 times a week     Comment: socially / 1-4 drinks/week    Drug use: No    Sexual activity: Never   Lifestyle    Physical activity     Days per week: 0 days     Minutes per session: 0 min    Stress: Not at all Relationships    Social connections     Talks on phone: None     Gets together: None     Attends Alevism service: None     Active member of club or organization: None     Attends meetings of clubs or organizations: None     Relationship status: None    Intimate partner violence     Fear of current or ex partner: No     Emotionally abused: No     Physically abused: No     Forced sexual activity: No   Other Topics Concern    None   Social History Narrative    Daily caffeine consumption, 2-3 servings a day    Lives with wife    Feels safe at home  Sees dentist reg    No living will      Medications and Allergies:     Current Outpatient Medications   Medication Sig Dispense Refill    albuterol (PROVENTIL HFA,VENTOLIN HFA) 90 mcg/act inhaler Inhale 2 puffs every 6 (six) hours as needed for wheezing 18 Inhaler 5    ASMANEX  MCG/ACT AERO Take 1 Act (200 mcg total) by mouth 2 (two) times a day 1 puff twice daily 3 Inhaler 3    bictegravir-emtricitab-tenofovir alafenamide (Biktarvy) -25 MG tablet Take 1 tablet by mouth daily with breakfast 90 tablet 1    fluticasone (FLONASE) 50 mcg/act nasal spray 2 sprays into each nostril daily (Patient taking differently: 2 sprays into each nostril daily as needed ) 48 g 1    tamsulosin (FLOMAX) 0 4 mg Take 1 capsule (0 4 mg total) by mouth daily with dinner 90 capsule 3    Zoster Vac Recomb Adjuvanted (Shingrix) 50 MCG/0 5ML SUSR 0 5mL IM for one dose, followed by 0 5mL IM 2-6 months after first dose 1 each 1     No current facility-administered medications for this visit        No Known Allergies   Immunizations:     Immunization History   Administered Date(s) Administered    Influenza, high dose seasonal 0 7 mL 10/31/2019    Influenza, recombinant, quadrivalent,injectable, preservative free 10/05/2018    Meningococcal MCV4P 10/31/2019, 01/30/2020    Pneumococcal Conjugate 13-Valent 07/24/2018    Pneumococcal Polysaccharide PPV23 09/19/2018    Tdap 11/10/2017    Tuberculin Skin Test-PPD Intradermal 05/22/2019      Health Maintenance:         Topic Date Due    Hepatitis C Screening  Completed         Topic Date Due    Hepatitis A Vaccine (1 of 2 - Risk 2-dose series) 01/14/1956    Hepatitis B Vaccine (1 of 3 - Risk 3-dose series) 01/14/1973    Influenza Vaccine  07/01/2020      Medicare Screening Tests and Risk Assessments:     Carl Hayward is here for his Subsequent Wellness visit  Health Risk Assessment:   Patient rates overall health as excellent  Patient feels that their physical health rating is same  Eyesight was rated as slightly worse  Hearing was rated as same  Patient feels that their emotional and mental health rating is same  Pain experienced in the last 7 days has been none  Patient states that he has experienced no weight loss or gain in last 6 months  Depression Screening:   PHQ-2 Score: 0      Fall Risk Screening: In the past year, patient has experienced: no history of falling in past year      Home Safety:  Patient does not have trouble with stairs inside or outside of their home  Patient has working smoke alarms and has working carbon monoxide detector  Home safety hazards include: none  Nutrition:   Current diet is Low Cholesterol, Limited junk food, Low Saturated Fat and Low Carb  Medications:   Patient is currently taking over-the-counter supplements  OTC medications include: see medication list  Patient is able to manage medications  Activities of Daily Living (ADLs)/Instrumental Activities of Daily Living (IADLs):   Walk and transfer into and out of bed and chair?: Yes  Dress and groom yourself?: Yes    Bathe or shower yourself?: Yes    Feed yourself?  Yes  Do your laundry/housekeeping?: Yes  Manage your money, pay your bills and track your expenses?: Yes  Make your own meals?: Yes    Do your own shopping?: Yes    Previous Hospitalizations:   Any hospitalizations or ED visits within the last 12 months?: No      Advance Care Planning:     Advanced directive counseling given: Yes    End of Life Decisions reviewed with patient: Yes      Comments: Full code      Cognitive Screening:   Provider or family/friend/caregiver concerned regarding cognition?: No    PREVENTIVE SCREENINGS      Cardiovascular Screening:    General: Screening Current      Diabetes Screening:     General: Screening Current      Colorectal Cancer Screening:     General: Screening Current      Prostate Cancer Screening:    General: Risks and Benefits Discussed      Osteoporosis Screening:    General: Screening Not Indicated      Abdominal Aortic Aneurysm (AAA) Screening:    Risk factors include: age between 73-69 yo and tobacco use        General: Risks and Benefits Discussed    Due for: Screening AAA Ultrasound      Lung Cancer Screening:     General: Screening Not Indicated and Risks and Benefits Discussed    Due for: Low Dose CT (LDCT)      Hepatitis C Screening:    General: Screening Current    Other Counseling Topics:   Regular weightbearing exercise  No exam data present     Physical Exam:     /60   Pulse 80   Temp 98 3 °F (36 8 °C) (Tympanic)   Resp 16   Ht 6' (1 829 m)   Wt 122 kg (270 lb)   BMI 36 62 kg/m²     Physical Exam  HENT:      Head: Normocephalic  Cardiovascular:      Rate and Rhythm: Normal rate and regular rhythm  Heart sounds: No murmur  Pulmonary:      Effort: Pulmonary effort is normal       Breath sounds: No wheezing or rales  Abdominal:      General: Abdomen is flat  Bowel sounds are normal       Tenderness: There is no abdominal tenderness  Neurological:      Mental Status: He is alert and oriented to person, place, and time  Mental status is at baseline            Mara Oneal MD

## 2020-09-01 NOTE — PROGRESS NOTES
Assessment/Plan:    Functional capacity: good, denies chest pain or shortness of breath when walking up and down a flight of stairs at home multiple times    Medical conditions that increase cardiac risk: none    Cardiac Risk Estimation: low cardiovascular risk    Patient may undergo the planned surgical procedure! Moderate persistent asthma without complication  Stable  Continue asmanex and prn albuterol    Benign prostatic hyperplasia with nocturia  Urination is stable  Continue flomax  I asked pt to hold flomax for 3 days before cataract surgery       Diagnoses and all orders for this visit:    Cataract of left eye, unspecified cataract type    Medicare annual wellness visit, subsequent    Encounter for screening for lung cancer  -     CT lung screening program; Future    Screening for AAA (abdominal aortic aneurysm)  -      abdominal aorta screening aaa; Future    Encounter for immunization  -     Zoster Vac Recomb Adjuvanted (Shingrix) 50 MCG/0 5ML SUSR; 0 5mL IM for one dose, followed by 0 5mL IM 2-6 months after first dose    Moderate persistent asthma without complication    Benign prostatic hyperplasia with nocturia              Subjective:     Sandhya Johnson is a 76 y o  male who presents to the office today for a preoperative consultation at the request of surgeon Dr Carrie Mckenna who plans on performing left cataract extraction on September 21  This consultation is requested for preoperative evaluation       Eye Problem    The left eye is affected  This is a chronic problem  The current episode started more than 1 year ago  The problem occurs constantly  The problem has been gradually worsening  Pertinent negatives include no fever or weakness  Associated symptoms comments: Decreased vision left eye due to cataract             Current Outpatient Medications:     albuterol (PROVENTIL HFA,VENTOLIN HFA) 90 mcg/act inhaler, Inhale 2 puffs every 6 (six) hours as needed for wheezing, Disp: 18 Inhaler, Rfl: 5    ASMANEX  MCG/ACT AERO, Take 1 Act (200 mcg total) by mouth 2 (two) times a day 1 puff twice daily, Disp: 3 Inhaler, Rfl: 3    bictegravir-emtricitab-tenofovir alafenamide (Biktarvy) -25 MG tablet, Take 1 tablet by mouth daily with breakfast, Disp: 90 tablet, Rfl: 1    fluticasone (FLONASE) 50 mcg/act nasal spray, 2 sprays into each nostril daily (Patient taking differently: 2 sprays into each nostril daily as needed ), Disp: 48 g, Rfl: 1    tamsulosin (FLOMAX) 0 4 mg, Take 1 capsule (0 4 mg total) by mouth daily with dinner, Disp: 90 capsule, Rfl: 3    Zoster Vac Recomb Adjuvanted (Shingrix) 50 MCG/0 5ML SUSR, 0 5mL IM for one dose, followed by 0 5mL IM 2-6 months after first dose, Disp: 1 each, Rfl: 1       The following portions of the patient's history were reviewed and updated as appropriate: current medications, past family history, past medical history, past social history, past surgical history and problem list     Review of Systems   Constitutional: Negative for fever  HENT: Negative for congestion  Eyes: Positive for visual disturbance  Respiratory: Negative for cough and shortness of breath  Cardiovascular: Negative for chest pain, palpitations and leg swelling  Gastrointestinal: Negative for abdominal pain, blood in stool and diarrhea  Endocrine: Negative for polydipsia and polyphagia  Genitourinary: Negative for difficulty urinating, dysuria and hematuria  Musculoskeletal: Negative for joint swelling, myalgias and neck stiffness  Skin: Negative for rash  Neurological: Negative for weakness, numbness and headaches  Hematological: Negative for adenopathy  Psychiatric/Behavioral: Negative for dysphoric mood  All other systems reviewed and are negative  Objective:    /60   Pulse 80   Temp 98 3 °F (36 8 °C) (Tympanic)   Resp 16   Ht 6' (1 829 m)   Wt 122 kg (270 lb)   BMI 36 62 kg/m²      Physical Exam  HENT:      Head: Normocephalic  Right Ear: Tympanic membrane normal       Left Ear: Tympanic membrane normal       Mouth/Throat:      Pharynx: No oropharyngeal exudate  Eyes:      Conjunctiva/sclera: Conjunctivae normal    Neck:      Musculoskeletal: Neck supple  Cardiovascular:      Rate and Rhythm: Normal rate and regular rhythm  Heart sounds: No murmur  Pulmonary:      Effort: No respiratory distress  Breath sounds: No wheezing or rales  Abdominal:      General: Bowel sounds are normal       Tenderness: There is no abdominal tenderness  Skin:     General: Skin is warm and dry  Neurological:      Mental Status: He is alert and oriented to person, place, and time  Cranial Nerves: No cranial nerve deficit  Psychiatric:         Mood and Affect: Mood normal                Shell Kapoor MD  Depression Screening Follow-up Plan: Patient's depression screening was positive with a PHQ-2 score of 0  Their PHQ-9 score was   Clinically patient does not have depression  No treatment is required  BMI Counseling: Body mass index is 36 62 kg/m²  The BMI is above normal  Nutrition recommendations include reducing portion sizes  Exercise recommendations include moderate aerobic physical activity for 150 minutes/week

## 2020-09-24 LAB
BUN SERPL-MCNC: 16 MG/DL (ref 8–27)
BUN/CREAT SERPL: 15 (ref 10–24)
CALCIUM SERPL-MCNC: 9.3 MG/DL (ref 8.6–10.2)
CHLORIDE SERPL-SCNC: 106 MMOL/L (ref 96–106)
CO2 SERPL-SCNC: 19 MMOL/L (ref 20–29)
CREAT SERPL-MCNC: 1.05 MG/DL (ref 0.76–1.27)
GLUCOSE SERPL-MCNC: 89 MG/DL (ref 65–99)
POTASSIUM SERPL-SCNC: 4.3 MMOL/L (ref 3.5–5.2)
PSA SERPL-MCNC: 2.1 NG/ML (ref 0–4)
SL AMB EGFR AFRICAN AMERICAN: 85 ML/MIN/1.73
SL AMB EGFR NON AFRICAN AMERICAN: 74 ML/MIN/1.73
SODIUM SERPL-SCNC: 138 MMOL/L (ref 134–144)

## 2020-10-28 LAB
HBA1C MFR BLD HPLC: 5.5 %
HCV AB SER-ACNC: 0.2

## 2020-11-27 ENCOUNTER — HOSPITAL ENCOUNTER (OUTPATIENT)
Dept: ULTRASOUND IMAGING | Facility: HOSPITAL | Age: 65
Discharge: HOME | End: 2020-11-27
Payer: COMMERCIAL

## 2020-11-27 ENCOUNTER — HOSPITAL ENCOUNTER (OUTPATIENT)
Dept: CT IMAGING | Facility: HOSPITAL | Age: 65
Discharge: HOME | End: 2020-11-27
Payer: COMMERCIAL

## 2020-11-27 DIAGNOSIS — Z13.6 SCREENING FOR AAA (ABDOMINAL AORTIC ANEURYSM): ICD-10-CM

## 2020-11-27 DIAGNOSIS — Z12.2 ENCOUNTER FOR SCREENING FOR LUNG CANCER: ICD-10-CM

## 2020-11-27 PROCEDURE — 76706 US ABDL AORTA SCREEN AAA: CPT

## 2020-11-27 PROCEDURE — G0297 LDCT FOR LUNG CA SCREEN: HCPCS

## 2020-12-03 ENCOUNTER — TELEPHONE (OUTPATIENT)
Dept: FAMILY MEDICINE CLINIC | Facility: HOSPITAL | Age: 65
End: 2020-12-03

## 2020-12-16 DIAGNOSIS — J45.40 MODERATE PERSISTENT ASTHMA WITHOUT COMPLICATION: Chronic | ICD-10-CM

## 2020-12-16 RX ORDER — MOMETASONE FUROATE 200 UG/1
200 AEROSOL RESPIRATORY (INHALATION) 2 TIMES DAILY
Qty: 3 INHALER | Refills: 0 | Status: SHIPPED | OUTPATIENT
Start: 2020-12-16 | End: 2021-11-01

## 2021-01-18 DIAGNOSIS — N40.1 BENIGN PROSTATIC HYPERPLASIA WITH NOCTURIA: ICD-10-CM

## 2021-01-18 DIAGNOSIS — R35.1 BENIGN PROSTATIC HYPERPLASIA WITH NOCTURIA: ICD-10-CM

## 2021-01-18 RX ORDER — TAMSULOSIN HYDROCHLORIDE 0.4 MG/1
0.4 CAPSULE ORAL
Qty: 90 CAPSULE | Refills: 3 | Status: SHIPPED | OUTPATIENT
Start: 2021-01-18 | End: 2022-01-20 | Stop reason: SDUPTHER

## 2021-02-12 ENCOUNTER — OFFICE VISIT (OUTPATIENT)
Dept: PULMONOLOGY | Facility: CLINIC | Age: 66
End: 2021-02-12
Payer: COMMERCIAL

## 2021-02-12 VITALS — HEIGHT: 72 IN | TEMPERATURE: 97.3 F | WEIGHT: 280 LBS | BODY MASS INDEX: 37.93 KG/M2

## 2021-02-12 DIAGNOSIS — Z87.01 HISTORY OF PNEUMOCYSTIS PNEUMONIA: ICD-10-CM

## 2021-02-12 DIAGNOSIS — J30.81 CHRONIC ALLERGIC RHINITIS DUE TO ANIMAL HAIR AND DANDER: ICD-10-CM

## 2021-02-12 DIAGNOSIS — G47.33 OSA (OBSTRUCTIVE SLEEP APNEA): ICD-10-CM

## 2021-02-12 DIAGNOSIS — J45.40 MODERATE PERSISTENT ASTHMA WITHOUT COMPLICATION: Primary | Chronic | ICD-10-CM

## 2021-02-12 PROCEDURE — 3008F BODY MASS INDEX DOCD: CPT | Performed by: INTERNAL MEDICINE

## 2021-02-12 PROCEDURE — 1036F TOBACCO NON-USER: CPT | Performed by: INTERNAL MEDICINE

## 2021-02-12 PROCEDURE — 1160F RVW MEDS BY RX/DR IN RCRD: CPT | Performed by: INTERNAL MEDICINE

## 2021-02-12 PROCEDURE — 99214 OFFICE O/P EST MOD 30 MIN: CPT | Performed by: INTERNAL MEDICINE

## 2021-02-12 NOTE — LETTER
February 12, 2021     Tim Abraham, [address]  [address] [address]    Patient: Kirstin Lovelace   YOB: 1955   Date of Visit: 2/12/2021       Dear Dr Damon Care: Thank you for referring Afshin Dietz to me for evaluation  Below are my notes for this consultation  If you have questions, please do not hesitate to call me  I look forward to following your patient along with you  Sincerely,        Eduardo Frye DO        CC: No Recipients  Eduardo Frye DO  2/12/2021 12:54 PM  Sign when Signing Visit  Progress note - Pulmonary Medicine   Kirstin Lovelace 78 y o  male MRN: [id number]       Impression & Plan:   67 y/o M with PMHx of BPH, Hay fever, alcohol abuse, ZAIRA and never tried CPAP, obesity who comes in for follow up for obstructive lung disease     1   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   Previously PFTs demonstrated severe obstruction noted with bronchodilator response   IgE mildly elevated but [place] panel with many different allergens       -  He is still using asmanex intermittently  He finds that it is really only helpful during the spring and summer       -  I encouraged him to check his peak flow numbers more consistently  I explained that asthmatics tend to not be as aware of dyspnea as much as they should  He will check them more frequently  -  I again recommended he is more consistent with his treatments         -  Continue PRN albuterol     2  History of  PCP pneumonia and Stenotrophomonas diagnosed on bronchoscopy   Completed Bactrim and prednisone  Now clinically resolved      - No additional testing at this time        3    HIV now with normal CD4 and undetectable viral load       - Continue HAART therapy   ID at [place] is following        4   Recent COVID infection in January - only mild symptoms and has completely recovered with no symptoms  No testing is necessary at this time      5  History of ZAIRA - We did discuss this again  I again mentioned my concern that he could develop cardiac disease if he does not treat this  He was more open to the idea of pursuing a home study but was still hoping to hold off for now     ______________________________________________________________________    HPI:    Anjana Schulz presents today for follow-up for his asthma  He states that he had bene doing well overall  However, in early January he came up positive for [address]  He is unsure where he was able to obtain it as he has been inside most of the time  He states that he had mild symptoms with cough and headache and that it resolved without issues  He went back to his regular chronic productive cough with sputum  He denies fever, chills or night sweats  Overall he is doing well  Answers for HPI/ROS submitted by the patient on 2/5/2021   Primary symptoms  Do you have chest tightness?: Yes  Do you have a wet cough?: Yes  Chronicity: chronic  When did you first notice your symptoms?: more than 1 year ago  How often do your symptoms occur?: 2 to 4 times per day  Since you first noticed this problem, how has it changed?: gradually improving  Do you have shortness of breath that occurs with effort or exertion?: No  Do you have ear congestion?: No  Do you have heartburn?: No  Do you have fatigue?: No  Do you have nasal congestion?: No  Do you have shortness of breath when lying flat?: No  Do you have shortness of breath when you wake up?: No  Do you have sweats?: No  Have you experienced weight loss?: No      Review of Systems:  Review of Systems   Constitutional: Negative for appetite change and fever  HENT: Negative for ear pain, postnasal drip, rhinorrhea, sneezing, sore throat and trouble swallowing  Respiratory: Positive for cough  Cardiovascular: Negative for chest pain  Gastrointestinal: Negative  Endocrine: Negative  Genitourinary: Negative      Musculoskeletal: Negative for myalgias  Skin: Negative  Allergic/Immunologic: Negative  Neurological: Positive for headaches  Hematological: Negative  Psychiatric/Behavioral: Negative            Social history updates:  Social History     Tobacco Use   Smoking Status Former Smoker    Packs/day: 1 50    Years: 20 00    Pack years: 30 00    Types: Cigarettes    Quit date: 2010    Years since quittin 0   Smokeless Tobacco Never Used     Social History     Socioeconomic History    Marital status: /Civil Union     Spouse name: Not on file    Number of children: Not on file    Years of education: Not on file    Highest education level: Not on file   Occupational History    Not on file   Social Needs    Financial resource strain: Not on file    Food insecurity     Worry: Not on file     Inability: Not on file    Transportation needs     Medical: No     Non-medical: No   Tobacco Use    Smoking status: Former Smoker     Packs/day: 1 50     Years: 20 00     Pack years: 30 00     Types: Cigarettes     Quit date: 2010     Years since quittin 0    Smokeless tobacco: Never Used   Substance and Sexual Activity    Alcohol use: Yes     Frequency: 2-3 times a week     Comment: socially / 1-4 drinks/week    Drug use: No    Sexual activity: Never   Lifestyle    Physical activity     Days per week: 0 days     Minutes per session: 0 min    Stress: Not at all   Relationships    Social connections     Talks on phone: Not on file     Gets together: Not on file     Attends Orthodox service: Not on file     Active member of club or organization: Not on file     Attends meetings of clubs or organizations: Not on file     Relationship status: Not on file    Intimate partner violence     Fear of current or ex partner: No     Emotionally abused: No     Physically abused: No     Forced sexual activity: No   Other Topics Concern    Not on file   Social History Narrative    Daily caffeine consumption, 2-3 servings a day    Lives with wife    Feels safe at home  Sees dentist reg    No living will       PhysicalExamination:  Vitals:   Temp (!) 97 3 °F (36 3 °C)   Ht 6' (1 829 m)   Wt 127 kg (280 lb)   BMI 37 97 kg/m²   General: Pleasant, Awake alert and oriented x 3, conversant without conversational dyspnea, NAD, normal affect  HEENT:  PERRL, Sclera noninjected, nonicteric OU, Nares patent,  no craniofacial abnormalities, Mucous membranes, moist, no oral lesions, normal dentition, Mallampati class 3  NECK: Trachea midline, no accessory muscle use, no stridor, no cervical or supraclavicular adenopathy, JVP not elevated  CARDIAC: Reg, single s1/S2, no m/r/g  PULM: CTA bilaterally no wheezing, rhonchi or rales  ABD: Normoactive bowel sounds, soft nontender, nondistended, no rebound, no rigidity, no guarding  EXT: No cyanosis, no clubbing, no edema, normal capillary refill  NEURO: no focal neurologic deficits, AAOx3, moving all extremities appropriately    Diagnostic Data:  Labs: I personally reviewed the most recent laboratory data pertinent to today's visit  I have personally reviewed pertinent lab results  Lab Results   Component Value Date    WBC 7 2 07/02/2020    HGB 14 6 07/02/2020    HCT 44 5 07/02/2020    MCV 93 07/02/2020     07/02/2020     Lab Results   Component Value Date    GLUCOSE 100 (H) 06/09/2017    CALCIUM 8 9 08/10/2018     06/09/2017    K 4 3 09/23/2020    CO2 19 (L) 09/23/2020     09/23/2020    BUN 16 09/23/2020    CREATININE 1 05 09/23/2020     Lab Results   Component Value Date     07/08/2018     Lab Results   Component Value Date    ALT 37 07/02/2020    AST 34 07/02/2020    ALKPHOS 57 08/10/2018    BILITOT 0 3 06/09/2017       minute walk in office 7/27/18  Starting saturation - 95%   Starting HR - 84 bpm  Lowest saturation - 94%    Highest HR - 114 bpm  Ambulated - 168 m without the need of oxygen        PFT results:   The most recent pulmonary function tests were reviewed  Spirometry today  Pre  FEV1/FVC - 61%  FEV1 - 3 03 (81%)  FVC - 4 97 (100%)  Post  FEV1/FVC - 68%  FEV1 - 3 26 (88%)  FVC - 4 79 (96%)  Change - 8%     3/4/18 Study Interpretation:   · Severe airflow limitation  Significant improvement in airflow on vital capacity following the administration of bronchodilators      6 minute walk test in office 6/21/18  Saturation - 91% at start, dropped to 80% with ambulation, maintained at 90% with 3L nasal cannula     Imaging:  I personally reviewed the images on the [facility] system pertinent to today's visit  CXR - 6/6/18   IMPRESSION:  Persistent bilateral pulmonary airspace disease and interstitial prominence in a pattern most compatible with pneumonia and with no significant interval change since 6/3/2018     Other studies:  Echo -  SUMMARY  LEFT VENTRICLE:  Size was normal   Systolic function was normal  Ejection fraction was estimated to be 55 %    Wall thickness was normal   Left ventricular diastolic function parameters were normal      RIGHT VENTRICLE:  The size was normal   Systolic function was normal      LEFT ATRIUM:  The atrium was mildly dilated      TRICUSPID VALVE:  There was trace regurgitation      AORTA:  The root exhibited mild dilatation    Veola Bile, DO

## 2021-02-12 NOTE — PROGRESS NOTES
Progress note - Pulmonary Medicine   Jaqueline Sandhoff 78 y o  male MRN: [id number]       Impression & Plan:   72 y/o M with PMHx of BPH, Hay fever, alcohol abuse, ZAIRA and never tried CPAP, obesity who comes in for follow up for obstructive lung disease     1   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   Previously PFTs demonstrated severe obstruction noted with bronchodilator response   IgE mildly elevated but [place] panel with many different allergens       -  He is still using asmanex intermittently  He finds that it is really only helpful during the spring and summer       -  I encouraged him to check his peak flow numbers more consistently  I explained that asthmatics tend to not be as aware of dyspnea as much as they should  He will check them more frequently  -  I again recommended he is more consistent with his treatments         -  Continue PRN albuterol     2  History of  PCP pneumonia and Stenotrophomonas diagnosed on bronchoscopy   Completed Bactrim and prednisone  Now clinically resolved      - No additional testing at this time        3    HIV now with normal CD4 and undetectable viral load       - Continue HAART therapy   ID at [facility] following        4   Recent COVID infection in January - only mild symptoms and has completely recovered with no symptoms  No testing is necessary at this time  5   History of ZAIRA - We did discuss this again  I again mentioned my concern that he could develop cardiac disease if he does not treat this  He was more open to the idea of pursuing a home study but was still hoping to hold off for now     ______________________________________________________________________    HPI:    Jaqueline Sandhoff presents today for follow-up for his asthma  He states that he had bene doing well overall  However, in early January he came up positive for [address]    He is unsure where he was able to obtain it as he has been inside most of the time   He states that he had mild symptoms with cough and headache and that it resolved without issues  He went back to his regular chronic productive cough with sputum  He denies fever, chills or night sweats  Overall he is doing well  Answers for HPI/ROS submitted by the patient on 2021   Primary symptoms  Do you have chest tightness?: Yes  Do you have a wet cough?: Yes  Chronicity: chronic  When did you first notice your symptoms?: more than 1 year ago  How often do your symptoms occur?: 2 to 4 times per day  Since you first noticed this problem, how has it changed?: gradually improving  Do you have shortness of breath that occurs with effort or exertion?: No  Do you have ear congestion?: No  Do you have heartburn?: No  Do you have fatigue?: No  Do you have nasal congestion?: No  Do you have shortness of breath when lying flat?: No  Do you have shortness of breath when you wake up?: No  Do you have sweats?: No  Have you experienced weight loss?: No      Review of Systems:  Review of Systems   Constitutional: Negative for appetite change and fever  HENT: Negative for ear pain, postnasal drip, rhinorrhea, sneezing, sore throat and trouble swallowing  Respiratory: Positive for cough  Cardiovascular: Negative for chest pain  Gastrointestinal: Negative  Endocrine: Negative  Genitourinary: Negative  Musculoskeletal: Negative for myalgias  Skin: Negative  Allergic/Immunologic: Negative  Neurological: Positive for headaches  Hematological: Negative  Psychiatric/Behavioral: Negative            Social history updates:  Social History     Tobacco Use   Smoking Status Former Smoker    Packs/day: 1 50    Years: 20 00    Pack years: 30 00    Types: Cigarettes    Quit date: 2010    Years since quittin 0   Smokeless Tobacco Never Used     Social History     Socioeconomic History    Marital status: /Civil Union     Spouse name: Not on file    Number of children: Not on file    Years of education: Not on file    Highest education level: Not on file   Occupational History    Not on file   Social Needs    Financial resource strain: Not on file    Food insecurity     Worry: Not on file     Inability: Not on file    Transportation needs     Medical: No     Non-medical: No   Tobacco Use    Smoking status: Former Smoker     Packs/day: 1 50     Years: 20 00     Pack years: 30 00     Types: Cigarettes     Quit date: 2010     Years since quittin 0    Smokeless tobacco: Never Used   Substance and Sexual Activity    Alcohol use: Yes     Frequency: 2-3 times a week     Comment: socially / 1-4 drinks/week    Drug use: No    Sexual activity: Never   Lifestyle    Physical activity     Days per week: 0 days     Minutes per session: 0 min    Stress: Not at all   Relationships    Social connections     Talks on phone: Not on file     Gets together: Not on file     Attends Pentecostalism service: Not on file     Active member of club or organization: Not on file     Attends meetings of clubs or organizations: Not on file     Relationship status: Not on file    Intimate partner violence     Fear of current or ex partner: No     Emotionally abused: No     Physically abused: No     Forced sexual activity: No   Other Topics Concern    Not on file   Social History Narrative    Daily caffeine consumption, 2-3 servings a day    Lives with wife    Feels safe at home      Sees dentist reg    No living will       PhysicalExamination:  Vitals:   Temp (!) 97 3 °F (36 3 °C)   Ht 6' (1 829 m)   Wt 127 kg (280 lb)   BMI 37 97 kg/m²   General: Pleasant, Awake alert and oriented x 3, conversant without conversational dyspnea, NAD, normal affect  HEENT:  PERRL, Sclera noninjected, nonicteric OU, Nares patent,  no craniofacial abnormalities, Mucous membranes, moist, no oral lesions, normal dentition, Mallampati class 3  NECK: Trachea midline, no accessory muscle use, no stridor, no cervical or supraclavicular adenopathy, JVP not elevated  CARDIAC: Reg, single s1/S2, no m/r/g  PULM: CTA bilaterally no wheezing, rhonchi or rales  ABD: Normoactive bowel sounds, soft nontender, nondistended, no rebound, no rigidity, no guarding  EXT: No cyanosis, no clubbing, no edema, normal capillary refill  NEURO: no focal neurologic deficits, AAOx3, moving all extremities appropriately    Diagnostic Data:  Labs: I personally reviewed the most recent laboratory data pertinent to today's visit  I have personally reviewed pertinent lab results  Lab Results   Component Value Date    WBC 7 2 07/02/2020    HGB 14 6 07/02/2020    HCT 44 5 07/02/2020    MCV 93 07/02/2020     07/02/2020     Lab Results   Component Value Date    GLUCOSE 100 (H) 06/09/2017    CALCIUM 8 9 08/10/2018     06/09/2017    K 4 3 09/23/2020    CO2 19 (L) 09/23/2020     09/23/2020    BUN 16 09/23/2020    CREATININE 1 05 09/23/2020     Lab Results   Component Value Date     07/08/2018     Lab Results   Component Value Date    ALT 37 07/02/2020    AST 34 07/02/2020    ALKPHOS 57 08/10/2018    BILITOT 0 3 06/09/2017       minute walk in office 7/27/18  Starting saturation - 95%   Starting HR - 84 bpm  Lowest saturation - 94%    Highest HR - 114 bpm  Ambulated - 168 m without the need of oxygen        PFT results: The most recent pulmonary function tests were reviewed  Spirometry today  Pre  FEV1/FVC - 61%  FEV1 - 3 03 (81%)  FVC - 4 97 (100%)  Post  FEV1/FVC - 68%  FEV1 - 3 26 (88%)  FVC - 4 79 (96%)  Change - 8%     3/4/18 Study Interpretation:   · Severe airflow limitation    Significant improvement in airflow on vital capacity following the administration of bronchodilators      6 minute walk test in office 6/21/18  Saturation - 91% at start, dropped to 80% with ambulation, maintained at 90% with 3L nasal cannula     Imaging:  I personally reviewed the images on the [facility] system pertinent to today's visit  CXR - 6/6/18 IMPRESSION:  Persistent bilateral pulmonary airspace disease and interstitial prominence in a pattern most compatible with pneumonia and with no significant interval change since 6/3/2018     Other studies:  Echo -  SUMMARY  LEFT VENTRICLE:  Size was normal   Systolic function was normal  Ejection fraction was estimated to be 55 %    Wall thickness was normal   Left ventricular diastolic function parameters were normal      RIGHT VENTRICLE:  The size was normal   Systolic function was normal      LEFT ATRIUM:  The atrium was mildly dilated      TRICUSPID VALVE:  There was trace regurgitation      AORTA:  The root exhibited mild dilatation    Maria Del Carmen Zurita, DO

## 2021-04-18 DIAGNOSIS — J44.9 CHRONIC OBSTRUCTIVE PULMONARY DISEASE, UNSPECIFIED COPD TYPE (HCC): ICD-10-CM

## 2021-04-19 RX ORDER — ALBUTEROL SULFATE 90 UG/1
AEROSOL, METERED RESPIRATORY (INHALATION)
Qty: 18 INHALER | Refills: 5 | Status: SHIPPED | OUTPATIENT
Start: 2021-04-19 | End: 2022-04-18 | Stop reason: SDUPTHER

## 2021-04-29 ENCOUNTER — TELEPHONE (OUTPATIENT)
Dept: GASTROENTEROLOGY | Facility: CLINIC | Age: 66
End: 2021-04-29

## 2021-04-29 NOTE — TELEPHONE ENCOUNTER
PT CALLED TO SCHED RECALL COLON-I SCHED PT AT [facility] THEN SAW THE PULOMONARY OV NOTES FROM 2/2021  DENNIS SHOWED THE OV NOTES TO ANESTHESIOLOGIST OV NOTES-PER ANESTHESIOLOGY PT MUST BE SCHEDULED AT HOSPITAL  PT IS ARGUMENTATIVE ABOUT GOING TO HOSPITAL-DOES NOT NOT WANT TO BE SCHED THERE  I EXPLAINED TO PT THAT HE HAS COPD AND OBSTRUCTION ISSUES   PT STATED HE WILL CALL BACK

## 2021-07-08 LAB — PSA SERPL-MCNC: 3.1 NG/ML (ref 0–4)

## 2021-08-02 NOTE — PROGRESS NOTES
2021    Nakia [facility]  1955  [id number]      Assessment  -BPH with lower urinary tract symptoms  -Routine prostate cancer screening  -Strong family history of prostate cancer    Adalgisa Pruett is a 78 y o  male being managed by Dr Dalia Kaminski  1  BPH with lower urinary tract symptoms-   Patient continues to do well without any urinary complaints  He will remain on tamsulosin 0 4 mg HS as managed by his PCP  2  Routine prostate cancer screening, strong family history of prostate cancer-   We reviewed the results of his recent PSA which is 3 1, previously 2 1 (2020)  Discussed significance of his rising PSA as well as his strong family history of prostate cancer  Reviewed causes for false elevation  Discussed proceeding with prostate biopsy for further evaluation  Patient amenable with this plan  Informed consent was provided including benefits and risks such as bleeding, infection, and acute urinary retention  Prescription for Cipro was electronically sent to his pharmacy  We reviewed biopsy preparation instructions  Will obtain repeat PSA now, to reassess that PSA remains elevated  Will call with his results  Follow-up with MD with prostate biopsy  He was instructed to call sooner with any issues     -All questions answered, patient agrees with plan      History of Present Illness  78 y o  male with a history of BPH and prostate cancer screening presents today for follow up  Patient was seen in the office in 2020  He remains on tamsulosin 0 4 mg HS  This is managed by his PCP  Patient denies any urinary complaints and denies any gross hematuria or dysuria  His last PSA from 2020 was 2 1  PSA in  was 1 3, and 0 8 in 2017  Patient states his father had prostate cancer, and underwent radiation therapy  He was diagnosed in his late 68s/80s  His father ultimately  at age 80 from other causes      Review of Systems  Review of Systems   Constitutional: Negative  HENT: Negative  Respiratory: Negative  Cardiovascular: Negative  Gastrointestinal: Negative  Genitourinary: Negative for decreased urine volume, difficulty urinating, dysuria, flank pain, frequency, hematuria and urgency  Musculoskeletal: Negative  Skin: Negative  Neurological: Negative  Psychiatric/Behavioral: Negative  Past Medical History  Past Medical History:   Diagnosis Date    Abscess, cheek     Last Assessed: 2/23/2016    Asthma     Chronic headaches     Constipation     COPD [facility]ronic obstructive pulmonary disease) [facility]     Cough     Difficulty attaining erection     Dyspnea     Enlarged prostate     Hemorrhoids     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[facility])     Seasonal allergies     Slow urinary stream     Wheezing        Past Social History  Past Surgical History:   Procedure Laterality Date    ABSCESS DRAINAGE      LA BRONCHOSCOPY,DIAGNOSTIC N/A 7/11/2018    Procedure: BRONCHOSCOPY FLEXIBLE;  Surgeon: Marzena Benites DO;  Location: [facility];  Service: Pulmonary    SKIN LESION EXCISION      Excision of lesion in vestibule of mouth;  Last Assessed: 2/23/2016       Past Family History  Family History   Problem Relation Age of Onset    Hypertension Mother     Glaucoma Mother     Cancer Father         Prostate    Diabetes Maternal Uncle     Glaucoma Maternal Uncle     Substance Abuse Neg Hx         neg fam hx    Mental illness Neg Hx         neg fam hx       Past Social history  Social History     Socioeconomic History    Marital status: /Civil Union     Spouse name: Not on file    Number of children: Not on file    Years of education: Not on file    Highest education level: Not on file   Occupational History    Not on file   Tobacco Use    Smoking status: Former Smoker     Packs/day: 1 50     Years: 20 00     Pack years: 30 00     Types: Cigarettes     Quit date: 2010     Years since quittin 5    Smokeless tobacco: Never Used   Vaping Use    Vaping Use: Never used   Substance and Sexual Activity    Alcohol use: Yes     Comment: socially / 1-4 drinks/week    Drug use: No    Sexual activity: Never   Other Topics Concern    Not on file   Social History Narrative    Daily caffeine consumption, 2-3 servings a day    Lives with wife    Feels safe at home  Sees dentist reg    No living will     Social Determinants of Health     Financial Resource Strain:     Difficulty of Paying Living Expenses:    Food Insecurity:     Worried About Running Out of Food in the Last Year:     [address] in the Last Year:    Transportation Needs: No Transportation Needs    Lack of Transportation (Medical): No    Lack of Transportation (Non-Medical):  No   Physical Activity: Inactive    Days of Exercise per Week: 0 days    Minutes of Exercise per Session: 0 min   Stress: No Stress Concern Present    Feeling of Stress : Not at all   Social Connections:     Frequency of Communication with Friends and Family:     Frequency of Social Gatherings with Friends and Family:     Attends Evangelical Services:     Active Member of Clubs or Organizations:     Attends Club or Organization Meetings:     Marital Status:    Intimate Partner Violence: Not At Risk    Fear of Current or Ex-Partner: No    Emotionally Abused: No    Physically Abused: No    Sexually Abused: No       Current Medications  Current Outpatient Medications   Medication Sig Dispense Refill    albuterol (PROVENTIL HFA,VENTOLIN HFA) 90 mcg/act inhaler TAKE 2 PUFFS BY MOUTH EVERY 6 HOURS AS NEEDED FOR WHEEZE 18 Inhaler 5    Asmanex  MCG/ACT AERO Take 1 Act (200 mcg total) by mouth 2 (two) times a day 1 puff twice daily 3 Inhaler 0    bictegravir-emtricitab-tenofovir alafenamide (Biktarvy) -25 MG tablet Take 1 tablet by mouth daily with breakfast 90 tablet 1    ciprofloxacin (CIPRO) 500 mg tablet Take 1 tablet (500 mg total) by mouth every 12 (twelve) hours for 1 day Begin first dose morning of biopsy 2 tablet 0    fluticasone (FLONASE) 50 mcg/act nasal spray 2 sprays into each nostril daily (Patient taking differently: 2 sprays into each nostril daily as needed ) 48 g 1    tamsulosin (FLOMAX) 0 4 mg Take 1 capsule (0 4 mg total) by mouth daily with dinner 90 capsule 3    Zoster Vac Recomb Adjuvanted (Shingrix) 50 MCG/0 5ML SUSR 0 5mL IM for one dose, followed by 0 5mL IM 2-6 months after first dose 1 each 1     No current facility-administered medications for this visit  Allergies  No Known Allergies    Past Medical History, Social History, Family History, medications and allergies were reviewed  Vitals  Vitals:    08/04/21 1019   BP: 120/72   Pulse: 80   Weight: 128 kg (282 lb)   Height: 6' (1 829 m)       Physical Exam  Physical Exam  Constitutional:       Appearance: Normal appearance  He is well-developed  HENT:      Head: Normocephalic  Eyes:      Pupils: Pupils are equal, round, and reactive to light  Pulmonary:      Effort: Pulmonary effort is normal    Abdominal:      Palpations: Abdomen is soft  Genitourinary:     Prostate: Normal       Rectum: Normal       Comments: Prostate 35 g, smooth, nontender, no nodules  Musculoskeletal:         General: Normal range of motion  Cervical back: Normal range of motion  Skin:     General: Skin is warm and dry  Neurological:      General: No focal deficit present  Mental Status: He is alert and oriented to person, place, and time  Psychiatric:         Mood and Affect: Mood normal          Behavior: Behavior normal          Thought Content:  Thought content normal          Judgment: Judgment normal          Results    I have personally reviewed all pertinent lab results and reviewed with patient  Lab Results   Component Value Date    PSA 3 1 07/07/2021    PSA 2 1 09/23/2020    PSA 1 3 04/19/2019     Lab Results   Component Value Date    GLUCOSE 100 (H) 06/09/2017    CALCIUM 8 9 08/10/2018     06/09/2017    K 4 3 09/23/2020    CO2 19 (L) 09/23/2020     09/23/2020    BUN 16 09/23/2020    CREATININE 1 05 09/23/2020     Lab Results   Component Value Date    WBC 7 2 07/02/2020    HGB 14 6 07/02/2020    HCT 44 5 07/02/2020    MCV 93 07/02/2020     07/02/2020     No results found for this or any previous visit (from the past 1 hour(s))

## 2021-08-04 ENCOUNTER — OFFICE VISIT (OUTPATIENT)
Dept: UROLOGY | Facility: HOSPITAL | Age: 66
End: 2021-08-04
Payer: COMMERCIAL

## 2021-08-04 VITALS
HEART RATE: 80 BPM | WEIGHT: 282 LBS | SYSTOLIC BLOOD PRESSURE: 120 MMHG | DIASTOLIC BLOOD PRESSURE: 72 MMHG | BODY MASS INDEX: 38.19 KG/M2 | HEIGHT: 72 IN

## 2021-08-04 DIAGNOSIS — Z80.42 FAMILY HISTORY OF PROSTATE CANCER: ICD-10-CM

## 2021-08-04 DIAGNOSIS — R97.20 ELEVATED PSA: ICD-10-CM

## 2021-08-04 DIAGNOSIS — N40.1 BENIGN PROSTATIC HYPERPLASIA WITH LOWER URINARY TRACT SYMPTOMS, SYMPTOM DETAILS UNSPECIFIED: ICD-10-CM

## 2021-08-04 DIAGNOSIS — Z12.5 SCREENING FOR PROSTATE CANCER: Primary | ICD-10-CM

## 2021-08-04 PROCEDURE — 3008F BODY MASS INDEX DOCD: CPT | Performed by: NURSE PRACTITIONER

## 2021-08-04 PROCEDURE — 1036F TOBACCO NON-USER: CPT | Performed by: NURSE PRACTITIONER

## 2021-08-04 PROCEDURE — 1160F RVW MEDS BY RX/DR IN RCRD: CPT | Performed by: NURSE PRACTITIONER

## 2021-08-04 PROCEDURE — 99213 OFFICE O/P EST LOW 20 MIN: CPT | Performed by: NURSE PRACTITIONER

## 2021-08-04 RX ORDER — CIPROFLOXACIN 500 MG/1
500 TABLET, FILM COATED ORAL EVERY 12 HOURS SCHEDULED
Qty: 2 TABLET | Refills: 0 | Status: SHIPPED | OUTPATIENT
Start: 2021-08-04 | End: 2021-08-05

## 2021-08-04 NOTE — PATIENT INSTRUCTIONS
Prostate Biopsy   AMBULATORY CARE:   What you need to know about a prostate biopsy:  A prostate biopsy is a procedure to remove samples of tissue from your prostate gland  The prostate is a male sex gland that makes fluid found in semen  It is located just below the bladder  After the samples are removed, they are sent to a lab and tested for cancer  How to prepare for a prostate biopsy:   · Your healthcare provider will talk to you about how to prepare for this procedure  You will need to stop taking blood thinners several days before your procedure  Examples of blood thinners include warfarin and NSAIDs  You may also need to stop taking herbal supplements before your procedure  Your provider may tell you to shower the night before your surgery  He or she may tell you to use a certain soap to help prevent a surgical site infection  Your provider may tell you not to eat or drink anything after midnight on the day of your surgery  He or she will tell you what other medicines to take or not take on the day of your procedure  · You will be given an antibiotic to help prevent a bacterial infection  You may need to give yourself an enema (liquid medicine put in your rectum) to help empty your bowel before your procedure  What will happen during a prostate biopsy:   · You may need to lie on your side with your knees pulled up toward your chest  Numbing cream or gel may be put into your rectum, or numbing medicine may be injected near your prostate  You may instead be given general anesthesia to keep you asleep and free from pain during the procedure  A biopsy sample may be taken through your rectum, urethra, or perineum  The perineum is the area between your scrotum and rectum  Most of the time, biopsy samples are taken through the rectum  · If your healthcare provider takes a sample through your rectum, he will insert a small ultrasound probe into your rectum   The ultrasound shows pictures of your prostate on a monitor, and is used to help guide the biopsy needle  Your healthcare provider will push the biopsy needle through the wall of your rectum and into your prostate gland  Your healthcare provider will remove between 6 to 12 samples of tissue from different areas of your prostate gland  The samples will be sent to a lab and tested for cancer  What will happen after a prostate biopsy: You may feel soreness at the biopsy site  You may need to take antibiotics for up to 2 days after your procedure to help prevent an infection  You may have some bleeding from your rectum  You may also have blood in your urine, bowel movements, or semen  Risks of a prostate biopsy: You may bleed more than expected or get an infection in your urinary tract or prostate gland  The infection may spread to your blood and the rest of your body  Your bladder may not empty completely when you urinate  You may need a catheter to help empty your bladder for a short period of time  Cancer cells may be missed during your biopsy procedure  You may need another prostate biopsy to check for cancer again  Seek care immediately if:   · You have heavy bleeding from your rectum  · You urinate very little or not at all  · You have pain from your procedure that gets worse, even after you take pain medicine  Contact your healthcare provider if:   · You have a fever or chills  · You feel pain or burning when you urinate  · Your urine is cloudy or smells bad  · You have questions or concerns about your condition or care  Medicines:  · Medicines  can help decrease pain  You may need medicine to prevent or treat a bacterial infection  Ask how to take pain medicine safely  · Take your medicine as directed  Contact your healthcare provider if you think your medicine is not helping or if you have side effects  Tell him or her if you are allergic to any medicine  Keep a list of the medicines, vitamins, and herbs you take   Include the amounts, and when and why you take them  Bring the list or the pill bottles to follow-up visits  Carry your medicine list with you in case of an emergency  Follow up with your healthcare provider or urologist as directed: You may need to return for more tests or procedures  Write down your questions so you remember to ask them during your visits  © Copyright [employer] 2021 Information is for End User's use only and may not be sold, redistributed or otherwise used for commercial purposes  All illustrations and images included in CareNotes® are the copyrighted property of [employer]  or [facility] Dorota Pina   The above information is an  only  It is not intended as medical advice for individual conditions or treatments  Talk to your doctor, nurse or pharmacist before following any medical regimen to see if it is safe and effective for you

## 2021-10-17 LAB — PSA SERPL-MCNC: 2.5 NG/ML (ref 0–4)

## 2021-11-01 ENCOUNTER — TELEPHONE (OUTPATIENT)
Dept: PULMONOLOGY | Facility: CLINIC | Age: 66
End: 2021-11-01

## 2021-11-01 DIAGNOSIS — J45.40 MODERATE PERSISTENT ASTHMA WITHOUT COMPLICATION: Chronic | ICD-10-CM

## 2021-11-01 RX ORDER — MOMETASONE FUROATE 200 UG/1
AEROSOL RESPIRATORY (INHALATION)
Qty: 13 G | Refills: 3 | Status: SHIPPED | OUTPATIENT
Start: 2021-11-01 | End: 2021-11-01

## 2021-11-03 ENCOUNTER — TELEPHONE (OUTPATIENT)
Dept: UROLOGY | Facility: HOSPITAL | Age: 66
End: 2021-11-03

## 2021-11-03 DIAGNOSIS — R97.20 ELEVATED PSA: Primary | ICD-10-CM

## 2021-11-03 DIAGNOSIS — N95.2 VAGINAL ATROPHY: ICD-10-CM

## 2021-11-03 DIAGNOSIS — Z80.42 FAMILY HISTORY OF PROSTATE CANCER: ICD-10-CM

## 2021-11-22 ENCOUNTER — TELEPHONE (OUTPATIENT)
Dept: ADMINISTRATIVE | Facility: OTHER | Age: 66
End: 2021-11-22

## 2021-11-29 ENCOUNTER — TELEPHONE (OUTPATIENT)
Dept: UROLOGY | Facility: AMBULATORY SURGERY CENTER | Age: 66
End: 2021-11-29

## 2021-12-07 ENCOUNTER — TELEPHONE (OUTPATIENT)
Dept: UROLOGY | Facility: AMBULATORY SURGERY CENTER | Age: 66
End: 2021-12-07

## 2021-12-09 ENCOUNTER — HOSPITAL ENCOUNTER (OUTPATIENT)
Dept: RADIOLOGY | Age: 66
Discharge: HOME | End: 2021-12-09
Payer: COMMERCIAL

## 2021-12-09 DIAGNOSIS — Z80.42 FAMILY HISTORY OF PROSTATE CANCER: ICD-10-CM

## 2021-12-09 DIAGNOSIS — R97.20 ELEVATED PSA: ICD-10-CM

## 2021-12-09 PROCEDURE — G1004 CDSM NDSC: HCPCS

## 2021-12-09 PROCEDURE — A9585 GADOBUTROL INJECTION: HCPCS | Performed by: NURSE PRACTITIONER

## 2021-12-09 PROCEDURE — 72197 MRI PELVIS W/O & W/DYE: CPT

## 2021-12-09 PROCEDURE — 76377 3D RENDER W/INTRP POSTPROCES: CPT

## 2021-12-09 RX ADMIN — GADOBUTROL 12 ML: 604.72 INJECTION INTRAVENOUS at 11:55

## 2021-12-14 ENCOUNTER — TELEPHONE (OUTPATIENT)
Dept: OTHER | Facility: OTHER | Age: 66
End: 2021-12-14

## 2022-01-20 ENCOUNTER — OFFICE VISIT (OUTPATIENT)
Dept: FAMILY MEDICINE CLINIC | Facility: HOSPITAL | Age: 67
End: 2022-01-20
Payer: COMMERCIAL

## 2022-01-20 VITALS
TEMPERATURE: 98.1 F | DIASTOLIC BLOOD PRESSURE: 74 MMHG | WEIGHT: 288.2 LBS | BODY MASS INDEX: 39.09 KG/M2 | SYSTOLIC BLOOD PRESSURE: 140 MMHG | HEART RATE: 98 BPM

## 2022-01-20 DIAGNOSIS — B20 HIV DISEASE (HCC): ICD-10-CM

## 2022-01-20 DIAGNOSIS — E66.01 SEVERE OBESITY (BMI 35.0-39.9) WITH COMORBIDITY (HCC): ICD-10-CM

## 2022-01-20 DIAGNOSIS — N40.1 BENIGN PROSTATIC HYPERPLASIA WITH NOCTURIA: ICD-10-CM

## 2022-01-20 DIAGNOSIS — J45.40 MODERATE PERSISTENT ASTHMA WITHOUT COMPLICATION: Chronic | ICD-10-CM

## 2022-01-20 DIAGNOSIS — J06.9 VIRAL UPPER RESPIRATORY INFECTION: Primary | ICD-10-CM

## 2022-01-20 DIAGNOSIS — R35.1 BENIGN PROSTATIC HYPERPLASIA WITH NOCTURIA: ICD-10-CM

## 2022-01-20 PROCEDURE — U0003 INFECTIOUS AGENT DETECTION BY NUCLEIC ACID (DNA OR RNA); SEVERE ACUTE RESPIRATORY SYNDROME CORONAVIRUS 2 (SARS-COV-2) (CORONAVIRUS DISEASE [COVID-19]), AMPLIFIED PROBE TECHNIQUE, MAKING USE OF HIGH THROUGHPUT TECHNOLOGIES AS DESCRIBED BY CMS-2020-01-R: HCPCS | Performed by: INTERNAL MEDICINE

## 2022-01-20 PROCEDURE — 99214 OFFICE O/P EST MOD 30 MIN: CPT | Performed by: INTERNAL MEDICINE

## 2022-01-20 PROCEDURE — U0005 INFEC AGEN DETEC AMPLI PROBE: HCPCS | Performed by: INTERNAL MEDICINE

## 2022-01-20 PROCEDURE — 1036F TOBACCO NON-USER: CPT | Performed by: INTERNAL MEDICINE

## 2022-01-20 PROCEDURE — 1160F RVW MEDS BY RX/DR IN RCRD: CPT | Performed by: INTERNAL MEDICINE

## 2022-01-20 RX ORDER — BENZONATATE 100 MG/1
100 CAPSULE ORAL 3 TIMES DAILY
Qty: 30 CAPSULE | Refills: 0 | Status: SHIPPED | OUTPATIENT
Start: 2022-01-20 | End: 2022-06-20

## 2022-01-20 RX ORDER — TAMSULOSIN HYDROCHLORIDE 0.4 MG/1
0.4 CAPSULE ORAL
Qty: 90 CAPSULE | Refills: 3 | Status: SHIPPED | OUTPATIENT
Start: 2022-01-20 | End: 2022-01-24 | Stop reason: SDUPTHER

## 2022-01-20 NOTE — ASSESSMENT & PLAN NOTE
Patient's CD4 count was more than 500  Will continue Biktarvy  Will check patient's renal function, liver function tests, cholesterol, blood counts  She follows with the id in [facility]

## 2022-01-20 NOTE — ASSESSMENT & PLAN NOTE
Patient has moderate persistent asthma  I did not hear expiratory wheezing today to suggest acute asthma exacerbation  I am concerned whether patient's symptoms are due to COVID infection  I tested him in the office  I also ordered a chest x-ray to make sure not missing pneumonia on physical examination  Patient will continue his albuterol rescue inhaler as well as Flovent inhaler      There is no indication for steroids today    I did not see volume overload suggestive acute CHF causing patient's shortness of breath on exertion

## 2022-01-20 NOTE — PROGRESS NOTES
Assessment/Plan:    HIV disease ([facility] )  Patient's CD4 count was more than 500  Will continue Biktarvy  Will check patient's renal function, liver function tests, cholesterol, blood counts  She follows with the id in [place]  Moderate persistent asthma without complication  Patient has moderate persistent asthma  I did not hear expiratory wheezing today to suggest acute asthma exacerbation  I am concerned whether patient's symptoms are due to COVID infection  I tested him in the office  I also ordered a chest x-ray to make sure not missing pneumonia on physical examination  Patient will continue his albuterol rescue inhaler as well as Flovent inhaler  There is no indication for steroids today    I did not see volume overload suggestive acute CHF causing patient's shortness of breath on exertion    Benign prostatic hyperplasia with nocturia  Patient has BPH with nocturia  Urination is improved with tamsulosin  I refilled tamsulosin  Patient will have prostate biopsy next week       Diagnoses and all orders for this visit:    Viral upper respiratory infection  -     COVID Only - Office Collect  -     benzonatate (TESSALON PERLES) 100 mg capsule; Take 1 capsule (100 mg total) by mouth 3 (three) times a day    Moderate persistent asthma without complication  -     XR chest pa & lateral; Future    Benign prostatic hyperplasia with nocturia  -     tamsulosin (FLOMAX) 0 4 mg; Take 1 capsule (0 4 mg total) by mouth daily with dinner    HIV disease ([facility] )  -     CBC and differential; Future  -     Comprehensive metabolic panel; Future  -     Lipid panel; Future    Severe obesity (BMI 35 0-39  9) with comorbidity ([place] [address] )          Patient has severe obesity:  BMI is 39 09 and he has comorbidities  I will see patient back in about a month for Medicare annual wellness visit    Subjective:      Patient ID: Gerson Rogers is a 75 y o  male      Patient developed upper respiratory congestion, nasal discharge, cough productive of whitish phlegm intermittently as well as shortness of breath with exertion such as walking up hill about a week ago  He has to use his albuterol inhaler more frequently since the start of this illness  He has some chills but no fever  He had no sore throat, no loss of taste or smell  Denies chest pain, lower extremity swelling abdominal pain, diarrhea, nausea  He came today to to see if he has pneumonia  His oxygen saturation at home was 93-96%  His CD4 count was more than 500 last November on review of records    He has received 2 motor vaccines but no blisters  The following portions of the patient's history were reviewed and updated as appropriate: current medications, past family history, past medical history, past social history, past surgical history and problem list     Review of Systems   Constitutional: Positive for chills  Negative for fever  HENT: Positive for congestion  Respiratory: Positive for cough, shortness of breath and wheezing  Cardiovascular: Negative for chest pain and leg swelling  Gastrointestinal: Negative for abdominal pain, diarrhea and nausea  Genitourinary: Negative for difficulty urinating  Objective:    /74 (BP Location: Right arm, Patient Position: Sitting, Cuff Size: Large)   Pulse 98   Temp 98 1 °F (36 7 °C) (Tympanic)   Wt 131 kg (288 lb 3 2 oz)   BMI 39 09 kg/m²      Physical Exam  Constitutional:       General: He is not in acute distress  Appearance: He is not ill-appearing or toxic-appearing  HENT:      Head: Normocephalic  Nose: Rhinorrhea (Clear rhinorrhea was present in both nostrils) present  Mouth/Throat:      Mouth: Mucous membranes are moist       Pharynx: Oropharynx is clear  No oropharyngeal exudate or posterior oropharyngeal erythema  Eyes:      Conjunctiva/sclera: Conjunctivae normal    Cardiovascular:      Rate and Rhythm: Normal rate and regular rhythm  Heart sounds:  No murmur heard  Pulmonary:      Effort: No respiratory distress  Breath sounds: No wheezing or rales  Comments: I heard no crackles or expiratory wheezing  Musculoskeletal:      Cervical back: Neck supple  Lymphadenopathy:      Cervical: No cervical adenopathy  Skin:     General: Skin is warm and dry  Comments: Patient had no lower extremity edema   Neurological:      Mental Status: He is alert and oriented to person, place, and time  Cranial Nerves: No cranial nerve deficit  Motor: No weakness  Gait: Gait normal    Psychiatric:         Mood and Affect: Mood normal          Thought Content: Thought content normal              Barnet MD Jeyson  BMI Counseling: Body mass index is 39 09 kg/m²  The BMI is above normal  Nutrition recommendations include reducing portion sizes

## 2022-01-20 NOTE — ASSESSMENT & PLAN NOTE
Patient has BPH with nocturia  Urination is improved with tamsulosin  I refilled tamsulosin    Patient will have prostate biopsy next week

## 2022-01-21 ENCOUNTER — TELEPHONE (OUTPATIENT)
Dept: FAMILY MEDICINE CLINIC | Facility: HOSPITAL | Age: 67
End: 2022-01-21

## 2022-01-21 ENCOUNTER — HOSPITAL ENCOUNTER (OUTPATIENT)
Dept: RADIOLOGY | Facility: HOSPITAL | Age: 67
Discharge: HOME | End: 2022-01-21
Attending: INTERNAL MEDICINE
Payer: COMMERCIAL

## 2022-01-21 DIAGNOSIS — J45.40 MODERATE PERSISTENT ASTHMA WITHOUT COMPLICATION: Chronic | ICD-10-CM

## 2022-01-21 LAB — SARS-COV-2 RNA RESP QL NAA+PROBE: NEGATIVE

## 2022-01-21 PROCEDURE — 71046 X-RAY EXAM CHEST 2 VIEWS: CPT

## 2022-01-21 NOTE — TELEPHONE ENCOUNTER
Patient stopped in  He was expecting a CT scan order for something that was seen on a CT scan of the chest from 2020  I only see a CXR order in the computer  Please call him and advise

## 2022-01-23 DIAGNOSIS — Z12.2 ENCOUNTER FOR SCREENING FOR LUNG CANCER: Primary | ICD-10-CM

## 2022-01-24 DIAGNOSIS — N40.1 BENIGN PROSTATIC HYPERPLASIA WITH NOCTURIA: ICD-10-CM

## 2022-01-24 DIAGNOSIS — R35.1 BENIGN PROSTATIC HYPERPLASIA WITH NOCTURIA: ICD-10-CM

## 2022-01-24 RX ORDER — TAMSULOSIN HYDROCHLORIDE 0.4 MG/1
0.4 CAPSULE ORAL
Qty: 90 CAPSULE | Refills: 3 | Status: SHIPPED | OUTPATIENT
Start: 2022-01-24 | End: 2022-02-01 | Stop reason: SDUPTHER

## 2022-01-24 RX ORDER — TAMSULOSIN HYDROCHLORIDE 0.4 MG/1
0.4 CAPSULE ORAL
Qty: 90 CAPSULE | Refills: 3 | Status: SHIPPED | OUTPATIENT
Start: 2022-01-24 | End: 2022-01-24

## 2022-01-24 NOTE — TELEPHONE ENCOUNTER
Called patient - explained the does need a pre-cert - he needs to schedule ct first and pre-encounter will pre-cert test

## 2022-01-27 ENCOUNTER — OFFICE VISIT (OUTPATIENT)
Dept: URGENT CARE | Facility: CLINIC | Age: 67
End: 2022-01-27
Payer: COMMERCIAL

## 2022-01-27 ENCOUNTER — APPOINTMENT (OUTPATIENT)
Dept: RADIOLOGY | Facility: CLINIC | Age: 67
End: 2022-01-27
Payer: COMMERCIAL

## 2022-01-27 VITALS
RESPIRATION RATE: 20 BRPM | BODY MASS INDEX: 39.06 KG/M2 | OXYGEN SATURATION: 95 % | TEMPERATURE: 97.7 F | WEIGHT: 288 LBS | HEART RATE: 84 BPM

## 2022-01-27 DIAGNOSIS — Z20.822 ENCOUNTER FOR SCREENING LABORATORY TESTING FOR COVID-19 VIRUS: ICD-10-CM

## 2022-01-27 DIAGNOSIS — J40 BRONCHITIS: ICD-10-CM

## 2022-01-27 DIAGNOSIS — J45.909 UNCOMPLICATED ASTHMA, UNSPECIFIED ASTHMA SEVERITY, UNSPECIFIED WHETHER PERSISTENT: ICD-10-CM

## 2022-01-27 DIAGNOSIS — Z20.822 ENCOUNTER FOR SCREENING LABORATORY TESTING FOR COVID-19 VIRUS: Primary | ICD-10-CM

## 2022-01-27 PROCEDURE — 87636 SARSCOV2 & INF A&B AMP PRB: CPT | Performed by: PHYSICIAN ASSISTANT

## 2022-01-27 PROCEDURE — G0382 LEV 3 HOSP TYPE B ED VISIT: HCPCS | Performed by: PHYSICIAN ASSISTANT

## 2022-01-27 PROCEDURE — 99283 EMERGENCY DEPT VISIT LOW MDM: CPT | Performed by: PHYSICIAN ASSISTANT

## 2022-01-27 PROCEDURE — 71046 X-RAY EXAM CHEST 2 VIEWS: CPT

## 2022-01-27 RX ORDER — PREDNISONE 10 MG/1
TABLET ORAL
Qty: 30 TABLET | Refills: 0 | Status: SHIPPED | OUTPATIENT
Start: 2022-01-27 | End: 2022-06-20

## 2022-01-27 RX ORDER — AZITHROMYCIN 250 MG/1
TABLET, FILM COATED ORAL
Qty: 6 TABLET | Refills: 0 | Status: SHIPPED | OUTPATIENT
Start: 2022-01-27 | End: 2022-01-31

## 2022-01-27 RX ORDER — ALBUTEROL SULFATE 90 UG/1
2 AEROSOL, METERED RESPIRATORY (INHALATION) EVERY 6 HOURS PRN
Qty: 8.5 G | Refills: 0 | Status: SHIPPED | OUTPATIENT
Start: 2022-01-27 | End: 2022-05-20 | Stop reason: SDUPTHER

## 2022-01-27 NOTE — PATIENT INSTRUCTIONS
[address]    Your healthcare provider and/or public health staff have evaluated you and have determined that you do not need to remain in the hospital at this time  At this time you can be isolated at home where you will be monitored by staff from your local or state health department  You should carefully follow the prevention and isolation steps below until a healthcare provider or local or state health department says that you can return to your normal activities  Stay home except to get medical care    People who are mildly ill with COVID-19 are able to isolate at home during their illness  You should restrict activities outside your home, except for getting medical care  Do not go to work, school, or public areas  Avoid using public transportation, ride-sharing, or taxis  Separate yourself from other people and animals in your home    People: As much as possible, you should stay in a specific room and away from other people in your home  Also, you should use a separate bathroom, if available  Animals: You should restrict contact with pets and other animals while you are sick with COVID-19, just like you would around other people  Although there have not been reports of pets or other animals becoming sick with COVID-19, it is still recommended that people sick with COVID-19 limit contact with animals until more information is known about the virus  When possible, have another member of your household care for your animals while you are sick  If you are sick with COVID-19, avoid contact with your pet, including petting, snuggling, being kissed or licked, and sharing food  If you must care for your pet or be around animals while you are sick, wash your hands before and after you interact with pets and wear a facemask  See COVID-19 and Animals for more information      Call ahead before visiting your doctor    If you have a medical appointment, call the healthcare provider and tell them that you have or may have COVID-19  This will help the healthcare providers office take steps to keep other people from getting infected or exposed  Wear a facemask    You should wear a facemask when you are around other people (e g , sharing a room or vehicle) or pets and before you enter a healthcare providers office  If you are not able to wear a facemask (for example, because it causes trouble breathing), then people who live with you should not stay in the same room with you, or they should wear a facemask if they enter your room  Cover your coughs and sneezes    Cover your mouth and nose with a tissue when you cough or sneeze  Throw used tissues in a lined trash can  Immediately wash your hands with soap and water for at least 20 seconds or, if soap and water are not available, clean your hands with an alcohol-based hand  that contains at least 60% alcohol  Clean your hands often    Wash your hands often with soap and water for at least 20 seconds, especially after blowing your nose, coughing, or sneezing; going to the bathroom; and before eating or preparing food  If soap and water are not readily available, use an alcohol-based hand  with at least 60% alcohol, covering all surfaces of your hands and rubbing them together until they feel dry  Soap and water are the best option if hands are visibly dirty  Avoid touching your eyes, nose, and mouth with unwashed hands  Avoid sharing personal household items    You should not share dishes, drinking glasses, cups, eating utensils, towels, or bedding with other people or pets in your home  After using these items, they should be washed thoroughly with soap and water  Clean all high-touch surfaces everyday    High touch surfaces include counters, tabletops, doorknobs, bathroom fixtures, toilets, phones, keyboards, tablets, and bedside tables  Also, clean any surfaces that may have blood, stool, or body fluids on them   Use a household cleaning spray or wipe, according to the label instructions  Labels contain instructions for safe and effective use of the cleaning product including precautions you should take when applying the product, such as wearing gloves and making sure you have good ventilation during use of the product  Monitor your symptoms    Seek prompt medical attention if your illness is worsening (e g , difficulty breathing)  Before seeking care, call your healthcare provider and tell them that you have, or are being evaluated for, COVID-19  Put on a facemask before you enter the facility  These steps will help the healthcare providers office to keep other people in the office or waiting room from getting infected or exposed  Ask your healthcare provider to call the local or [facility] health department  Persons who are placed under active monitoring or facilitated self-monitoring should follow instructions provided by their local health department or occupational health professionals, as appropriate  If you have a medical emergency and need to call 911, notify the dispatch personnel that you have, or are being evaluated for COVID-19  If possible, put on a facemask before emergency medical services arrive      Discontinuing home isolation    Patients with confirmed COVID-19 should remain under home isolation precautions until the following conditions are met:   - They have had no fever for at least 24 hours (that is one full day of no fever without the use medicine that reduces fevers)  AND  - other symptoms have improved (for example, when their cough or shortness of breath have improved)  AND  - If had mild or moderate illness, at least 10 days have passed since their symptoms first appeared or if severe illness (needed oxygen) or immunosuppressed, at least 20 days have passed since symptoms first appeared  Patients with confirmed COVID-19 should also notify close contacts (including their workplace) and ask that they self-quarantine  Currently, close contact is defined as being within 6 feet for 15 minutes or more from the period 24 hours starting 48 hours before symptom onset to the time at which the patient went into isolation  Close contacts of patients diagnosed with COVID-19 should be instructed by the patient to self-quarantine for 14 days from the last time of their last contact with the patient  Source: [employer]    WHAT YOU NEED TO KNOW:   Asthma is a lung disease that makes breathing difficult  Chronic inflammation and reactions to triggers narrow the airways in the lungs  Asthma can become life-threatening if it is not managed  DISCHARGE INSTRUCTIONS:   Call your local emergency number (911 in the [address] [facility]) if:   · You have severe shortness of breath  · The skin around your neck and ribs pulls in with each breath  · Your peak flow numbers are in the red zone of your AAP  Return to the emergency department if:   · You have shortness of breath, even after you take your short-term medicine as directed  · Your lips or nails turn blue or gray  Call your doctor or asthma specialist if:   · You run out of medicine before your next refill is due  · Your symptoms get worse  · You need to take more medicine than usual to control your symptoms  · You have questions or concerns about your condition or care  Medicines:   · Medicines  may be used to decrease inflammation, open airways, and make it easier to breathe  Medicines may be inhaled, taken as a pill, or injected  Short-term medicines relieve your symptoms quickly  Long-term medicines are used to prevent future asthma attacks  Other medicines may be needed if your regular medicines are not able to prevent attacks  You may also need medicine to help control your allergies   Ask your healthcare provider for more information about any medicine you are given and how to take it safely  · Take your medicine as directed  Contact your healthcare provider if you think your medicine is not helping or if you have side effects  Tell him of her if you are allergic to any medicine  Keep a list of the medicines, vitamins, and herbs you take  Include the amounts, and when and why you take them  Bring the list or the pill bottles to follow-up visits  Carry your medicine list with you in case of an emergency  Manage your symptoms and prevent future attacks:   · Follow your Asthma Action Plan (AAP)  This is a written plan that you and your healthcare provider create  It explains which medicine you need and when to change doses if necessary  It also explains how you can monitor symptoms and use a peak flow meter  The meter measures how well your lungs are working  · Manage other health conditions , such as allergies, acid reflux, and sleep apnea  · Identify and avoid triggers  These may include pets, dust mites, mold, and cockroaches  · Do not smoke or be around others who smoke  Nicotine and other chemicals in cigarettes and cigars can cause lung damage  Ask your healthcare provider for information if you currently smoke and need help to quit  E-cigarettes or smokeless tobacco still contain nicotine  Talk to your healthcare provider before you use these products  · Ask about the flu vaccine  The flu can make your asthma worse  You may need a yearly flu shot  Follow up with your healthcare provider as directed: You will need to return to make sure your medicine is working and your symptoms are controlled  You may be referred to an asthma or allergy specialist  Valentine Peña may be asked to keep a record of your peak flow values and bring it with you to your appointments  Write down your questions so you remember to ask them during your visits    © Copyright [employer] 2021 Information is for End User's use only and may not be sold, redistributed or otherwise used for commercial purposes  All illustrations and images included in CareNotes® are the copyrighted property of [employer] A M [employer]  or Baldemar Pina   The above information is an  only  It is not intended as medical advice for individual conditions or treatments  Talk to your doctor, nurse or pharmacist before following any medical regimen to see if it is safe and effective for you  Acute Bronchitis   WHAT YOU NEED TO KNOW:   Acute bronchitis is swelling and irritation in your lungs  It is usually caused by a virus and most often happens in the winter  Bronchitis may also be caused by bacteria or by a chemical irritant, such as smoke  DISCHARGE INSTRUCTIONS:   Return to the emergency department if:   · You cough up blood  · Your lips or fingernails turn blue  · You feel like you are not getting enough air when you breathe  Call your doctor if:   · Your symptoms do not go away or get worse, even after treatment  · Your cough does not get better within 4 weeks  · You have questions or concerns about your condition or care  Medicines: You may  need any of the following:  · Cough suppressants  decrease your urge to cough  · Decongestants  help loosen mucus in your lungs and make it easier to cough up  This can help you breathe easier  · Inhalers  may be given  Your healthcare provider may give you one or more inhalers to help you breathe easier and cough less  An inhaler gives your medicine to open your airways  Ask your healthcare provider to show you how to use your inhaler correctly  · Antibiotics  may be given for up to 5 days if your bronchitis is caused by bacteria  · Acetaminophen  decreases pain and fever  It is available without a doctor's order  Ask how much to take and how often to take it  Follow directions   Read the labels of all other medicines you are using to see if they also contain acetaminophen, or ask your doctor or pharmacist  Acetaminophen can cause liver damage if not taken correctly  Do not use more than 4 grams (4,000 milligrams) total of acetaminophen in one day  · NSAIDs  help decrease swelling and pain or fever  This medicine is available with or without a doctor's order  NSAIDs can cause stomach bleeding or kidney problems in certain people  If you take blood thinner medicine, always ask your healthcare provider if NSAIDs are safe for you  Always read the medicine label and follow directions  · Take your medicine as directed  Contact your healthcare provider if you think your medicine is not helping or if you have side effects  Tell him of her if you are allergic to any medicine  Keep a list of the medicines, vitamins, and herbs you take  Include the amounts, and when and why you take them  Bring the list or the pill bottles to follow-up visits  Carry your medicine list with you in case of an emergency  Self-care:   · Drink liquids as directed  You may need to drink more liquids than usual to stay hydrated  Ask how much liquid to drink each day and which liquids are best for you  · Use a cool mist humidifier  to increase air moisture in your home  This may make it easier for you to breathe and help decrease your cough  · Get more rest   Rest helps your body to heal  Slowly start to do more each day  Rest when you feel it is needed  · Avoid irritants in the air  Avoid chemicals, fumes, and dust  Wear a face mask if you must work around dust or fumes  Stay inside on days when air pollution levels are high  If you have allergies, stay inside when pollen counts are high  Do not use aerosol products, such as spray-on deodorant, bug spray, and hair spray  · Do not smoke or be around others who are smoking  Nicotine and other chemicals in cigarettes and cigars can cause lung damage  Ask your healthcare provider for information if you currently smoke and need help to quit  E-cigarettes or smokeless tobacco still contain nicotine   Talk to your healthcare provider before you use these products  Prevent acute bronchitis:       · Ask about vaccines you may need  Get a flu vaccine each year as soon as recommended, usually in September or October  Ask your healthcare provider if you should also get a pneumonia or COVID-19 vaccine  Your healthcare provider can tell you if you should also get other vaccines, and when to get them  · Prevent the spread of germs  You can decrease your risk for acute bronchitis and other illnesses by doing the following:     ? Wash your hands often with soap and water  Carry germ-killing hand lotion or gel with you  You can use the lotion or gel to clean your hands when soap and water are not available  ? Do not touch your eyes, nose, or mouth unless you have washed your hands first     ? Always cover your mouth when you cough to prevent the spread of germs  It is best to cough into a tissue or your shirt sleeve instead of into your hand  Ask those around you to cover their mouths when they cough  ? Try to avoid people who have a cold or the flu  If you are sick, stay away from others as much as possible  Follow up with your doctor as directed:  Write down questions you have so you will remember to ask them during your follow-up visits  © Copyright [employer] 2021 Information is for End User's use only and may not be sold, redistributed or otherwise used for commercial purposes  All illustrations and images included in CareNotes® are the copyrighted property of A [employer] M [employer] Inc  or Baldemar White  The above information is an  only  It is not intended as medical advice for individual conditions or treatments  Talk to your doctor, nurse or pharmacist before following any medical regimen to see if it is safe and effective for you

## 2022-01-27 NOTE — PROGRESS NOTES
[facility]        NAME: Karen Tay is a 75 y o  male  : 1955    MRN: [id number]  DATE: 2022  TIME: 6:03 PM    Pulse 84   Temp 97 7 °F (36 5 °C)   Resp 20   Wt 131 kg (288 lb)   SpO2 95%   BMI 39 06 kg/m²     Assessment and Plan   Encounter for screening laboratory testing for COVID-19 virus [Z20 822]  1  Encounter for screening laboratory testing for COVID-19 virus  [facility]    XR chest pa & lateral    predniSONE 10 mg tablet    albuterol (ProAir HFA) 90 mcg/act inhaler    azithromycin (ZITHROMAX) 250 mg tablet   2  Bronchitis  predniSONE 10 mg tablet    albuterol (ProAir HFA) 90 mcg/act inhaler    azithromycin (ZITHROMAX) 250 mg tablet   3  Uncomplicated asthma, unspecified asthma severity, unspecified whether persistent  predniSONE 10 mg tablet    albuterol (ProAir HFA) 90 mcg/act inhaler    azithromycin (ZITHROMAX) 250 mg tablet         Patient Instructions       Follow up with PCP in 3-5 days  Proceed to  ER if symptoms worsen  Chief Complaint     Chief Complaint   Patient presents with    Cough     2022:  cough (wet, productive; green brown), chills, low grad fever, vomiting after spastic coughing fit, loose stool, HA, nasal congestion, HA  Yesterday felt better, then worsened last night   Chest Pain     Pt using albuterol inhaler         History of Present Illness       Pt with worsening cough x 6 days , pt saw family docto 7 days ago and had a chest xray  Read as normal,  Pt now with productive cough and using inhaler, pt with sob       Review of Systems   Review of Systems   Constitutional: Negative  HENT: Negative  Eyes: Negative  Respiratory: Positive for cough and shortness of breath  Cardiovascular: Negative  Gastrointestinal: Negative  Endocrine: Negative  Genitourinary: Negative  Musculoskeletal: Negative  Skin: Negative  Allergic/Immunologic: Negative  Neurological: Negative  Hematological: Negative  Psychiatric/Behavioral: Negative  All other systems reviewed and are negative          Current Medications       Current Outpatient Medications:     albuterol (ProAir HFA) 90 mcg/act inhaler, Inhale 2 puffs every 6 (six) hours as needed for wheezing, Disp: 8 5 g, Rfl: 0    albuterol (PROVENTIL HFA,VENTOLIN HFA) 90 mcg/act inhaler, TAKE 2 PUFFS BY MOUTH EVERY 6 HOURS AS NEEDED FOR WHEEZE, Disp: 18 Inhaler, Rfl: 5    azithromycin (ZITHROMAX) 250 mg tablet, Take 2 tablets today then 1 tablet daily x 4 days, Disp: 6 tablet, Rfl: 0    benzonatate (TESSALON PERLES) 100 mg capsule, Take 1 capsule (100 mg total) by mouth 3 (three) times a day, Disp: 30 capsule, Rfl: 0    bictegravir-emtricitab-tenofovir alafenamide (Biktarvy) -25 MG tablet, Take 1 tablet by mouth daily with breakfast, Disp: 90 tablet, Rfl: 1    fluticasone (Flovent HFA) 220 mcg/act inhaler, Inhale 2 puffs 2 (two) times a day Rinse mouth after use , Disp: 36 g, Rfl: 3    predniSONE 10 mg tablet, 5 tabs po qd x 2 days then 4 tabs po qd x 2 days then 3 tabs po qd x 2 days then 2 tabs po qd x 2 days then 1 tab po qd x 2 days, Disp: 30 tablet, Rfl: 0    tamsulosin (FLOMAX) 0 4 mg, Take 1 capsule (0 4 mg total) by mouth daily with dinner, Disp: 90 capsule, Rfl: 3    Current Allergies     Allergies as of 01/27/2022    (No Known Allergies)            The following portions of the patient's history were reviewed and updated as appropriate: allergies, current medications, past family history, past medical history, past social history, past surgical history and problem list      Past Medical History:   Diagnosis Date    Abscess, cheek     Last Assessed: 2/23/2016    Asthma     Chronic headaches     Constipation     COPD (chronic obstructive pulmonary disease) ([facility])     Cough     Difficulty attaining erection     Dyspnea     Enlarged prostate     Hemorrhoids     HIV (human immunodeficiency virus infection) ([facility])  Migraine     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HCC)     Seasonal allergies     Slow urinary stream     Wheezing        Past Surgical History:   Procedure Laterality Date    ABSCESS DRAINAGE      ID BRONCHOSCOPY,DIAGNOSTIC N/A 7/11/2018    Procedure: BRONCHOSCOPY FLEXIBLE;  Surgeon: Evon Valdivia DO;  Location: [facility];  Service: Pulmonary    SKIN LESION EXCISION      Excision of lesion in vestibule of mouth; Last Assessed: 2/23/2016       Family History   Problem Relation Age of Onset    Hypertension Mother     Glaucoma Mother     Cancer Father         Prostate    Diabetes Maternal Uncle     Glaucoma Maternal Uncle     Substance Abuse Neg Hx         neg fam hx    Mental illness Neg Hx         neg fam hx         Medications have been verified  Objective   Pulse 84   Temp 97 7 °F (36 5 °C)   Resp 20   Wt 131 kg (288 lb)   SpO2 95%   BMI 39 06 kg/m²        Physical Exam     Physical Exam  Vitals and nursing note reviewed  Constitutional:       Appearance: Normal appearance  He is normal weight  Comments: Pt denies chest pain     6pm  Called pt discussed radiology reading , pt did get zithromax , pt will go to er if condition worsens    HENT:      Head: Normocephalic and atraumatic  Right Ear: Tympanic membrane, ear canal and external ear normal       Left Ear: Tympanic membrane, ear canal and external ear normal       Nose: Nose normal       Mouth/Throat:      Mouth: Mucous membranes are moist       Pharynx: Oropharynx is clear  Eyes:      Extraocular Movements: Extraocular movements intact  Conjunctiva/sclera: Conjunctivae normal       Pupils: Pupils are equal, round, and reactive to light  Cardiovascular:      Rate and Rhythm: Normal rate and regular rhythm  Pulses: Normal pulses  Heart sounds: Normal heart sounds     Pulmonary:      Effort: Pulmonary effort is normal       Comments: Minor coarse sounds left lower   Abdominal:      General: Abdomen is flat  Bowel sounds are normal       Palpations: Abdomen is soft  Musculoskeletal:         General: Normal range of motion  Cervical back: Normal range of motion and neck supple  Skin:     General: Skin is warm  Capillary Refill: Capillary refill takes less than 2 seconds  Neurological:      General: No focal deficit present  Mental Status: He is alert and oriented to person, place, and time     Psychiatric:         Mood and Affect: Mood normal          Behavior: Behavior normal

## 2022-01-28 LAB
FLUAV RNA RESP QL NAA+PROBE: NEGATIVE
FLUBV RNA RESP QL NAA+PROBE: NEGATIVE
SARS-COV-2 RNA RESP QL NAA+PROBE: NEGATIVE

## 2022-02-01 DIAGNOSIS — R35.1 BENIGN PROSTATIC HYPERPLASIA WITH NOCTURIA: ICD-10-CM

## 2022-02-01 DIAGNOSIS — N40.1 BENIGN PROSTATIC HYPERPLASIA WITH NOCTURIA: ICD-10-CM

## 2022-02-01 RX ORDER — TAMSULOSIN HYDROCHLORIDE 0.4 MG/1
0.4 CAPSULE ORAL
Qty: 90 CAPSULE | Refills: 3 | Status: SHIPPED | OUTPATIENT
Start: 2022-02-01 | End: 2022-05-09 | Stop reason: SDUPTHER

## 2022-02-18 LAB
ALBUMIN SERPL-MCNC: 4 G/DL (ref 3.8–4.8)
ALBUMIN/GLOB SERPL: 1.5 {RATIO} (ref 1.2–2.2)
ALP SERPL-CCNC: 62 IU/L (ref 44–121)
ALT SERPL-CCNC: 22 IU/L (ref 0–44)
AST SERPL-CCNC: 23 IU/L (ref 0–40)
BASOPHILS # BLD AUTO: 0 X10E3/UL (ref 0–0.2)
BASOPHILS NFR BLD AUTO: 1 %
BILIRUB SERPL-MCNC: 0.4 MG/DL (ref 0–1.2)
BUN SERPL-MCNC: 14 MG/DL (ref 8–27)
BUN/CREAT SERPL: 16 (ref 10–24)
CALCIUM SERPL-MCNC: 9.1 MG/DL (ref 8.6–10.2)
CHLORIDE SERPL-SCNC: 102 MMOL/L (ref 96–106)
CHOLEST SERPL-MCNC: 186 MG/DL (ref 100–199)
CO2 SERPL-SCNC: 25 MMOL/L (ref 20–29)
CREAT SERPL-MCNC: 0.89 MG/DL (ref 0.76–1.27)
EOSINOPHIL # BLD AUTO: 0.3 X10E3/UL (ref 0–0.4)
EOSINOPHIL NFR BLD AUTO: 5 %
ERYTHROCYTE [DISTWIDTH] IN BLOOD BY AUTOMATED COUNT: 12.4 % (ref 11.6–15.4)
GLOBULIN SER-MCNC: 2.7 G/DL (ref 1.5–4.5)
GLUCOSE SERPL-MCNC: 91 MG/DL (ref 65–99)
HCT VFR BLD AUTO: 41.4 % (ref 37.5–51)
HDLC SERPL-MCNC: 52 MG/DL
HGB BLD-MCNC: 14 G/DL (ref 13–17.7)
IMM GRANULOCYTES # BLD: 0 X10E3/UL (ref 0–0.1)
IMM GRANULOCYTES NFR BLD: 0 %
LDLC SERPL CALC-MCNC: 121 MG/DL (ref 0–99)
LYMPHOCYTES # BLD AUTO: 2.2 X10E3/UL (ref 0.7–3.1)
LYMPHOCYTES NFR BLD AUTO: 38 %
MCH RBC QN AUTO: 29.7 PG (ref 26.6–33)
MCHC RBC AUTO-ENTMCNC: 33.8 G/DL (ref 31.5–35.7)
MCV RBC AUTO: 88 FL (ref 79–97)
MONOCYTES # BLD AUTO: 0.4 X10E3/UL (ref 0.1–0.9)
MONOCYTES NFR BLD AUTO: 8 %
NEUTROPHILS # BLD AUTO: 2.7 X10E3/UL (ref 1.4–7)
NEUTROPHILS NFR BLD AUTO: 48 %
PLATELET # BLD AUTO: 266 X10E3/UL (ref 150–450)
POTASSIUM SERPL-SCNC: 4.7 MMOL/L (ref 3.5–5.2)
PROT SERPL-MCNC: 6.7 G/DL (ref 6–8.5)
RBC # BLD AUTO: 4.71 X10E6/UL (ref 4.14–5.8)
SL AMB EGFR AFRICAN AMERICAN: 102 ML/MIN/1.73
SL AMB EGFR NON AFRICAN AMERICAN: 88 ML/MIN/1.73
SL AMB VLDL CHOLESTEROL CALC: 13 MG/DL (ref 5–40)
SODIUM SERPL-SCNC: 139 MMOL/L (ref 134–144)
TRIGL SERPL-MCNC: 71 MG/DL (ref 0–149)
WBC # BLD AUTO: 5.6 X10E3/UL (ref 3.4–10.8)

## 2022-03-26 ENCOUNTER — HOSPITAL ENCOUNTER (OUTPATIENT)
Dept: CT IMAGING | Facility: HOSPITAL | Age: 67
Discharge: HOME | End: 2022-03-26
Attending: INTERNAL MEDICINE
Payer: COMMERCIAL

## 2022-03-26 DIAGNOSIS — Z12.2 ENCOUNTER FOR SCREENING FOR LUNG CANCER: ICD-10-CM

## 2022-03-26 PROCEDURE — 71271 CT THORAX LUNG CANCER SCR C-: CPT

## 2022-04-05 ENCOUNTER — TELEPHONE (OUTPATIENT)
Dept: PULMONOLOGY | Facility: CLINIC | Age: 67
End: 2022-04-05

## 2022-04-05 DIAGNOSIS — J45.40 MODERATE PERSISTENT ASTHMA WITHOUT COMPLICATION: ICD-10-CM

## 2022-04-05 NOTE — TELEPHONE ENCOUNTER
Called patient and LVM stating we received his call for a refill on Asmanex  Advised him that we did not see him in over a year and would need to set up an appt before sending it in  There is also a note stating Asmanex is not covered and we sent in [address] in for him  Please confirm as to which inhaler patient would like

## 2022-04-15 RX ORDER — FLUTICASONE PROPIONATE 220 UG/1
2 AEROSOL, METERED RESPIRATORY (INHALATION) 2 TIMES DAILY
Qty: 36 G | Refills: 0 | Status: SHIPPED | OUTPATIENT
Start: 2022-04-15 | End: 2022-04-18 | Stop reason: SDUPTHER

## 2022-04-18 ENCOUNTER — OFFICE VISIT (OUTPATIENT)
Dept: PULMONOLOGY | Facility: HOSPITAL | Age: 67
End: 2022-04-18
Payer: COMMERCIAL

## 2022-04-18 VITALS
RESPIRATION RATE: 18 BRPM | SYSTOLIC BLOOD PRESSURE: 138 MMHG | HEART RATE: 97 BPM | WEIGHT: 280 LBS | BODY MASS INDEX: 39.2 KG/M2 | HEIGHT: 71 IN | DIASTOLIC BLOOD PRESSURE: 70 MMHG | TEMPERATURE: 97.6 F | OXYGEN SATURATION: 94 %

## 2022-04-18 DIAGNOSIS — J44.9 CHRONIC OBSTRUCTIVE PULMONARY DISEASE, UNSPECIFIED COPD TYPE (HCC): Primary | ICD-10-CM

## 2022-04-18 DIAGNOSIS — Z87.01 HISTORY OF PNEUMOCYSTIS PNEUMONIA: ICD-10-CM

## 2022-04-18 DIAGNOSIS — F17.211 NICOTINE DEPENDENCE, CIGARETTES, IN REMISSION: ICD-10-CM

## 2022-04-18 DIAGNOSIS — J45.40 MODERATE PERSISTENT ASTHMA WITHOUT COMPLICATION: ICD-10-CM

## 2022-04-18 DIAGNOSIS — G47.33 OSA (OBSTRUCTIVE SLEEP APNEA): ICD-10-CM

## 2022-04-18 PROCEDURE — 1036F TOBACCO NON-USER: CPT | Performed by: PHYSICIAN ASSISTANT

## 2022-04-18 PROCEDURE — 99214 OFFICE O/P EST MOD 30 MIN: CPT | Performed by: PHYSICIAN ASSISTANT

## 2022-04-18 PROCEDURE — 1160F RVW MEDS BY RX/DR IN RCRD: CPT | Performed by: PHYSICIAN ASSISTANT

## 2022-04-18 PROCEDURE — 3008F BODY MASS INDEX DOCD: CPT | Performed by: PHYSICIAN ASSISTANT

## 2022-04-18 RX ORDER — ALBUTEROL SULFATE 90 UG/1
2 AEROSOL, METERED RESPIRATORY (INHALATION) EVERY 6 HOURS PRN
Qty: 18 G | Refills: 3 | Status: SHIPPED | OUTPATIENT
Start: 2022-04-18

## 2022-04-18 RX ORDER — FLUTICASONE PROPIONATE 220 UG/1
2 AEROSOL, METERED RESPIRATORY (INHALATION) 2 TIMES DAILY
Qty: 36 G | Refills: 3 | Status: SHIPPED | OUTPATIENT
Start: 2022-04-18 | End: 2022-08-08 | Stop reason: SDUPTHER

## 2022-04-18 NOTE — PROGRESS NOTES
Answers for HPI/ROS submitted by the patient on 4/13/2022  Chronicity: chronic  When did you first notice your symptoms?: more than 1 year ago  How often do your symptoms occur?: daily  Since you first noticed this problem, how has it changed?: unchanged  Do you have shortness of breath that occurs with effort or exertion?: No  Do you have ear congestion?: No  Do you have heartburn?: No  Do you have fatigue?: No  Do you have nasal congestion?: No  Do you have shortness of breath when lying flat?: No  Do you have shortness of breath when you wake up?: No  Do you have sweats?: No  Have you experienced weight loss?: No  Which of the following makes your symptoms worse?: nothing  Which of the following makes your symptoms better?: nothing    Assessment & Plan:    1  Chronic obstructive pulmonary disease, unspecified COPD type (HCC)  albuterol (PROVENTIL HFA,VENTOLIN HFA) 90 mcg/act inhaler   2  Moderate persistent asthma without complication  fluticasone (Flovent HFA) 220 mcg/act inhaler   3  ZAIRA (obstructive sleep apnea)     4  History of pneumocystis pneumonia     5  Nicotine dependence, cigarettes, in remission         · Patient presenting for follow-up, respiratory symptoms have been stable  · Continue Flovent twice daily, albuterol as needed  · Reviewed CT lung screening without suspicious lung nodules  Done March 2022  Repeat in 1 year  · Patient refuses sleep study    Subjective:     Patient ID: Jesus Parsons is a 75 y o  male  Chief Complaint:    Guille Stephenson is a 58-year-old male with past medical history including hay fever, asthma, ZAIRA not on CPAP, HIV, Pneumocystis pneumonia, alcohol abuse, tobacco abuse, prostate cancer presenting for follow-up  Patient has not been seen in over a year  He says that his breathing has been fine  He reports no change in his chronic cough productive for clear sputum  Rarely requires his albuterol inhaler  Overall doing well  Associated symptoms include coughing  Pertinent negatives include no chest pain, fever, headaches, myalgias or sore throat  The following portions of the patient's history were reviewed in this encounter and updated as appropriate: Past medical, social, surgical, family, allergies    Review of Systems   Constitutional: Negative for appetite change and fever  HENT: Negative for ear pain, postnasal drip, rhinorrhea, sneezing, sore throat and trouble swallowing  Respiratory: Positive for cough  Cardiovascular: Negative for chest pain  Musculoskeletal: Negative for myalgias  Neurological: Negative for headaches  All other systems reviewed and are negative  Objective:  Vitals:    04/18/22 1401   BP: 138/70   Pulse: 97   Resp: 18   Temp: 97 6 °F (36 4 °C)   SpO2: 94%   Weight: 127 kg (280 lb)   Height: 5' 11" (1 803 m)       Physical Exam  Vitals and nursing note reviewed  Constitutional:       General: He is not in acute distress  Appearance: He is well-developed  He is not diaphoretic  HENT:      Head: Normocephalic and atraumatic  Right Ear: External ear normal       Left Ear: External ear normal       Nose: Nose normal    Eyes:      General: No scleral icterus  Right eye: No discharge  Left eye: No discharge  Conjunctiva/sclera: Conjunctivae normal    Neck:      Trachea: No tracheal deviation  Cardiovascular:      Rate and Rhythm: Normal rate and regular rhythm  Heart sounds: Normal heart sounds  No murmur heard  No friction rub  No gallop  Pulmonary:      Effort: Pulmonary effort is normal  No respiratory distress  Breath sounds: Normal breath sounds  No stridor  No wheezing  Abdominal:      General: There is no distension  Tenderness: There is no guarding  Musculoskeletal:         General: No tenderness or deformity  Normal range of motion  Cervical back: Normal range of motion and neck supple  Skin:     General: Skin is warm and dry        Coloration: Skin is not pale       Findings: No erythema or rash  Neurological:      Mental Status: He is alert and oriented to person, place, and time  Cranial Nerves: No cranial nerve deficit  Motor: No abnormal muscle tone  Psychiatric:         Behavior: Behavior normal          Thought Content: Thought content normal          Judgment: Judgment normal          Lab Review:   No visits with results within 2 Month(s) from this visit     Latest known visit with results is:   Orders Only on 02/18/2022   Component Date Value    White Blood Cell Count 02/18/2022 5 6     Red Blood Cell Count 02/18/2022 4 71     Hemoglobin 02/18/2022 14 0     HCT 02/18/2022 41 4     MCV 02/18/2022 88     MCH 02/18/2022 29 7     MCHC 02/18/2022 33 8     RDW 02/18/2022 12 4     Platelet Count 61/75/9896 266     Neutrophils 02/18/2022 48     Lymphocytes 02/18/2022 38     Monocytes 02/18/2022 8     Eosinophils 02/18/2022 5     Basophils PCT 02/18/2022 1     Neutrophils (Absolute) 02/18/2022 2 7     Lymphocytes (Absolute) 02/18/2022 2 2     Monocytes (Absolute) 02/18/2022 0 4     Eosinophils (Absolute) 02/18/2022 0 3     Basophils ABS 02/18/2022 0 0     Immature Granulocytes 02/18/2022 0     Immature Granulocytes (A* 02/18/2022 0 0     Glucose, Random 02/18/2022 91     BUN 02/18/2022 14     Creatinine 02/18/2022 0 89     eGFR Non  02/18/2022 88     eGFR  02/18/2022 102     SL AMB BUN/CREATININE RA* 02/18/2022 16     Sodium 02/18/2022 139     Potassium 02/18/2022 4 7     Chloride 02/18/2022 102     CO2 02/18/2022 25     CALCIUM 02/18/2022 9 1     Protein, Total 02/18/2022 6 7     Albumin 02/18/2022 4 0     Globulin, Total 02/18/2022 2 7     Albumin/Globulin Ratio 02/18/2022 1 5     TOTAL BILIRUBIN 02/18/2022 0 4     Alk Phos Isoenzymes 02/18/2022 62     AST 02/18/2022 23     ALT 02/18/2022 22     Cholesterol, Total 02/18/2022 186     Triglycerides 02/18/2022 71     HDL 02/18/2022 52     VLDL Cholesterol Calcula* 02/18/2022 13     LDL Calculated 02/18/2022 121*

## 2022-05-09 DIAGNOSIS — N40.1 BENIGN PROSTATIC HYPERPLASIA WITH NOCTURIA: ICD-10-CM

## 2022-05-09 DIAGNOSIS — R35.1 BENIGN PROSTATIC HYPERPLASIA WITH NOCTURIA: ICD-10-CM

## 2022-05-09 RX ORDER — TAMSULOSIN HYDROCHLORIDE 0.4 MG/1
0.4 CAPSULE ORAL
Qty: 90 CAPSULE | Refills: 3 | Status: SHIPPED | OUTPATIENT
Start: 2022-05-09

## 2022-05-17 ENCOUNTER — TELEPHONE (OUTPATIENT)
Dept: FAMILY MEDICINE CLINIC | Facility: HOSPITAL | Age: 67
End: 2022-05-17

## 2022-05-17 NOTE — TELEPHONE ENCOUNTER
Pt was given Lisinopril yesterday in [facility]  Would like to speak with someone about this medication   PCB

## 2022-05-17 NOTE — TELEPHONE ENCOUNTER
See call- pt was seen at [facility] 05/16- see Media, was given Lisinopril for HTN, thought that dr was going to also prescribe a water pill but none was given  He is asking for your opinion on taking med and if he needs a diuretic?

## 2022-05-18 NOTE — TELEPHONE ENCOUNTER
Based on the ER documentation I'm not sure if he needs diuretic but he must be seen in person for follow-up  I'm gald to see him!

## 2022-05-20 ENCOUNTER — OFFICE VISIT (OUTPATIENT)
Dept: FAMILY MEDICINE CLINIC | Facility: HOSPITAL | Age: 67
End: 2022-05-20
Payer: COMMERCIAL

## 2022-05-20 VITALS
TEMPERATURE: 98.4 F | BODY MASS INDEX: 39.76 KG/M2 | HEART RATE: 82 BPM | HEIGHT: 71 IN | RESPIRATION RATE: 16 BRPM | OXYGEN SATURATION: 95 % | DIASTOLIC BLOOD PRESSURE: 70 MMHG | WEIGHT: 284 LBS | SYSTOLIC BLOOD PRESSURE: 130 MMHG

## 2022-05-20 DIAGNOSIS — I10 PRIMARY HYPERTENSION: Primary | ICD-10-CM
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
## 2022-05-20 DIAGNOSIS — C61 MALIGNANT NEOPLASM OF PROSTATE (HCC): ICD-10-CM

## 2022-05-20 PROCEDURE — 3008F BODY MASS INDEX DOCD: CPT | Performed by: PHYSICIAN ASSISTANT

## 2022-05-20 PROCEDURE — 1160F RVW MEDS BY RX/DR IN RCRD: CPT | Performed by: PHYSICIAN ASSISTANT

## 2022-05-20 PROCEDURE — 1036F TOBACCO NON-USER: CPT | Performed by: PHYSICIAN ASSISTANT

## 2022-05-20 PROCEDURE — 99213 OFFICE O/P EST LOW 20 MIN: CPT | Performed by: PHYSICIAN ASSISTANT

## 2022-05-20 RX ORDER — LISINOPRIL 10 MG/1
10 TABLET ORAL DAILY
Qty: 90 TABLET | Refills: 3 | Status: SHIPPED | OUTPATIENT
Start: 2022-05-20 | End: 2022-06-20 | Stop reason: SDUPTHER

## 2022-05-20 RX ORDER — LISINOPRIL 10 MG/1
10 TABLET ORAL DAILY
COMMUNITY
Start: 2022-05-16 | End: 2022-05-20 | Stop reason: SDUPTHER

## 2022-05-20 NOTE — PROGRESS NOTES
Assessment/Plan:       Problem List Items Addressed This Visit        Genitourinary    Malignant neoplasm of prostate ([facility] [address] )-  Sees   Dr Denita Red, getting chemo and routine blood test        Other Visit Diagnoses     Tick bite, unspecified site, initial encounter   5/6/22   -  Primary    Relevant Orders    Lyme Antibody Profile with reflex to WB    Primary hypertension -  Under good control      Relevant Medications    lisinopril (ZESTRIL) 10 mg tablet          Subjective:      Patient ID: Cindi Shepherd is a 75 y o  male  Presents for follow up after going to ER, in [place], past Monday  Has been monitoring blood pressure, 130-160/88-89  Getting radiation for cancer treatment; for prostate cancer  Also had some foot swelling and pain  No antibiotics given, started patient on Lisinopril  Did notice some bloody, mucous discharge from one side of nose  Requesting to get lyme titers, had a tick bite few weeks ago  Review of Systems   Constitutional: Positive for fatigue  Negative for chills, diaphoresis and fever  HENT: Negative for congestion, ear pain, rhinorrhea and sore throat  Respiratory: Positive for cough, chest tightness and shortness of breath  Objective:      /72   Pulse 82   Temp 98 4 °F (36 9 °C) (Tympanic)   Resp 16   Ht 5' 11" (1 803 m)   Wt 129 kg (284 lb)   SpO2 95%   BMI 39 61 kg/m²          Physical Exam  Vitals reviewed  Constitutional:       General: He is not in acute distress  Appearance: He is obese  He is not ill-appearing, toxic-appearing or diaphoretic  HENT:      Head: Normocephalic and atraumatic  Right Ear: Tympanic membrane, ear canal and external ear normal  There is no impacted cerumen  Left Ear: Tympanic membrane, ear canal and external ear normal  There is no impacted cerumen  Nose: Nose normal  No congestion or rhinorrhea        Mouth/Throat:      Mouth: Mucous membranes are moist       Pharynx: No oropharyngeal exudate or posterior oropharyngeal erythema  Eyes:      General: No scleral icterus  Right eye: No discharge  Left eye: No discharge  Extraocular Movements: Extraocular movements intact  Conjunctiva/sclera: Conjunctivae normal    Cardiovascular:      Rate and Rhythm: Normal rate and regular rhythm  Pulses: Normal pulses  Heart sounds: Normal heart sounds  Pulmonary:      Effort: Pulmonary effort is normal  No respiratory distress  Breath sounds: Normal breath sounds  No stridor  No wheezing or rhonchi  Musculoskeletal:         General: No swelling, tenderness, deformity or signs of injury  Normal range of motion  Right lower leg: No edema  Left lower leg: No edema  Neurological:      General: No focal deficit present  Mental Status: He is alert and oriented to person, place, and time  Cranial Nerves: No cranial nerve deficit  Sensory: No sensory deficit  Motor: No weakness  Coordination: Coordination normal    Psychiatric:         Mood and Affect: Mood normal          Behavior: Behavior normal          Thought Content:  Thought content normal          Judgment: Judgment normal

## 2022-05-26 LAB
B BURGDOR IGG PATRN SER IB-IMP: NEGATIVE
B BURGDOR IGM PATRN SER IB-IMP: NEGATIVE
B BURGDOR18KD IGG SER QL IB: NORMAL
B BURGDOR23KD IGG SER QL IB: NORMAL
B BURGDOR23KD IGM SER QL IB: NORMAL
B BURGDOR28KD IGG SER QL IB: NORMAL
B BURGDOR30KD IGG SER QL IB: NORMAL
B BURGDOR39KD IGG SER QL IB: NORMAL
B BURGDOR39KD IGM SER QL IB: NORMAL
B BURGDOR41KD IGG SER QL IB: NORMAL
B BURGDOR41KD IGM SER QL IB: NORMAL
B BURGDOR45KD IGG SER QL IB: NORMAL
B BURGDOR58KD IGG SER QL IB: NORMAL
B BURGDOR66KD IGG SER QL IB: NORMAL
B BURGDOR93KD IGG SER QL IB: NORMAL

## 2022-06-02 ENCOUNTER — TELEPHONE (OUTPATIENT)
Dept: OTHER | Facility: OTHER | Age: 67
End: 2022-06-02

## 2022-06-08 ENCOUNTER — OFFICE VISIT (OUTPATIENT)
Dept: PULMONOLOGY | Facility: HOSPITAL | Age: 67
End: 2022-06-08
Payer: COMMERCIAL

## 2022-06-08 VITALS
BODY MASS INDEX: 39.76 KG/M2 | OXYGEN SATURATION: 95 % | DIASTOLIC BLOOD PRESSURE: 70 MMHG | TEMPERATURE: 98.2 F | HEIGHT: 71 IN | SYSTOLIC BLOOD PRESSURE: 122 MMHG | HEART RATE: 97 BPM | RESPIRATION RATE: 18 BRPM | WEIGHT: 284 LBS

## 2022-06-08 DIAGNOSIS — G47.33 OSA (OBSTRUCTIVE SLEEP APNEA): Primary | ICD-10-CM

## 2022-06-08 DIAGNOSIS — B20 HIV DISEASE (HCC): ICD-10-CM

## 2022-06-08 DIAGNOSIS — J45.40 MODERATE PERSISTENT ASTHMA WITHOUT COMPLICATION: Chronic | ICD-10-CM

## 2022-06-08 PROCEDURE — 99214 OFFICE O/P EST MOD 30 MIN: CPT | Performed by: INTERNAL MEDICINE

## 2022-06-08 NOTE — PROGRESS NOTES
Progress Note - Pulmonary   Sinai Gibbs 75 y o  male MRN: [id number]   Encounter: [id number]      Assessment/Plan:  Patient is a 60-year-old male with past medical history significant for HIV on HAART, mild obstructive lung disease who presents for routine follow-up  Overall, the patient is doing well  He has completed treatments of radiation for his prostate cancer  There is completed in June of 2022  He noted significant fatigue during that time but is currently recovering well  In terms of his breathing, he reports that is approximately stable  He will occasionally uses albuterol  He noted that when he stopped the inhaled corticosteroid, his breathing did worsen but this has now resolved  May transition patient to [facility] as he noted better previous control on this  We again discussed the need for a sleep study, the patient defers at this time  Mild obstructive lung disease  -  Transition LABA/ICS   -  Pt will review pharmacy  -  Continue albuterol PRN    HIV on HAART  -  Pt notes good control  -  He notes his CD 4 count is greater >500    Hx of COVID  -  Recovered fully     Hx of ZAIRA  -  No daytime sleepiness  -  Will discuss at next visit    Patient may follow up in 12 months or sooner as necessary  Orders:  No orders of the defined types were placed in this encounter  Subjective: The patient ran out of asmanex which was replaced by flovent by the pharmacy  The patient was concerned that the flovent caused his elevated blood pressure  He determined that the flovent did not affect his blood pressure  He is using his albuterol about 1puff/bid prior to flovent  He denies any current difficulties at this time  He quit smoking about 15 years ago  Reviewed scarring in left lower lobe  He notes his breathing is improving  He will note slight shortness of breath with hills and stairs  He is starting to increase exercise        Inhaler Regimen:  flovent  albuterol    Remainder of review of systems negative except as described in HPI  The following portions of the patient's history were reviewed and updated as appropriate: allergies, current medications, past family history, past medical history, past social history, past surgical history and problem list      Objective:   Vitals: Blood pressure 122/70, pulse 97, temperature 98 2 °F (36 8 °C), temperature source Tympanic, resp  rate 18, height 5' 11" (1 803 m), weight 129 kg (284 lb), SpO2 95 % , RA, Body mass index is 39 61 kg/m²  Physical Exam  Gen: Pleasant, awake, alert, oriented x 3, no acute distress  HEENT: Mucous membranes moist, no oral lesions, no thrush  NECK: No accessory muscle use, JVP not elevated  Cardiac: RRR, single S1, single S2, no murmurs, no rubs, no gallops  Lungs: CTA b/l  Abdomen: normoactive bowel sounds, soft nontender, nondistended, no rebound or rigidity, no guarding, obese  Extremities: no cyanosis, no clubbing, no LE edema  MSK:  Strength equal in all extremities  Derm:  No rashes/lesions noted  Neuro:  Appropriate mood/affect    Labs: I have personally reviewed pertinent lab results  Lab Results   Component Value Date    WBC 5 6 02/18/2022    WBC 7 27 08/10/2018    WBC 5 1 02/24/2016    HGB 14 0 02/18/2022    HGB 12 3 08/10/2018    HGB 13 8 02/24/2016     02/18/2022     08/10/2018     02/24/2016     Lab Results   Component Value Date    CREATININE 0 89 02/18/2022    CREATININE 0 94 08/10/2018    CREATININE 0 88 06/09/2017        Imaging and other studies: I have personally reviewed pertinent reports  CT lung screening 3/26/22  Radiology findings:  LUNGS:  No suspicious pulmonary nodules  A 2 mm right apical nodule described on the prior study currently seen on #3/15 is unchanged and therefore benign  Mild unchanged left upper lobe scarring  PLEURA:  Unremarkable  HEART/GREAT VESSELS: Heart is unremarkable for patient's age  No thoracic aortic aneurysm   Coronary artery calcifications  MEDIASTINUM AND LIZABETH:  Unremarkable  CHEST WALL AND LOWER NECK:   Unremarkable  Pulmonary Function Testing: I have personally reviewed pertinent reports  and I have personally reviewed pertinent films in PACS    FEV1  FVC  FEV1/FVC  9/10/2018: 3 03 81% 1 97 100% 61 (81%)    Manav Trivedi

## 2022-06-10 DIAGNOSIS — J45.50 SEVERE PERSISTENT ASTHMA, UNSPECIFIED WHETHER COMPLICATED: Primary | ICD-10-CM

## 2022-06-10 RX ORDER — FLUTICASONE PROPIONATE AND SALMETEROL 250; 50 UG/1; UG/1
1 POWDER RESPIRATORY (INHALATION) 2 TIMES DAILY
Qty: 180 BLISTER | Refills: 3 | Status: SHIPPED | OUTPATIENT
Start: 2022-06-10 | End: 2022-06-14 | Stop reason: SDUPTHER

## 2022-06-14 DIAGNOSIS — J45.50 SEVERE PERSISTENT ASTHMA, UNSPECIFIED WHETHER COMPLICATED: ICD-10-CM

## 2022-06-14 RX ORDER — FLUTICASONE PROPIONATE AND SALMETEROL 250; 50 UG/1; UG/1
1 POWDER RESPIRATORY (INHALATION) 2 TIMES DAILY
Qty: 180 BLISTER | Refills: 2 | Status: SHIPPED | OUTPATIENT
Start: 2022-06-14 | End: 2022-09-12

## 2022-06-20 ENCOUNTER — OFFICE VISIT (OUTPATIENT)
Dept: CARDIOLOGY CLINIC | Facility: CLINIC | Age: 67
End: 2022-06-20
Payer: COMMERCIAL

## 2022-06-20 VITALS
DIASTOLIC BLOOD PRESSURE: 70 MMHG | HEART RATE: 94 BPM | HEIGHT: 71 IN | SYSTOLIC BLOOD PRESSURE: 122 MMHG | BODY MASS INDEX: 40.1 KG/M2 | WEIGHT: 286.4 LBS

## 2022-06-20 DIAGNOSIS — I25.10 CORONARY ARTERY CALCIFICATION SEEN ON CT SCAN: ICD-10-CM

## 2022-06-20 DIAGNOSIS — I10 HYPERTENSION, UNSPECIFIED TYPE: Primary | ICD-10-CM

## 2022-06-20 PROCEDURE — 3078F DIAST BP <80 MM HG: CPT | Performed by: INTERNAL MEDICINE

## 2022-06-20 PROCEDURE — 99204 OFFICE O/P NEW MOD 45 MIN: CPT | Performed by: INTERNAL MEDICINE

## 2022-06-20 PROCEDURE — 3074F SYST BP LT 130 MM HG: CPT | Performed by: INTERNAL MEDICINE

## 2022-06-20 PROCEDURE — 93000 ELECTROCARDIOGRAM COMPLETE: CPT | Performed by: INTERNAL MEDICINE

## 2022-06-20 PROCEDURE — 3008F BODY MASS INDEX DOCD: CPT | Performed by: INTERNAL MEDICINE

## 2022-06-20 PROCEDURE — 1160F RVW MEDS BY RX/DR IN RCRD: CPT | Performed by: INTERNAL MEDICINE

## 2022-06-20 PROCEDURE — 1036F TOBACCO NON-USER: CPT | Performed by: INTERNAL MEDICINE

## 2022-06-20 RX ORDER — LISINOPRIL 5 MG/1
5 TABLET ORAL DAILY
Qty: 90 TABLET | Refills: 3 | Status: SHIPPED | OUTPATIENT
Start: 2022-06-20

## 2022-06-20 RX ORDER — ATORVASTATIN CALCIUM 10 MG/1
10 TABLET, FILM COATED ORAL DAILY
Qty: 90 TABLET | Refills: 3 | Status: SHIPPED | OUTPATIENT
Start: 2022-06-20

## 2022-06-20 NOTE — PROGRESS NOTES
[address] [facility]  Office Consultation  Jose Cabrera 75 y o  male MRN: [id number]        Chief Complaint    Chief Complaint   Patient presents with    New Patient Visit     Est  Care, elevated BP        Referring Provider: Self, Referral    Impression & Plan:    1  Hypertension, unspecified type  Well controlled on lisinopril 5 mg daily  Was hypotensive on 10 mg daily  Continue only 5 mg daily    - POCT ECG  - lisinopril (ZESTRIL) 5 mg tablet; Take 1 tablet (5 mg total) by mouth daily  Dispense: 90 tablet; Refill: 3  - atorvastatin (LIPITOR) 10 mg tablet; Take 1 tablet (10 mg total) by mouth daily  Dispense: 90 tablet; Refill: 3    2  Coronary artery calcification seen on CT scan  The 10-year ASCVD risk score (Gadiel Artis et al , 2013) is: 15 9%    Values used to calculate the score:      Age: 75 years      Sex: Male      Is Non- : No      Diabetic: No      Tobacco smoker: No      Systolic Blood Pressure: 501 mmHg      Is BP treated: Yes      HDL Cholesterol: 52 mg/dL      Total Cholesterol: 186 mg/dL  We reviewed the ACC ASCVD Risk  together, discussed the patient's risk factors, and reviewed the risk impacts of various therapies or interventions  Patient interested in statin for primary prevention  Start atorvastatin 10 mg daily  - atorvastatin (LIPITOR) 10 mg tablet; Take 1 tablet (10 mg total) by mouth daily  Dispense: 90 tablet; Refill: 3        We will see Jose Cabrera back as needed  HPI: Jose Cabrera is a 75y o  year old male with HIV, prostate cancer receiving radiation tx, and HTN who presents for further evaluation  He had HTN with SBP >140 mmHg some months ago  This was verified on multiple BP cuffs  He was started on lisinopril 10 mg daily by [facility]  His blood pressure dropped to 90s/650s  He has seen self cut it down to 5 mg daily  Brings BP readings with him today which show weeks of BP typically 100s-120s SBP and 70s to 80s DBP       He occasional gets short of breath due to asthma, intermittently takes albuterol  Recently started on Advair  No chest pain, chest pressure with exertion or at rest      I personally reviewed his chest CT from 3/26/22 which was done for lung cancer screening; he has mild to moderate coronary artery calcifications in all distributions  Review of Systems   Constitutional: Negative for activity change and unexpected weight change  HENT: Negative for facial swelling  Eyes: Negative for visual disturbance  Respiratory: Negative for cough and chest tightness  Cardiovascular: Negative for chest pain  Gastrointestinal: Negative for blood in stool  Musculoskeletal: Negative for myalgias  Skin: Negative for pallor  Allergic/Immunologic: Negative for immunocompromised state  Neurological: Negative for syncope and light-headedness  Psychiatric/Behavioral: Negative for agitation and hallucinations  Past Medical History:   Diagnosis Date    Abscess, cheek     Last Assessed: 2/23/2016    Allergic rhinitis     Asthma     Benign prostatic hyperplasia     Chronic headaches     Constipation     COPD (chronic obstructive pulmonary disease) (HCC)     Cough     Difficulty attaining erection     Dyspnea     Enlarged prostate     Hemorrhoids     [facility] [facility]     Migraine     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 )     Pneumonia Jan 27 2022    clear    Seasonal allergies     Slow urinary stream     Wheezing      Past Surgical History:   Procedure Laterality Date    ABSCESS DRAINAGE      HI BRONCHOSCOPY,DIAGNOSTIC N/A 7/11/2018    Procedure: BRONCHOSCOPY FLEXIBLE;  Surgeon: Jazmine Markell, DO;  [facility];  Service: Pulmonary    SKIN LESION EXCISION      Excision of lesion in vestibule of mouth;  Last Assessed: 2/23/2016     Social History     Substance and Sexual Activity   Alcohol Use Yes    Comment: socially / 1-4 drinks/week     Social History Substance and Sexual Activity   Drug Use No     Social History     Tobacco Use   Smoking Status Former Smoker    Packs/day: 1 00    Years: 20 00    Pack years: 20 00    Types: Cigarettes    Start date:     Quit date: 1993    Years since quittin 3   Smokeless Tobacco Never Used     Family History   Problem Relation Age of Onset    Hypertension Mother     Glaucoma Mother     Cancer Father         Prostate    Diabetes Maternal Uncle     Glaucoma Maternal Uncle     Substance Abuse Neg Hx         neg fam hx    Mental illness Neg Hx         neg fam hx       Allergies: Allergies   Allergen Reactions    Seasonal Ic [Cholestatin] Sneezing       Medications (as of START of this encounter): Outpatient Medications Prior to Visit   Medication Sig Dispense Refill    albuterol (PROVENTIL HFA,VENTOLIN HFA) 90 mcg/act inhaler Inhale 2 puffs every 6 (six) hours as needed for wheezing or shortness of breath 18 g 3    bictegravir-emtricitab-tenofovir alafenamide (Biktarvy) -25 MG tablet Take 1 tablet by mouth daily with breakfast 90 tablet 1    fluticasone (Flovent HFA) 220 mcg/act inhaler Inhale 2 puffs 2 (two) times a day Rinse mouth after use  36 g 3    Fluticasone-Salmeterol (Advair Diskus) 250-50 mcg/dose inhaler Inhale 1 puff 2 (two) times a day Rinse mouth after use  180 blister 2    tamsulosin (FLOMAX) 0 4 mg Take 1 capsule (0 4 mg total) by mouth daily with dinner 90 capsule 3    lisinopril (ZESTRIL) 10 mg tablet Take 1 tablet (10 mg total) by mouth in the morning   (Patient taking differently: Take 10 mg by mouth daily Pt taking 0 5 tablet (5 mg daily)) 90 tablet 3    benzonatate (TESSALON PERLES) 100 mg capsule Take 1 capsule (100 mg total) by mouth 3 (three) times a day 30 capsule 0    predniSONE 10 mg tablet 5 tabs po qd x 2 days then 4 tabs po qd x 2 days then 3 tabs po qd x 2 days then 2 tabs po qd x 2 days then 1 tab po qd x 2 days 30 tablet 0     No facility-administered medications prior to visit  Vitals:    06/20/22 1056   BP: 122/70   Pulse: 94     Weight (last 2 days)     Date/Time Weight    06/20/22 1056 130 (286 4)          General: Kirstin Lovelace is a well appearing male, in no acute distress, sitting comfortably  HEENT: moist mucous membranes, EOMI  Neck:  No JVD, supple, trachea midline   Cardiovascular: unremarkable S1/S2, regular rate and rhythm, no murmurs, rubs or gallops   Pulmonary: normal respiratory effort, CTAB   Abdomen: soft and nondistended  Extremities: No lower extremity edema  Warm and well perfused extremities  Neuro: no focal motor deficits, AAOx3 (person, place, time)  Psych: Normal mood and affect, cooperative      Laboratory Studies:    Laboratory studies personally reviewed  Lipid panel 2/18/22 , TG 71, HDL 52,       Cardiac testing:     EKG reviewed personally:   EKG today NSR, 94 bpm, normal axis, normal intervals, normal study  Time Spent:  Total time (face-to-face and non-face-to-face) spent on today's visit was 35 minutes  This includes preparation for the visits (i e  reviewing test results), performance of a medically appropriate history and examination, and orders for medications, tests or other procedures  This time is exclusive of procedures performed and time spent teaching  Debo Hunt MD    This note was completed in part utilizing M*Modal fluency direct voice recognition software  Grammatical errors, random word insertion, spelling mistakes, occasional wrong word or "sound-alike" substitutions and incomplete sentences may be an occasional consequence of the system secondary to software limitations, ambient noise and hardware issues  At the time of dictation, efforts were made to edit, clarify and /or correct errors  Please read the chart carefully and recognize, using context, where substitutions have occurred    If you have any questions or concerns about the context, text or information contained within the body of this dictation, please contact myself, the provider, for further clarification

## 2022-08-08 DIAGNOSIS — J45.40 MODERATE PERSISTENT ASTHMA WITHOUT COMPLICATION: ICD-10-CM

## 2022-08-08 RX ORDER — FLUTICASONE PROPIONATE 220 UG/1
2 AEROSOL, METERED RESPIRATORY (INHALATION) 2 TIMES DAILY
Qty: 36 G | Refills: 3 | Status: SHIPPED | OUTPATIENT
Start: 2022-08-08 | End: 2022-11-06

## 2022-11-03 DIAGNOSIS — J45.50 SEVERE PERSISTENT ASTHMA, UNSPECIFIED WHETHER COMPLICATED: ICD-10-CM

## 2022-11-03 RX ORDER — FLUTICASONE PROPIONATE AND SALMETEROL 250; 50 UG/1; UG/1
POWDER RESPIRATORY (INHALATION)
Qty: 60 BLISTER | Refills: 2 | Status: SHIPPED | OUTPATIENT
Start: 2022-11-03

## 2022-11-17 ENCOUNTER — OFFICE VISIT (OUTPATIENT)
Dept: FAMILY MEDICINE CLINIC | Facility: HOSPITAL | Age: 67
End: 2022-11-17

## 2022-11-17 VITALS
OXYGEN SATURATION: 97 % | HEIGHT: 71 IN | TEMPERATURE: 97.2 F | BODY MASS INDEX: 37.85 KG/M2 | DIASTOLIC BLOOD PRESSURE: 74 MMHG | WEIGHT: 270.4 LBS | SYSTOLIC BLOOD PRESSURE: 124 MMHG | HEART RATE: 60 BPM

## 2022-11-17 DIAGNOSIS — J45.40 MODERATE PERSISTENT ASTHMA WITHOUT COMPLICATION: ICD-10-CM

## 2022-11-17 DIAGNOSIS — J40 BRONCHITIS: Primary | ICD-10-CM

## 2022-11-17 RX ORDER — DOXYCYCLINE 100 MG/1
100 CAPSULE ORAL 2 TIMES DAILY
Qty: 14 CAPSULE | Refills: 0 | Status: SHIPPED | OUTPATIENT
Start: 2022-11-17 | End: 2022-11-24

## 2022-11-17 RX ORDER — FLUTICASONE PROPIONATE 220 UG/1
2 AEROSOL, METERED RESPIRATORY (INHALATION) 2 TIMES DAILY
Qty: 36 G | Refills: 3 | Status: SHIPPED | OUTPATIENT
Start: 2022-11-17 | End: 2023-02-15

## 2022-11-17 NOTE — PROGRESS NOTES
Assessment/Plan:     Given longevity of symptoms will start on antibiotic  No sign of asthma exacerbation so no need for steroid  I did suggest starting his albuterol inhaler  Call if no better or any worse  Diagnoses and all orders for this visit:    Bronchitis  -     doxycycline monohydrate (MONODOX) 100 mg capsule; Take 1 capsule (100 mg total) by mouth 2 (two) times a day for 7 days          Subjective:     Patient ID: Isaura Burk is a 75 y o  male  For last two weeks has been having cold symptoms  Coughing a lot with minimal mucous production  He does cough up green mucus and it tastes bad  Coughing at night when laying down  Denies fever or chills  Denies SOB or wheezing  Denies N/V/D  , probable sick contacts  Denies sinus pressure or ear  Denies post-nasal drip  Has been using nasal lavage  Denies OTC medications  Has not had to use albuterol inhaler  He states his symptoms are worse than two weeks ago, especially in the afternoon  Review of Systems   Constitutional: Positive for fatigue  Negative for chills and fever  HENT: Positive for congestion  Negative for ear pain, postnasal drip, sinus pressure, sinus pain and sore throat  Respiratory: Positive for cough  Negative for shortness of breath and wheezing  Gastrointestinal: Negative for diarrhea, nausea and vomiting  The following portions of the patient's history were reviewed and updated as appropriate: allergies, current medications, past family history, past medical history, past social history, past surgical history and problem list     Objective:  Vitals:    11/17/22 0958   BP: 124/74   Pulse: 60   Temp: (!) 97 2 °F (36 2 °C)   SpO2: 97%      Physical Exam  Vitals reviewed  Constitutional:       General: He is not in acute distress  Appearance: Normal appearance  He is well-developed and well-nourished  He is not ill-appearing     HENT:      Right Ear: Tympanic membrane, ear canal and external ear normal       Left Ear: Tympanic membrane, ear canal and external ear normal       Mouth/Throat:      Mouth: Oropharynx is clear and moist and mucous membranes are normal  Mucous membranes are moist       Pharynx: Oropharynx is clear  Uvula midline  Cardiovascular:      Rate and Rhythm: Normal rate and regular rhythm  Heart sounds: Normal heart sounds  No murmur heard  Pulmonary:      Effort: Pulmonary effort is normal       Breath sounds: Normal breath sounds  Comments: No cough during exam    Lymphadenopathy:      Cervical: No cervical adenopathy  Skin:     General: Skin is warm and dry  Neurological:      Mental Status: He is alert and oriented to person, place, and time  Psychiatric:         Mood and Affect: Mood and affect and mood normal          Behavior: Behavior normal          Thought Content:  Thought content normal          Judgment: Judgment normal

## 2023-01-10 ENCOUNTER — TELEPHONE (OUTPATIENT)
Dept: PULMONOLOGY | Facility: CLINIC | Age: 68
End: 2023-01-10

## 2023-01-10 NOTE — TELEPHONE ENCOUNTER
L/M asking Pt to give us a call back to schedule his next f/u pardeep   Sooner Feb 2023 instead of April 2023

## 2023-02-20 ENCOUNTER — OFFICE VISIT (OUTPATIENT)
Dept: GASTROENTEROLOGY | Facility: CLINIC | Age: 68
End: 2023-02-20

## 2023-02-20 VITALS
BODY MASS INDEX: 38.64 KG/M2 | DIASTOLIC BLOOD PRESSURE: 70 MMHG | HEIGHT: 71 IN | SYSTOLIC BLOOD PRESSURE: 122 MMHG | TEMPERATURE: 97.9 F | WEIGHT: 276 LBS

## 2023-02-20 DIAGNOSIS — C61 MALIGNANT NEOPLASM OF PROSTATE (HCC): ICD-10-CM

## 2023-02-20 DIAGNOSIS — R19.4 CHANGE IN BOWEL HABITS: Primary | ICD-10-CM

## 2023-02-20 DIAGNOSIS — Z86.010 PERSONAL HISTORY OF COLONIC POLYPS: ICD-10-CM

## 2023-02-20 NOTE — PROGRESS NOTES
[facility] - Outpatient Consultation  Tomasz Yarbrough 70 y o  male MRN: [id number]  Encounter: [id number]          ASSESSMENT AND PLAN:      1  Change in bowel habits  2  Personal history of colonic polyps  3  Malignant neoplasm of prostate ([address] )  He describes change in stool frequency and consistency over the past 6 months  He denies diarrhea, constipation, abdominal pain  He saw some blood in his stool last week but this was isolated  He had recent colonoscopy at [facility] June 2021 with removal of precancerous polyps; he was recommended repeat in 7 years  He completed radiation therapy for prostate cancer in June 2022, a few months prior to onset of symptoms  I am unsure whether he has some mild radiation colitis  I would like to obtain his most recent colonoscopy report  In the meantime, we agree to conservatively manage symptoms with Metamucil 1-2 times daily and loperamide as needed to prevent frequent stools  I would like him to follow-up in 4 to 6 weeks and if symptoms are not improved, we can repeat colonoscopy  Follow-up in 4 to 6 weeks  ______________________________________________________________________    HPI: 42-year-old male with prostate cancer status post radiation asthma, HIV on HAART, allergic rhinitis, obstructive sleep apnea presenting for change in bowel habits  He states for the past 6 months, he has noticed more frequent stools  He has 1-2 normal, type 4 stools in the morning followed by additional stools in the afternoon  The stools are thinner and stringy in consistency  He denies diarrhea, constipation, abdominal pain  He saw some blood in his stool last week for 1-2 days but otherwise denies any bleeding  He changed his eating habits and lost 20 pounds, but states he gained 6 back according to our scale  He denies any nausea, vomiting, reflux, difficulty swallowing        He was diagnosed with prostate cancer and completed radiation in June 2022, prior to onset of the symptoms  He denies any changes in medications  He denies use of artificial sweeteners  He denies change in his caffeine intake  He had colonoscopy in May 2016 at [facility]  This showed 2 polyps (1 tubular adenoma, 1 hyperplastic polyp), diverticulosis, internal hemorrhoids  He states he had another colonoscopy June 2021 at [facility] (this report is unavailable to me) with removal of precancerous polyps  He was recommended repeat in 7 years  Answers for HPI/ROS submitted by the patient on 2/17/2023  Chronicity: recurrent  Onset: more than 1 year ago  Onset quality: gradual  Pain location: suprapubic region, left flank  Pain - numeric: 3/10  Pain quality: aching, sharp  Radiates to: does not radiate  anorexia: No  arthralgias: Yes  belching: No  constipation: No  diarrhea: No  dysuria: Yes  fever: No  flatus: Yes  frequency: No  headaches: Yes  hematochezia: Yes  hematuria: No  melena: No  myalgias: Yes  nausea: No  weight loss: Yes  vomiting: No  Aggravated by: movement  Diagnostic workup: lower endoscopy          REVIEW OF SYSTEMS:    CONSTITUTIONAL: Denies any fever, chills, rigors, and weight loss  HEENT: No earache or tinnitus  Denies hearing loss or visual disturbances  CARDIOVASCULAR: No chest pain or palpitations  RESPIRATORY: Denies any cough, hemoptysis, shortness of breath or dyspnea on exertion  GASTROINTESTINAL: As noted in the History of Present Illness  GENITOURINARY: No problems with urination  Denies any hematuria or dysuria  NEUROLOGIC: No dizziness or vertigo, denies headaches  MUSCULOSKELETAL: Denies any muscle or joint pain  SKIN: Denies skin rashes or itching  ENDOCRINE: Denies excessive thirst  Denies intolerance to heat or cold  PSYCHOSOCIAL: Denies depression or anxiety  Denies any recent memory loss         Historical Information   Past Medical History:   Diagnosis Date   • Abscess, cheek     Last Assessed: 2/23/2016   • Allergic rhinitis    • Asthma    • Benign prostatic hyperplasia    • Chronic headaches    • Constipation    • COPD [facility]chronic obstructive [facility] disease) ([facility])    • Cough    • Difficulty attaining erection    • Dyspnea    • Enlarged prostate    • Hemorrhoids    • HIV (human immunodeficiency virus infection) ([facility] )    • Migraine    • 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[facility] )    • Pneumonia 2022    clear   • Seasonal allergies    • Slow urinary stream    • Wheezing      Past Surgical History:   Procedure Laterality Date   • ABSCESS DRAINAGE     • [place] [address] INCL FLUOR GDNCE DX W/CELL WASHG SPX N/A 2018    Procedure: BRONCHOSCOPY FLEXIBLE;  Surgeon: Samantha Falk DO;  [facility];  Service: Pulmonary   • SKIN LESION EXCISION      Excision of lesion in vestibule of mouth;  Last Assessed: 2016     Social History   Social History     Substance and Sexual Activity   Alcohol Use Yes    Comment: socially / 1-4 drinks/week     Social History     Substance and Sexual Activity   Drug Use No     Social History     Tobacco Use   Smoking Status Former   • Packs/day: 1 00   • Years: 20 00   • Pack years: 20 00   • Types: Cigarettes   • Start date:    • Quit date: 1993   • Years since quittin 0   Smokeless Tobacco Never     Family History   Problem Relation Age of Onset   • Hypertension Mother    • Glaucoma Mother    • Cancer Father         Prostate   • Diabetes Maternal Uncle    • Glaucoma Maternal Uncle    • Substance Abuse Neg Hx         neg fam hx   • Mental illness Neg Hx         neg fam hx       Meds/Allergies       Current Outpatient Medications:   •  albuterol (PROVENTIL HFA,VENTOLIN HFA) 90 mcg/act inhaler  •  atorvastatin (LIPITOR) 10 mg tablet  •  bictegravir-emtricitab-tenofovir alafenamide (Biktarvy) -25 MG tablet  •  fluticasone (Flovent HFA) 220 mcg/act inhaler  •  lisinopril (ZESTRIL) 5 mg tablet  •  tamsulosin (FLOMAX) 0 4 mg  •  Wixela Inhub 250-50 MCG/ACT inhaler    No Known Allergies        Objective     Blood pressure 122/70, temperature 97 9 °F (36 6 °C), temperature source Tympanic, height 5' 11" (1 803 m), weight 125 kg (276 lb)  Body mass index is 38 49 kg/m²  PHYSICAL EXAM:      General Appearance:   Alert, cooperative, no distress   HEENT:   Normocephalic, atraumatic, anicteric      Neck:  Supple, symmetrical, trachea midline   Lungs:   Clear to auscultation bilaterally; no rales, rhonchi or wheezing; respirations unlabored    Heart[de-identified]   Regular rate and rhythm; no murmur, rub, or gallop  Abdomen:   Soft, non-tender, non-distended; normal bowel sounds; no masses, no organomegaly    Genitalia:   Deferred    Rectal:   Deferred    Extremities:  No cyanosis, clubbing or edema    Pulses:  2+ and symmetric    Skin:  No jaundice, rashes, or lesions    Lymph nodes:  No palpable cervical lymphadenopathy        Lab Results:   No visits with results within 1 Day(s) from this visit  Latest known visit with results is:   Office Visit on 05/20/2022   Component Date Value   • Lyme 93 kD IgG 05/24/2022 Absent    • Lyme 66 kD IgG 05/24/2022 Absent    • Lyme 58 kD IgG 05/24/2022 Absent    • Lyme 45 kD IgG 05/24/2022 Absent    • Lyme 41 kD IgG 05/24/2022 Absent    • Lyme 39 kD IgG 05/24/2022 Absent    • Lyme 30 kD IgG 05/24/2022 Absent    • Lyme 28 kD IgG 05/24/2022 Absent    • Lyme 23 kD IgG 05/24/2022 Absent    • Lyme 18 kD IgG 05/24/2022 Absent    • Lyme IgG WB Interp  05/24/2022 Negative    • Lyme 41 kD IgM 05/24/2022 Absent    • Lyme 39 kD IgM 05/24/2022 Absent    • Lyme 23 kD IgM 05/24/2022 Absent    • Lyme IgM WB Interp  05/24/2022 Negative          Radiology Results:   No results found

## 2023-03-27 DIAGNOSIS — I10 HYPERTENSION, UNSPECIFIED TYPE: ICD-10-CM

## 2023-03-27 DIAGNOSIS — I25.10 CORONARY ARTERY CALCIFICATION SEEN ON CT SCAN: ICD-10-CM

## 2023-03-27 RX ORDER — ATORVASTATIN CALCIUM 10 MG/1
TABLET, FILM COATED ORAL
Qty: 90 TABLET | Refills: 3 | Status: SHIPPED | OUTPATIENT
Start: 2023-03-27

## 2023-03-29 DIAGNOSIS — N40.1 BENIGN PROSTATIC HYPERPLASIA WITH NOCTURIA: ICD-10-CM

## 2023-03-29 DIAGNOSIS — J44.9 CHRONIC OBSTRUCTIVE PULMONARY DISEASE, UNSPECIFIED COPD TYPE (HCC): ICD-10-CM

## 2023-03-29 DIAGNOSIS — R35.1 BENIGN PROSTATIC HYPERPLASIA WITH NOCTURIA: ICD-10-CM

## 2023-03-29 RX ORDER — ALBUTEROL SULFATE 90 UG/1
AEROSOL, METERED RESPIRATORY (INHALATION)
Qty: 34 G | Refills: 1 | Status: SHIPPED | OUTPATIENT
Start: 2023-03-29

## 2023-03-30 RX ORDER — TAMSULOSIN HYDROCHLORIDE 0.4 MG/1
CAPSULE ORAL
Qty: 90 CAPSULE | Refills: 3 | Status: SHIPPED | OUTPATIENT
Start: 2023-03-30

## 2023-06-12 ENCOUNTER — TELEPHONE (OUTPATIENT)
Dept: PULMONOLOGY | Facility: CLINIC | Age: 68
End: 2023-06-12

## 2023-06-12 NOTE — TELEPHONE ENCOUNTER
LM for patient to give a call back to set up a follow up Appt in [facility]  with Kavin Schirmer next available

## 2023-07-08 DIAGNOSIS — I10 HYPERTENSION, UNSPECIFIED TYPE: ICD-10-CM

## 2023-07-10 RX ORDER — LISINOPRIL 5 MG/1
TABLET ORAL
Qty: 90 TABLET | Refills: 3 | Status: SHIPPED | OUTPATIENT
Start: 2023-07-10

## 2023-08-16 ENCOUNTER — PATIENT OUTREACH (OUTPATIENT)
Dept: HEMATOLOGY ONCOLOGY | Facility: CLINIC | Age: 68
End: 2023-08-16

## 2023-08-16 ENCOUNTER — DOCUMENTATION (OUTPATIENT)
Dept: HEMATOLOGY ONCOLOGY | Facility: CLINIC | Age: 68
End: 2023-08-16

## 2023-08-16 ENCOUNTER — TELEPHONE (OUTPATIENT)
Dept: HEMATOLOGY ONCOLOGY | Facility: CLINIC | Age: 68
End: 2023-08-16

## 2023-08-16 NOTE — PROGRESS NOTES
Patient returned my call. His most recent PSA is 0.4 from June 2023. His prostate RT was in 5/2022. Records in chart under media tab and care everywhere. Patient is not on any type is medication for his prostate cancer at this time. Patient is following with his urologist, Emmy lAeman from [facility]. Patient stated that his urologist stated that he needs to see a Oncologist due to his rise in PSA. He is aware that I will be sending message to office RN to address with Dr. Fran Bennett and get back to me. I will call patient back if this appt is necessary or not. He voiced understanding and was appreciative for the call.

## 2023-08-16 NOTE — TELEPHONE ENCOUNTER
Called patient regarding self referral, patients pcp is Dr. Del Castillo Adjutant, office notes will be found in my chart, patient goes to [facility] for blood work, uploaded last 3 PSA blood tests.

## 2023-08-16 NOTE — PROGRESS NOTES
Outreach made to patient. LM asking him to return my call pertaining to his prostate cancer history. I left my direct line for patient. Patient may not need Med Onc at this time.

## 2023-08-16 NOTE — TELEPHONE ENCOUNTER
Appointment Schedule   Who are you speaking with? Patient   If it is not the patient, are they listed on an active communication consent form? N/A   Which provider is the appointment scheduled with? Dr. Tony Manzanares   At which location is the appointment scheduled for? [facility]   When is the appointment scheduled? Please list date and time 9/6/23 @ 11:20am   What is the reason for this appointment? New patient appointment   Did patient voice understanding of the details of this appointment? yes   Was the no show policy reviewed with patient?  yes

## 2023-08-17 ENCOUNTER — OFFICE VISIT (OUTPATIENT)
Dept: PULMONOLOGY | Facility: CLINIC | Age: 68
End: 2023-08-17
Payer: COMMERCIAL

## 2023-08-17 VITALS
HEART RATE: 84 BPM | TEMPERATURE: 96.7 F | HEIGHT: 71 IN | RESPIRATION RATE: 16 BRPM | BODY MASS INDEX: 38.08 KG/M2 | WEIGHT: 272 LBS | DIASTOLIC BLOOD PRESSURE: 64 MMHG | SYSTOLIC BLOOD PRESSURE: 110 MMHG | OXYGEN SATURATION: 97 %

## 2023-08-17 DIAGNOSIS — E66.01 OBESITY, MORBID (HCC): ICD-10-CM

## 2023-08-17 DIAGNOSIS — G47.33 OSA (OBSTRUCTIVE SLEEP APNEA): Primary | ICD-10-CM

## 2023-08-17 DIAGNOSIS — J45.20 MILD INTERMITTENT ASTHMA WITHOUT COMPLICATION: ICD-10-CM

## 2023-08-17 PROCEDURE — 99214 OFFICE O/P EST MOD 30 MIN: CPT | Performed by: INTERNAL MEDICINE

## 2023-08-17 RX ORDER — FLUTICASONE PROPIONATE 220 UG/1
AEROSOL, METERED RESPIRATORY (INHALATION)
COMMUNITY
Start: 2018-07-01

## 2023-08-17 NOTE — PROGRESS NOTES
Progress Note - Pulmonary   Orlando Ugalde 70 y.o. male MRN: [id number]   Encounter: [id number]      Assessment/Plan:  Patient is a 50-year-old male with past medical history significant for well-controlled HIV who presents for routine follow-up. The patient had stopped his inhalers about 2 weeks ago and notes no significant change. He reports that his previous PFTs were done in close proximity to an admission for pneumonia. The patient may hold on taking his inhalers daily and may use on an as-needed basis. The patient does have significant concern for obstructive sleep apnea in setting of age, weight, witnessed apneas/snoring. We will check home sleep study. The patient has quit smoking greater than 20 years ago. He does not qualify for lung cancer screening. Mild obstructive lung disease  -  May use inhaler PRN  -  May hold LABA/ICS     HIV on HAART  -  Pt notes good control  -  He notes his CD 4 count is greater >500     Hx of COVID  -  Denies prolonged symptoms    ZAIRA  -  Wife is concerned about sleep apnea   -  She notes snoring/apnea events      Nicotine Dependence  - quit in about 2003  - no indication for lung cancer screening    1. ZAIRA (obstructive sleep apnea)  -     Home Study; Future    2. Obesity, morbid ([address])    3. Mild intermittent asthma without complication        Patient may follow up in 12 months or sooner as necessary. Orders:  Orders Placed This Encounter   Procedures   • Home Study     Standing Status:   Future     Standing Expiration Date:   8/17/2024     Order Specific Question:   Does the patient snore? Answer:   Yes     Order Specific Question:   Exclusions: Answer:   None of the above     Order Specific Question:   Sleep History/Symptoms: (Must have at least one other symptoms than snoring.)     Answer: Witnessed apneas     Order Specific Question:   Sleep History/Symptoms: (Must have at least one other symptoms than snoring.)     Answer:    Witnessed gasping Order Specific Question:   Sleep History/Symptoms: (Must have at least one other symptoms than snoring.)     Answer:   BMI > 30     Order Specific Question:   Service requested:     Answer:   Referring provider to provide patient f/u     Order Specific Question:   You assume responsibility for management and orders for additional studies and equipment. Answer:   Yes     Order Specific Question:   Preference for reading physician (Leave blank if no preference.)     Answer:   [place]     Order Specific Question:   Select Preferred Provider (Leave blank if no preference.)     Answer:   Se       Subjective: The patient stopped his inhalers about 2 weeks ago. He notes no worsening and actually feels better. He notes an increase in his activity. He is doing more landscaping. He denies wheezing or shortness of breath. The patient notes that his weight is about stable. He denies daytime sleepiness. He is unaware of snoring. He quit smoking about at least 20 years ago. Inhaler Regimen:  Albuterol - not taking  Fluticasone/Salmeterol - not taking    Answers for HPI/ROS submitted by the patient on 8/14/2023  Have you had a change in appetite?: No  Do you have chest pain?: No  Do you have shortness of breath that occurs with effort or exertion?: Yes  Do you have ear congestion?: No  Do you have ear pain?: No  Do you have a fever?: No  Do you have headaches?: No  Do you have heartburn?: No  Do you have fatigue?: No  Do you have muscle pain?: No  Do you have nasal congestion?: No  Do you have shortness of breath when lying flat?: No  Do you have shortness of breath when you wake up?: No  Do you have post-nasal drip?: No  Do you have a runny nose?: No  Do you have sneezing?: Yes  Do you have a sore throat?: No  Do you have sweats?: No  Do you have trouble swallowing?: No  Have you experienced weight loss?: Yes    Remainder of review of systems negative except as described in HPI.       The following portions of the patient's history were reviewed and updated as appropriate: allergies, current medications, past family history, past medical history, past social history, past surgical history and problem list.     Objective:   Vitals: Blood pressure 110/64, pulse 84, temperature (!) 96.7 °F (35.9 °C), temperature source Tympanic, resp. rate 16, height 5' 11" (1.803 m), weight 123 kg (272 lb), SpO2 97 %., RA, Body mass index is 37.94 kg/m². Physical Exam  Gen: Pleasant, awake, alert, oriented x 3, no acute distress  HEENT: Mucous membranes moist, no oral lesions, no thrush  NECK: No accessory muscle use, JVP not elevated  Cardiac: RRR, single S1, single S2, no murmurs, no rubs, no gallops  Lungs: CTA b/l  Abdomen: normoactive bowel sounds, soft nontender, nondistended, no rebound or rigidity, no guarding, obese  Extremities: no cyanosis, no clubbing, no LE edema  MSK:  Strength equal in all extremities  Derm:  No rashes/lesions noted  Neuro:  Appropriate mood/affect    Labs: I have personally reviewed pertinent lab results. Lab Results   Component Value Date    WBC 5.6 02/18/2022    WBC 7.27 08/10/2018    WBC 5.1 02/24/2016    HGB 14.0 02/18/2022    HGB 12.3 08/10/2018    HGB 13.8 02/24/2016     02/18/2022     08/10/2018     02/24/2016     Lab Results   Component Value Date    CREATININE 0.89 02/18/2022    CREATININE 0.94 08/10/2018    CREATININE 0.88 06/09/2017        Imaging and other studies: I have personally reviewed pertinent reports. and I have personally reviewed pertinent films in PACS  CT lung screening 3/26/2022  My interpretation:  Stable lung nodule     Radiology findings:  LUNGS:  No suspicious pulmonary nodules. A 2 mm right apical nodule described on the prior study currently seen on #3/15 is unchanged and therefore benign. Mild unchanged left upper lobe scarring  PLEURA:  Unremarkable. HEART/GREAT VESSELS: Heart is unremarkable for patient's age.  No thoracic aortic aneurysm. Coronary artery calcifications. MEDIASTINUM AND LIZABETH:  Unremarkable. CHEST WALL AND LOWER NECK:   Unremarkable    Pulmonary Function Testing: I have personally reviewed pertinent reports. and I have personally reviewed pertinent films in PACS   9/10/2018  Mild obstructive lung disease with borderline bronchodilator response with FEV1    Aydin Martinez.  Martha Holm, [address] [place] [address]

## 2023-08-18 ENCOUNTER — PATIENT OUTREACH (OUTPATIENT)
Dept: HEMATOLOGY ONCOLOGY | Facility: CLINIC | Age: 68
End: 2023-08-18

## 2023-08-18 ENCOUNTER — DOCUMENTATION (OUTPATIENT)
Dept: HEMATOLOGY ONCOLOGY | Facility: CLINIC | Age: 68
End: 2023-08-18

## 2023-08-18 ENCOUNTER — TELEPHONE (OUTPATIENT)
Dept: SLEEP CENTER | Facility: CLINIC | Age: 68
End: 2023-08-18

## 2023-08-18 NOTE — PROGRESS NOTES
Called patient and let him know that [address] [place] [address] stated that they do not have pathology on his prostate bx. Patient stated that Dr Yonny Maloney did this bx and the resulted lab was [facility]. I will call and get records.   Hanna Randall #[id number]

## 2023-08-18 NOTE — TELEPHONE ENCOUNTER
----- Message from Erika Hamlin MD sent at 8/18/2023 11:51 AM EDT -----  Approved    ----- Message -----  From: Davidson Lagunas  Sent: 8/18/2023   9:34 AM EDT  To: Sleep Medicine [facility] Provider    This Home sleep study needs approval.     If approved please sign and return to clerical pool. If denied please include reasons why. Also provide alternative testing if warranted. Please sign and return to clerical pool.

## 2023-08-18 NOTE — PROGRESS NOTES
Outreach made to patient to make him aware that per Dr Junie Jackson reviewed his chart and his appt is appropriate. I reminded him of appt day, time, and location. I asked him to return my call with any questions or concerns.

## 2023-08-18 NOTE — PROGRESS NOTES
Intake received, chart reviewed for need of external records.     Pathology requested:   From [facility] via fax [phone], phone [phone] requested:  From [facility] via fax [phone], phone [phone]    Routed to consulting providers team.

## 2023-08-18 NOTE — PROGRESS NOTES
Rebel Mccollum, requested assistance to fax request for path slides/ images, request faxed as noted below.     Pathology requested:   -From [facility] via fax [phone], phone [phone]  - From [address] [place] [address] [phone]     Images requested:  From [facility] via fax [phone], phone [phone]

## 2023-08-25 ENCOUNTER — DOCUMENTATION (OUTPATIENT)
Dept: HEMATOLOGY ONCOLOGY | Facility: CLINIC | Age: 68
End: 2023-08-25

## 2023-08-25 ENCOUNTER — PATIENT OUTREACH (OUTPATIENT)
Dept: HEMATOLOGY ONCOLOGY | Facility: CLINIC | Age: 68
End: 2023-08-25

## 2023-08-25 DIAGNOSIS — C61 MALIGNANT NEOPLASM OF PROSTATE (HCC): Primary | ICD-10-CM

## 2023-08-25 NOTE — PROGRESS NOTES
[address]  [facility] Dept @ [phone]. I LM asking for an update in a disks request I sent on 8/18/23. I left my direct line for a call back.

## 2023-08-25 NOTE — PROGRESS NOTES
[address] [place] [address] Dr imaging Dept called back and stated they only have US of lower extremity on this patient. After reviewing further looks like like patient also has imaging at [facility]. Intake received, chart reviewed for need of external records.     Images requested:  From [facility] via fax [phone], phone [phone]    Routed to consulting providers team.

## 2023-08-25 NOTE — PROGRESS NOTES
Radiation completed with [facility] 5/2022. Post radiation PSAs  0.8  0.2  0.4  Highest PSA 2.5 at time of diagnosis  Sees Urology at [facility]. Call to pt and introduced myself as Nurse Navigator and explained my role in his care with coordinating appts, being a point of contact, providing support and connecting him with available resources as needed. General assessment completed and evaluated for any barriers to care. Referral to Oncology Social Work placed. Answered questions to pt's satisfaction. Reviewed upcoming appts. Provided support and encouraged to call with any questions or concerns.     Future Appointments   Date Time Provider [address]   9/6/2023 11:20 AM Heaven Middleton MD HEM ONC ALL Practice-Onc   9/11/2023 10:00 AM QU HOME STUDY ROOM QU Sleep lab [address] [place] [address]

## 2023-08-25 NOTE — PROGRESS NOTES
Records received from outside facility. Outside Pathology/Images records received from :     Records sent to Pathology attention Faustina Black. Note routed to team.      Pathology report was also scanned into his chart.

## 2023-08-28 ENCOUNTER — TELEPHONE (OUTPATIENT)
Dept: SLEEP CENTER | Facility: CLINIC | Age: 68
End: 2023-08-28

## 2023-08-28 ENCOUNTER — PATIENT OUTREACH (OUTPATIENT)
Dept: CASE MANAGEMENT | Facility: HOSPITAL | Age: 68
End: 2023-08-28

## 2023-08-28 NOTE — PROGRESS NOTES
[facility] received referral for patient. Patient has scheduled consult with Dr. Wan Gastelum on 9/6/23. LSW will follow.

## 2023-08-29 ENCOUNTER — TELEPHONE (OUTPATIENT)
Dept: HEMATOLOGY ONCOLOGY | Facility: CLINIC | Age: 68
End: 2023-08-29

## 2023-08-29 ENCOUNTER — LAB REQUISITION (OUTPATIENT)
Dept: LAB | Facility: HOSPITAL | Age: 68
End: 2023-08-29
Payer: COMMERCIAL

## 2023-08-29 DIAGNOSIS — C61 MALIGNANT NEOPLASM OF PROSTATE (HCC): ICD-10-CM

## 2023-08-29 PROCEDURE — 88321 CONSLTJ&REPRT SLD PREP ELSWR: CPT | Performed by: PATHOLOGY

## 2023-08-29 NOTE — TELEPHONE ENCOUNTER
I called Naomi Wyman in response to a referral that was received for patient to establish care with [facility]. Outreach was made to schedule a consultation. Patient's voicemail is full and unable to accept messages at this time. The referral has been closed.      [employer] sent

## 2023-08-31 ENCOUNTER — OFFICE VISIT (OUTPATIENT)
Dept: FAMILY MEDICINE CLINIC | Facility: HOSPITAL | Age: 68
End: 2023-08-31
Payer: COMMERCIAL

## 2023-08-31 VITALS
HEART RATE: 68 BPM | SYSTOLIC BLOOD PRESSURE: 134 MMHG | WEIGHT: 276 LBS | BODY MASS INDEX: 38.64 KG/M2 | DIASTOLIC BLOOD PRESSURE: 84 MMHG | TEMPERATURE: 97.3 F | HEIGHT: 71 IN

## 2023-08-31 DIAGNOSIS — N40.1 BENIGN PROSTATIC HYPERPLASIA WITH NOCTURIA: ICD-10-CM

## 2023-08-31 DIAGNOSIS — B20 HIV DISEASE (HCC): ICD-10-CM

## 2023-08-31 DIAGNOSIS — M54.50 ACUTE RIGHT-SIDED LOW BACK PAIN WITHOUT SCIATICA: Primary | ICD-10-CM

## 2023-08-31 DIAGNOSIS — R35.1 BENIGN PROSTATIC HYPERPLASIA WITH NOCTURIA: ICD-10-CM

## 2023-08-31 DIAGNOSIS — J44.9 CHRONIC OBSTRUCTIVE PULMONARY DISEASE, UNSPECIFIED COPD TYPE (HCC): ICD-10-CM

## 2023-08-31 DIAGNOSIS — E66.01 OBESITY, MORBID (HCC): ICD-10-CM

## 2023-08-31 LAB
SL AMB  POCT GLUCOSE, UA: NORMAL
SL AMB LEUKOCYTE ESTERASE,UA: NORMAL
SL AMB POCT BILIRUBIN,UA: NORMAL
SL AMB POCT BLOOD,UA: NORMAL
SL AMB POCT CLARITY,UA: CLEAR
SL AMB POCT COLOR,UA: YELLOW
SL AMB POCT KETONES,UA: NORMAL
SL AMB POCT NITRITE,UA: NORMAL
SL AMB POCT PH,UA: 6
SL AMB POCT SPECIFIC GRAVITY,UA: 1.01
SL AMB POCT URINE PROTEIN: NORMAL
SL AMB POCT UROBILINOGEN: NORMAL

## 2023-08-31 PROCEDURE — 99214 OFFICE O/P EST MOD 30 MIN: CPT | Performed by: INTERNAL MEDICINE

## 2023-08-31 PROCEDURE — 81002 URINALYSIS NONAUTO W/O SCOPE: CPT | Performed by: INTERNAL MEDICINE

## 2023-08-31 RX ORDER — TAMSULOSIN HYDROCHLORIDE 0.4 MG/1
0.4 CAPSULE ORAL 2 TIMES DAILY
Start: 2023-08-31

## 2023-08-31 RX ORDER — CYCLOBENZAPRINE HCL 5 MG
5 TABLET ORAL 3 TIMES DAILY PRN
Qty: 30 TABLET | Refills: 0 | Status: SHIPPED | OUTPATIENT
Start: 2023-08-31

## 2023-08-31 NOTE — PROGRESS NOTES
Name: India Muhammad      : [id number]      MRN: [id number]  Encounter Provider: Celeste Schultz DO  Encounter Date: 2023   Encounter department: [address]     1. Acute right-sided low back pain without sciatica  Comments:  Reassured pt that pain not c/w UTI and no other  symptoms present, UA w/o blood which would make me think kidney stones, No GI symptoms present, symptoms and location more muscular, pt deferring PT today but will consider if not better, urged heat/massage/stretches as well as OTC Tylenol or NSAID sparingly, rx for Flexeril sent, SE including sedation reviewed - urged no driving/operating heavy machinery until he knows how the med will affect him, call if no better in 2-3 wks (would recommend imaging and PT at that time) OR with any new/worse symptoms, red flag cauda equina symptoms reviewed as well  Orders:  -     cyclobenzaprine (FLEXERIL) 5 mg tablet; Take 1 tablet (5 mg total) by mouth 3 (three) times a day as needed for muscle spasms  -     CBC  -     Comprehensive metabolic panel  -     Lipid panel  -     TSH, 3rd generation with Free T4 reflex  -     Vitamin D 25 hydroxy; Future  -     Vitamin D 25 hydroxy  -     POCT urine dip    2. Benign prostatic hyperplasia with nocturia  Comments:  Now on Flomax bid, med list updated today  Orders:  -     tamsulosin (FLOMAX) 0.4 mg; Take 1 capsule (0.4 mg total) by mouth 2 (two) times a day  -     CBC  -     Comprehensive metabolic panel  -     Lipid panel  -     TSH, 3rd generation with Free T4 reflex  -     Vitamin D 25 hydroxy; Future  -     Vitamin D 25 hydroxy    3. HIV disease ([address])  Comments: Following with ID, on Biktarvy, con't meds/labs and f/u as per specialist  Orders:  -     CBC  -     Comprehensive metabolic panel  -     Lipid panel  -     TSH, 3rd generation with Free T4 reflex  -     Vitamin D 25 hydroxy; Future  -     Vitamin D 25 hydroxy    4.  Chronic obstructive pulmonary disease, unspecified COPD type ([address])  Comments:  Pt denies he  has COPD - states  he has asthma, will review chart and update accordingly  Orders:  -     CBC  -     Comprehensive metabolic panel  -     Lipid panel  -     TSH, 3rd generation with Free T4 reflex  -     Vitamin D 25 hydroxy; Future  -     Vitamin D 25 hydroxy    5. Obesity, morbid ([address])  Comments:  Due for fasting labs - order given, will follow  Orders:  -     CBC  -     Comprehensive metabolic panel  -     Lipid panel  -     TSH, 3rd generation with Free T4 reflex  -     Vitamin D 25 hydroxy; Future  -     Vitamin D 25 hydroxy        Colonoscopy 6/21 - 7 yrs    BW 2/22      Subjective      HPI Pt here for an acute visit    Pt with R low back pain for approx 2 wks. He is not aware of new activity/lifting/trauma. Pain gradually increased and Fri/Sat/Sun last week pain increased and started to go the RLQ. He had no upper back/kidney/flank pain. He states he had similar pain L lower abd that has been present for years and is not new/worse. He notes some burning with urination but that has been present since his radiation tx for his prostate cancer ended May 23. He notes no blood in the urine/F/C. He has some intermittent urinary incontinence but that is not new/worse. He denies diarrhea/blood in the stool/V. He has had some nausea intermittently. He notes the R LBP does not radiate into buttock or down RLE. He notes no numbness/tingling/weakness/saddle anesthesia/bowel incontinence. Review of Systems   Constitutional: Negative for chills and fever. Eyes: Negative for visual disturbance. Respiratory: Negative for cough and shortness of breath. Cardiovascular: Negative for chest pain and palpitations. Gastrointestinal: Positive for abdominal pain and nausea. Negative for blood in stool, constipation, diarrhea and vomiting. Genitourinary: Positive for dysuria. Negative for flank pain and hematuria.    Musculoskeletal: Positive for back pain. Negative for gait problem. Neurological: Positive for headaches. Negative for numbness. Current Outpatient Medications on File Prior to Visit   Medication Sig   • albuterol (PROVENTIL HFA,VENTOLIN HFA) 90 mcg/act inhaler USE 2 INHALATIONS BY MOUTH  EVERY 6 HOURS AS NEEDED FOR WHEEZING OR SHORTNESS OF  BREATH   • atorvastatin (LIPITOR) 10 mg tablet TAKE 1 TABLET BY MOUTH  DAILY   • bictegravir-emtricitab-tenofovir alafenamide (Biktarvy) -25 MG tablet Take 1 tablet by mouth daily with breakfast   • fluticasone (Flovent HFA) 220 mcg/act inhaler    • Fluticasone-Salmeterol (Wixela Inhub) 250-50 mcg/dose inhaler Inhale 1 puff 2 (two) times a day Rinse mouth after use. • lisinopril (ZESTRIL) 5 mg tablet TAKE 1 TABLET BY MOUTH  DAILY   • [DISCONTINUED] predniSONE 10 mg tablet 4 tabs x 3 days, 3 tabs x 3 days 2 tabs x 3 days 1 tab x 3 days, 1/2 tab x 3 days then stop (Patient not taking: Reported on 8/17/2023)   • [DISCONTINUED] tamsulosin (FLOMAX) 0.4 mg TAKE 1 CAPSULE BY MOUTH  DAILY WITH DINNER (Patient taking differently: 0.4 mg 2 (two) times a day)       Objective     /84   Pulse 68   Temp (!) 97.3 °F (36.3 °C) (Tympanic)   Ht 5' 11" (1.803 m)   Wt 125 kg (276 lb)   BMI 38.49 kg/m²     Physical Exam  Vitals and nursing note reviewed. Constitutional:       General: He is not in acute distress. Appearance: He is well-developed. He is not ill-appearing. HENT:      Head: Normocephalic and atraumatic. Eyes:      General:         Right eye: No discharge. Left eye: No discharge. Conjunctiva/sclera: Conjunctivae normal.   Neck:      Trachea: No tracheal deviation. Cardiovascular:      Rate and Rhythm: Normal rate and regular rhythm. Heart sounds: No murmur heard. Pulmonary:      Effort: Pulmonary effort is normal. No respiratory distress. Breath sounds: Normal breath sounds. No wheezing, rhonchi or rales.    Abdominal:      General: There is no distension. Palpations: Abdomen is soft. Tenderness: There is no abdominal tenderness. There is no right CVA tenderness, left CVA tenderness, guarding or rebound. Musculoskeletal:         General: No tenderness, deformity or signs of injury. Cervical back: Neck supple. Comments: No pain with palp of spinous processes of lower thoracic/upper lumbar spine, no pain with palp of R paraspinal musculature (where pt points pain is)   Lymphadenopathy:      Cervical: No cervical adenopathy. Skin:     General: Skin is warm and dry. Coloration: Skin is not pale. Findings: No rash. Neurological:      General: No focal deficit present. Mental Status: He is alert. Mental status is at baseline. Sensory: No sensory deficit. Motor: No weakness or abnormal muscle tone. Gait: Gait normal.      Deep Tendon Reflexes: Reflexes normal.      Comments: 5/5 B/L LE muscle strength, 2/4 patellar DTR B/L LE, nml sensation to light touch B/L LE, neg SLR test B/L   Psychiatric:         Behavior: Behavior normal.         Thought Content:  Thought content normal.         Judgment: Judgment normal.       Jaswant Moses,

## 2023-09-01 ENCOUNTER — TELEPHONE (OUTPATIENT)
Dept: FAMILY MEDICINE CLINIC | Facility: HOSPITAL | Age: 68
End: 2023-09-01

## 2023-09-01 NOTE — TELEPHONE ENCOUNTER
Jonathan Andrade said the pharmacy said the flexerill needs prior auth - he is asking if there is another medication that he could take that wouldn't need prior- authorization - he said he needs the medication I told him it could take 4 days to get response

## 2023-09-06 ENCOUNTER — CONSULT (OUTPATIENT)
Dept: HEMATOLOGY ONCOLOGY | Facility: CLINIC | Age: 68
End: 2023-09-06
Payer: COMMERCIAL

## 2023-09-06 VITALS
SYSTOLIC BLOOD PRESSURE: 128 MMHG | OXYGEN SATURATION: 97 % | HEART RATE: 84 BPM | HEIGHT: 71 IN | WEIGHT: 274 LBS | TEMPERATURE: 97.1 F | DIASTOLIC BLOOD PRESSURE: 64 MMHG | BODY MASS INDEX: 38.36 KG/M2 | RESPIRATION RATE: 18 BRPM

## 2023-09-06 DIAGNOSIS — C61 MALIGNANT NEOPLASM OF PROSTATE (HCC): Primary | ICD-10-CM

## 2023-09-06 DIAGNOSIS — Z86.39 HISTORY OF HYPERLIPIDEMIA: ICD-10-CM

## 2023-09-06 DIAGNOSIS — Z87.09 HISTORY OF ASTHMA: ICD-10-CM

## 2023-09-06 DIAGNOSIS — B20 HIV DISEASE (HCC): ICD-10-CM

## 2023-09-06 DIAGNOSIS — Z86.79 HISTORY OF HYPERTENSION: ICD-10-CM

## 2023-09-06 PROCEDURE — 99204 OFFICE O/P NEW MOD 45 MIN: CPT | Performed by: INTERNAL MEDICINE

## 2023-09-06 NOTE — PROGRESS NOTES
Consultation - Medical Oncology   Magdalena Prescott 70 y.o. male MRN: [id number]  Unit/Bed#:  Encounter: [id number]  To be completed    Reason for Consult:   HPI: Magdalena Prescott is a 70y.o. 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[address])    • Pneumonia 2022    clear   • Seasonal allergies    • Slow urinary stream    • Wheezing      Past Surgical History:   Procedure Laterality Date   • ABSCESS DRAINAGE     •  [address] INCL FLUOR GDNCE DX W/CELL WASHG SPX N/A 2018    Procedure: BRONCHOSCOPY FLEXIBLE;  Surgeon: Beverly Dennis DO;  Location: [facility] OR;  Service: Pulmonary   • SKIN LESION EXCISION      Excision of lesion in vestibule of mouth;  Last Assessed: 2016     Social History   Social History     Substance and Sexual Activity   Alcohol Use Yes    Comment: socially / 1-4 drinks/week     Social History     Substance and Sexual Activity   Drug Use No     Social History     Tobacco Use   Smoking Status Former   • Packs/day: 1.00   • Years: 20.00   • Total pack years: 20.00   • Types: Cigarettes   • Start date: 1968   • Quit date: 1993   • Years since quittin.6   • Passive exposure: Past   Smokeless Tobacco Never     Family History:   Family History   Problem Relation Age of Onset   • Hypertension Mother    • Glaucoma Mother    • Cancer Father         Prostate   • Diabetes Maternal Uncle    • Glaucoma Maternal Uncle    • Substance Abuse Neg Hx neg fam hx   • Mental illness Neg Hx         neg fam hx         Current Outpatient Medications:   •  albuterol (PROVENTIL HFA,VENTOLIN HFA) 90 mcg/act inhaler, USE 2 INHALATIONS BY MOUTH  EVERY 6 HOURS AS NEEDED FOR WHEEZING OR SHORTNESS OF  BREATH, Disp: 34 g, Rfl: 1  •  atorvastatin (LIPITOR) 10 mg tablet, TAKE 1 TABLET BY MOUTH  DAILY, Disp: 90 tablet, Rfl: 3  •  cyclobenzaprine (FLEXERIL) 5 mg tablet, Take 1 tablet (5 mg total) by mouth 3 (three) times a day as needed for muscle spasms, Disp: 30 tablet, Rfl: 0  •  fluticasone (Flovent HFA) 220 mcg/act inhaler, , Disp: , Rfl:   •  lisinopril (ZESTRIL) 5 mg tablet, TAKE 1 TABLET BY MOUTH  DAILY, Disp: 90 tablet, Rfl: 3  •  tamsulosin (FLOMAX) 0.4 mg, Take 1 capsule (0.4 mg total) by mouth 2 (two) times a day, Disp: , Rfl:   •  bictegravir-emtricitab-tenofovir alafenamide (Biktarvy) -25 MG tablet, Take 1 tablet by mouth daily with breakfast, Disp: 90 tablet, Rfl: 1  •  Fluticasone-Salmeterol (Wixela Inhub) 250-50 mcg/dose inhaler, Inhale 1 puff 2 (two) times a day Rinse mouth after use., Disp: 180 blister, Rfl: 1    No Known Allergies  @ ROS@  Physical Exam:  Vitals:    09/06/23 1127   BP: 128/64   BP Location: Left arm   Patient Position: Sitting   Cuff Size: Adult   Pulse: 84   Resp: 18   Temp: (!) 97.1 °F (36.2 °C)   TempSrc: Temporal   SpO2: 97%   Weight: 124 kg (274 lb)   Height: 5' 11" (1.803 m)           Lab Results: I have reviewed all pertinent labs.   LABS:  Results for orders placed or performed in visit on 08/31/23   POCT urine dip   Result Value Ref Range    LEUKOCYTE ESTERASE,UA neg     NITRITE,UA neg     SL AMB POCT UROBILINOGEN neg     POCT URINE PROTEIN neg      PH,UA 6.0     BLOOD,UA neg     SPECIFIC GRAVITY,UA 1.015     KETONES,UA neg     BILIRUBIN,UA neg     GLUCOSE, UA neg      COLOR,UA yellow     CLARITY,UA clear      Case Report   Surgical Pathology Report                         Case: [id number]                                 Authorizing Provider: Mahesh Valles MD        Collected:           08/29/2023 0733               Ordering Location:     [facility]      Received:            08/29/2023 0734                                      [facility]                                                                                   Laboratory                                                                    Pathologist:           Kelsey Hardy MD                                                                  Specimen:    Prostate, Prostate Biopsy (3 slides [id number] [facility], collected 2/14/2022)         Final Diagnosis   Prostate Biopsy (3 slides [id number] Lipocalyx, collected 2/14/2022):     Specimen 4 Left apex:  - Prostatic adenocarcinoma, acinar type, Shahab score 3 + 4 = 7, Prognostic Grade Group 2, continuously involving 1 of 1 cores (100%). - Percentage of [place] pattern 4: 30%  - Perineural invasion: Not identified     Specimen 10 Left lateral apex:  - Prostatic adenocarcinoma, acinar type, Shahab score 3 + 4 = 7, Prognostic Grade Group 2, continuously involving 3 of 3 cores (80% of total tissue). - Percentage of [place] pattern 4: 20%  - Perineural invasion: Not identified     Specimen 11 Left lateral mid:  - Prostatic adenocarcinoma, acinar type, [place] score 3 + 4 = 7, Prognostic Grade Group 2, continuously involving 2 of 2 cores (90% of total tissue). - Percentage of [place] pattern 4: 10%  - Perineural invasion: Not identified   Electronically signed by Teofilo Garrido MD on 8/29/2023 at 10:07 AM   Additional Information          Imaging Studies: I have personally reviewed pertinent reports. IMPRESSION:     1. PI-RADSv2.1 Category 4 -High (clinically significant cancer is likely to be present). Lesion in the lateral left peripheral zone at apex.     2.  No extraprostatic tumor, seminal vesicle invasion, pelvic lymphadenopathy, or pelvic osseous metastatic disease.     3. Calculated prostate volume of 36 cc.     Prostate gland boundaries and areas of concern for significant prostate cancer were segmented using 3D advanced post-processing on an independent [employer] system workstation with active physician participation. The segmentation was performed should   MR-ultrasound fusion biopsy be required.     The study was marked in [facility] for significant notification.     Workstation performed: [id number]        Imaging    MRI prostate multiparametric wo w contrast (Order: [id number]) - 12/9/2021    Pathology, and Other Studies: I have personally reviewed pertinent reports. Assessment and Plan:  See diagnoses, orders instructions below  Blood work anytime. PSA every 2 months. Visit in 3 months. He will make an appointment for oncology genetic. Patient will continue to follow with  primary physician and other consultants. Patient voiced understanding and agrees  1. Malignant neoplasm of prostate (HCC)    - CBC and differential; Future  - Comprehensive metabolic panel; Future  - Testosterone; Future  - PSA Total, Diagnostic; Standing  - PSA Total, Diagnostic  - CBC and differential  - Comprehensive metabolic panel  - Testosterone    2. HIV disease ([address])      3. History of hypertension      4. History of hyperlipidemia      5. History of asthma        Disclaimer: This document was prepared using a dictation device. If a word or phrase is confusing, or does not make sense, this is likely due to recognition error which was not discovered during the providers review. If you believe an error has occurred, please Contact me through [employer] service for patrick? cation. Counseling / Coordination of Care  . Hilaria Piña   Provided counseling and support for significant notification.     Workstation performed: [id number]        Imaging    MRI prostate multiparametric wo w contrast (Order: [id number]) - 12/9/2021    Pathology, and Other Studies: I have personally reviewed pertinent reports. See above    Assessment and Plan:  See diagnoses, orders instructions below   In 2022 patient was diagnosed to have cancer of the prostate. [place] score was 3+4=7. PSA was 2.5. Patient received radiation treatments 30-35 and he completed them in May 2022. No therapy for cancer since then. Patient follows with his urologist in [place]. He states his PSA came down to 0.8 and then 0.2 and has increased to 0.4 and is staying at 0.4. He states his urine flow is low. He has been on Flomax. History of hypertension, high cholesterol, asthma and HIV. He is on medications for these conditions. He follows with his primary physician, urologist and other consultants. Patient has quit smoking cigarettes. He drinks beer but not daily. Physical examination and test results are as recorded and discussed in detail. Discussed patient characteristics. Discussed cancer characteristics. Time to reevaluate prostate cancer disease status. See diagnoses, orders and instructions. Discussed healthy diet, protein supplements, activities as tolerated and health screening test.  Patient is capable of self-care. Goal is to find out more about status of prostrate cancer. All discussed in detail. Questions answered. 1. Malignant neoplasm of prostate (HCC)    - CBC and differential; Future  - Comprehensive metabolic panel; Future  - Testosterone; Future  - PSA Total, Diagnostic; Standing  - PSA Total, Diagnostic  - CBC and differential  - Comprehensive metabolic panel  - Testosterone    2. HIV disease ([address])      3. History of hypertension      4. History of hyperlipidemia      5. History of asthma    Blood work anytime. PSA every 2 months. Visit in 3 months.   He will make an appointment for oncology genetic. Patient will continue to follow with  primary physician and other consultants. Patient voiced understanding and agrees    Disclaimer: This document was prepared using a dictation device. If a word or phrase is confusing, or does not make sense, this is likely due to recognition error which was not discovered during the providers review. If you believe an error has occurred, please Contact me through [employer] service for patrick? cation. Counseling / Coordination of Care  . Jihan Syed   Provided counseling and support

## 2023-09-06 NOTE — PATIENT INSTRUCTIONS
Blood work anytime. PSA every 2 months. Visit in 3 months. He will make an appointment for oncology genetic.

## 2023-09-08 ENCOUNTER — PATIENT OUTREACH (OUTPATIENT)
Dept: CASE MANAGEMENT | Facility: HOSPITAL | Age: 68
End: 2023-09-08

## 2023-09-08 NOTE — PROGRESS NOTES
OSW completed chart review. Patient had consult with Dr. Darcy Al. LSW will outreach for assessment and DT.

## 2023-09-11 ENCOUNTER — PATIENT OUTREACH (OUTPATIENT)
Dept: CASE MANAGEMENT | Facility: HOSPITAL | Age: 68
End: 2023-09-11

## 2023-09-11 ENCOUNTER — HOSPITAL ENCOUNTER (OUTPATIENT)
Dept: SLEEP CENTER | Facility: HOSPITAL | Age: 68
Discharge: HOME | End: 2023-09-11
Attending: INTERNAL MEDICINE
Payer: COMMERCIAL

## 2023-09-11 DIAGNOSIS — G47.33 OSA (OBSTRUCTIVE SLEEP APNEA): ICD-10-CM

## 2023-09-11 PROCEDURE — G0399 HOME SLEEP TEST/TYPE 3 PORTA: HCPCS

## 2023-09-11 NOTE — PROGRESS NOTES
OSW placed call to patient to complete assessment and DT. Patient not interested in completing assessment and DT, stating that he isn't going to do treatment and he feels fine.      LSW will close per patient request.

## 2023-09-13 DIAGNOSIS — E55.9 VITAMIN D INSUFFICIENCY: Primary | ICD-10-CM

## 2023-09-13 LAB
25(OH)D3+25(OH)D2 SERPL-MCNC: 30.6 NG/ML (ref 30–100)
ALBUMIN SERPL-MCNC: 4.4 G/DL (ref 3.9–4.9)
ALBUMIN SERPL-MCNC: 4.5 G/DL (ref 3.9–4.9)
ALBUMIN/GLOB SERPL: 1.8 {RATIO} (ref 1.2–2.2)
ALBUMIN/GLOB SERPL: 1.8 {RATIO} (ref 1.2–2.2)
ALP SERPL-CCNC: 56 IU/L (ref 44–121)
ALP SERPL-CCNC: 57 IU/L (ref 44–121)
ALT SERPL-CCNC: 26 IU/L (ref 0–44)
ALT SERPL-CCNC: 27 IU/L (ref 0–44)
AST SERPL-CCNC: 29 IU/L (ref 0–40)
AST SERPL-CCNC: 30 IU/L (ref 0–40)
BASOPHILS # BLD AUTO: 0 X10E3/UL (ref 0–0.2)
BASOPHILS NFR BLD AUTO: 1 %
BILIRUB SERPL-MCNC: 0.5 MG/DL (ref 0–1.2)
BILIRUB SERPL-MCNC: 0.5 MG/DL (ref 0–1.2)
BUN SERPL-MCNC: 14 MG/DL (ref 8–27)
BUN SERPL-MCNC: 14 MG/DL (ref 8–27)
BUN/CREAT SERPL: 15 (ref 10–24)
BUN/CREAT SERPL: 16 (ref 10–24)
CALCIUM SERPL-MCNC: 9.2 MG/DL (ref 8.6–10.2)
CALCIUM SERPL-MCNC: 9.2 MG/DL (ref 8.6–10.2)
CHLORIDE SERPL-SCNC: 102 MMOL/L (ref 96–106)
CHLORIDE SERPL-SCNC: 103 MMOL/L (ref 96–106)
CHOLEST SERPL-MCNC: 140 MG/DL (ref 100–199)
CHOLEST/HDLC SERPL: 2.4 RATIO (ref 0–5)
CO2 SERPL-SCNC: 22 MMOL/L (ref 20–29)
CO2 SERPL-SCNC: 23 MMOL/L (ref 20–29)
CREAT SERPL-MCNC: 0.86 MG/DL (ref 0.76–1.27)
CREAT SERPL-MCNC: 0.91 MG/DL (ref 0.76–1.27)
EGFR: 91 ML/MIN/1.73
EGFR: 94 ML/MIN/1.73
EOSINOPHIL # BLD AUTO: 0.2 X10E3/UL (ref 0–0.4)
EOSINOPHIL NFR BLD AUTO: 3 %
ERYTHROCYTE [DISTWIDTH] IN BLOOD BY AUTOMATED COUNT: 11.9 % (ref 11.6–15.4)
ERYTHROCYTE [DISTWIDTH] IN BLOOD BY AUTOMATED COUNT: 11.9 % (ref 11.6–15.4)
GLOBULIN SER-MCNC: 2.4 G/DL (ref 1.5–4.5)
GLOBULIN SER-MCNC: 2.5 G/DL (ref 1.5–4.5)
GLUCOSE SERPL-MCNC: 97 MG/DL (ref 70–99)
GLUCOSE SERPL-MCNC: 99 MG/DL (ref 70–99)
HCT VFR BLD AUTO: 42.3 % (ref 37.5–51)
HCT VFR BLD AUTO: 42.8 % (ref 37.5–51)
HDLC SERPL-MCNC: 59 MG/DL
HGB BLD-MCNC: 13.8 G/DL (ref 13–17.7)
HGB BLD-MCNC: 14 G/DL (ref 13–17.7)
IMM GRANULOCYTES # BLD: 0 X10E3/UL (ref 0–0.1)
IMM GRANULOCYTES NFR BLD: 0 %
LDLC SERPL CALC-MCNC: 67 MG/DL (ref 0–99)
LYMPHOCYTES # BLD AUTO: 1.5 X10E3/UL (ref 0.7–3.1)
LYMPHOCYTES NFR BLD AUTO: 27 %
MCH RBC QN AUTO: 30.8 PG (ref 26.6–33)
MCH RBC QN AUTO: 30.9 PG (ref 26.6–33)
MCHC RBC AUTO-ENTMCNC: 32.6 G/DL (ref 31.5–35.7)
MCHC RBC AUTO-ENTMCNC: 32.7 G/DL (ref 31.5–35.7)
MCV RBC AUTO: 94 FL (ref 79–97)
MCV RBC AUTO: 95 FL (ref 79–97)
MONOCYTES # BLD AUTO: 0.4 X10E3/UL (ref 0.1–0.9)
MONOCYTES NFR BLD AUTO: 8 %
NEUTROPHILS # BLD AUTO: 3.4 X10E3/UL (ref 1.4–7)
NEUTROPHILS NFR BLD AUTO: 61 %
PLATELET # BLD AUTO: 257 X10E3/UL (ref 150–450)
PLATELET # BLD AUTO: 258 X10E3/UL (ref 150–450)
POTASSIUM SERPL-SCNC: 4.6 MMOL/L (ref 3.5–5.2)
POTASSIUM SERPL-SCNC: 4.6 MMOL/L (ref 3.5–5.2)
PROT SERPL-MCNC: 6.8 G/DL (ref 6–8.5)
PROT SERPL-MCNC: 7 G/DL (ref 6–8.5)
PSA SERPL-MCNC: 0.3 NG/ML (ref 0–4)
RBC # BLD AUTO: 4.46 X10E6/UL (ref 4.14–5.8)
RBC # BLD AUTO: 4.55 X10E6/UL (ref 4.14–5.8)
SL AMB VLDL CHOLESTEROL CALC: 14 MG/DL (ref 5–40)
SODIUM SERPL-SCNC: 139 MMOL/L (ref 134–144)
SODIUM SERPL-SCNC: 140 MMOL/L (ref 134–144)
TESTOST SERPL-MCNC: 325 NG/DL (ref 264–916)
TRIGL SERPL-MCNC: 72 MG/DL (ref 0–149)
TSH SERPL DL<=0.005 MIU/L-ACNC: 2.14 UIU/ML (ref 0.45–4.5)
WBC # BLD AUTO: 5.5 X10E3/UL (ref 3.4–10.8)
WBC # BLD AUTO: 5.5 X10E3/UL (ref 3.4–10.8)

## 2023-09-13 RX ORDER — MELATONIN
1000 DAILY
Start: 2023-09-13

## 2023-09-13 NOTE — PROGRESS NOTES
Home Sleep Study Documentation    HOME STUDY DEVICE: Noxturnal no                                           Suyapa G3 yes      Pre-Sleep Home Study:    Set-up and instructions performed by: Irma Gomez performed demonstration for Patient: yes    Return demonstration performed by Patient: yes    Written instructions provided to Patient: yes    Patient signed consent form: yes        Post-Sleep Home Study:    Additional comments by Patient: None    Home Sleep Study Failed:no:    Failure reason: N/A    Reported or Detected: N/A    Scored by: Lindsey Meyers

## 2023-09-15 DIAGNOSIS — G47.33 OSA (OBSTRUCTIVE SLEEP APNEA): Primary | ICD-10-CM

## 2023-09-15 PROCEDURE — 95806 SLEEP STUDY UNATT&RESP EFFT: CPT | Performed by: INTERNAL MEDICINE

## 2023-09-21 ENCOUNTER — TELEPHONE (OUTPATIENT)
Dept: SLEEP CENTER | Facility: CLINIC | Age: 68
End: 2023-09-21

## 2023-09-21 NOTE — TELEPHONE ENCOUNTER
Patient of Dr. Ida Godoy at [facility] sleep study shows mild ZAIRA and Dr. Nory Haddad ordered APAP     Left call back message and a MyChart message for the patient

## 2023-10-04 ENCOUNTER — TELEPHONE (OUTPATIENT)
Dept: PULMONOLOGY | Facility: CLINIC | Age: 68
End: 2023-10-04

## 2023-10-04 NOTE — TELEPHONE ENCOUNTER
I called and Left Bill a message to call me back regarding a Cpap order that was ordered for him. Please transfer call to me when patient calls back. Thank you.

## 2023-10-05 NOTE — TELEPHONE ENCOUNTER
Mr. Jaky Clark called me back and he has declined Cpap Therapy. He does not want me to process his order. He stated he is doing fine and does not want to use a Cpap. I asked him if he wanted me to schedule him to see Dr. Diana Santoyo to discuss be he also declined and stated he will wait for his yearly follow up. He is currently doing fine.

## 2023-11-08 ENCOUNTER — PATIENT MESSAGE (OUTPATIENT)
Dept: HEMATOLOGY ONCOLOGY | Facility: CLINIC | Age: 68
End: 2023-11-08

## 2023-11-30 DIAGNOSIS — R35.1 BENIGN PROSTATIC HYPERPLASIA WITH NOCTURIA: ICD-10-CM

## 2023-11-30 DIAGNOSIS — N40.1 BENIGN PROSTATIC HYPERPLASIA WITH NOCTURIA: ICD-10-CM

## 2023-11-30 RX ORDER — TAMSULOSIN HYDROCHLORIDE 0.4 MG/1
0.4 CAPSULE ORAL 2 TIMES DAILY
Qty: 180 CAPSULE | Refills: 0 | Status: SHIPPED | OUTPATIENT
Start: 2023-11-30

## 2023-12-26 ENCOUNTER — OFFICE VISIT (OUTPATIENT)
Dept: HEMATOLOGY ONCOLOGY | Facility: CLINIC | Age: 68
End: 2023-12-26
Payer: COMMERCIAL

## 2023-12-26 ENCOUNTER — DOCUMENTATION (OUTPATIENT)
Dept: GENETICS | Facility: CLINIC | Age: 68
End: 2023-12-26

## 2023-12-26 VITALS
RESPIRATION RATE: 18 BRPM | BODY MASS INDEX: 39.06 KG/M2 | OXYGEN SATURATION: 99 % | TEMPERATURE: 97.6 F | WEIGHT: 279 LBS | SYSTOLIC BLOOD PRESSURE: 134 MMHG | HEART RATE: 66 BPM | DIASTOLIC BLOOD PRESSURE: 66 MMHG | HEIGHT: 71 IN

## 2023-12-26 DIAGNOSIS — C61 MALIGNANT NEOPLASM OF PROSTATE (HCC): Primary | ICD-10-CM

## 2023-12-26 DIAGNOSIS — B20 HIV DISEASE (HCC): ICD-10-CM

## 2023-12-26 DIAGNOSIS — Z86.39 HISTORY OF HYPERLIPIDEMIA: ICD-10-CM

## 2023-12-26 DIAGNOSIS — Z87.09 HISTORY OF ASTHMA: ICD-10-CM

## 2023-12-26 DIAGNOSIS — Z86.79 HISTORY OF HYPERTENSION: ICD-10-CM

## 2023-12-26 PROCEDURE — 99214 OFFICE O/P EST MOD 30 MIN: CPT | Performed by: INTERNAL MEDICINE

## 2023-12-26 NOTE — PATIENT INSTRUCTIONS
Blood work prior to next visit in 6 months.  Referral to oncology genetics and please get patient an appointment with oncology genetic before he leaves the office today.

## 2023-12-26 NOTE — PROGRESS NOTES
Rashad stopped by the  to schedule a new patient appointment with the Cancer Risk and Genetics Program.      Outcome:  Genetics appointment scheduled for 2/19/2024 11am with Maria R.    Personal/Family History Related to Appointment:  Personal history of Prostate Cancer.    History of Genetic Testing:  Patient reports no personal or family history of genetic testing    Genetics Family History Questionnaire:  I confirmed the patient's e-mail on file as the best e-mail to send an invite link for our genetics family history intake

## 2023-12-26 NOTE — PROGRESS NOTES
HPI: Follow-up visit for cancer of the prostate that was diagnosed  in 2022,  Shahab score was 3+4=7.  PSA  2.5.  Patient received radiation treatments 30-35 and he completed radiation in May 2022.  No therapy for cancer since then.  Patient follows with his urologist in [place].  He states his PSA came down to 0.8 and then 0.2 and has increased to 0.4 but has come down to  0.2 and is staying at 0.2.  He states his urine flow is low.  He has been on Flomax.  He wakes up twice at night to pass urine  History of hypertension, high cholesterol, asthma and HIV.  He is on medications for these conditions.  He follows with his primary physician, urologist and other consultants.  Patient has quit smoking cigarettes.  He drinks beer but not daily.    ROS:  12/26/23 Reviewed 12 systems: See symptoms in HPI  No other neurological, cardiac, pulmonary, GI and  symptoms.  Other symptoms are in HPI.  No other symptoms like fevers, chills, bleeding, bone pains, skin rash, weight loss, night sweats, tiredness, weakness, numbness, claudication, arthritic symptoms, skin rash, swelling of the ankles and swollen glands.  Patient is not anxious.      Historical Information   Past Medical History:   Diagnosis Date    Abscess, cheek     Last Assessed: 2/23/2016    Allergic rhinitis     Asthma     Benign prostatic hyperplasia     Chronic headaches     Constipation     COPD (chronic obstructive pulmonary disease) (HCC)     Cough     Difficulty attaining erection     Dyspnea     Enlarged prostate     Hemorrhoids     HIV (human immunodeficiency virus infection) (HCC)     Hypertension     Migraine     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HCC)     Pneumonia Jan 27 2022    clear    Prostate cancer (HCC) March 2022    Seasonal allergies     Slow urinary stream     Wheezing      Past Surgical History:   Procedure Laterality Date    ABSCESS DRAINAGE      COLONOSCOPY  May 5, 2021    precancerous polyps    NC BRNCC INCL FLUOR GDNCE DX W/CELL  WASHG SPX N/A 2018    Procedure: BRONCHOSCOPY FLEXIBLE;  Surgeon: Daksha Naqvi DO;  Location: [facility];  Service: Pulmonary    SKIN LESION EXCISION      Excision of lesion in vestibule of mouth; Last Assessed: 2016     Social History   Social History     Substance and Sexual Activity   Alcohol Use Yes    Comment: socially / 1-4 drinks/week     Social History     Substance and Sexual Activity   Drug Use No     Social History     Tobacco Use   Smoking Status Former    Current packs/day: 0.00    Average packs/day: 1 pack/day for 25.1 years (25.1 ttl pk-yrs)    Types: Cigarettes    Start date: 1968    Quit date: 1993    Years since quittin.9    Passive exposure: Past   Smokeless Tobacco Never   Tobacco Comments    on and off during period of use     Family History:   Family History   Problem Relation Age of Onset    Hypertension Mother     Glaucoma Mother     Cancer Father         Prostate    Prostate cancer Father     Diabetes Maternal Uncle     Glaucoma Maternal Uncle     Substance Abuse Neg Hx         neg fam hx    Mental illness Neg Hx         neg fam hx         Current Outpatient Medications:     albuterol (PROVENTIL HFA,VENTOLIN HFA) 90 mcg/act inhaler, USE 2 INHALATIONS BY MOUTH  EVERY 6 HOURS AS NEEDED FOR WHEEZING OR SHORTNESS OF  BREATH, Disp: 34 g, Rfl: 1    atorvastatin (LIPITOR) 10 mg tablet, TAKE 1 TABLET BY MOUTH  DAILY, Disp: 90 tablet, Rfl: 3    cholecalciferol (VITAMIN D3) 1,000 units tablet, Take 1 tablet (1,000 Units total) by mouth daily, Disp: , Rfl:     cyclobenzaprine (FLEXERIL) 5 mg tablet, Take 1 tablet (5 mg total) by mouth 3 (three) times a day as needed for muscle spasms, Disp: 30 tablet, Rfl: 0    fluticasone (Flovent HFA) 220 mcg/act inhaler, , Disp: , Rfl:     lisinopril (ZESTRIL) 5 mg tablet, TAKE 1 TABLET BY MOUTH  DAILY, Disp: 90 tablet, Rfl: 3    tamsulosin (FLOMAX) 0.4 mg, Take 1 capsule (0.4 mg total) by mouth 2 (two) times a day, Disp: 180 capsule, Rfl:  "0    bictegravir-emtricitab-tenofovir alafenamide (Biktarvy) -25 MG tablet, Take 1 tablet by mouth daily with breakfast, Disp: 90 tablet, Rfl: 1    Fluticasone-Salmeterol (Wixela Inhub) 250-50 mcg/dose inhaler, Inhale 1 puff 2 (two) times a day Rinse mouth after use., Disp: 180 blister, Rfl: 1    No Known Allergies    Physical Exam:  Vitals:    12/26/23 0816   BP: 134/66   BP Location: Left arm   Patient Position: Sitting   Cuff Size: Adult   Pulse: 66   Resp: 18   Temp: 97.6 °F (36.4 °C)   TempSrc: Temporal   SpO2: 99%   Weight: 127 kg (279 lb)   Height: 5' 11\" (1.803 m)   Patient is alert and oriented.  Patient is not in distress.  Vital signs are above.  There is no icterus.  There is no oral thrush.  There is no palpable neck mass.  Lung fields are clear.  Heart rate is regular.  No palpable abdominal mass.  Soft and nontender abdomen.  There is no ascites.  There is no edema of the ankles.  There is no calf tenderness.  There is no palpable lymphadenopathy in the neck and axillary areas.  No focal neurological deficit.  No skin rash.  No clubbing.  Performance status 0 on ECOG scale.      Lab Results: I have reviewed all pertinent labs.  LABS:    Results for orders placed or performed in visit on 09/06/23   PSA Total, Diagnostic   Result Value Ref Range    Prostate Specific Antigen Total 0.3 0.0 - 4.0 ng/mL   CBC and differential   Result Value Ref Range    White Blood Cell Count 5.5 3.4 - 10.8 x10E3/uL    Red Blood Cell Count 4.55 4.14 - 5.80 x10E6/uL    Hemoglobin 14.0 13.0 - 17.7 g/dL    HCT 42.8 37.5 - 51.0 %    MCV 94 79 - 97 fL    MCH 30.8 26.6 - 33.0 pg    MCHC 32.7 31.5 - 35.7 g/dL    RDW 11.9 11.6 - 15.4 %    Platelet Count 258 150 - 450 x10E3/uL    Neutrophils 61 Not Estab. %    Lymphocytes 27 Not Estab. %    Monocytes 8 Not Estab. %    Eosinophils 3 Not Estab. %    Basophils PCT 1 Not Estab. %    Neutrophils (Absolute) 3.4 1.4 - 7.0 x10E3/uL    Lymphocytes (Absolute) 1.5 0.7 - 3.1 x10E3/uL    " Monocytes (Absolute) 0.4 0.1 - 0.9 x10E3/uL    Eosinophils (Absolute) 0.2 0.0 - 0.4 x10E3/uL    Basophils ABS 0.0 0.0 - 0.2 x10E3/uL    Immature Granulocytes 0 Not Estab. %    Immature Granulocytes (Absolute) 0.0 0.0 - 0.1 x10E3/uL   Comprehensive metabolic panel   Result Value Ref Range    Glucose, Random 97 70 - 99 mg/dL    BUN 14 8 - 27 mg/dL    Creatinine 0.86 0.76 - 1.27 mg/dL    eGFR 94 >59 mL/min/1.73    SL AMB BUN/CREATININE RATIO 16 10 - 24    Sodium 139 134 - 144 mmol/L    Potassium 4.6 3.5 - 5.2 mmol/L    Chloride 102 96 - 106 mmol/L    CO2 23 20 - 29 mmol/L    CALCIUM 9.2 8.6 - 10.2 mg/dL    Protein, Total 6.8 6.0 - 8.5 g/dL    Albumin 4.4 3.9 - 4.9 g/dL    Globulin, Total 2.4 1.5 - 4.5 g/dL    Albumin/Globulin Ratio 1.8 1.2 - 2.2    TOTAL BILIRUBIN 0.5 0.0 - 1.2 mg/dL    Alk Phos Isoenzymes 57 44 - 121 IU/L    AST 29 0 - 40 IU/L    ALT 27 0 - 44 IU/L   Testosterone   Result Value Ref Range    TESTOSTERONE TOTAL 325 264 - 916 ng/dL   On 11/12/2023 PSA was 0.2.  Patient states he had PSA again checked this month and it was 0.2  Case Report   Surgical Pathology Report                         Case: [id number]                                    Authorizing Provider:  Rio Dalal MD        Collected:           08/29/2023 0733               Ordering Location:     [facility]      Received:            08/29/2023 0734                                      [facility]                                                                                   Laboratory                                                                    Pathologist:           Davis Hardy MD                                                                  Specimen:    Prostate, Prostate Biopsy (3 slides [id number] [place], collected 2/14/2022)         Final Diagnosis   Prostate Biopsy (3 slides [id number] [place],  collected 2/14/2022):     Specimen 4 Left apex:  - Prostatic adenocarcinoma, acinar type, Shahab score 3 + 4 = 7, Prognostic Grade Group 2, continuously involving 1 of 1 cores (100%).  - Percentage of Kingston pattern 4: 30%  - Perineural invasion: Not identified     Specimen 10 Left lateral apex:  - Prostatic adenocarcinoma, acinar type, Kingston score 3 + 4 = 7, Prognostic Grade Group 2, continuously involving 3 of 3 cores (80% of total tissue).  - Percentage of Kingston pattern 4: 20%  - Perineural invasion: Not identified     Specimen 11 Left lateral mid:  - Prostatic adenocarcinoma, acinar type, Kingston score 3 + 4 = 7, Prognostic Grade Group 2, continuously involving 2 of 2 cores (90% of total tissue).  - Percentage of Shahab pattern 4: 10%  - Perineural invasion: Not identified   Electronically signed by Davis Hardy MD on 8/29/2023 at 10:07 AM   Additional Information          Imaging Studies: I have personally reviewed pertinent reports.    IMPRESSION:     1. PI-RADSv2.1 Category 4 -High (clinically significant cancer is likely to be present).  Lesion in the lateral left peripheral zone at apex.     2. No extraprostatic tumor, seminal vesicle invasion, pelvic lymphadenopathy, or pelvic osseous metastatic disease.     3. Calculated prostate volume of 36 cc.     Prostate gland boundaries and areas of concern for significant prostate cancer were segmented using 3D advanced post-processing on an independent [employer] system workstation with active physician participation.  The segmentation was performed should   MR-ultrasound fusion biopsy be required.     The study was marked in EPIC for significant notification.     Workstation performed: [id number]        Imaging    MRI prostate multiparametric wo w contrast (Order: [id number]) - 12/9/2021    Pathology, and Other Studies: I have personally reviewed pertinent reports.    See above    Assessment and Plan:  See diagnoses, orders instructions  below  Follow-up visit for cancer of the prostate that was diagnosed  in 2022,  Shahab score was 3+4=7.  PSA  2.5.  Patient received radiation treatments 30-35 and he completed radiation in May 2022.  No therapy for cancer since then.  Patient follows with his urologist in [place].  He states his PSA came down to 0.8 and then 0.2 and has increased to 0.4 but has come down to  0.2 and is staying at 0.2.  He states his urine flow is low.  He has been on Flomax.  He wakes up twice at night to pass urine  History of hypertension, high cholesterol, asthma and HIV.  He is on medications for these conditions.  He follows with his primary physician, urologist and other consultants.  Patient has quit smoking cigarettes.  He drinks beer but not daily.  Physical examination and test results are as recorded and discussed in detail.  Patient states when time came for hormone injections he might go for removal of testicles.  See diagnoses, orders and instructions.  Discussed healthy diet, protein supplements, activities as tolerated and health screening test.  Patient is capable of self-care.  Plan is surveillance for now and he would prefer that at this time.  All discussed in detail.  Questions answered.  Goal is prolongation of survival and cure if possible.  1. Malignant neoplasm of prostate (HCC)    - CBC and differential; Future  - Comprehensive metabolic panel; Future  - PSA Total, Diagnostic; Future  - CBC and differential  - Comprehensive metabolic panel  - PSA Total, Diagnostic  - CBC and differential; Future  - Comprehensive metabolic panel; Future  - PSA Total, Diagnostic; Future  - CBC and differential  - Comprehensive metabolic panel  - PSA Total, Diagnostic  - Ambulatory Referral to Oncology Genetics; Future    2. History of hypertension      3. History of hyperlipidemia      4. HIV disease (HCC)      5. History of asthma    Blood work prior to next visit in 6 months.  Referral to oncology genetics and please  get patient an appointment with oncology genetic before he leaves the office today.      He will make an appointment for oncology genetic.   Patient will continue to follow with  primary physician and other consultants.  Patient voiced understanding and agrees    Disclaimer: This document was prepared using a dictation device. If a word or phrase is confusing, or does not make sense, this is likely due to recognition error which was not discovered during the providers review. If you believe an error has occurred, please Contact me through [facility] HOPE Line service for patrick?cation.    Counseling / Coordination of Care  ..  Provided counseling and support

## 2024-01-10 ENCOUNTER — TELEPHONE (OUTPATIENT)
Dept: FAMILY MEDICINE CLINIC | Facility: HOSPITAL | Age: 69
End: 2024-01-10

## 2024-01-29 DIAGNOSIS — I25.10 CORONARY ARTERY CALCIFICATION SEEN ON CT SCAN: ICD-10-CM

## 2024-01-29 DIAGNOSIS — I10 HYPERTENSION, UNSPECIFIED TYPE: ICD-10-CM

## 2024-01-30 RX ORDER — ATORVASTATIN CALCIUM 10 MG/1
TABLET, FILM COATED ORAL
Qty: 90 TABLET | Refills: 3 | Status: SHIPPED | OUTPATIENT
Start: 2024-01-30

## 2024-02-02 ENCOUNTER — OFFICE VISIT (OUTPATIENT)
Dept: FAMILY MEDICINE CLINIC | Facility: HOSPITAL | Age: 69
End: 2024-02-02
Payer: COMMERCIAL

## 2024-02-02 VITALS
HEIGHT: 71 IN | OXYGEN SATURATION: 97 % | DIASTOLIC BLOOD PRESSURE: 76 MMHG | SYSTOLIC BLOOD PRESSURE: 168 MMHG | BODY MASS INDEX: 37.55 KG/M2 | WEIGHT: 268.2 LBS | HEART RATE: 66 BPM | TEMPERATURE: 97.1 F

## 2024-02-02 DIAGNOSIS — J45.20 MILD INTERMITTENT ASTHMA WITHOUT COMPLICATION: ICD-10-CM

## 2024-02-02 DIAGNOSIS — B20 HIV DISEASE (HCC): ICD-10-CM

## 2024-02-02 DIAGNOSIS — J98.9 RESPIRATORY ILLNESS: Primary | ICD-10-CM

## 2024-02-02 LAB
SARS-COV-2 AG UPPER RESP QL IA: NEGATIVE
VALID CONTROL: NORMAL

## 2024-02-02 PROCEDURE — 99214 OFFICE O/P EST MOD 30 MIN: CPT | Performed by: INTERNAL MEDICINE

## 2024-02-02 PROCEDURE — 87811 SARS-COV-2 COVID19 W/OPTIC: CPT | Performed by: INTERNAL MEDICINE

## 2024-02-02 RX ORDER — AMOXICILLIN AND CLAVULANATE POTASSIUM 875; 125 MG/1; MG/1
1 TABLET, FILM COATED ORAL EVERY 12 HOURS SCHEDULED
Qty: 14 TABLET | Refills: 0 | Status: SHIPPED | OUTPATIENT
Start: 2024-02-02 | End: 2024-02-09

## 2024-02-02 NOTE — ASSESSMENT & PLAN NOTE
Pt reports recent increase in viral load and decrease in CD 4 cts, on Biktarvy, con't tx and f/u as per ID

## 2024-02-02 NOTE — PROGRESS NOTES
Name: Rashad Wilkinson      : 1955      MRN: [id number]  Encounter Provider: Rody Granda DO  Encounter Date: 2024   Encounter department: [facility] CARE [address]     Assessment & Plan     1. Respiratory illness  Comments:  Febrile illness in higher risk pt, COVID neg today in offic - rx with Augmentin, cont' symptomatic tx with gargles/hot tea/lozenges/rest/fluids, call with new/worse symptoms, d/w pt that cough can last 4-6 wks  Orders:  -     amoxicillin-clavulanate (AUGMENTIN) 875-125 mg per tablet; Take 1 tablet by mouth every 12 (twelve) hours for 7 days  -     Poct Covid 19 Rapid Antigen Test    2. HIV disease (HCC)  Assessment & Plan:  Pt reports recent increase in viral load and decrease in CD 4 cts, on Biktarvy, con't tx and f/u as per ID      3. Mild intermittent asthma without complication  Assessment & Plan:  No current s/sx of exacerbation, not using Wixela at all but did encourage to use if SOB/wheezing/worsening cough OR with increase in rescue inhaler use, cont' f/u with PCP             Subjective      HPI Pt here for an acute visit    Pt here with 5-6 days of not feeling well. Started with just feeling lousy and a dry cough. He has had intermittent low grade fever with . He has some sinus and chest congestion and diarrhea.  He notes no ear pain or ST and denies vomiting but had some nausea. The diarrhea has resolved.  He notes no body aches/HA's/urinary symptoms/rashes.  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  He is using Dayquil and Nyquil.  He is feeling better but is not back to baseline.  He has not done a COVID test but he is vaccinated for COVID.  He did not get a flu vaccine this year.  He has HIV and is on Biktarvy. He states recently his viral load did go up and his CD4 ct did drop a bit.  He has asthma. He is not using Wixela daily.  He is using his rescue inhaler bid to prevent SOB but he does not feel he HAS to use  "it      Review of Systems   Constitutional:  Positive for chills and fatigue. Negative for fever.   HENT:  Positive for congestion and rhinorrhea. Negative for sore throat.    Eyes:  Negative for discharge and redness.   Respiratory:  Positive for cough. Negative for shortness of breath and wheezing.    Cardiovascular:  Negative for chest pain, palpitations and leg swelling.   Gastrointestinal:  Positive for diarrhea and nausea. Negative for abdominal pain, blood in stool, constipation and vomiting.   Genitourinary:  Negative for difficulty urinating and dysuria.   Musculoskeletal:  Negative for arthralgias and myalgias.   Skin:  Negative for rash and wound.   Neurological:  Negative for dizziness and headaches.   Hematological:  Negative for adenopathy.   Psychiatric/Behavioral:  Negative for confusion.        Current Outpatient Medications on File Prior to Visit   Medication Sig    albuterol (PROVENTIL HFA,VENTOLIN HFA) 90 mcg/act inhaler USE 2 INHALATIONS BY MOUTH  EVERY 6 HOURS AS NEEDED FOR WHEEZING OR SHORTNESS OF  BREATH    atorvastatin (LIPITOR) 10 mg tablet TAKE 1 TABLET BY MOUTH DAILY    bictegravir-emtricitab-tenofovir alafenamide (Biktarvy) -25 MG tablet Take 1 tablet by mouth daily with breakfast    cholecalciferol (VITAMIN D3) 1,000 units tablet Take 1 tablet (1,000 Units total) by mouth daily    cyclobenzaprine (FLEXERIL) 5 mg tablet Take 1 tablet (5 mg total) by mouth 3 (three) times a day as needed for muscle spasms    fluticasone (Flovent HFA) 220 mcg/act inhaler     Fluticasone-Salmeterol (Wixela Inhub) 250-50 mcg/dose inhaler Inhale 1 puff 2 (two) times a day Rinse mouth after use.    lisinopril (ZESTRIL) 5 mg tablet TAKE 1 TABLET BY MOUTH  DAILY    tamsulosin (FLOMAX) 0.4 mg Take 1 capsule (0.4 mg total) by mouth 2 (two) times a day       Objective     /76   Pulse 66   Temp (!) 97.1 °F (36.2 °C) (Tympanic)   Ht 5' 11\" (1.803 m)   Wt 122 kg (268 lb 3.2 oz)   SpO2 97%   BMI 37.41 " kg/m²     Physical Exam  Constitutional:       General: He is not in acute distress.     Appearance: He is well-developed. He is obese. He is not ill-appearing.   HENT:      Head: Normocephalic and atraumatic.      Right Ear: Tympanic membrane and external ear normal. There is no impacted cerumen.      Left Ear: Tympanic membrane and external ear normal. There is no impacted cerumen.      Mouth/Throat:      Pharynx: Posterior oropharyngeal erythema present. No oropharyngeal exudate.   Eyes:      General:         Right eye: No discharge.         Left eye: No discharge.      Conjunctiva/sclera: Conjunctivae normal.   Cardiovascular:      Rate and Rhythm: Normal rate and regular rhythm.      Heart sounds: No murmur heard.  Pulmonary:      Effort: Pulmonary effort is normal. No respiratory distress.      Breath sounds: Normal breath sounds. No wheezing, rhonchi or rales.   Musculoskeletal:         General: No deformity or signs of injury.   Lymphadenopathy:      Cervical: Cervical adenopathy present.   Skin:     General: Skin is warm and dry.      Coloration: Skin is not pale.      Findings: No rash.   Neurological:      General: No focal deficit present.      Mental Status: He is alert.      Gait: Gait normal.   Psychiatric:         Behavior: Behavior normal.         Thought Content: Thought content normal.         Judgment: Judgment normal.       Rody Granda DO

## 2024-02-02 NOTE — ASSESSMENT & PLAN NOTE
Chief Complaint   Patient presents with    Wrist Pain     Here with mom and dad for left wrist injury at school yesterday. 1. Have you been to the ER, urgent care clinic since your last visit? Hospitalized since your last visit? No    2. Have you seen or consulted any other health care providers outside of the [address] [place] [address] since your last visit? Include any pap smears or colon screening.  No No current s/sx of exacerbation, not using Wixela at all but did encourage to use if SOB/wheezing/worsening cough OR with increase in rescue inhaler use, cont' f/u with PCP

## 2024-02-13 ENCOUNTER — TELEPHONE (OUTPATIENT)
Dept: GENETICS | Facility: CLINIC | Age: 69
End: 2024-02-13

## 2024-02-19 ENCOUNTER — DOCUMENTATION (OUTPATIENT)
Dept: GENETICS | Facility: CLINIC | Age: 69
End: 2024-02-19

## 2024-02-19 ENCOUNTER — CLINICAL SUPPORT (OUTPATIENT)
Dept: GENETICS | Facility: CLINIC | Age: 69
End: 2024-02-19
Payer: COMMERCIAL

## 2024-02-19 DIAGNOSIS — C61 MALIGNANT NEOPLASM OF PROSTATE (HCC): Primary | ICD-10-CM

## 2024-02-19 PROCEDURE — 36415 COLL VENOUS BLD VENIPUNCTURE: CPT

## 2024-02-19 NOTE — LETTER
2024     Rio Dalal MD  [address]  [address]  [place] [address]    Patient: Rashad Wilkinson  YOB: 1955  Date of Visit: 2024      Dear Dr. Dalal:    Thank you for referring Rashad Wilkinson to me for evaluation. Below are my notes for this consultation.    If you have questions, please do not hesitate to call me. I look forward to following your patient along with you.         Sincerely,        Maria R Goodrich        CC: No Recipients        Pre-Test Genetic Counseling Consult Note    Patient Name: Rashad Wilkinson   /Age: 1955/69 y.o.  Ref[facility]    Date of Service: 2024  Genetic Counselor: Maria R Goodrich MS, [facility] [place]  Interpretation Services: None  Location: In-person consult at [place]  Length of Visit: 30 Minutes    Rashad was referred to the [facility] Cancer Risk and Genetic Assessment Program due to his  personal history of prostate cancer and family history of prostate and pancreatic cancer . he presents today to discuss the possibility of a hereditary cancer syndrome, options for genetic testing, and implications for him and his family.    Cancer History and Treatment:   Personal History:   Intermediate risk prostate cancer diagnosed at 68 y/o  Treatment: RT     Screening Hx:   Colon:  Colonoscopy (): 1 polyp reported  F/u recommended in 7 years     Skin:  F/u as needed     Other screening: none     Medical and Surgical History  Pertinent surgical history:   Past Surgical History:   Procedure Laterality Date   • ABSCESS DRAINAGE     • COLONOSCOPY  May 5, 2021    precancerous polyps   [facility] W/CELL WASHG SPX N/A 2018    Procedure: BRONCHOSCOPY FLEXIBLE;  Surgeon: Daksha Naqvi DO;  Location:  MAIN OR;  Service: Pulmonary   • SKIN LESION EXCISION      Excision of lesion in vestibule of mouth; Last Assessed: 2016      Pertinent medical history:  Past Medical History:   Diagnosis Date   •  "Abscess, cheek     Last Assessed: 2/23/2016   • Allergic rhinitis    • Asthma    • Benign prostatic hyperplasia    • Chronic headaches    • Constipation    • COPD (chronic obstructive pulmonary disease) ([facility])    • Cough    • Difficulty attaining erection    • Dyspnea    • Enlarged prostate    • Hemorrhoids    • HIV (human immunodeficiency virus infection) ([facility])    • Hypertension    • 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HCC)    • Pneumonia Jan 27 2022    clear   • Prostate cancer ([facility]) March 2022   • Seasonal allergies    • Slow urinary stream    • Wheezing          Other History:  Height:   Ht Readings from Last 1 Encounters:   02/02/24 5' 11\" (1.803 m)     Weight:   Wt Readings from Last 1 Encounters:   02/02/24 122 kg (268 lb 3.2 oz)       Relevant Family History   Patient reports non-Ashkenazi Anabaptism ancestry.     Maternal Family History:  Non-contributory   Limited information regarding family history     Paternal Family History:  Father: prostate cancer diagnosed in 70s, s/p prostatectomy + RT, recurrence in 90s; pancreatic cancer diagnosed in 90s (d.98)   Limited information regarding family history and structure     Please refer to the scanned pedigree in the Media Tab for a complete family history     *All history is reported as provided by the patient; records are not available for review, except where indicated.     Assessment:  We discussed sporadic, familial and hereditary cancer.  We reviewed that only 5-10% of cancers are considered hereditary. Cancers such as lung cancer, cervical cancer, testicular cancer, and liver cancer very rarely have a hereditary etiology, and we cannot test for a genetic predisposition for these cancers at this time.  We also discussed the many factors that influence our risk for cancer such as age, environmental exposures, lifestyle choices and family history.      We reviewed the indications suggestive of a hereditary predisposition to cancer.    Genetic testing is indicated " for Rashad based on the following criteria:   Meets NCCN V2.2024 Testing Criteria for Pancreatic Cancer Susceptibility Genes: first-degree relative (father) with pancreatic cancer diagnosis; limited information regarding paternal family history and structure.     Although Rashad's father with pancreatic cancer is the most informative relative to test, his father is . Thus, Rashad is the most informative living relative to pursue testing.     The risks, benefits, and limitations of genetic testing were reviewed with the patient, as well as genetic discrimination laws, and possible test results (positive, negative, variants of uncertain significance) and their clinical implications. If positive for a mutation, options for managing cancer risk including increased surveillance, chemoprevention, and in some cases prophylactic surgery were discussed.     Rashad was informed that if a hereditary cancer syndrome was identified in him, first degree relatives (parents, siblings, and children) have a chance of also inheriting the condition. Genetic testing would allow for predictive genetic testing in other relatives, who may also be at risk depending on their degree of relation.     Billing:  Most individuals pay <$100 for hereditary cancer genetic testing. If insurance covers the cost of the testing, individuals may still pay out of pocket secondary to co-pays, co-insurance, or deductibles. If the cost of the testing exceeds $100, the lab will reach out to the patient via phone or e-mail. The patient will then have the option to proceed with the testing, cancel the testing, or elect the self-pay option of $250. Rashad verbalized understanding.     Plan: Patient decided to proceed with testing and provided consent.    Summary:     Sample Collection:  The patient's blood sample was drawn in the office on 24 by medical assistant Inocencio Mendez    Genetic Testing Preformed: CustomNext: Cancer® +RNAinsight® (59 genes): APC, ISACC,  AXIN2, BAP1, BARD1, BMPR1A, BRCA1, BRCA2, BRIP1, CDH1, CDK4, CDKN1B, CDKN2A, CHEK2, CTNNA1, DICER1, EGLN1, EPCAM, FH, FLCN, GREM1, HOXB13, KIF1B, KIT, MAX, MEN1, MET, MITF, MLH1, MSH2, MSH3, MSH6, MUTYH, NF1, NTHL1, PALB2, PDGFRA PMS2, POLD1, POLE, POT1, PTEN, RAD51C, RAD51D, RB1, RET, SDHA, SDHAF2, SDHB, SDHC, SDHD, SMAD4, SMARCA4, STK11, ILMZ512, TP53, TSC1, TSC2, VHL      Result Call Information:  In the event that we need to reach Bill via telephone:  I confirmed the patient's mobile number on file as the best number to call with results  I confirmed with the patient that we can leave a voicemail on his mobile number    Results take approximately 2-3 weeks to complete once test is started.    Bill will be notified via [employer] once results are available.      Additional recommendations for surveillance/medical management will be made pending genetic test results.

## 2024-02-19 NOTE — PROGRESS NOTES
Pre-Test Genetic Counseling Consult Note    Patient Name: Rashad Wilkinson   /Age: 1955/69 y.o.  [facility]    Date of Service: 2024  Genetic Counselor: Maria R Goodrich MS, [facility] [place]  Interpretation Services: None  Location: In-person consult at [place]  Length of Visit: 30 Minutes    Rashad was referred to the [facility] Cancer Risk and Genetic Assessment Program due to his  personal history of prostate cancer and family history of prostate and pancreatic cancer . he presents today to discuss the possibility of a hereditary cancer syndrome, options for genetic testing, and implications for him and his family.    Cancer History and Treatment:   Personal History:   Intermediate risk prostate cancer diagnosed at 68 y/o  Treatment: RT     Screening Hx:   Colon:  Colonoscopy (): 1 polyp reported  F/u recommended in 7 years     Skin:  F/u as needed     Other screening: none     Medical and Surgical History  Pertinent surgical history:   Past Surgical History:   Procedure Laterality Date    ABSCESS DRAINAGE      COLONOSCOPY  May 5, 2021    precancerous polyps    [place] [facility] INCL FLUOR GDNCE DX W/CELL WASHG SPX N/A 2018    Procedure: BRONCHOSCOPY FLEXIBLE;  Surgeon: Daksha Naqvi DO;  Location:  MAIN OR;  Service: Pulmonary    SKIN LESION EXCISION      Excision of lesion in vestibule of mouth; Last Assessed: 2016      Pertinent medical history:  Past Medical History:   Diagnosis Date    Abscess, cheek     Last Assessed: 2016    Allergic rhinitis     Asthma     Benign prostatic hyperplasia     Chronic headaches     Constipation     COPD (chronic obstructive pulmonary disease) (HCC)     Cough     Difficulty attaining erection     Dyspnea     Enlarged prostate     Hemorrhoids     HIV (human immunodeficiency virus infection) (HCC)     Hypertension     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HCC)     Pneumonia 2022    clear    Prostate cancer (HCC) 2022    Seasonal  "allergies     Slow urinary stream     Wheezing          Other History:  Height:   Ht Readings from Last 1 Encounters:   24 5' 11\" (1.803 m)     Weight:   Wt Readings from Last 1 Encounters:   24 122 kg (268 lb 3.2 oz)       Relevant Family History   Patient reports non-Ashkenazi Bahai ancestry.     Maternal Family History:  Non-contributory   Limited information regarding family history     Paternal Family History:  Father: prostate cancer diagnosed in 70s, s/p prostatectomy + RT, recurrence in 90s; pancreatic cancer diagnosed in 90s (d.98)   Limited information regarding family history and structure     Please refer to the scanned pedigree in the Media Tab for a complete family history     *All history is reported as provided by the patient; records are not available for review, except where indicated.     Assessment:  We discussed sporadic, familial and hereditary cancer.  We reviewed that only 5-10% of cancers are considered hereditary. Cancers such as lung cancer, cervical cancer, testicular cancer, and liver cancer very rarely have a hereditary etiology, and we cannot test for a genetic predisposition for these cancers at this time.  We also discussed the many factors that influence our risk for cancer such as age, environmental exposures, lifestyle choices and family history.      We reviewed the indications suggestive of a hereditary predisposition to cancer.    Genetic testing is indicated for Rsahad based on the following criteria:   Meets NCCN V2.2024 Testing Criteria for Pancreatic Cancer Susceptibility Genes: first-degree relative (father) with pancreatic cancer diagnosis; limited information regarding paternal family history and structure.     Although Rashad's father with pancreatic cancer is the most informative relative to test, his father is . Thus, Rashad is the most informative living relative to pursue testing.     The risks, benefits, and limitations of genetic testing were " reviewed with the patient, as well as genetic discrimination laws, and possible test results (positive, negative, variants of uncertain significance) and their clinical implications. If positive for a mutation, options for managing cancer risk including increased surveillance, chemoprevention, and in some cases prophylactic surgery were discussed.     Rashad was informed that if a hereditary cancer syndrome was identified in him, first degree relatives (parents, siblings, and children) have a chance of also inheriting the condition. Genetic testing would allow for predictive genetic testing in other relatives, who may also be at risk depending on their degree of relation.     Billing:  Most individuals pay <$100 for hereditary cancer genetic testing. If insurance covers the cost of the testing, individuals may still pay out of pocket secondary to co-pays, co-insurance, or deductibles. If the cost of the testing exceeds $100, the lab will reach out to the patient via phone or e-mail. The patient will then have the option to proceed with the testing, cancel the testing, or elect the self-pay option of $250. Bill verbalized understanding.     Plan: Patient decided to proceed with testing and provided consent.    Summary:     Sample Collection:  The patient's blood sample was drawn in the office on 2/19/24 by medical assistant Inocencio Mendez    Genetic Testing Preformed: CustomNext: Cancer® +RNAinsight® (59 genes): APC, ISACC, AXIN2, BAP1, BARD1, BMPR1A, BRCA1, BRCA2, BRIP1, CDH1, CDK4, CDKN1B, CDKN2A, CHEK2, CTNNA1, DICER1, EGLN1, EPCAM, FH, FLCN, GREM1, HOXB13, KIF1B, KIT, MAX, MEN1, MET, MITF, MLH1, MSH2, MSH3, MSH6, MUTYH, NF1, NTHL1, PALB2, PDGFRA PMS2, POLD1, POLE, POT1, PTEN, RAD51C, RAD51D, RB1, RET, SDHA, SDHAF2, SDHB, SDHC, SDHD, SMAD4, SMARCA4, STK11, [id number], TP53, TSC1, TSC2, VHL      Result Call Information:  In the event that we need to reach Bill via telephone:  I confirmed the patient's mobile number on file  as the best number to call with results  I confirmed with the patient that we can leave a voicemail on his mobile number    Results take approximately 2-3 weeks to complete once test is started.    Bill will be notified via [employer] once results are available.      Additional recommendations for surveillance/medical management will be made pending genetic test results.

## 2024-03-03 DIAGNOSIS — R35.1 BENIGN PROSTATIC HYPERPLASIA WITH NOCTURIA: ICD-10-CM

## 2024-03-03 DIAGNOSIS — N40.1 BENIGN PROSTATIC HYPERPLASIA WITH NOCTURIA: ICD-10-CM

## 2024-03-03 RX ORDER — TAMSULOSIN HYDROCHLORIDE 0.4 MG/1
0.4 CAPSULE ORAL 2 TIMES DAILY
Qty: 180 CAPSULE | Refills: 0 | Status: SHIPPED | OUTPATIENT
Start: 2024-03-03

## 2024-03-04 ENCOUNTER — TELEPHONE (OUTPATIENT)
Dept: GENETICS | Facility: CLINIC | Age: 69
End: 2024-03-04

## 2024-03-04 NOTE — TELEPHONE ENCOUNTER
Post-Test Genetic Counseling Consult Note    Today I left a voicemail for Rashad reviewing the results of his genetic test for hereditary cancer. We met previously on 2/19/24 for pre-test counseling.  I encouraged Rashad to call [phone] to discuss this result further.  A copy of this consult note and genetic test result will be shared with the patient.      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 The significance of the ISACC variant is currently not known and therefore this test result cannot be used to help determine Bill cancer risks.      It is possible that the variant was seen in only a handful of individuals, or there may be conflicting or incomplete information in the medical literature about the variant and its association with hereditary cancer.     The laboratory will continue to accumulate information on this variant and will reclassify it as either a positive or negative genetic test result when they are confident that they have adequate information. We will notify Bill as updated information is obtained. It is important to note that the majority of variants of uncertain significance are reclassified as likely benign or benign as additional information  about the variant becomes available.     Risk Based on Family History:  The significance of this variant is currently not known and therefore this test result cannot be used to help determine Rashad cancer risks. Rather his personal medical history and family history of cancer are the most important factors used to estimate his risk for developing certain cancers and to direct his medical management.      Rashad's risk of pancreatic cancer is increased, based on the reported family history. As compared with the general population risk of approximately 1-2%, empiric data suggest that [place] risk to develop pancreatic cancer is approximately 4-6% given one first-degree relative with pancreatic cancer (PMID: 31555226).     Risks and Testing for Family Members:  Genetic testing for this variant is not recommended for relatives who wish to determine their cancer risks for purposes of determining medical management. The presence or absence of this variant in a relative is not clinically meaningful unless the variant is reclassified in the future.     Despite this result, Rashad's first-degree relatives may be at increased risk for the cancers based on the family history. We recommend they discuss screening and management recommendations with their healthcare providers.    If Rashad has any affected family members with a cancer diagnosis, especially at a young age, they may still consider genetic testing. Relatives who wish to pursue genetic testing can reach out to the [facility] [phone] to schedule an appointment or visit [email or web address] to identify a local genetic counselor.      Plan:   There are no additional recommendations based on Rashad's result. he should continue cancer screening and medical management as clinically indicated and as determined appropriate by his healthcare providers.    VUS Result: Rashad was strongly encouraged to contact us regarding any changes in his personal or family history  of cancer as these changes could alter our recommendation regarding genetic testing and/or cancer screening. Bill was also encouraged to follow up with us on an annual basis as variant classifications are subject to change.

## 2024-03-12 LAB
BASOPHILS # BLD AUTO: 0 X10E3/UL (ref 0–0.2)
BASOPHILS NFR BLD AUTO: 1 %
EOSINOPHIL # BLD AUTO: 0.3 X10E3/UL (ref 0–0.4)
EOSINOPHIL NFR BLD AUTO: 6 %
ERYTHROCYTE [DISTWIDTH] IN BLOOD BY AUTOMATED COUNT: 12.1 % (ref 11.6–15.4)
HCT VFR BLD AUTO: 40.7 % (ref 37.5–51)
HGB BLD-MCNC: 13.7 G/DL (ref 13–17.7)
IMM GRANULOCYTES # BLD: 0 X10E3/UL (ref 0–0.1)
IMM GRANULOCYTES NFR BLD: 0 %
LYMPHOCYTES # BLD AUTO: 1.4 X10E3/UL (ref 0.7–3.1)
LYMPHOCYTES NFR BLD AUTO: 26 %
MCH RBC QN AUTO: 30.5 PG (ref 26.6–33)
MCHC RBC AUTO-ENTMCNC: 33.7 G/DL (ref 31.5–35.7)
MCV RBC AUTO: 91 FL (ref 79–97)
MONOCYTES # BLD AUTO: 0.4 X10E3/UL (ref 0.1–0.9)
MONOCYTES NFR BLD AUTO: 7 %
NEUTROPHILS # BLD AUTO: 3.3 X10E3/UL (ref 1.4–7)
NEUTROPHILS NFR BLD AUTO: 60 %
PLATELET # BLD AUTO: 254 X10E3/UL (ref 150–450)
RBC # BLD AUTO: 4.49 X10E6/UL (ref 4.14–5.8)
WBC # BLD AUTO: 5.4 X10E3/UL (ref 3.4–10.8)

## 2024-03-13 LAB
ALBUMIN SERPL-MCNC: 4.2 G/DL (ref 3.9–4.9)
ALBUMIN/GLOB SERPL: 1.7 {RATIO} (ref 1.2–2.2)
ALP SERPL-CCNC: 54 IU/L (ref 44–121)
ALT SERPL-CCNC: 26 IU/L (ref 0–44)
AST SERPL-CCNC: 27 IU/L (ref 0–40)
BILIRUB SERPL-MCNC: 0.5 MG/DL (ref 0–1.2)
BUN SERPL-MCNC: 12 MG/DL (ref 8–27)
BUN/CREAT SERPL: 12 (ref 10–24)
CALCIUM SERPL-MCNC: 9.5 MG/DL (ref 8.6–10.2)
CHLORIDE SERPL-SCNC: 105 MMOL/L (ref 96–106)
CO2 SERPL-SCNC: 22 MMOL/L (ref 20–29)
CREAT SERPL-MCNC: 0.97 MG/DL (ref 0.76–1.27)
EGFR: 84 ML/MIN/1.73
GLOBULIN SER-MCNC: 2.5 G/DL (ref 1.5–4.5)
GLUCOSE SERPL-MCNC: 100 MG/DL (ref 70–99)
POTASSIUM SERPL-SCNC: 4.7 MMOL/L (ref 3.5–5.2)
PROT SERPL-MCNC: 6.7 G/DL (ref 6–8.5)
PSA SERPL-MCNC: 0.1 NG/ML (ref 0–4)
SODIUM SERPL-SCNC: 141 MMOL/L (ref 134–144)

## 2024-04-18 ENCOUNTER — OFFICE VISIT (OUTPATIENT)
Dept: INFECTIOUS DISEASES | Facility: CLINIC | Age: 69
End: 2024-04-18
Payer: COMMERCIAL

## 2024-04-18 VITALS
TEMPERATURE: 98.4 F | BODY MASS INDEX: 36.54 KG/M2 | OXYGEN SATURATION: 97 % | SYSTOLIC BLOOD PRESSURE: 132 MMHG | RESPIRATION RATE: 16 BRPM | WEIGHT: 261 LBS | HEIGHT: 71 IN | DIASTOLIC BLOOD PRESSURE: 78 MMHG | HEART RATE: 68 BPM

## 2024-04-18 DIAGNOSIS — B20 HIV DISEASE (HCC): Primary | ICD-10-CM

## 2024-04-18 DIAGNOSIS — Z11.59 ENCOUNTER FOR SCREENING FOR OTHER VIRAL DISEASES: ICD-10-CM

## 2024-04-18 DIAGNOSIS — E66.01 OBESITY, MORBID (HCC): ICD-10-CM

## 2024-04-18 PROCEDURE — 99204 OFFICE O/P NEW MOD 45 MIN: CPT | Performed by: STUDENT IN AN ORGANIZED HEALTH CARE EDUCATION/TRAINING PROGRAM

## 2024-04-18 NOTE — PROGRESS NOTES
Consultation - Infectious Disease   Rashad Wilkinson 69 y.o. male MRN: [id number]  Unit/Bed#:  Encounter: [id number]      IMPRESSION & RECOMMENDATIONS:     1. HIV.  Diagnosed in 2018 when he was hospitalized for pneumocystis pneumonia.  Patient initially on Genvoya which was then switched to Biktarvy 5/2019 due to DDI's.  The patient is tolerating Biktarvy without side effect.  Reports 100% adherence with his medications.  Most recent viral load 20 and CD4 477.    -Continue Biktarvy 1 tab daily   -Patient aware to separate Biktarvy from multivitamins for at least 4 hours   -Patient was provided medication, adherence and prevention education   -Check labs in 3 months to evaluate for virologic control, drug toxicity, coinfection   -ID follow-up in 3 months    2.  Prostate cancer.  Diagnosed in 2020 and completed radiation therapy.  No cancer therapy since that time.  Patient follows with oncology    3.  Hyperlipidemia.  Statin therapy per primary    4.  Vaccinations.  Patient up-to-date on meningococcal vaccine Tdap, zoster, pneumococcal vaccination.   -Will discuss PCV 20 at next visit   -Check hepatitis A and B serologies to assess for immunity    My recommendations were discussed with the patient in detail who verbalized understanding.  ID follow-up in 3 months    HISTORY OF PRESENT ILLNESS:  Reason for Consult: HIV  HPI: Rashad Wilkinson is a 69 y.o. year old male here for new patient evaluation for HIV.  The patient was diagnosed with HIV in July 2018 when he was admitted to [facility] with pneumocystis pneumonia.  HIV testing was positive and he was started on Genvoya later that year.  Initial CD4 count was 72 and viral load was 773K.  The patient was initially seen by [facility] and then transferred care to [facility] (Dr. Castelan) in 2019.  The patient's viral load has been well-controlled since 2020.  ART was transition to Biktarvy 5/2019 due to drug drug interactions.  The patient most recently has  followed with [facility] but due to his schedule now wishes to be seen at [facility] again.  The patient is overall doing well on Biktarvy without any missed doses or side effects.  He is a previous  and currently works driving a schoolbus.  The patient separates his other medications from Biktarvy, he takes the Biktarvy in the morning and his other medications at night.  The patient is  and denies any other sexual partners.  His wife is HIV negative.    The patient also has a history of prostate cancer which was diagnosed in 2020.  He currently follows with oncology.  He completed radiation treatment in 2020 and has not been on cancer therapy since then.      REVIEW OF SYSTEMS:  A complete review of systems is negative other than that noted in the HPI    PAST MEDICAL HISTORY:  Past Medical History:   Diagnosis Date    Abscess, cheek     Last Assessed: 2/23/2016    Allergic rhinitis     Asthma     Benign prostatic hyperplasia     Chronic headaches     Constipation     COPD (chronic obstructive pulmonary disease) (HCC)     Cough     Difficulty attaining erection     Dyspnea     Enlarged prostate     Hemorrhoids     HIV (human immunodeficiency virus infection) (HCC)     Hypertension     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HCC)     Pneumonia Jan 27 2022    clear    Prostate cancer (HCC) March 2022    Seasonal allergies     Slow urinary stream     Wheezing      Past Surgical History:   Procedure Laterality Date    ABSCESS DRAINAGE      COLONOSCOPY  May 5, 2021    precancerous polyps    NC BRNCC INCL FLUOR GDNCE DX W/CELL WASHG SPX N/A 07/11/2018    Procedure: BRONCHOSCOPY FLEXIBLE;  Surgeon: Daksha Naqvi DO;  Location: [facility];  Service: Pulmonary    SKIN LESION EXCISION      Excision of lesion in vestibule of mouth; Last Assessed: 2/23/2016       FAMILY HISTORY:  Non-contributory    SOCIAL HISTORY:  Social History   Social History     Substance and Sexual Activity   Alcohol Use Yes    Comment:  "socially / 1-4 drinks/week     Social History     Substance and Sexual Activity   Drug Use No     Social History     Tobacco Use   Smoking Status Former    Current packs/day: 0.00    Average packs/day: 1 pack/day for 25.1 years (25.1 ttl pk-yrs)    Types: Cigarettes    Start date: 1968    Quit date: 1993    Years since quittin.2    Passive exposure: Past   Smokeless Tobacco Never   Tobacco Comments    on and off during period of use       ALLERGIES:  No Known Allergies    MEDICATIONS:  All current active medications have been reviewed.    PHYSICAL EXAM:  Vitals:    24 1000   BP: 132/78   BP Location: Left arm   Patient Position: Sitting   Cuff Size: Standard   Pulse: 68   Resp: 16   Temp: 98.4 °F (36.9 °C)   TempSrc: Temporal   SpO2: 97%   Weight: 118 kg (261 lb)   Height: 5' 11\" (1.803 m)         General Appearance:  Appearing well, nontoxic, and in no distress   Head:  Normocephalic, without obvious abnormality, atraumatic   Eyes:  Conjunctiva pink and sclera anicteric, both eyes   Nose: Nares normal, mucosa normal, no drainage   Throat: Oropharynx moist without lesions   Neck: Supple, symmetrical, no adenopathy, no tenderness/mass/nodules   Back:   Symmetric, no curvature, ROM normal, no CVA tenderness   Lungs:   Clear to auscultation bilaterally, respirations unlabored   Chest Wall:  No tenderness or deformity   Heart:  RRR; no murmur, rub or gallop   Abdomen:   Soft, non-tender, non-distended, positive bowel sounds,    Extremities: No cyanosis, clubbing or edema   Skin: No rashes or lesions. No draining wounds noted.   Lymph nodes: Cervical, supraclavicular nodes normal   Neurologic: Alert and oriented times 3, extremity strength 5/5 and symmetric       LABS, IMAGING, & OTHER STUDIES:  Lab Results:  I have personally reviewed pertinent labs.  Lab Results   Component Value Date     2017    K 4.7 2024     2024    CO2 22 2024    BUN 12 2024    CREATININE " 0.97 03/12/2024    GLUCOSE 100 (H) 06/09/2017    CALCIUM 8.9 08/10/2018    AST 27 03/12/2024    ALT 26 03/12/2024    ALKPHOS 57 08/10/2018    PROT 7.9 06/09/2017    BILITOT 0.3 06/09/2017    EGFR 84 03/12/2024     Lab Results   Component Value Date    WBC 5.4 03/12/2024    HGB 13.7 03/12/2024    HCT 40.7 03/12/2024    MCV 91 03/12/2024     03/12/2024     Lab Results   Component Value Date    HEPCAB 0.2 10/28/2020     Lab Results   Component Value Date    HEPAIGM Negative 05/03/2019    HEPBIGM Negative 05/03/2019    HEPCAB 0.2 10/28/2020     Lab Results   Component Value Date    RPR Non Reactive 07/19/2018     CD4 ABS   Date/Time Value Ref Range Status   07/02/2020 12:48  359 - 1,519 /uL Final     HIV-1 RNA by PCR, Qn   Date/Time Value Ref Range Status   07/02/2020 12:48 PM <20 copies/mL Final     Comment:     HIV-1 RNA not detected  The reportable range for this assay is 20 to 10,000,000  copies HIV-1 RNA/mL.       Labs 3/27/24:  VL 20  CD4 447  Cr 0.95  WBC 5.2  Hgb 14  Plts 247

## 2024-04-18 NOTE — PATIENT INSTRUCTIONS
-continue Biktarvy  -check labs in 3 months and follow up after  -please let us know if you need refills and upload labs to [facility]

## 2024-05-02 DIAGNOSIS — R35.1 BENIGN PROSTATIC HYPERPLASIA WITH NOCTURIA: ICD-10-CM

## 2024-05-02 DIAGNOSIS — N40.1 BENIGN PROSTATIC HYPERPLASIA WITH NOCTURIA: ICD-10-CM

## 2024-05-04 RX ORDER — TAMSULOSIN HYDROCHLORIDE 0.4 MG/1
0.4 CAPSULE ORAL 2 TIMES DAILY
Qty: 180 CAPSULE | Refills: 1 | Status: SHIPPED | OUTPATIENT
Start: 2024-05-04

## 2024-05-15 ENCOUNTER — NEW PATIENT (OUTPATIENT)
Dept: URBAN - METROPOLITAN AREA CLINIC 79 | Facility: CLINIC | Age: 69
End: 2024-05-15

## 2024-05-15 DIAGNOSIS — Z98.890: ICD-10-CM

## 2024-05-15 DIAGNOSIS — H43.813: ICD-10-CM

## 2024-05-15 DIAGNOSIS — H25.811: ICD-10-CM

## 2024-05-15 DIAGNOSIS — Z96.1: ICD-10-CM

## 2024-05-15 DIAGNOSIS — H18.423: ICD-10-CM

## 2024-05-15 DIAGNOSIS — H40.013: ICD-10-CM

## 2024-05-15 DIAGNOSIS — H52.7: ICD-10-CM

## 2024-05-15 PROCEDURE — 92134 CPTRZ OPH DX IMG PST SGM RTA: CPT | Mod: NC

## 2024-05-15 PROCEDURE — 83861 MICROFLUID ANALY TEARS: CPT

## 2024-05-15 PROCEDURE — 92250 FUNDUS PHOTOGRAPHY W/I&R: CPT

## 2024-05-15 PROCEDURE — 92015 DETERMINE REFRACTIVE STATE: CPT

## 2024-05-15 PROCEDURE — 99204 OFFICE O/P NEW MOD 45 MIN: CPT

## 2024-05-15 ASSESSMENT — VISUAL ACUITY
OS_SC: 20/125
OD_PH: 20/25+3
OS_SC: 20/20
OD_CC: 20/40+2
OD_SC: 20/60-1
OS_CC: 20/25+1
OD_SC: 20/50-2
OD_GLARE: 20/50

## 2024-05-15 ASSESSMENT — TONOMETRY
OD_IOP_MMHG: 17
OS_IOP_MMHG: 21

## 2024-05-16 LAB
ALBUMIN SERPL-MCNC: 4.2 G/DL (ref 3.9–4.9)
ALBUMIN/GLOB SERPL: 1.8 {RATIO} (ref 1.2–2.2)
ALP SERPL-CCNC: 59 IU/L (ref 44–121)
ALT SERPL-CCNC: 26 IU/L (ref 0–44)
AST SERPL-CCNC: 25 IU/L (ref 0–40)
BASOPHILS # BLD AUTO: 0 X10E3/UL (ref 0–0.2)
BASOPHILS NFR BLD AUTO: 1 %
BILIRUB SERPL-MCNC: 0.6 MG/DL (ref 0–1.2)
BUN SERPL-MCNC: 15 MG/DL (ref 8–27)
BUN/CREAT SERPL: 17 (ref 10–24)
CALCIUM SERPL-MCNC: 9.5 MG/DL (ref 8.6–10.2)
CD3+CD4+ CELLS # BLD: 638 /UL (ref 359–1519)
CD3+CD4+ CELLS NFR BLD: 25.5 % (ref 30.8–58.5)
CHLORIDE SERPL-SCNC: 100 MMOL/L (ref 96–106)
CO2 SERPL-SCNC: 25 MMOL/L (ref 20–29)
CREAT SERPL-MCNC: 0.87 MG/DL (ref 0.76–1.27)
EGFR: 93 ML/MIN/1.73
EOSINOPHIL # BLD AUTO: 0.7 X10E3/UL (ref 0–0.4)
EOSINOPHIL NFR BLD AUTO: 11 %
ERYTHROCYTE [DISTWIDTH] IN BLOOD BY AUTOMATED COUNT: 12.3 % (ref 11.6–15.4)
GLOBULIN SER-MCNC: 2.4 G/DL (ref 1.5–4.5)
GLUCOSE SERPL-MCNC: 94 MG/DL (ref 70–99)
HBV SURFACE AB SER-ACNC: 19.7 MIU/ML
HCT VFR BLD AUTO: 42.6 % (ref 37.5–51)
HCV AB S/CO SERPL IA: NON REACTIVE
HGB BLD-MCNC: 13.4 G/DL (ref 13–17.7)
HIV1 RNA # SERPL NAA+PROBE: <20 COPIES/ML
HIV1 RNA SERPL NAA+PROBE-LOG#: NORMAL LOG10COPY/ML
IMM GRANULOCYTES # BLD: 0 X10E3/UL (ref 0–0.1)
IMM GRANULOCYTES NFR BLD: 0 %
LYMPHOCYTES # BLD AUTO: 2.5 X10E3/UL (ref 0.7–3.1)
LYMPHOCYTES NFR BLD AUTO: 38 %
MCH RBC QN AUTO: 29.8 PG (ref 26.6–33)
MCHC RBC AUTO-ENTMCNC: 31.5 G/DL (ref 31.5–35.7)
MCV RBC AUTO: 95 FL (ref 79–97)
MONOCYTES # BLD AUTO: 0.5 X10E3/UL (ref 0.1–0.9)
MONOCYTES NFR BLD AUTO: 8 %
NEUTROPHILS # BLD AUTO: 2.9 X10E3/UL (ref 1.4–7)
NEUTROPHILS NFR BLD AUTO: 42 %
PLATELET # BLD AUTO: 267 X10E3/UL (ref 150–450)
POTASSIUM SERPL-SCNC: 4.7 MMOL/L (ref 3.5–5.2)
PROT SERPL-MCNC: 6.6 G/DL (ref 6–8.5)
RBC # BLD AUTO: 4.5 X10E6/UL (ref 4.14–5.8)
SODIUM SERPL-SCNC: 138 MMOL/L (ref 134–144)
WBC # BLD AUTO: 6.7 X10E3/UL (ref 3.4–10.8)

## 2024-05-20 DIAGNOSIS — I10 HYPERTENSION, UNSPECIFIED TYPE: ICD-10-CM

## 2024-05-20 DIAGNOSIS — J44.9 CHRONIC OBSTRUCTIVE PULMONARY DISEASE, UNSPECIFIED COPD TYPE (HCC): ICD-10-CM

## 2024-05-20 RX ORDER — ALBUTEROL SULFATE 90 UG/1
AEROSOL, METERED RESPIRATORY (INHALATION)
Qty: 34 G | Refills: 2 | Status: SHIPPED | OUTPATIENT
Start: 2024-05-20

## 2024-05-22 RX ORDER — LISINOPRIL 5 MG/1
TABLET ORAL
Qty: 90 TABLET | Refills: 0 | Status: SHIPPED | OUTPATIENT
Start: 2024-05-22

## 2024-05-22 NOTE — TELEPHONE ENCOUNTER
Please notify patient that their medication was approved but no refills were given. He is overdue for follow up appt/AWV and needs to make an appt (40 min) to con't receiving medications.  TY

## 2024-06-10 ENCOUNTER — PRE-OP CATARACT MEASUREMENTS (OUTPATIENT)
Dept: URBAN - METROPOLITAN AREA CLINIC 79 | Facility: CLINIC | Age: 69
End: 2024-06-10

## 2024-06-10 DIAGNOSIS — H25.811: ICD-10-CM

## 2024-06-10 DIAGNOSIS — H18.423: ICD-10-CM

## 2024-06-10 PROCEDURE — 92136 OPHTHALMIC BIOMETRY: CPT | Mod: TC,RT

## 2024-06-10 PROCEDURE — 92025 CPTRIZED CORNEAL TOPOGRAPHY: CPT | Mod: TC

## 2024-06-20 ENCOUNTER — PRE-OP CATARACT MEASUREMENTS (OUTPATIENT)
Dept: URBAN - METROPOLITAN AREA CLINIC 79 | Facility: CLINIC | Age: 69
End: 2024-06-20

## 2024-06-20 DIAGNOSIS — H25.811: ICD-10-CM

## 2024-06-20 DIAGNOSIS — H40.013: ICD-10-CM

## 2024-06-20 PROCEDURE — 92012 INTRM OPH EXAM EST PATIENT: CPT

## 2024-06-20 ASSESSMENT — VISUAL ACUITY
OD_PH: 20/40
OD_SC: 20/60
OS_SC: 20/100
OS_PH: 20/50

## 2024-06-20 ASSESSMENT — TONOMETRY
OD_IOP_MMHG: 20
OS_IOP_MMHG: 24

## 2024-06-21 LAB
BASOPHILS # BLD AUTO: 0 X10E3/UL (ref 0–0.2)
BASOPHILS NFR BLD AUTO: 1 %
EOSINOPHIL # BLD AUTO: 0.5 X10E3/UL (ref 0–0.4)
EOSINOPHIL NFR BLD AUTO: 7 %
ERYTHROCYTE [DISTWIDTH] IN BLOOD BY AUTOMATED COUNT: 12 % (ref 11.6–15.4)
HCT VFR BLD AUTO: 42.6 % (ref 37.5–51)
HGB BLD-MCNC: 14 G/DL (ref 13–17.7)
IMM GRANULOCYTES # BLD: 0 X10E3/UL (ref 0–0.1)
IMM GRANULOCYTES NFR BLD: 0 %
LYMPHOCYTES # BLD AUTO: 2.3 X10E3/UL (ref 0.7–3.1)
LYMPHOCYTES NFR BLD AUTO: 35 %
MCH RBC QN AUTO: 31.3 PG (ref 26.6–33)
MCHC RBC AUTO-ENTMCNC: 32.9 G/DL (ref 31.5–35.7)
MCV RBC AUTO: 95 FL (ref 79–97)
MONOCYTES # BLD AUTO: 0.5 X10E3/UL (ref 0.1–0.9)
MONOCYTES NFR BLD AUTO: 7 %
NEUTROPHILS # BLD AUTO: 3.3 X10E3/UL (ref 1.4–7)
NEUTROPHILS NFR BLD AUTO: 50 %
PLATELET # BLD AUTO: 254 X10E3/UL (ref 150–450)
RBC # BLD AUTO: 4.47 X10E6/UL (ref 4.14–5.8)
WBC # BLD AUTO: 6.6 X10E3/UL (ref 3.4–10.8)

## 2024-06-22 LAB
ALBUMIN SERPL-MCNC: 4.2 G/DL (ref 3.9–4.9)
ALP SERPL-CCNC: 59 IU/L (ref 44–121)
ALT SERPL-CCNC: 22 IU/L (ref 0–44)
AST SERPL-CCNC: 22 IU/L (ref 0–40)
BILIRUB SERPL-MCNC: 0.4 MG/DL (ref 0–1.2)
BUN SERPL-MCNC: 17 MG/DL (ref 8–27)
BUN/CREAT SERPL: 17 (ref 10–24)
CALCIUM SERPL-MCNC: 9.2 MG/DL (ref 8.6–10.2)
CHLORIDE SERPL-SCNC: 102 MMOL/L (ref 96–106)
CO2 SERPL-SCNC: 23 MMOL/L (ref 20–29)
CREAT SERPL-MCNC: 0.98 MG/DL (ref 0.76–1.27)
EGFR: 83 ML/MIN/1.73
GLOBULIN SER-MCNC: 2.6 G/DL (ref 1.5–4.5)
GLUCOSE SERPL-MCNC: 93 MG/DL (ref 70–99)
POTASSIUM SERPL-SCNC: 4.9 MMOL/L (ref 3.5–5.2)
PROT SERPL-MCNC: 6.8 G/DL (ref 6–8.5)
PSA SERPL-MCNC: 0.2 NG/ML (ref 0–4)
SODIUM SERPL-SCNC: 137 MMOL/L (ref 134–144)

## 2024-06-25 ENCOUNTER — CONSULT (OUTPATIENT)
Dept: FAMILY MEDICINE CLINIC | Facility: HOSPITAL | Age: 69
End: 2024-06-25
Payer: COMMERCIAL

## 2024-06-25 VITALS
DIASTOLIC BLOOD PRESSURE: 68 MMHG | WEIGHT: 238.6 LBS | TEMPERATURE: 97.5 F | BODY MASS INDEX: 33.4 KG/M2 | OXYGEN SATURATION: 98 % | HEIGHT: 71 IN | SYSTOLIC BLOOD PRESSURE: 128 MMHG | HEART RATE: 64 BPM

## 2024-06-25 DIAGNOSIS — C61 MALIGNANT NEOPLASM OF PROSTATE (HCC): ICD-10-CM

## 2024-06-25 DIAGNOSIS — H26.9 CATARACT OF RIGHT EYE, UNSPECIFIED CATARACT TYPE: ICD-10-CM

## 2024-06-25 DIAGNOSIS — Z01.818 PREOP EXAMINATION: Primary | ICD-10-CM

## 2024-06-25 PROCEDURE — G2211 COMPLEX E/M VISIT ADD ON: HCPCS | Performed by: NURSE PRACTITIONER

## 2024-06-25 PROCEDURE — 99214 OFFICE O/P EST MOD 30 MIN: CPT | Performed by: NURSE PRACTITIONER

## 2024-06-25 NOTE — PROGRESS NOTES
[facility] PRE-OPERATIVE EVALUATION  [facility] - [facility] PRIMARY CARE [address]     NAME: Rashad Wilkinson  AGE: 69 y.o. SEX: male  : 1955     DATE: 2024    [facility] Pre-Operative Evaluation      Chief Complaint: Pre-operative Evaluation     Surgery: right eye cataract extraction  Anticipated Date of Surgery: 24  Referring Provider: No ref. provider found       History of Present Illness:     Rashad Wilkinson is a 69 y.o. male who presents to the office today for a preoperative consultation at the request of surgeon, at [facility], who plans on performing right eye cataract extraction on 24. Planned anesthesia is IV sedation. Patient has a bleeding risk of: no recent abnormal bleeding, no remote history of abnormal bleeding, and no use of Ca-channel blockers. Patient does not have objections to receiving blood products if needed. Current anti-platelet/anti-coagulation medications that the patient is prescribed includes:  n/a .   Denies concerns or recent changes in health.      Assessment of Chronic Conditions:   - Asthma: mild intermittent, no recent exacerbation  - Hypertension: well controlled on lisinopril  - HIV: managed by infectious disease      Assessment of Cardiac Risk:  Denies unstable or severe angina or MI in the last 6 weeks or history of stent placement in the last year   Denies decompensated heart failure (e.g. New onset heart failure, NYHA functional class IV heart failure, or worsening existing heart failure)  Denies significant arrhythmias such as high grade AV block, symptomatic ventricular arrhythmia, newly recognized ventricular tachycardia, supraventricular tachycardia with resting heart rate >100, or symptomatic bradycardia  Denies severe heart valve disease including aortic stenosis or symptomatic mitral stenosis     Exercise Capacity:  Able to walk 4 blocks without symptoms?: Yes  Able to walk 2 flights without symptoms?: Yes    Prior  Anesthesia Reactions: No     Personal history of venous thromboembolic disease? No    History of steroid use for >2 weeks within last year? No         Review of Systems:     Review of Systems    Current Problem List:     Patient Active Problem List   Diagnosis    Benign prostatic hyperplasia with nocturia    Chronic allergic rhinitis due to animal hair and dander    Mild intermittent asthma without complication    HIV disease (HCC)    ZAIRA (obstructive sleep apnea)    Benign essential tremor    History of pneumocystis pneumonia    Malignant neoplasm of prostate (HCC)    Obesity, morbid (HCC)    History of hypertension    History of hyperlipidemia    History of asthma       Allergies:     No Known Allergies    Current Medications:       Current Outpatient Medications:     albuterol (PROVENTIL HFA,VENTOLIN HFA) 90 mcg/act inhaler, USE 2 INHALATIONS BY MOUTH EVERY 6 HOURS AS NEEDED FOR WHEEZING  OR SHORTNESS OF BREATH, Disp: 34 g, Rfl: 2    atorvastatin (LIPITOR) 10 mg tablet, TAKE 1 TABLET BY MOUTH DAILY, Disp: 90 tablet, Rfl: 3    cholecalciferol (VITAMIN D3) 1,000 units tablet, Take 1 tablet (1,000 Units total) by mouth daily, Disp: , Rfl:     fluticasone (Flovent HFA) 220 mcg/act inhaler, , Disp: , Rfl:     lisinopril (ZESTRIL) 5 mg tablet, TAKE 1 TABLET BY MOUTH DAILY, Disp: 90 tablet, Rfl: 0    Multiple Vitamin (MULTIVITAMIN ADULT PO), , Disp: , Rfl:     tamsulosin (FLOMAX) 0.4 mg, TAKE 1 CAPSULE BY MOUTH TWICE  DAILY, Disp: 180 capsule, Rfl: 1    bictegravir-emtricitab-tenofovir alafenamide (Biktarvy) -25 MG tablet, Take 1 tablet by mouth daily with breakfast, Disp: 90 tablet, Rfl: 1    Fluticasone-Salmeterol (Wixela Inhub) 250-50 mcg/dose inhaler, Inhale 1 puff 2 (two) times a day Rinse mouth after use., Disp: 180 blister, Rfl: 1    Past Medical History:       Past Medical History:   Diagnosis Date    Abscess, cheek     Last Assessed: 2/23/2016    Allergic rhinitis     Asthma     Benign prostatic  hyperplasia     Chronic headaches     Constipation     COPD (chronic obstructive pulmonary disease) (HCC)     Cough     Difficulty attaining erection     Dyspnea     Enlarged prostate     Hemorrhoids     HIV (human immunodeficiency virus infection) ([facility])     Hypertension     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HCC)     Pneumonia 2022    clear    Prostate cancer ([facility]) 2022    Seasonal allergies     Slow urinary stream     Wheezing         Past Surgical History:   Procedure Laterality Date    ABSCESS DRAINAGE      COLONOSCOPY  May 5, 2021    precancerous polyps    [place] BRNCHSC INCL FLUOR GDNCE DX W/CELL WASHG SPX N/A 2018    Procedure: BRONCHOSCOPY FLEXIBLE;  Surgeon: Daksha Naqvi DO;  Location: [facility];  Service: Pulmonary    SKIN LESION EXCISION      Excision of lesion in vestibule of mouth; Last Assessed: 2016        Family History   Problem Relation Age of Onset    Hypertension Mother     Glaucoma Mother     Cancer Father         Prostate    Prostate cancer Father     Diabetes Maternal Uncle     Glaucoma Maternal Uncle     Substance Abuse Neg Hx         neg fam hx    Mental illness Neg Hx         neg fam hx        Social History     Socioeconomic History    Marital status: /Civil Union     Spouse name: Not on file    Number of children: Not on file    Years of education: Not on file    Highest education level: Not on file   Occupational History    Not on file   Tobacco Use    Smoking status: Former     Current packs/day: 0.00     Average packs/day: 1 pack/day for 25.1 years (25.1 ttl pk-yrs)     Types: Cigarettes     Start date: 1968     Quit date: 1993     Years since quittin.4     Passive exposure: Past    Smokeless tobacco: Never    Tobacco comments:     on and off during period of use   Vaping Use    Vaping status: Never Used   Substance and Sexual Activity    Alcohol use: Yes     Comment: socially / 1-4 drinks/week    Drug use: No    Sexual activity: Not Currently     " Partners: Female   Other Topics Concern    Not on file   Social History Narrative    Daily caffeine consumption, 2-3 servings a day    Lives with wife    Feels safe at home.    Sees dentist reg    No living will     Social Determinants of Health     Financial Resource Strain: Not on file   Food Insecurity: Not on file   Transportation Needs: No Transportation Needs (9/1/2020)    PRAPARE - Transportation     Lack of Transportation (Medical): No     Lack of Transportation (Non-Medical): No   Physical Activity: Inactive (9/1/2020)    Exercise Vital Sign     Days of Exercise per Week: 0 days     Minutes of Exercise per Session: 0 min   Stress: No Stress Concern Present (9/1/2020)    [employer] - Occupational Stress Questionnaire     Feeling of Stress : Not at all   Social Connections: Not on file   Intimate Partner Violence: Not At Risk (9/1/2020)    Humiliation, Afraid, Rape, and Kick questionnaire     Fear of Current or Ex-Partner: No     Emotionally Abused: No     Physically Abused: No     Sexually Abused: No   Housing Stability: Not on file        Physical Exam:     /68 (BP Location: Left arm, Patient Position: Sitting, Cuff Size: Standard)   Pulse 64   Temp 97.5 °F (36.4 °C) (Tympanic)   Ht 5' 11\" (1.803 m)   Wt 108 kg (238 lb 9.6 oz)   SpO2 98%   BMI 33.28 kg/m²     Physical Exam     Data:     Pre-operative work-up    Laboratory Results:  n/a     EKG:  n/a    Chest x-ray:  n/a      Previous cardiopulmonary studies within the past year:  Echocardiogram: n/a  Cardiac Catheterization: n/a  Stress Test: n/a  Pulmonary Function Testing: n/a      Assessment & Recommendations:     1. Preop examination      preop H&P completed and medical clearance issued      2. Cataract of right eye, unspecified cataract type        3. Malignant neoplasm of prostate (HCC)      Established with oncology          Pre-Op Evaluation Assessment  69 y.o. male with planned surgery: right eye cataract " extraction.    Known risk factors for perioperative complications: None.        Current medications which may produce withdrawal symptoms if withheld perioperatively: n/a.    Pre-Op Evaluation Plan  1. Further preoperative workup as follows:   - None; no further preoperative work-up is required    2. Medication Management/Recommendations:   - None, continue medication regimen including morning of surgery, with sip of water    3. Prophylaxis for cardiac events with perioperative beta-blockers: not indicated.    4. Patient requires further consultation with: None    Clearance  Patient is CLEARED for surgery without any additional cardiac testing.     GENNY Acosta  [facility] CARE [address]   [address] [place] [id number]  Phone#  [phone]  Fax#  [phone]

## 2024-06-26 ENCOUNTER — OFFICE VISIT (OUTPATIENT)
Dept: HEMATOLOGY ONCOLOGY | Facility: CLINIC | Age: 69
End: 2024-06-26
Payer: COMMERCIAL

## 2024-06-26 VITALS
TEMPERATURE: 98.1 F | SYSTOLIC BLOOD PRESSURE: 124 MMHG | BODY MASS INDEX: 33.74 KG/M2 | OXYGEN SATURATION: 96 % | DIASTOLIC BLOOD PRESSURE: 80 MMHG | HEIGHT: 71 IN | RESPIRATION RATE: 17 BRPM | WEIGHT: 241 LBS | HEART RATE: 60 BPM

## 2024-06-26 DIAGNOSIS — R97.20 RISING PSA LEVEL: ICD-10-CM

## 2024-06-26 DIAGNOSIS — M54.50 CHRONIC MIDLINE LOW BACK PAIN WITHOUT SCIATICA: ICD-10-CM

## 2024-06-26 DIAGNOSIS — Z87.09 HISTORY OF ASTHMA: ICD-10-CM

## 2024-06-26 DIAGNOSIS — B20 HIV DISEASE (HCC): ICD-10-CM

## 2024-06-26 DIAGNOSIS — C61 MALIGNANT NEOPLASM OF PROSTATE (HCC): Primary | ICD-10-CM

## 2024-06-26 DIAGNOSIS — G89.29 CHRONIC MIDLINE LOW BACK PAIN WITHOUT SCIATICA: ICD-10-CM

## 2024-06-26 DIAGNOSIS — Z86.79 HISTORY OF HYPERTENSION: ICD-10-CM

## 2024-06-26 DIAGNOSIS — Z86.39 HISTORY OF HYPERLIPIDEMIA: ICD-10-CM

## 2024-06-26 PROCEDURE — G2211 COMPLEX E/M VISIT ADD ON: HCPCS | Performed by: INTERNAL MEDICINE

## 2024-06-26 PROCEDURE — 99214 OFFICE O/P EST MOD 30 MIN: CPT | Performed by: INTERNAL MEDICINE

## 2024-06-26 NOTE — PATIENT INSTRUCTIONS
Bone scan and x-ray lumbosacral spine in 1 week.  PSA test every 8 weeks.  CBCD and CMP prior to next visit in 4 months.

## 2024-06-29 NOTE — PROGRESS NOTES
HPI: Continuation of care for prostate cancer and PSA has increased from 0.1-0.2.    Prostate cancer was diagnosed in 2022.  [place] score was 3+4=7.  PSA  2.5.  Patient received radiation treatments 30-35 and he completed radiation in May 2022.  No therapy for cancer since then.  Patient follows with his urologist in [place].  He states his PSA came down to 0.8 and then 0.2 and  increased to 0.4 but has came down to  0.2.  I noted PSA was 0.3 in September 2023.  PSA was 0.1 in March 2024.  PSA 0.2.  Patient does not have any new symptoms from prostate cancer.  His urine flow is low.  He has been on Flomax.  He wakes up twice at night to pass urine.  Patient has chronic pain in his lower back and that will be checked further to make sure there is no metastatic disease in that area.  History of hypertension, high cholesterol, asthma and HIV.  He is on medications for these conditions.  He follows with his primary physician, urologist and other consultants.  Patient has quit smoking cigarettes.  He drinks beer but not daily.    ROS:  06/29/24 Reviewed 12 systems: See symptoms in HPI  No other neurological, cardiac, pulmonary, GI and  symptoms.  Other symptoms are in HPI.  No fevers, chills, bleeding, bone pains, skin rash, weight loss, night sweats, tiredness, weakness, numbness, claudication, arthritic symptoms, skin rash, swelling of the ankles and swollen glands.  Patient is not anxious.      Historical Information   Past Medical History:   Diagnosis Date   • Abscess, cheek     Last Assessed: 2/23/2016   • Allergic rhinitis    • Asthma    • Benign prostatic hyperplasia    • Chronic headaches    • Constipation    • COPD (chronic obstructive pulmonary disease) ([facility])    • Cough    • Difficulty attaining erection    • Dyspnea    • Enlarged prostate    • Hemorrhoids    • HIV (human immunodeficiency virus infection) ([facility])    • Hypertension    • 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[facility])    • Pneumonia Jan 27 2022     clear   • Prostate cancer (HCC) 2022   • Seasonal allergies    • Slow urinary stream    • Wheezing      Past Surgical History:   Procedure Laterality Date   • ABSCESS DRAINAGE     • COLONOSCOPY  May 5, 2021    precancerous polyps   • NV BRNCHSC INCL FLUOR GDNCE DX W/CELL WASHG SPX N/A 2018    Procedure: BRONCHOSCOPY FLEXIBLE;  Surgeon: Daksha Naqvi DO;  Location: [facility];  Service: Pulmonary   • SKIN LESION EXCISION      Excision of lesion in vestibule of mouth; Last Assessed: 2016     Social History   Social History     Substance and Sexual Activity   Alcohol Use Yes    Comment: socially / 1-4 drinks/week     Social History     Substance and Sexual Activity   Drug Use No     Social History     Tobacco Use   Smoking Status Former   • Current packs/day: 0.00   • Average packs/day: 1 pack/day for 25.1 years (25.1 ttl pk-yrs)   • Types: Cigarettes   • Start date: 1968   • Quit date: 1993   • Years since quittin.4   • Passive exposure: Past   Smokeless Tobacco Never   Tobacco Comments    on and off during period of use     Family History:   Family History   Problem Relation Age of Onset   • Hypertension Mother    • Glaucoma Mother    • Cancer Father         Prostate   • Prostate cancer Father    • Diabetes Maternal Uncle    • Glaucoma Maternal Uncle    • Substance Abuse Neg Hx         neg fam hx   • Mental illness Neg Hx         neg fam hx         Current Outpatient Medications:   •  atorvastatin (LIPITOR) 10 mg tablet, TAKE 1 TABLET BY MOUTH DAILY, Disp: 90 tablet, Rfl: 3  •  cholecalciferol (VITAMIN D3) 1,000 units tablet, Take 1 tablet (1,000 Units total) by mouth daily, Disp: , Rfl:   •  fluticasone (Flovent HFA) 220 mcg/act inhaler, , Disp: , Rfl:   •  lisinopril (ZESTRIL) 5 mg tablet, TAKE 1 TABLET BY MOUTH DAILY, Disp: 90 tablet, Rfl: 0  •  Multiple Vitamin (MULTIVITAMIN ADULT PO), , Disp: , Rfl:   •  tamsulosin (FLOMAX) 0.4 mg, TAKE 1 CAPSULE BY MOUTH TWICE  DAILY, Disp: 180  "capsule, Rfl: 1  •  albuterol (PROVENTIL HFA,VENTOLIN HFA) 90 mcg/act inhaler, USE 2 INHALATIONS BY MOUTH EVERY 6 HOURS AS NEEDED FOR WHEEZING  OR SHORTNESS OF BREATH (Patient not taking: Reported on 6/26/2024), Disp: 34 g, Rfl: 2  •  bictegravir-emtricitab-tenofovir alafenamide (Biktarvy) -25 MG tablet, Take 1 tablet by mouth daily with breakfast, Disp: 90 tablet, Rfl: 1  •  Fluticasone-Salmeterol (Wixela Inhub) 250-50 mcg/dose inhaler, Inhale 1 puff 2 (two) times a day Rinse mouth after use., Disp: 180 blister, Rfl: 1    No Known Allergies    Physical Exam:  Vitals:    06/26/24 1406   BP: 124/80   BP Location: Left arm   Patient Position: Sitting   Cuff Size: Adult   Pulse: 60   Resp: 17   Temp: 98.1 °F (36.7 °C)   SpO2: 96%   Weight: 109 kg (241 lb)   Height: 5' 11\" (1.803 m)     Alert and oriented and not in distress.  Stable vitals.  No icterus.  No oral thrush.  No palpable neck mass.  Clear lung fields.  Regular heart rate.  Soft and nontender abdomen.  There is no palpable abdominal mass.  There is no ascites.  No edema of the ankles.  No calf tenderness.  Lymph nodes are not palpably enlarged in the neck and axillary areas.  There is no focal neurological deficit.  No skin rash.  No clubbing.  Performance status 1 on ECOG scale.      Lab Results: I have reviewed all pertinent labs.  LABS:    Results for orders placed or performed in visit on 04/18/24   T-helper cells (CD4) count   Result Value Ref Range    CD4  359 - 1,519 /uL    CD4 % [place] T Cell 25.5 (L) 30.8 - 58.5 %    White Blood Cell Count 6.7 3.4 - 10.8 x10E3/uL    Red Blood Cell Count 4.50 4.14 - 5.80 x10E6/uL    Hemoglobin 13.4 13.0 - 17.7 g/dL    HCT 42.6 37.5 - 51.0 %    MCV 95 79 - 97 fL    MCH 29.8 26.6 - 33.0 pg    MCHC 31.5 31.5 - 35.7 g/dL    RDW 12.3 11.6 - 15.4 %    Platelet Count 267 150 - 450 x10E3/uL    Neutrophils 42 Not Estab. %    Lymphocytes 38 Not Estab. %    Monocytes 8 Not Estab. %    Eosinophils 11 Not Estab. %    " Basophils PCT 1 Not Estab. %    Neutrophils (Absolute) 2.9 1.4 - 7.0 x10E3/uL    Lymphocytes (Absolute) 2.5 0.7 - 3.1 x10E3/uL    Monocytes (Absolute) 0.5 0.1 - 0.9 x10E3/uL    Eosinophils (Absolute) 0.7 (H) 0.0 - 0.4 x10E3/uL    Basophils ABS 0.0 0.0 - 0.2 x10E3/uL    Immature Granulocytes 0 Not Estab. %    Immature Granulocytes (Absolute) 0.0 0.0 - 0.1 x10E3/uL   Comprehensive metabolic panel   Result Value Ref Range    Glucose, Random 94 70 - 99 mg/dL    BUN 15 8 - 27 mg/dL    Creatinine 0.87 0.76 - 1.27 mg/dL    eGFR 93 >59 mL/min/1.73    SL AMB BUN/CREATININE RATIO 17 10 - 24    Sodium 138 134 - 144 mmol/L    Potassium 4.7 3.5 - 5.2 mmol/L    Chloride 100 96 - 106 mmol/L    CO2 25 20 - 29 mmol/L    CALCIUM 9.5 8.6 - 10.2 mg/dL    Protein, Total 6.6 6.0 - 8.5 g/dL    Albumin 4.2 3.9 - 4.9 g/dL    Globulin, Total 2.4 1.5 - 4.5 g/dL    Albumin/Globulin Ratio 1.8 1.2 - 2.2    TOTAL BILIRUBIN 0.6 0.0 - 1.2 mg/dL    Alk Phos Isoenzymes 59 44 - 121 IU/L    AST 25 0 - 40 IU/L    ALT 26 0 - 44 IU/L   HIV-1 RNA, quantitative, PCR   Result Value Ref Range    HIV-1 RNA by PCR, Qn <20 copies/mL    HIV-1 RNA Viral Load Log CANCELED [id number]/mL   Hepatitis C antibody   Result Value Ref Range    HEP C AB Non Reactive Non Reactive   Hepatitis B surface antibody   Result Value Ref Range    Hepatitis B Surface Ab Quant 19.7 Immunity>9.9 mIU/mL                   Component  Ref Range & Units 6/21/24  6:45 AM 3/12/24  9:37 AM 9/12/23  9:34 AM 10/16/21  7:35 AM 7/7/21 10:52 AM 9/23/20 10:05 AM 4/19/19  8:27 AM   Prostate Specific Antigen Total  0.0 - 4.0 ng/mL 0.2 0.1 CM 0.3 CM 2.5 CM 3.1 CM 2.1 CM 1.3 CM   Comment: Roche ECLIA methodology.        Case Report   Surgical Pathology Report                         Case: [id number]                                    Authorizing Provider:  Rio Dalal MD        Collected:           08/29/2023 0733               Ordering Location:     [facility]      Received:             08/29/2023 [address] [place] [address] Specialty                                                                                   Laboratory                                                                    Pathologist:           Davis Hardy MD                                                                  Specimen:    Prostate, Prostate Biopsy (3 slides [id number] Dynamic Signal, collected 2/14/2022)         Final Diagnosis   Prostate Biopsy (3 slides [id number] Dynamic Signal, collected 2/14/2022):     Specimen 4 Left apex:  - Prostatic adenocarcinoma, acinar type, [place] score 3 + 4 = 7, Prognostic Grade Group 2, continuously involving 1 of 1 cores (100%).  - Percentage of Shahab pattern 4: 30%  - Perineural invasion: Not identified     Specimen 10 Left lateral apex:  - Prostatic adenocarcinoma, acinar type, [place] score 3 + 4 = 7, Prognostic Grade Group 2, continuously involving 3 of 3 cores (80% of total tissue).  - Percentage of Maize pattern 4: 20%  - Perineural invasion: Not identified     Specimen 11 Left lateral mid:  - Prostatic adenocarcinoma, acinar type, [place] score 3 + 4 = 7, Prognostic Grade Group 2, continuously involving 2 of 2 cores (90% of total tissue).  - Percentage of Shahab pattern 4: 10%  - Perineural invasion: Not identified   Electronically signed by Davis Hardy MD on 8/29/2023 at 10:07 AM   Additional Information          Imaging Studies: I have personally reviewed pertinent reports.    IMPRESSION:     1. PI-RADSv2.1 Category 4 -High (clinically significant cancer is likely to be present).  Lesion in the lateral left peripheral zone at apex.     2. No extraprostatic tumor, seminal vesicle invasion, pelvic lymphadenopathy, or pelvic osseous metastatic disease.     3. Calculated prostate volume of 36 cc.     Prostate gland boundaries and areas  of concern for significant prostate cancer were segmented using 3D advanced post-processing on an independent [employer] system workstation with active physician participation.  The segmentation was performed should   MR-ultrasound fusion biopsy be required.     The study was marked in EPIC for significant notification.     Workstation performed: [id number]        Imaging    MRI prostate multiparametric wo w contrast (Order: [id number]) - 12/9/2021    Pathology, and Other Studies: I have personally reviewed pertinent reports.    See above    Assessment and Plan:  See diagnoses, orders instructions below  Continuation of care for prostate cancer and PSA has increased from 0.1-0.2.    Prostate cancer was diagnosed in 2022.  [place] score was 3+4=7.  PSA  2.5.  Patient received radiation treatments 30-35 and he completed radiation in May 2022.  No therapy for cancer since then.  Patient follows with his urologist in [place].  He states his PSA came down to 0.8 and then 0.2 and  increased to 0.4 but has came down to  0.2.  I noted PSA was 0.3 in September 2023.  PSA was 0.1 in March 2024.  PSA 0.2.  Patient does not have any new symptoms from prostate cancer.  His urine flow is low.  He has been on Flomax.  He wakes up twice at night to pass urine.  Patient has chronic pain in his lower back and that will be checked further to make sure there is no metastatic disease in that area.  History of hypertension, high cholesterol, asthma and HIV.  He is on medications for these conditions.  He follows with his primary physician, urologist and other consultants.  Patient has quit smoking cigarettes.  He drinks beer but not daily.    Physical examination and test results are as recorded and discussed in detail.  Patient states when time came for hormone injections he might go for removal of testicles.  At this point he wants PSA to be monitored.  He will go for x-ray and bone scan for  Chronic low back pain and if needed he  could have MRI scan and PSMA scan.  Discussed all this with the patient in detail.  Questions answered.    See diagnoses, orders and instructions.  Discussed healthy diet, protein supplements, activities as tolerated and health screening test.  Patient is capable of self-care.  Plan is surveillance for now and he would prefer that at this time.  Goal is prolongation of survival and cure if possible.  1. Malignant neoplasm of prostate (HCC)    - XR spine lumbar minimum 4 views non injury; Future  - NM bone scan whole body; Future  - PSA Total, Diagnostic; Standing  - PSA Total, Diagnostic  - CBC and differential; Future  - Comprehensive metabolic panel; Future  - CBC and differential  - Comprehensive metabolic panel    2. Rising PSA level    - XR spine lumbar minimum 4 views non injury; Future  - NM bone scan whole body; Future  - PSA Total, Diagnostic; Standing  - PSA Total, Diagnostic    3. History of hypertension      4. History of hyperlipidemia      5. HIV disease (HCC)      6. History of asthma      7. Chronic midline low back pain without sciatica    - XR spine lumbar minimum 4 views non injury; Future  - NM bone scan whole body; Future    Bone scan and x-ray lumbosacral spine in 1 week.  PSA test every 8 weeks.  CBCD and CMP prior to next visit in 4 months.    He will make an appointment for oncology genetic.   Patient will continue to follow with  primary physician and other consultants.  Patient voiced understanding and agrees    Disclaimer: This document was prepared using a dictation device. If a word or phrase is confusing, or does not make sense, this is likely due to recognition error which was not discovered during the providers review. If you believe an error has occurred, please Contact me through [facility] Line service for patrick?cation.    Counseling / Coordination of Care  ..  Provided counseling and support

## 2024-07-16 ENCOUNTER — SURGERY/PROCEDURE (OUTPATIENT)
Dept: URBAN - METROPOLITAN AREA SURGICAL CENTER 17 | Facility: SURGICAL CENTER | Age: 69
End: 2024-07-16

## 2024-07-16 DIAGNOSIS — H25.11: ICD-10-CM

## 2024-07-16 PROCEDURE — MISCLAL LIGHT ADJUSTABLE LENS

## 2024-07-16 PROCEDURE — 66984 XCAPSL CTRC RMVL W/O ECP: CPT

## 2024-07-17 ENCOUNTER — 1 DAY POST-OP (OUTPATIENT)
Dept: URBAN - METROPOLITAN AREA CLINIC 79 | Facility: CLINIC | Age: 69
End: 2024-07-17

## 2024-07-17 DIAGNOSIS — Z96.1: ICD-10-CM

## 2024-07-17 PROCEDURE — 99024 POSTOP FOLLOW-UP VISIT: CPT

## 2024-07-17 ASSESSMENT — VISUAL ACUITY
OD_SC: 20/50
OD_PH: 20/20-1

## 2024-07-17 ASSESSMENT — TONOMETRY: OD_IOP_MMHG: 22

## 2024-07-19 ENCOUNTER — TELEPHONE (OUTPATIENT)
Dept: HEMATOLOGY ONCOLOGY | Facility: CLINIC | Age: 69
End: 2024-07-19

## 2024-07-19 NOTE — TELEPHONE ENCOUNTER
Patient will like labs, referrals and scans scripts that  ordered on his last visit 6/26 to be email or mailed out to him.       Email: [email or web address]         Thank you!

## 2024-07-23 DIAGNOSIS — B20 HIV DISEASE (HCC): Primary | ICD-10-CM

## 2024-07-24 ENCOUNTER — 1 WEEK POST-OP (OUTPATIENT)
Dept: URBAN - METROPOLITAN AREA CLINIC 79 | Facility: CLINIC | Age: 69
End: 2024-07-24

## 2024-07-24 DIAGNOSIS — Z96.1: ICD-10-CM

## 2024-07-24 PROCEDURE — 99024 POSTOP FOLLOW-UP VISIT: CPT

## 2024-07-24 ASSESSMENT — TONOMETRY: OD_IOP_MMHG: 19

## 2024-07-24 ASSESSMENT — VISUAL ACUITY: OD_SC: 20/20

## 2024-07-25 ENCOUNTER — TELEPHONE (OUTPATIENT)
Dept: INFECTIOUS DISEASES | Facility: CLINIC | Age: 69
End: 2024-07-25

## 2024-07-25 NOTE — TELEPHONE ENCOUNTER
Called and left message for patient today.   Reminded patient of upcoming virtual appointment and to have labs done prior so results can be gone over at the appointment.   Informed patient if there are any further questions to call the office.

## 2024-07-26 ENCOUNTER — HOSPITAL ENCOUNTER (OUTPATIENT)
Dept: RADIOLOGY | Facility: HOSPITAL | Age: 69
End: 2024-07-26
Payer: COMMERCIAL

## 2024-07-26 ENCOUNTER — HOSPITAL ENCOUNTER (OUTPATIENT)
Dept: NUCLEAR MEDICINE | Facility: HOSPITAL | Age: 69
End: 2024-07-26
Attending: INTERNAL MEDICINE
Payer: COMMERCIAL

## 2024-07-26 DIAGNOSIS — C61 MALIGNANT NEOPLASM OF PROSTATE (HCC): ICD-10-CM

## 2024-07-26 DIAGNOSIS — M54.50 CHRONIC MIDLINE LOW BACK PAIN WITHOUT SCIATICA: ICD-10-CM

## 2024-07-26 DIAGNOSIS — R97.20 RISING PSA LEVEL: ICD-10-CM

## 2024-07-26 DIAGNOSIS — G89.29 CHRONIC MIDLINE LOW BACK PAIN WITHOUT SCIATICA: ICD-10-CM

## 2024-07-26 PROCEDURE — 78306 BONE IMAGING WHOLE BODY: CPT

## 2024-07-26 PROCEDURE — A9503 TC99M MEDRONATE: HCPCS

## 2024-07-26 PROCEDURE — 72110 X-RAY EXAM L-2 SPINE 4/>VWS: CPT

## 2024-07-29 ENCOUNTER — TELEPHONE (OUTPATIENT)
Dept: INFECTIOUS DISEASES | Facility: CLINIC | Age: 69
End: 2024-07-29

## 2024-07-29 NOTE — TELEPHONE ENCOUNTER
Contacted patient as he is scheduled for visit tomorrow. Inquiring as to if he did get labs and which lab he did go to so we can obtain results. Would r/s til after labs if he did not.    LM for pt to CB.

## 2024-07-30 ENCOUNTER — OFFICE VISIT (OUTPATIENT)
Dept: INFECTIOUS DISEASES | Facility: CLINIC | Age: 69
End: 2024-07-30
Payer: COMMERCIAL

## 2024-07-30 VITALS
BODY MASS INDEX: 33.04 KG/M2 | DIASTOLIC BLOOD PRESSURE: 72 MMHG | SYSTOLIC BLOOD PRESSURE: 118 MMHG | OXYGEN SATURATION: 96 % | HEART RATE: 67 BPM | HEIGHT: 71 IN | WEIGHT: 236 LBS | TEMPERATURE: 96.8 F

## 2024-07-30 DIAGNOSIS — E66.01 OBESITY, MORBID (HCC): ICD-10-CM

## 2024-07-30 DIAGNOSIS — B20 HIV DISEASE (HCC): Primary | ICD-10-CM

## 2024-07-30 PROCEDURE — G2211 COMPLEX E/M VISIT ADD ON: HCPCS | Performed by: STUDENT IN AN ORGANIZED HEALTH CARE EDUCATION/TRAINING PROGRAM

## 2024-07-30 PROCEDURE — 99214 OFFICE O/P EST MOD 30 MIN: CPT | Performed by: STUDENT IN AN ORGANIZED HEALTH CARE EDUCATION/TRAINING PROGRAM

## 2024-07-30 PROCEDURE — 1160F RVW MEDS BY RX/DR IN RCRD: CPT | Performed by: STUDENT IN AN ORGANIZED HEALTH CARE EDUCATION/TRAINING PROGRAM

## 2024-07-30 PROCEDURE — 1159F MED LIST DOCD IN RCRD: CPT | Performed by: STUDENT IN AN ORGANIZED HEALTH CARE EDUCATION/TRAINING PROGRAM

## 2024-07-30 RX ORDER — BICTEGRAVIR SODIUM, EMTRICITABINE, AND TENOFOVIR ALAFENAMIDE FUMARATE 50; 200; 25 MG/1; MG/1; MG/1
1 TABLET ORAL
Qty: 90 TABLET | Refills: 1 | Status: SHIPPED | OUTPATIENT
Start: 2024-07-30 | End: 2025-01-26

## 2024-07-30 NOTE — PATIENT INSTRUCTIONS
-Continue Biktarvy  -check labs in 5 months and follow up after  -recommend the pneumococcal vaccine (PCV 20)

## 2024-07-30 NOTE — PROGRESS NOTES
Progress Note - Infectious Disease   Rashad Wilkinson 69 y.o. male MRN: [id number]  Unit/Bed#:  Encounter: [id number]      Impression/Plan:    1. HIV.  Diagnosed in 2018 when he was hospitalized for pneumocystis pneumonia.  Patient initially on Genvoya which was then switched to Biktarvy 5/2019 due to DDI's.  The patient is tolerating Biktarvy without side effect.  Reports 100% adherence with his medications.  Most recent viral load undetectable and CD4 638              -Continue Biktarvy 1 tab daily              -Patient aware to separate Biktarvy from multivitamins for at least 4 hours              -Patient was provided medication, adherence and prevention education              -Check labs in 5 months to evaluate for virologic control, drug toxicity, coinfection              -ID follow-up in 6 months     2.  Prostate cancer.  Diagnosed in 2020 and completed radiation therapy.  No cancer therapy since that time.  Patient follows with oncology.  Recently underwent bone scan which did not show any metastatic disease.     3.  Hyperlipidemia.  Statin therapy per primary     4.  Vaccinations. Hepatitis B immune. Will assess hepatitis A Ab with next labs.  Recommend PCV 20 since he is due for pneumococcal vaccination; patient states he will get this at his pharmacy.    Above management plan discussed with the patient who is in agreement.  ID follow-up in 6 months.    Subjective:  Routine follow-up for HIV.  Patient claims 100% adherence with Biktarvy. Patient denies any notable side effects.  Overall he is feeling well.  The patient denies any fever chills or sweats, denies any nausea vomiting or diarrhea, denies any cough or shortness of breath. He recently underwent cataract surgery.    ROS:  A complete review of systems is negative other than that noted above in the subjective    Followup portions patient history reviewed and updated as:  Allergies, current medications, past medical history, past social history, past  "surgical history, and the problem list    Objective:  Vitals:  Vitals:    07/30/24 0955   BP: 118/72   BP Location: Left arm   Patient Position: Sitting   Cuff Size: Standard   Pulse: 67   Temp: (!) 96.8 °F (36 °C)   TempSrc: Temporal   SpO2: 96%   Weight: 107 kg (236 lb)   Height: 5' 11\" (1.803 m)       Physical Exam:   General Appearance:  Alert, interactive, appearing well,  nontoxic, no acute distress.   Neck:   Supple without lymphadenopathy, no thyromegaly or masses   Throat: Oropharynx moist without lesions.    Lungs:   Clear to auscultation bilaterally; no wheezes, rhonchi or rales; respirations unlabored   Heart:  RRR; no murmur, rub or gallop   Abdomen:   Soft, non-tender, non-distended, positive bowel sounds.     Extremities: No clubbing, cyanosis or edema   Skin: No new rashes or lesions. No draining wounds noted.       Labs, Imaging, & Other studies:   All pertinent labs and imaging studies were personally reviewed    Lab Results   Component Value Date     06/09/2017    K 4.9 06/21/2024     06/21/2024    CO2 23 06/21/2024    BUN 17 06/21/2024    CREATININE 0.98 06/21/2024    GLUCOSE 100 (H) 06/09/2017    CALCIUM 8.9 08/10/2018    AST 22 06/21/2024    ALT 22 06/21/2024    ALKPHOS 57 08/10/2018    PROT 7.9 06/09/2017    BILITOT 0.3 06/09/2017    EGFR 83 06/21/2024     Lab Results   Component Value Date    WBC 6.6 06/21/2024    HGB 14.0 06/21/2024    HCT 42.6 06/21/2024    MCV 95 06/21/2024     06/21/2024     Lab Results   Component Value Date    HEPCAB Non Reactive 05/15/2024     Lab Results   Component Value Date    HEPAIGM Negative 05/03/2019    HEPBIGM Negative 05/03/2019    HEPCAB Non Reactive 05/15/2024     Lab Results   Component Value Date    RPR Non Reactive 07/19/2018     CD4 ABS   Date/Time Value Ref Range Status   05/15/2024 06:50  359 - 1,519 /uL Final     HIV-1 RNA by PCR, Qn   Date/Time Value Ref Range Status   05/15/2024 06:50 AM <20 copies/mL Final     Comment:     " HIV-1 RNA not detected  The reportable range for this assay is 20 to 10,000,000  copies HIV-1 RNA/mL.             Current Outpatient Medications:     atorvastatin (LIPITOR) 10 mg tablet, TAKE 1 TABLET BY MOUTH DAILY, Disp: 90 tablet, Rfl: 3    bictegravir-emtricitab-tenofovir alafenamide (Biktarvy) -25 MG tablet, Take 1 tablet by mouth daily with breakfast, Disp: 90 tablet, Rfl: 1    cholecalciferol (VITAMIN D3) 1,000 units tablet, Take 1 tablet (1,000 Units total) by mouth daily, Disp: , Rfl:     lisinopril (ZESTRIL) 5 mg tablet, TAKE 1 TABLET BY MOUTH DAILY, Disp: 90 tablet, Rfl: 0    Multiple Vitamin (MULTIVITAMIN ADULT PO), , Disp: , Rfl:     tamsulosin (FLOMAX) 0.4 mg, TAKE 1 CAPSULE BY MOUTH TWICE  DAILY, Disp: 180 capsule, Rfl: 1    albuterol (PROVENTIL HFA,VENTOLIN HFA) 90 mcg/act inhaler, USE 2 INHALATIONS BY MOUTH EVERY 6 HOURS AS NEEDED FOR WHEEZING  OR SHORTNESS OF BREATH (Patient not taking: Reported on 6/26/2024), Disp: 34 g, Rfl: 2    fluticasone (Flovent HFA) 220 mcg/act inhaler, , Disp: , Rfl:     Fluticasone-Salmeterol (Wixela Inhub) 250-50 mcg/dose inhaler, Inhale 1 puff 2 (two) times a day Rinse mouth after use. (Patient not taking: Reported on 7/30/2024), Disp: 180 blister, Rfl: 1

## 2024-08-09 ENCOUNTER — PROCEDURE ONLY (OUTPATIENT)
Dept: URBAN - METROPOLITAN AREA CLINIC 48 | Facility: CLINIC | Age: 69
End: 2024-08-09

## 2024-08-09 DIAGNOSIS — Z96.1: ICD-10-CM

## 2024-08-09 PROCEDURE — 99024 POSTOP FOLLOW-UP VISIT: CPT | Mod: LD

## 2024-08-09 ASSESSMENT — TONOMETRY
OS_IOP_MMHG: 22
OD_IOP_MMHG: 19

## 2024-08-09 ASSESSMENT — VISUAL ACUITY
OD_SC: 20/25
OS_SC: 20/100-1
OD_SC: 20/70

## 2024-08-20 ENCOUNTER — PROCEDURE ONLY (OUTPATIENT)
Dept: URBAN - METROPOLITAN AREA CLINIC 48 | Facility: CLINIC | Age: 69
End: 2024-08-20

## 2024-08-20 DIAGNOSIS — Z96.1: ICD-10-CM

## 2024-08-20 PROCEDURE — 99024 POSTOP FOLLOW-UP VISIT: CPT | Mod: LD

## 2024-08-20 ASSESSMENT — VISUAL ACUITY
OS_CC: 20/20
OS_SC: 20/200
OD_SC: 20/20

## 2024-08-26 ENCOUNTER — PROCEDURE ONLY (OUTPATIENT)
Dept: URBAN - METROPOLITAN AREA CLINIC 48 | Facility: CLINIC | Age: 69
End: 2024-08-26

## 2024-08-26 DIAGNOSIS — Z96.1: ICD-10-CM

## 2024-08-26 PROCEDURE — 99024 POSTOP FOLLOW-UP VISIT: CPT | Mod: LD

## 2024-08-26 ASSESSMENT — VISUAL ACUITY
OD_SC: 20/20-1
OS_SC: 20/50-2
OS_PH: 20/25-1
OD_SC: 20/100
OS_SC: 20/25

## 2024-08-26 ASSESSMENT — TONOMETRY
OD_IOP_MMHG: 17
OS_IOP_MMHG: 23

## 2024-09-03 ENCOUNTER — TELEPHONE (OUTPATIENT)
Dept: PULMONOLOGY | Facility: CLINIC | Age: 69
End: 2024-09-03

## 2024-09-03 NOTE — TELEPHONE ENCOUNTER
Tried calling PT in regards to scheduling a 1Y FU with . Currently is schedule isn't available so he advises for PT to see  in the [place] office for this next FU.

## 2024-09-12 LAB — HAV AB SER QL IA: POSITIVE

## 2024-09-14 DIAGNOSIS — I10 HYPERTENSION, UNSPECIFIED TYPE: ICD-10-CM

## 2024-09-14 RX ORDER — LISINOPRIL 5 MG/1
TABLET ORAL
Qty: 90 TABLET | Refills: 1 | Status: SHIPPED | OUTPATIENT
Start: 2024-09-14

## 2024-09-26 ENCOUNTER — POST-OP CHECK (OUTPATIENT)
Dept: URBAN - METROPOLITAN AREA CLINIC 79 | Facility: CLINIC | Age: 69
End: 2024-09-26

## 2024-09-26 DIAGNOSIS — Z96.1: ICD-10-CM

## 2024-09-26 PROCEDURE — 99024 POSTOP FOLLOW-UP VISIT: CPT

## 2024-09-26 ASSESSMENT — VISUAL ACUITY
OS_SC: 20/80
OD_SC: 20/80
OS_SC: 20/25
OS_PH: 20/30
OD_SC: 20/20

## 2024-09-26 ASSESSMENT — TONOMETRY
OD_IOP_MMHG: 17
OS_IOP_MMHG: 20

## 2024-10-06 DIAGNOSIS — R35.1 BENIGN PROSTATIC HYPERPLASIA WITH NOCTURIA: ICD-10-CM

## 2024-10-06 DIAGNOSIS — N40.1 BENIGN PROSTATIC HYPERPLASIA WITH NOCTURIA: ICD-10-CM

## 2024-10-07 RX ORDER — TAMSULOSIN HYDROCHLORIDE 0.4 MG/1
0.4 CAPSULE ORAL 2 TIMES DAILY
Qty: 180 CAPSULE | Refills: 1 | Status: SHIPPED | OUTPATIENT
Start: 2024-10-07

## 2024-10-25 LAB
BASOPHILS # BLD AUTO: 0 X10E3/UL (ref 0–0.2)
BASOPHILS NFR BLD AUTO: 1 %
EOSINOPHIL # BLD AUTO: 0.2 X10E3/UL (ref 0–0.4)
EOSINOPHIL NFR BLD AUTO: 5 %
ERYTHROCYTE [DISTWIDTH] IN BLOOD BY AUTOMATED COUNT: 11.8 % (ref 11.6–15.4)
HCT VFR BLD AUTO: 40.6 % (ref 37.5–51)
HGB BLD-MCNC: 13.4 G/DL (ref 13–17.7)
IMM GRANULOCYTES # BLD: 0 X10E3/UL (ref 0–0.1)
IMM GRANULOCYTES NFR BLD: 0 %
LYMPHOCYTES # BLD AUTO: 1.8 X10E3/UL (ref 0.7–3.1)
LYMPHOCYTES NFR BLD AUTO: 37 %
MCH RBC QN AUTO: 31.2 PG (ref 26.6–33)
MCHC RBC AUTO-ENTMCNC: 33 G/DL (ref 31.5–35.7)
MCV RBC AUTO: 95 FL (ref 79–97)
MONOCYTES # BLD AUTO: 0.4 X10E3/UL (ref 0.1–0.9)
MONOCYTES NFR BLD AUTO: 8 %
NEUTROPHILS # BLD AUTO: 2.5 X10E3/UL (ref 1.4–7)
NEUTROPHILS NFR BLD AUTO: 49 %
PLATELET # BLD AUTO: 248 X10E3/UL (ref 150–450)
RBC # BLD AUTO: 4.29 X10E6/UL (ref 4.14–5.8)
WBC # BLD AUTO: 5 X10E3/UL (ref 3.4–10.8)

## 2024-10-26 LAB
ALBUMIN SERPL-MCNC: 4.2 G/DL (ref 3.9–4.9)
ALP SERPL-CCNC: 68 IU/L (ref 44–121)
ALT SERPL-CCNC: 45 IU/L (ref 0–44)
AST SERPL-CCNC: 39 IU/L (ref 0–40)
BILIRUB SERPL-MCNC: 0.4 MG/DL (ref 0–1.2)
BUN SERPL-MCNC: 19 MG/DL (ref 8–27)
BUN/CREAT SERPL: 22 (ref 10–24)
CALCIUM SERPL-MCNC: 9.1 MG/DL (ref 8.6–10.2)
CHLORIDE SERPL-SCNC: 105 MMOL/L (ref 96–106)
CO2 SERPL-SCNC: 23 MMOL/L (ref 20–29)
CREAT SERPL-MCNC: 0.85 MG/DL (ref 0.76–1.27)
EGFR: 93 ML/MIN/1.73
GLOBULIN SER-MCNC: 2.4 G/DL (ref 1.5–4.5)
GLUCOSE SERPL-MCNC: 102 MG/DL (ref 70–99)
POTASSIUM SERPL-SCNC: 4.3 MMOL/L (ref 3.5–5.2)
PROT SERPL-MCNC: 6.6 G/DL (ref 6–8.5)
SODIUM SERPL-SCNC: 140 MMOL/L (ref 134–144)

## 2024-10-29 ENCOUNTER — OFFICE VISIT (OUTPATIENT)
Dept: HEMATOLOGY ONCOLOGY | Facility: CLINIC | Age: 69
End: 2024-10-29
Payer: COMMERCIAL

## 2024-10-29 VITALS
OXYGEN SATURATION: 95 % | TEMPERATURE: 97.6 F | WEIGHT: 241 LBS | BODY MASS INDEX: 33.74 KG/M2 | HEART RATE: 71 BPM | DIASTOLIC BLOOD PRESSURE: 74 MMHG | HEIGHT: 71 IN | SYSTOLIC BLOOD PRESSURE: 112 MMHG

## 2024-10-29 DIAGNOSIS — C61 MALIGNANT NEOPLASM OF PROSTATE (HCC): Primary | ICD-10-CM

## 2024-10-29 PROCEDURE — 99214 OFFICE O/P EST MOD 30 MIN: CPT | Performed by: PHYSICIAN ASSISTANT

## 2024-10-29 RX ORDER — LORATADINE 10 MG/1
CAPSULE, LIQUID FILLED ORAL
COMMUNITY

## 2024-10-29 NOTE — PROGRESS NOTES
Hematology/Oncology Outpatient Follow-up  Rashad Wilkinson 69 y.o. male 1955 [id number]    Date:  10/29/2024      Assessment and Plan:  1. Malignant neoplasm of prostate (HCC)    Follow-up for history of prostate cancer, status post RT alone.  He is not following with urology.  PSA is at 0.1.    Recommend recheck in 6 months.  Patient requested to recheck every 8 weeks.  Order given.    Follow-up in 6 months.    We reviewed symptoms of dizziness.  These are likely related to his hypotension.  This is worse with certain movements.  He is recommended to wear compression socks when appropriate, he previously on his own discontinued him with lisinopril 5 mg which I do agree with based on his symptoms and BP today being 112/74.  He states he does monitor BP at home and should it be elevated he will sometimes take the lisinopril.    - PSA Total, Diagnostic; Standing  - PSA Total, Diagnostic  - CBC and differential; Future  - Comprehensive metabolic panel; Future  - CBC and differential  - Comprehensive metabolic panel       HPI:  69 year old male presents for follow up for prostate cancer.     Prostate cancer was diagnosed in 2022.  [place] score was 3+4=7.  PSA  2.5.  Patient received radiation treatments 30-35 and he completed radiation in May 2022.  No therapy for cancer since then.  Patient was following with his urologist in [place] but not in awhile.     ROS: Review of Systems   Constitutional:  Negative for activity change, appetite change and fatigue.   Respiratory:  Negative for cough and shortness of breath.    Cardiovascular:  Negative for chest pain, palpitations and leg swelling.   Gastrointestinal:  Negative for abdominal pain, constipation, diarrhea, nausea and vomiting.   Genitourinary:  Negative for difficulty urinating, dysuria and hematuria.   Musculoskeletal:  Positive for back pain (low back, improved since wearing belly band).   Skin: Negative.    Neurological:  Positive for dizziness (with  standing up sometimes, also when in the shower at any time of the day). Negative for weakness and headaches.   Hematological: Negative.    Psychiatric/Behavioral: Negative.       Past Medical History:   Diagnosis Date    Abscess, cheek     Last Assessed: 2016    Allergic rhinitis     Asthma     Benign prostatic hyperplasia     Chronic headaches     Constipation     COPD (chronic obstructive pulmonary disease) (HCC)     Cough     Difficulty attaining erection     Dyspnea     Enlarged prostate     Hemorrhoids     HIV (human immunodeficiency virus infection) ([facility])     Hypertension     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HCC)     Pneumonia 2022    clear    Prostate cancer ([facility]) 2022    Seasonal allergies     Slow urinary stream     Wheezing        Past Surgical History:   Procedure Laterality Date    ABSCESS DRAINAGE      COLONOSCOPY  May 5, 2021    precancerous polyps    PA BRNCHSC INCL FLUOR GDNCE DX W/CELL WASHG SPX N/A 2018    Procedure: BRONCHOSCOPY FLEXIBLE;  Surgeon: Daksha Naqvi DO;  Location: [facility];  Service: Pulmonary    SKIN LESION EXCISION      Excision of lesion in vestibule of mouth; Last Assessed: 2016       Social History     Socioeconomic History    Marital status: /Civil Union     Spouse name: None    Number of children: None    Years of education: None    Highest education level: None   Occupational History    None   Tobacco Use    Smoking status: Former     Current packs/day: 0.00     Average packs/day: 1 pack/day for 25.1 years (25.1 ttl pk-yrs)     Types: Cigarettes     Start date: 1968     Quit date: 1993     Years since quittin.7     Passive exposure: Past    Smokeless tobacco: Never    Tobacco comments:     on and off during period of use   Vaping Use    Vaping status: Never Used   Substance and Sexual Activity    Alcohol use: Yes     Comment: socially / 1-4 drinks/week    Drug use: No    Sexual activity: Not Currently     Partners: Female    Other Topics Concern    None   Social History Narrative    Daily caffeine consumption, 2-3 servings a day    Lives with wife    Feels safe at home.    Sees dentist reg    No living will     Social Determinants of Health     Financial Resource Strain: Not on file   Food Insecurity: Not on file   Transportation Needs: No Transportation Needs (9/1/2020)    PRAPARE - Transportation     Lack of Transportation (Medical): No     Lack of Transportation (Non-Medical): No   Physical Activity: Inactive (9/1/2020)    Exercise Vital Sign     Days of Exercise per Week: 0 days     Minutes of Exercise per Session: 0 min   Stress: No Stress Concern Present (9/1/2020)    [employer] - Occupational Stress Questionnaire     Feeling of Stress : Not at all   Social Connections: Not on file   Intimate Partner Violence: Not At Risk (9/1/2020)    Humiliation, Afraid, Rape, and Kick questionnaire     Fear of Current or Ex-Partner: No     Emotionally Abused: No     Physically Abused: No     Sexually Abused: No   Housing Stability: Not on file       Family History   Problem Relation Age of Onset    Hypertension Mother     Glaucoma Mother     Cancer Father         Prostate    Prostate cancer Father     Diabetes Maternal Uncle     Glaucoma Maternal Uncle     Substance Abuse Neg Hx         neg fam hx    Mental illness Neg Hx         neg fam hx       No Known Allergies      Current Outpatient Medications:     atorvastatin (LIPITOR) 10 mg tablet, TAKE 1 TABLET BY MOUTH DAILY, Disp: 90 tablet, Rfl: 3    bictegravir-emtricitab-tenofovir alafenamide (Biktarvy) -25 MG tablet, Take 1 tablet by mouth daily with breakfast, Disp: 90 tablet, Rfl: 1    cholecalciferol (VITAMIN D3) 1,000 units tablet, Take 1 tablet (1,000 Units total) by mouth daily, Disp: , Rfl:     Loratadine 10 MG CAPS, , Disp: , Rfl:     Multiple Vitamin (MULTIVITAMIN ADULT PO), , Disp: , Rfl:     tamsulosin (FLOMAX) 0.4 mg, TAKE 1 CAPSULE BY MOUTH  "TWICE  DAILY, Disp: 180 capsule, Rfl: 1    fluticasone (Flovent HFA) 220 mcg/act inhaler, , Disp: , Rfl:       Physical Exam:  /74 (BP Location: Right arm, Patient Position: Sitting, Cuff Size: Standard)   Pulse 71   Temp 97.6 °F (36.4 °C) (Temporal)   Ht 5' 11\" (1.803 m)   Wt 109 kg (241 lb)   SpO2 95%   BMI 33.61 kg/m²     Physical Exam  Vitals reviewed.   Constitutional:       General: He is not in acute distress.     Appearance: He is well-developed. He is not ill-appearing.   HENT:      Head: Normocephalic and atraumatic.   Eyes:      General: No scleral icterus.     Conjunctiva/sclera: Conjunctivae normal.   Cardiovascular:      Rate and Rhythm: Normal rate and regular rhythm.      Heart sounds: Normal heart sounds. No murmur heard.  Pulmonary:      Effort: Pulmonary effort is normal. No respiratory distress.      Breath sounds: Normal breath sounds.   Abdominal:      Palpations: Abdomen is soft.      Tenderness: There is no abdominal tenderness.   Musculoskeletal:         General: No tenderness. Normal range of motion.      Cervical back: Normal range of motion and neck supple.      Right lower leg: No edema.      Left lower leg: No edema.   Lymphadenopathy:      Cervical: No cervical adenopathy.   Skin:     General: Skin is warm and dry.   Neurological:      Mental Status: He is alert and oriented to person, place, and time.      Cranial Nerves: No cranial nerve deficit.   Psychiatric:         Mood and Affect: Mood normal.         Behavior: Behavior normal.       Labs:  Lab Results   Component Value Date    WBC 5.0 10/25/2024    HGB 13.4 10/25/2024    HCT 40.6 10/25/2024    MCV 95 10/25/2024     10/25/2024     I have spent 30 minutes with Patient  today in which greater than 50% of this time was spent in counseling/coordination of care regarding Diagnostic results, Risks and benefits of tx options, Instructions for management, Patient and family education, Impressions, Documenting in the " medical record, Reviewing / ordering tests, medicine, procedures  , and Obtaining or reviewing history  .    Patient voiced understanding and agreement in the above discussion. Aware to contact our office with questions/symptoms in the interim.     This note has been generated by voice recognition software system.  Therefore, there may be spelling, grammar, and or syntax errors. Please contact if questions arise.

## 2024-11-26 ENCOUNTER — TELEPHONE (OUTPATIENT)
Age: 69
End: 2024-11-26

## 2024-11-26 NOTE — TELEPHONE ENCOUNTER
Pt states he is going to [facility] to have blood work done/ he cannot find lab slips/ asking if they can be put in his mychart.

## 2024-11-27 PROBLEM — Z23 NEED FOR VACCINATION: Status: ACTIVE | Noted: 2024-11-27

## 2024-11-27 PROBLEM — E78.5 HYPERLIPIDEMIA: Status: ACTIVE | Noted: 2024-11-27

## 2024-11-27 NOTE — ASSESSMENT & PLAN NOTE
Last lipid panel available for review in the chart was from 9/2023. I personally reviewed the results which showed stable cholesterol. Patient remains on Atorvastatin per primary care provider. Recommend he continue yearly lipid panel screening.  -recommend annual lipid panel screening  -statin management per primary service

## 2024-11-27 NOTE — ASSESSMENT & PLAN NOTE
Diagnosed in 2020 and completed radiation therapy.  No cancer therapy since that time.  Patient follows with oncology. Patient's most recent bone scan did not show any metastatic disease.    -recommend ongoing outpatient follow up with oncology

## 2024-11-27 NOTE — PROGRESS NOTES
Name: Rashad Wilkinson      : 1955      MRN: [id number]  Encounter Provider: GENNY Piedra  Encounter Date: 12/3/2024   Encounter department: [facility]  :  Assessment & Plan  HIV disease (HCC)  Diagnosed in  when he was hospitalized for pneumocystis pneumonia. Patient initially on Genvoya which was then switched to Biktarvy 2019 due to DDI's. The patient is tolerating Biktarvy without side effect. Reports 100% adherence with his medications. I personally reviewed his most recent lab work from 2024 which showed stable CD4 = 508 and undetectable viral load. CMP with stable creatinine = 0.91 with GFR = 91. No LFT elevations. I will continue patient on Biktarvy for now. Stressed adherence. Patient is requesting to transition to annual visits for follow up and I can discuss that further with Dr. Martinez on her return to the office.  -continue Biktarvy for ART  -complete CBCD, CMP, CD4, and viral load prior to next outpatient ID follow up  -return to the outpatient ID office for follow up in 6 months  Malignant neoplasm of prostate (HCC)  Diagnosed in  and completed radiation therapy.  No cancer therapy since that time.  Patient follows with oncology. Patient's most recent bone scan did not show any metastatic disease.    -recommend ongoing outpatient follow up with oncology  Hyperlipidemia  Last lipid panel available for review in the chart was from 2023. I personally reviewed the results which showed stable cholesterol. Patient remains on Atorvastatin per primary care provider. Recommend he continue yearly lipid panel screening.  -recommend annual lipid panel screening  -statin management per primary service   Need for vaccination  Patient was due for pneumococcal vaccine at last ID follow up. He would benefit from the Flu shot and COVID-19 booster now. Patient declined.    Above plan was discussed in detail with patient in the exam room. Return to work  note provided at today's visit.     HAART:  Biktarvy    Subjective:  Patient presents for routine outpatient HIV management. Since his last visit he has continued on Biktarvy for ART and has been tolerating the antiretroviral treatment without difficulty. He denies missed doses. Today he has no fever, chills, sweats, shakes; no nausea, vomiting, abdominal pain, diarrhea, or dysuria; no cough, shortness of breath, or chest pain. No new symptoms. He reports he recently got over bronchitis and has been out of work for the past few days. Would like a return to work note for him to go back tomorrow morning.     Objective:  Vitals:  Temp 97  HR 58  /68    Physical Exam:   General Appearance:  Alert, interactive, nontoxic, no acute distress. He appears comfortable sitting in the exam room.   Lungs:   Clear to auscultation bilaterally; no wheezes, rhonchi or rales; respirations unlabored on room air.   Heart:  RRR; no murmur, rub or gallop.   Skin: No new rashes noted on exposed skin.     Labs, Imaging, & Other studies:   All pertinent labs and imaging studies were personally reviewed by me including CBCD, CMP, HIV viral load, and CD4 collected on 11/26/2024.

## 2024-11-27 NOTE — ASSESSMENT & PLAN NOTE
Diagnosed in 2018 when he was hospitalized for pneumocystis pneumonia. Patient initially on Genvoya which was then switched to Biktarvy 5/2019 due to DDI's. The patient is tolerating Biktarvy without side effect. Reports 100% adherence with his medications. I personally reviewed his most recent lab work from 11/26/2024 which showed stable CD4 = 508 and undetectable viral load. CMP with stable creatinine = 0.91 with GFR = 91. No LFT elevations. I will continue patient on Biktarvy for now. Stressed adherence. Patient is requesting to transition to annual visits for follow up and I can discuss that further with Dr. Martinez on her return to the office.  -continue Biktarvy for ART  -complete CBCD, CMP, CD4, and viral load prior to next outpatient ID follow up  -return to the outpatient ID office for follow up in 6 months

## 2024-11-27 NOTE — PATIENT INSTRUCTIONS
Continue daily Biktarvy for ongoing antiretroviral therapy.    Complete lab work (CBCD, CMP, HIV viral load, and CD4) prior to next outpatient infectious disease follow up.    Return to the outpatient infectious disease office for follow up in 6 months.     Patient is cleared to return to work tomorrow, note provided at check out.

## 2024-11-27 NOTE — ASSESSMENT & PLAN NOTE
Patient was due for pneumococcal vaccine at last ID follow up. He would benefit from the Flu shot and COVID-19 booster now. Patient declined.

## 2024-11-29 LAB
ALBUMIN SERPL-MCNC: 4.3 G/DL (ref 3.9–4.9)
ALP SERPL-CCNC: 63 IU/L (ref 44–121)
ALT SERPL-CCNC: 29 IU/L (ref 0–44)
AST SERPL-CCNC: 31 IU/L (ref 0–40)
BASOPHILS # BLD AUTO: 0 X10E3/UL (ref 0–0.2)
BASOPHILS NFR BLD AUTO: 0 %
BILIRUB SERPL-MCNC: 0.5 MG/DL (ref 0–1.2)
BUN SERPL-MCNC: 16 MG/DL (ref 8–27)
BUN/CREAT SERPL: 18 (ref 10–24)
CALCIUM SERPL-MCNC: 9.5 MG/DL (ref 8.6–10.2)
CD3+CD4+ CELLS # BLD: 508 /UL (ref 359–1519)
CD3+CD4+ CELLS NFR BLD: 29.9 % (ref 30.8–58.5)
CHLORIDE SERPL-SCNC: 102 MMOL/L (ref 96–106)
CO2 SERPL-SCNC: 22 MMOL/L (ref 20–29)
CREAT SERPL-MCNC: 0.91 MG/DL (ref 0.76–1.27)
EGFR: 91 ML/MIN/1.73
EOSINOPHIL # BLD AUTO: 0.2 X10E3/UL (ref 0–0.4)
EOSINOPHIL NFR BLD AUTO: 5 %
ERYTHROCYTE [DISTWIDTH] IN BLOOD BY AUTOMATED COUNT: 11.8 % (ref 11.6–15.4)
GLOBULIN SER-MCNC: 2.7 G/DL (ref 1.5–4.5)
GLUCOSE SERPL-MCNC: 90 MG/DL (ref 70–99)
HCT VFR BLD AUTO: 42.1 % (ref 37.5–51)
HGB BLD-MCNC: 14 G/DL (ref 13–17.7)
HIV1 RNA # SERPL NAA+PROBE: <20 COPIES/ML
HIV1 RNA SERPL NAA+PROBE-LOG#: NORMAL LOG10COPY/ML
IMM GRANULOCYTES # BLD: 0 X10E3/UL (ref 0–0.1)
IMM GRANULOCYTES NFR BLD: 0 %
LYMPHOCYTES # BLD AUTO: 1.7 X10E3/UL (ref 0.7–3.1)
LYMPHOCYTES NFR BLD AUTO: 39 %
MCH RBC QN AUTO: 31.3 PG (ref 26.6–33)
MCHC RBC AUTO-ENTMCNC: 33.3 G/DL (ref 31.5–35.7)
MCV RBC AUTO: 94 FL (ref 79–97)
MONOCYTES # BLD AUTO: 0.4 X10E3/UL (ref 0.1–0.9)
MONOCYTES NFR BLD AUTO: 8 %
NEUTROPHILS # BLD AUTO: 2.1 X10E3/UL (ref 1.4–7)
NEUTROPHILS NFR BLD AUTO: 48 %
PLATELET # BLD AUTO: 248 X10E3/UL (ref 150–450)
POTASSIUM SERPL-SCNC: 4.4 MMOL/L (ref 3.5–5.2)
PROT SERPL-MCNC: 7 G/DL (ref 6–8.5)
RBC # BLD AUTO: 4.48 X10E6/UL (ref 4.14–5.8)
SODIUM SERPL-SCNC: 138 MMOL/L (ref 134–144)
WBC # BLD AUTO: 4.5 X10E3/UL (ref 3.4–10.8)

## 2024-12-02 RX ORDER — BICTEGRAVIR SODIUM, EMTRICITABINE, AND TENOFOVIR ALAFENAMIDE FUMARATE 50; 200; 25 MG/1; MG/1; MG/1
1 TABLET ORAL
Qty: 90 TABLET | Refills: 1 | Status: CANCELLED | OUTPATIENT
Start: 2024-12-02 | End: 2025-05-31

## 2024-12-03 ENCOUNTER — OFFICE VISIT (OUTPATIENT)
Dept: INFECTIOUS DISEASES | Facility: CLINIC | Age: 69
End: 2024-12-03
Payer: COMMERCIAL

## 2024-12-03 VITALS
SYSTOLIC BLOOD PRESSURE: 128 MMHG | WEIGHT: 238 LBS | TEMPERATURE: 97 F | OXYGEN SATURATION: 94 % | DIASTOLIC BLOOD PRESSURE: 68 MMHG | HEART RATE: 58 BPM | BODY MASS INDEX: 33.19 KG/M2

## 2024-12-03 DIAGNOSIS — Z23 NEED FOR VACCINATION: ICD-10-CM

## 2024-12-03 DIAGNOSIS — C61 MALIGNANT NEOPLASM OF PROSTATE (HCC): ICD-10-CM

## 2024-12-03 DIAGNOSIS — E78.5 HYPERLIPIDEMIA: ICD-10-CM

## 2024-12-03 DIAGNOSIS — B20 HIV DISEASE (HCC): Primary | ICD-10-CM

## 2024-12-03 PROCEDURE — 99215 OFFICE O/P EST HI 40 MIN: CPT | Performed by: NURSE PRACTITIONER

## 2024-12-03 RX ORDER — LISINOPRIL 10 MG/1
5 TABLET ORAL DAILY PRN
COMMUNITY

## 2024-12-03 NOTE — LETTER
December 3, 2024     Patient: Rashad Wilkinson  YOB: 1955  Date of Visit: 12/3/2024      To Whom it May Concern:    Rashad Wilkinson is under my professional care. Rashad was seen in my office on 12/3/2024. Rashad may return to work on 12/04/2024 .    If you have any questions or concerns, please don't hesitate to call.         Sincerely,          GENNY Piedra        CC: No Recipients

## 2024-12-19 LAB
PSA FREE MFR SERPL: <10 %
PSA FREE SERPL-MCNC: <0.02 NG/ML
PSA SERPL-MCNC: 0.2 NG/ML (ref 0–4)

## 2025-02-07 DIAGNOSIS — I10 HYPERTENSION, UNSPECIFIED TYPE: ICD-10-CM

## 2025-02-07 DIAGNOSIS — I25.10 CORONARY ARTERY CALCIFICATION SEEN ON CT SCAN: ICD-10-CM

## 2025-02-12 RX ORDER — ATORVASTATIN CALCIUM 10 MG/1
10 TABLET, FILM COATED ORAL DAILY
Qty: 90 TABLET | Refills: 3 | Status: SHIPPED | OUTPATIENT
Start: 2025-02-12

## 2025-03-05 LAB
HIV1 RNA # SERPL NAA+PROBE: 30 COPIES/ML
HIV1 RNA SERPL NAA+PROBE-LOG#: 1.48 LOG10COPY/ML
PSA SERPL-MCNC: 0.1 NG/ML (ref 0–4)

## 2025-03-13 DIAGNOSIS — B20 HIV DISEASE (HCC): ICD-10-CM

## 2025-03-14 RX ORDER — BICTEGRAVIR SODIUM, EMTRICITABINE, AND TENOFOVIR ALAFENAMIDE FUMARATE 50; 200; 25 MG/1; MG/1; MG/1
1 TABLET ORAL
Qty: 90 TABLET | Refills: 0 | Status: SHIPPED | OUTPATIENT
Start: 2025-03-14 | End: 2025-06-12

## 2025-04-13 DIAGNOSIS — B20 HIV DISEASE (HCC): ICD-10-CM

## 2025-04-14 ENCOUNTER — TELEPHONE (OUTPATIENT)
Age: 70
End: 2025-04-14

## 2025-04-14 DIAGNOSIS — C61 MALIGNANT NEOPLASM OF PROSTATE (HCC): Primary | ICD-10-CM

## 2025-04-14 RX ORDER — BICTEGRAVIR SODIUM, EMTRICITABINE, AND TENOFOVIR ALAFENAMIDE FUMARATE 50; 200; 25 MG/1; MG/1; MG/1
1 TABLET ORAL
Qty: 90 TABLET | Refills: 0 | Status: SHIPPED | OUTPATIENT
Start: 2025-04-14 | End: 2025-07-13

## 2025-04-25 DIAGNOSIS — R35.1 BENIGN PROSTATIC HYPERPLASIA WITH NOCTURIA: ICD-10-CM

## 2025-04-25 DIAGNOSIS — N40.1 BENIGN PROSTATIC HYPERPLASIA WITH NOCTURIA: ICD-10-CM

## 2025-04-25 RX ORDER — TAMSULOSIN HYDROCHLORIDE 0.4 MG/1
0.4 CAPSULE ORAL 2 TIMES DAILY
Qty: 180 CAPSULE | Refills: 1 | Status: SHIPPED | OUTPATIENT
Start: 2025-04-25

## 2025-04-27 LAB
ALBUMIN SERPL-MCNC: 4.3 G/DL (ref 3.8–4.8)
ALP SERPL-CCNC: 64 IU/L (ref 44–121)
ALT SERPL-CCNC: 35 IU/L (ref 0–44)
AST SERPL-CCNC: 34 IU/L (ref 0–40)
BASOPHILS # BLD AUTO: 0 X10E3/UL (ref 0–0.2)
BASOPHILS NFR BLD AUTO: 1 %
BILIRUB SERPL-MCNC: 0.7 MG/DL (ref 0–1.2)
BUN SERPL-MCNC: 14 MG/DL (ref 8–27)
BUN/CREAT SERPL: 16 (ref 10–24)
CALCIUM SERPL-MCNC: 9.1 MG/DL (ref 8.6–10.2)
CHLORIDE SERPL-SCNC: 104 MMOL/L (ref 96–106)
CO2 SERPL-SCNC: 21 MMOL/L (ref 20–29)
CREAT SERPL-MCNC: 0.85 MG/DL (ref 0.76–1.27)
EGFR: 93 ML/MIN/1.73
EOSINOPHIL # BLD AUTO: 0.2 X10E3/UL (ref 0–0.4)
EOSINOPHIL NFR BLD AUTO: 5 %
ERYTHROCYTE [DISTWIDTH] IN BLOOD BY AUTOMATED COUNT: 11.9 % (ref 11.6–15.4)
GLOBULIN SER-MCNC: 2.5 G/DL (ref 1.5–4.5)
GLUCOSE SERPL-MCNC: 103 MG/DL (ref 70–99)
HCT VFR BLD AUTO: 41.7 % (ref 37.5–51)
HGB BLD-MCNC: 13.8 G/DL (ref 13–17.7)
IMM GRANULOCYTES # BLD: 0 X10E3/UL (ref 0–0.1)
IMM GRANULOCYTES NFR BLD: 0 %
LYMPHOCYTES # BLD AUTO: 1.8 X10E3/UL (ref 0.7–3.1)
LYMPHOCYTES NFR BLD AUTO: 36 %
MCH RBC QN AUTO: 31.1 PG (ref 26.6–33)
MCHC RBC AUTO-ENTMCNC: 33.1 G/DL (ref 31.5–35.7)
MCV RBC AUTO: 94 FL (ref 79–97)
MONOCYTES # BLD AUTO: 0.4 X10E3/UL (ref 0.1–0.9)
MONOCYTES NFR BLD AUTO: 9 %
NEUTROPHILS # BLD AUTO: 2.5 X10E3/UL (ref 1.4–7)
NEUTROPHILS NFR BLD AUTO: 49 %
PLATELET # BLD AUTO: 248 X10E3/UL (ref 150–450)
POTASSIUM SERPL-SCNC: 4.3 MMOL/L (ref 3.5–5.2)
PROT SERPL-MCNC: 6.8 G/DL (ref 6–8.5)
PSA SERPL-MCNC: 0.1 NG/ML (ref 0–4)
RBC # BLD AUTO: 4.44 X10E6/UL (ref 4.14–5.8)
SODIUM SERPL-SCNC: 138 MMOL/L (ref 134–144)
WBC # BLD AUTO: 5 X10E3/UL (ref 3.4–10.8)

## 2025-04-28 ENCOUNTER — OFFICE VISIT (OUTPATIENT)
Dept: PULMONOLOGY | Facility: CLINIC | Age: 70
End: 2025-04-28
Payer: COMMERCIAL

## 2025-04-28 VITALS
TEMPERATURE: 97.8 F | OXYGEN SATURATION: 98 % | BODY MASS INDEX: 33.32 KG/M2 | DIASTOLIC BLOOD PRESSURE: 74 MMHG | SYSTOLIC BLOOD PRESSURE: 132 MMHG | HEIGHT: 71 IN | HEART RATE: 84 BPM | WEIGHT: 238 LBS

## 2025-04-28 DIAGNOSIS — J45.20 MILD INTERMITTENT ASTHMA WITHOUT COMPLICATION: Primary | ICD-10-CM

## 2025-04-28 DIAGNOSIS — G47.33 OSA (OBSTRUCTIVE SLEEP APNEA): ICD-10-CM

## 2025-04-28 DIAGNOSIS — E66.01 OBESITY, MORBID (HCC): ICD-10-CM

## 2025-04-28 DIAGNOSIS — B20 HIV DISEASE (HCC): ICD-10-CM

## 2025-04-28 PROCEDURE — 99213 OFFICE O/P EST LOW 20 MIN: CPT | Performed by: INTERNAL MEDICINE

## 2025-04-28 RX ORDER — FLUTICASONE PROPIONATE AND SALMETEROL 113; 14 UG/1; UG/1
1 POWDER, METERED RESPIRATORY (INHALATION) 2 TIMES DAILY
Qty: 3 EACH | Refills: 3 | Status: SHIPPED | OUTPATIENT
Start: 2025-04-28

## 2025-04-28 RX ORDER — FLUTICASONE PROPIONATE AND SALMETEROL 250; 50 UG/1; UG/1
1 POWDER RESPIRATORY (INHALATION) 2 TIMES DAILY
COMMUNITY
End: 2025-04-28

## 2025-04-28 RX ORDER — ALBUTEROL SULFATE 90 UG/1
2 INHALANT RESPIRATORY (INHALATION) EVERY 6 HOURS PRN
Qty: 18 G | Refills: 3 | Status: SHIPPED | OUTPATIENT
Start: 2025-04-28

## 2025-04-28 NOTE — ASSESSMENT & PLAN NOTE
He has successfully lost weight from 290 lbs to 240 lbs over the past year and a half through calorie restriction and intermittent fasting. He is advised to continue his current regimen of 1800 calories per day and intermittent fasting on Mondays and Wednesdays.

## 2025-04-28 NOTE — PROGRESS NOTES
Name: Rashad Wilkinson      : 1955      MRN: [id number]  Encounter Provider: Zeeshan Trejo MD  Encounter Date: 2025   Encounter department: [facility]  :  Assessment & Plan  Mild intermittent asthma without complication  His asthma symptoms have been well-managed with Advair 250/50 mcg twice daily, although he occasionally skips doses without significant impact. He has not required albuterol since restarting Advair. The potential benefits of switching to Symbicort for as-needed use were discussed, but he prefers to continue with Advair at a lower dose. A prescription for Advair 100/50 mcg will be provided. He is advised to adjust the dosage based on symptom control and to use the minimum effective dose. A refill for the albuterol inhaler will also be sent to [facility] with a 30-day supply and additional refills.    Orders:    fluticasone-salmeterol (AirDuo RespiClick 113/14) 113-14 mcg/act dry powder inhaler; Inhale 1 puff 2 (two) times a day Rinse mouth after use.    albuterol (Ventolin HFA) 90 mcg/act inhaler; Inhale 2 puffs every 6 (six) hours as needed for wheezing    Obesity, morbid (HCC)  He has successfully lost weight from 290 lbs to 240 lbs over the past year and a half through calorie restriction and intermittent fasting. He is advised to continue his current regimen of 1800 calories per day and intermittent fasting on  and .       ZAIRA (obstructive sleep apnea)  His sleep apnea has improved significantly with weight loss, and he no longer snores. He has never used a CPAP machine.       HIV disease (HCC)  His HIV is well-controlled with antiretroviral therapy, and his CD4 counts remain stable between 20-40. He will continue his current antiretroviral regimen.      Nicotine Dependence  - quit in about   - no indication for lung cancer screening          Assessment & Plan  1. Asthma.  2. Weight management.  3. Sleep apnea.  4.  HIV.      Follow-up  The patient will follow up in 1 year.    Rashad Wilkinson is a 70 y.o. male who presents for follow up for asthma.      History of Present Illness  The patient presents for evaluation of asthma.    He reports that his asthma symptoms began to manifest towards the end of 2024 or the beginning of 2025. He experienced a recurrence of chest tightness, which he managed with albuterol and a few remaining Advair Diskus inhalers, despite them being approximately 7 months past their expiration date. He discontinued the use of these medications for a period of 1 year but resumed their use recently, which has brought his symptoms back under control. He reports no new exposures, pets, or lifestyle changes. He acquired 2 dogs a few months ago but does not believe they have exacerbated his condition. He has been using Advair daily, twice a day, occasionally skipping doses without noticing any significant difference. Since starting Advair, he has not required albuterol. He also takes over-the-counter 24-hour antihistamines. He reports no shortness of breath, chest pain, heaviness, fevers, chills, nausea, or vomiting. He has not previously used Symbicort and does not believe he requires daily use of Advair. He is currently on a high dose of Advair (250/50) and is considering reducing the dose. He does not own a nebulizer and is seeking a refill of his albuterol inhaler. He reports no smoking.    He has lost weight intentionally, dropping from 290 to 240 pounds since 01/2024. He has reduced his caloric intake to 1800 calories per day and practices intermittent fasting on Mondays and Wednesdays, consuming between 500 to 800 calories on these days.    His sleep apnea has improved significantly. He has never used a CPAP machine and reports a decrease in snoring.    His HIV is well-managed. He is on antiretroviral therapy and his CD4 counts fluctuate between 20 to 40.    Supplemental Information  He reports no abdominal  pain or leg swelling. He experiences chronic lower back pain and uses a compression band for support. He also reports occasional right knee pain, which he attributes to an old injury.    SOCIAL HISTORY  He reports no smoking.    MEDICATIONS  Current: albuterol, Advair, over-the-counter antihistamines      Inhaler Regimen:  Advair  Albuterol    Remainder of review of systems negative except as described in HPI.    Review of Systems   Constitutional:  Negative for appetite change and fever.   HENT:  Positive for sneezing. Negative for ear pain, postnasal drip, rhinorrhea, sore throat and trouble swallowing.    Cardiovascular:  Negative for chest pain.   Musculoskeletal:  Negative for myalgias.   Neurological:  Negative for headaches.     Answers submitted by the patient for this visit:  Pulmonology Questionnaire (Submitted on 4/26/2025)  Chief Complaint: Primary symptoms  Do you have chest tightness?: Yes  Chronicity: recurrent  When did you first notice your symptoms?: more than 1 month ago  How often do your symptoms occur?: daily  Since you first noticed this problem, how has it changed?: gradually improving  Do you have shortness of breath that occurs with effort or exertion?: No  Do you have ear congestion?: No  Do you have heartburn?: No  Do you have fatigue?: No  Do you have nasal congestion?: Yes  Do you have shortness of breath when lying flat?: No  Do you have shortness of breath when you wake up?: No  Do you have sweats?: No  Have you experienced weight loss?: No  Which of the following makes your symptoms worse?: URI  Which of the following makes your symptoms better?: beta-agonist, steroid inhaler    The following portions of the patient's history were reviewed and updated as appropriate: allergies, current medications, past family history, past medical history, past social history, past surgical history and problem list.     Vitals: Blood pressure 132/74, pulse 84, temperature 97.8 °F (36.6 °C),  "temperature source Tympanic, height 5' 11\" (1.803 m), weight 108 kg (238 lb), SpO2 98%., RA, Body mass index is 33.19 kg/m².  Physical Exam  Vitals and nursing note reviewed.   Constitutional:       General: He is not in acute distress.     Appearance: He is well-developed.   HENT:      Head: Normocephalic and atraumatic.   Eyes:      Conjunctiva/sclera: Conjunctivae normal.   Cardiovascular:      Rate and Rhythm: Normal rate and regular rhythm.      Heart sounds: No murmur heard.  Pulmonary:      Effort: Pulmonary effort is normal. No respiratory distress.      Breath sounds: Normal breath sounds.   Abdominal:      Palpations: Abdomen is soft.      Tenderness: There is no abdominal tenderness.   Musculoskeletal:         General: No swelling.      Cervical back: Neck supple.   Skin:     General: Skin is warm and dry.      Capillary Refill: Capillary refill takes less than 2 seconds.   Neurological:      Mental Status: He is alert.   Psychiatric:         Mood and Affect: Mood normal.        Physical Exam  Lungs are clear. No wheezing, rhonchi or crackles.  Heart sounds normal.    Results  Laboratory Studies  CD4 counts fluctuate between 20 to 40.    Lab Results: I personally reviewed relevant lab results.  Lab Results   Component Value Date    WBC 5.0 04/26/2025    WBC 7.27 08/10/2018    WBC 5.1 02/24/2016    HGB 13.8 04/26/2025    HGB 12.3 08/10/2018    HGB 13.8 02/24/2016     04/26/2025     08/10/2018     02/24/2016     Lab Results   Component Value Date    CREATININE 0.85 04/26/2025    CREATININE 0.94 08/10/2018    CREATININE 0.88 06/09/2017        Imaging and other studies: Results Review Statement: I reviewed radiology reports from this admission including: CT chest.  3/26/2022  Radiology findings:  LUNGS:  No suspicious pulmonary nodules. A 2 mm right apical nodule described on the prior study currently seen on #3/15 is unchanged and therefore benign.  Mild unchanged left upper lobe " scarring.  PLEURA:  Unremarkable.  HEART/GREAT VESSELS: Heart is unremarkable for patient's age. No thoracic aortic aneurysm. Coronary artery calcifications.  MEDIASTINUM AND LIZABETH:  Unremarkable.  CHEST WALL AND LOWER NECK:   Unremarkable.  VISUALIZED STRUCTURES IN THE UPPER ABDOMEN:  Unremarkable.  OSSEOUS STRUCTURES:  No acute fracture or destructive osseous lesion.    Pulmonary Function Testing: Results Review Statement: I reviewed radiology reports from this admission including: PFT.  9/10/2018  Mild obstructive lung disease with borderline bronchodilator response with FEV1    Zeeshan Trejo MD  [facility]     [NI] : Endocrine [Nl] : Hematologic/Lymphatic [Fever Above 102] : no fever [Change in Activity] : no change in activity [Rash] : no rash [Malaise] : no malaise [Murmur] : no murmur [Wheezing] : no wheezing

## 2025-04-28 NOTE — ASSESSMENT & PLAN NOTE
His sleep apnea has improved significantly with weight loss, and he no longer snores. He has never used a CPAP machine.

## 2025-04-28 NOTE — ASSESSMENT & PLAN NOTE
His HIV is well-controlled with antiretroviral therapy, and his CD4 counts remain stable between 20-40. He will continue his current antiretroviral regimen.      Nicotine Dependence  - quit in about 2003  - no indication for lung cancer screening

## 2025-04-28 NOTE — ASSESSMENT & PLAN NOTE
His asthma symptoms have been well-managed with Advair 250/50 mcg twice daily, although he occasionally skips doses without significant impact. He has not required albuterol since restarting Advair. The potential benefits of switching to Symbicort for as-needed use were discussed, but he prefers to continue with Advair at a lower dose. A prescription for Advair 100/50 mcg will be provided. He is advised to adjust the dosage based on symptom control and to use the minimum effective dose. A refill for the albuterol inhaler will also be sent to [facility] with a 30-day supply and additional refills.    Orders:    fluticasone-salmeterol (AirDuo RespiClick 113/14) 113-14 mcg/act dry powder inhaler; Inhale 1 puff 2 (two) times a day Rinse mouth after use.    albuterol (Ventolin HFA) 90 mcg/act inhaler; Inhale 2 puffs every 6 (six) hours as needed for wheezing

## 2025-04-29 ENCOUNTER — OFFICE VISIT (OUTPATIENT)
Dept: HEMATOLOGY ONCOLOGY | Facility: CLINIC | Age: 70
End: 2025-04-29
Payer: COMMERCIAL

## 2025-04-29 VITALS
WEIGHT: 237 LBS | BODY MASS INDEX: 33.18 KG/M2 | DIASTOLIC BLOOD PRESSURE: 80 MMHG | HEART RATE: 79 BPM | TEMPERATURE: 97.9 F | RESPIRATION RATE: 18 BRPM | SYSTOLIC BLOOD PRESSURE: 126 MMHG | OXYGEN SATURATION: 97 % | HEIGHT: 71 IN

## 2025-04-29 DIAGNOSIS — G89.29 CHRONIC MIDLINE LOW BACK PAIN WITHOUT SCIATICA: ICD-10-CM

## 2025-04-29 DIAGNOSIS — Z87.09 HISTORY OF ASTHMA: ICD-10-CM

## 2025-04-29 DIAGNOSIS — C61 MALIGNANT NEOPLASM OF PROSTATE (HCC): Primary | ICD-10-CM

## 2025-04-29 DIAGNOSIS — Z86.79 HISTORY OF HYPERTENSION: ICD-10-CM

## 2025-04-29 DIAGNOSIS — B20 HIV DISEASE (HCC): ICD-10-CM

## 2025-04-29 DIAGNOSIS — M54.50 CHRONIC MIDLINE LOW BACK PAIN WITHOUT SCIATICA: ICD-10-CM

## 2025-04-29 PROCEDURE — 99214 OFFICE O/P EST MOD 30 MIN: CPT | Performed by: INTERNAL MEDICINE

## 2025-04-29 RX ORDER — FLUTICASONE PROPIONATE 50 MCG
SPRAY, SUSPENSION (ML) NASAL
COMMUNITY

## 2025-04-29 NOTE — ASSESSMENT & PLAN NOTE
Diagnosed to have cancer of the prostate.  Shahab score was 3+4=7.  PSA was 2.5.  Patient received radiation treatments 30-35 and he completed them in May 2022.   PSA level was fluctuating, more recently has been 0.1.   However in patient's case PSA levels have not been reliable as even at time of diagnosis highest levels for PSA were 3.1.  Will obtain whole-body bone scan and MRI prostrate in July.  Patient requested to continue PSA monitoring every 8 weeks and still continue same.    Follow-up in office in 6 months, he knows to call office if he develops any symptoms or have any questions prior to neck scheduled visit.    Orders:    NM bone scan whole body; Future    PSA Total, Diagnostic; Standing    MRI prostate multiparametric wo w contrast; Future    CBC and differential; Future    Comprehensive metabolic panel; Future

## 2025-04-29 NOTE — PROGRESS NOTES
Name: Rashad Wilkinson      : 1955      MRN: [id number]  Encounter Provider: Rio Dalal MD  Encounter Date: 2025   Encounter department: [facility] [place]  :  Assessment & Plan  Malignant neoplasm of prostate (HCC)  Diagnosed to have cancer of the prostate.  Shahab score was 3+4=7.  PSA was 2.5.  Patient received radiation treatments 30-35 and he completed them in May 2022.   PSA level was fluctuating, more recently has been 0.1.   However in patient's case PSA levels have not been reliable as even at time of diagnosis highest levels for PSA were 3.1.  Will obtain whole-body bone scan and MRI prostrate in July.  Patient requested to continue PSA monitoring every 8 weeks and still continue same.    Follow-up in office in 6 months, he knows to call office if he develops any symptoms or have any questions prior to neck scheduled visit.    Orders:    NM bone scan whole body; Future    PSA Total, Diagnostic; Standing    MRI prostate multiparametric wo w contrast; Future    CBC and differential; Future    Comprehensive metabolic panel; Future    HIV disease (HCC)  Follows with infectious disease, on Biktarvy.       History of hypertension  On lisinopril, being managed by PCP.       Chronic midline low back pain without sciatica  Wears brace for back to help with symptoms.        History of asthma  Follows with Pulmonology. Not in exacerbation.            Return in about 6 months (around 10/29/2025) for Office Visit, Labs - See Treatment Plan, Imaging - See orders.    History of Present Illness   Chief Complaint   Patient presents with    Follow-up   Rashad Wilkinson is a 70 y.o. year old male.  In  patient was diagnosed to have cancer of the prostate.  Shahab score was 3+4=7.  PSA was 2.5.  Patient received radiation treatments 30-35 and he completed them in May 2022.  No therapy for cancer since then.  Patient follows with his urologist in [place].  He states his PSA  "came down to 0.8 and then 0.2 and has increased to 0.4 and is staying at 0.4.  He states his urine flow is low.  He has been on Flomax.  History of hypertension, high cholesterol, asthma and HIV.  He is on medications for these conditions.  He follows with his primary physician, urologist and other consultants.  Patient has quit smoking cigarettes.  He drinks beer but not daily.    Pertinent Medical History     25: Patient is seen in office for continued T of care and follow-up for prostate cancer.  He denies any active symptoms but showed concerns as his PSA was never reliable marker even at the time of diagnosis as highest value was 3.1.     Review of Systems   Constitutional:  Negative for appetite change and unexpected weight change.   Respiratory:  Negative for cough and chest tightness.    Cardiovascular:  Negative for chest pain and palpitations.   Gastrointestinal:  Negative for abdominal pain, constipation and diarrhea.   Genitourinary:  Negative for frequency and hematuria.   Musculoskeletal:  Negative for arthralgias.   Neurological:  Negative for dizziness and headaches.   All other systems reviewed and are negative.    Social History     Tobacco Use    Smoking status: Former     Current packs/day: 0.00     Average packs/day: 1 pack/day for 25.1 years (25.1 ttl pk-yrs)     Types: Cigarettes     Start date: 1968     Quit date: 1993     Years since quittin.2     Passive exposure: Past    Smokeless tobacco: Never    Tobacco comments:     on and off during period of use   Vaping Use    Vaping status: Never Used   Substance and Sexual Activity    Alcohol use: Yes     Comment: socially / 1-4 drinks/week    Drug use: No    Sexual activity: Not Currently     Partners: Female         Objective   /80 (BP Location: Left arm, Patient Position: Sitting, Cuff Size: Large)   Pulse 79   Temp 97.9 °F (36.6 °C) (Temporal)   Resp 18   Ht 5' 11\" (1.803 m)   Wt 108 kg (237 lb)   SpO2 97%   BMI " 33.05 kg/m²     Pain Screening:  Pain Score: 0-No pain  ECOG   0  Physical Exam  Constitutional:       Appearance: Normal appearance.   Eyes:      Conjunctiva/sclera: Conjunctivae normal.      Pupils: Pupils are equal, round, and reactive to light.   Cardiovascular:      Rate and Rhythm: Normal rate and regular rhythm.      Pulses: Normal pulses.      Heart sounds: Normal heart sounds.   Pulmonary:      Effort: Pulmonary effort is normal.      Breath sounds: Normal breath sounds.   Abdominal:      General: Bowel sounds are normal.      Palpations: Abdomen is soft.   Skin:     General: Skin is warm and dry.   Neurological:      Mental Status: He is alert and oriented to person, place, and time. Mental status is at baseline.       Labs: I have reviewed the following labs:  Lab Results   Component Value Date/Time    White Blood Cell Count 5.0 04/26/2025 07:39 AM    Red Blood Cell Count 4.44 04/26/2025 07:39 AM    Hemoglobin 13.8 04/26/2025 07:39 AM    HCT 41.7 04/26/2025 07:39 AM    MCV 94 04/26/2025 07:39 AM    MCH 31.1 04/26/2025 07:39 AM    RDW 11.9 04/26/2025 07:39 AM    Platelet Count 248 04/26/2025 07:39 AM    Neutrophils 49 04/26/2025 07:39 AM    Lymphocytes 36 04/26/2025 07:39 AM    Monocytes 9 04/26/2025 07:39 AM    Eosinophils 5 04/26/2025 07:39 AM    Neutrophils (Absolute) 2.5 04/26/2025 07:39 AM     Lab Results   Component Value Date/Time    Potassium 4.3 04/26/2025 07:39 AM    Chloride 104 04/26/2025 07:39 AM    CO2 21 04/26/2025 07:39 AM    BUN 14 04/26/2025 07:39 AM    Creatinine 0.85 04/26/2025 07:39 AM    AST 34 04/26/2025 07:39 AM    ALT 35 04/26/2025 07:39 AM    Protein, Total 6.8 04/26/2025 07:39 AM    Albumin 4.3 04/26/2025 07:39 AM    TOTAL BILIRUBIN 0.7 04/26/2025 07:39 AM    eGFR 93 04/26/2025 07:39 AM     PSA Total, Diagnostic            Component  Ref Range & Units (hover) 4/26/25  7:40 AM 3/4/25  1:17 PM 12/18/24  9:39 AM 10/25/24 11:39 AM 9/11/24  9:49 AM 6/21/24  6:45 AM 3/12/24  9:37 AM    Prostate Specific Antigen Total 0.1 0.1 CM 0.2 CM 0.1 CM 0.2 CM 0.2 CM 0.1 CM          NM bone scan 7/26/24:  IMPRESSION:     No focal tracer activity characteristic of osseous metastatic disease.     Note is made of nonspecific focal tracer activity at the sternomanubrial junction in a location most consistent with reactive/degenerative change with osseous metastatic disease considered less likely.

## 2025-05-05 ENCOUNTER — TELEPHONE (OUTPATIENT)
Age: 70
End: 2025-05-05

## 2025-05-30 LAB
ALBUMIN SERPL-MCNC: 4.5 G/DL (ref 3.8–4.8)
ALP SERPL-CCNC: 69 IU/L (ref 44–121)
ALT SERPL-CCNC: 33 IU/L (ref 0–44)
AST SERPL-CCNC: 36 IU/L (ref 0–40)
BASOPHILS # BLD AUTO: 0 X10E3/UL (ref 0–0.2)
BASOPHILS NFR BLD AUTO: 0 %
BILIRUB SERPL-MCNC: 0.8 MG/DL (ref 0–1.2)
BUN SERPL-MCNC: 13 MG/DL (ref 8–27)
BUN/CREAT SERPL: 15 (ref 10–24)
CALCIUM SERPL-MCNC: 9.5 MG/DL (ref 8.6–10.2)
CD3+CD4+ CELLS # BLD: 629 /UL (ref 359–1519)
CD3+CD4+ CELLS NFR BLD: 26.2 % (ref 30.8–58.5)
CHLORIDE SERPL-SCNC: 100 MMOL/L (ref 96–106)
CO2 SERPL-SCNC: 21 MMOL/L (ref 20–29)
CREAT SERPL-MCNC: 0.85 MG/DL (ref 0.76–1.27)
EGFR: 93 ML/MIN/1.73
EOSINOPHIL # BLD AUTO: 0.3 X10E3/UL (ref 0–0.4)
EOSINOPHIL NFR BLD AUTO: 5 %
ERYTHROCYTE [DISTWIDTH] IN BLOOD BY AUTOMATED COUNT: 11.9 % (ref 11.6–15.4)
GLOBULIN SER-MCNC: 2.6 G/DL (ref 1.5–4.5)
GLUCOSE SERPL-MCNC: 90 MG/DL (ref 70–99)
HCT VFR BLD AUTO: 43.6 % (ref 37.5–51)
HGB BLD-MCNC: 14 G/DL (ref 13–17.7)
HIV1 RNA # SERPL NAA+PROBE: <20 COPIES/ML
HIV1 RNA SERPL NAA+PROBE-LOG#: NORMAL LOG10COPY/ML
IMM GRANULOCYTES # BLD: 0 X10E3/UL (ref 0–0.1)
IMM GRANULOCYTES NFR BLD: 0 %
LYMPHOCYTES # BLD AUTO: 2.4 X10E3/UL (ref 0.7–3.1)
LYMPHOCYTES NFR BLD AUTO: 37 %
MCH RBC QN AUTO: 30.6 PG (ref 26.6–33)
MCHC RBC AUTO-ENTMCNC: 32.1 G/DL (ref 31.5–35.7)
MCV RBC AUTO: 95 FL (ref 79–97)
MONOCYTES # BLD AUTO: 0.5 X10E3/UL (ref 0.1–0.9)
MONOCYTES NFR BLD AUTO: 8 %
NEUTROPHILS # BLD AUTO: 3.1 X10E3/UL (ref 1.4–7)
NEUTROPHILS NFR BLD AUTO: 50 %
PLATELET # BLD AUTO: 268 X10E3/UL (ref 150–450)
POTASSIUM SERPL-SCNC: 4.5 MMOL/L (ref 3.5–5.2)
PROT SERPL-MCNC: 7.1 G/DL (ref 6–8.5)
RBC # BLD AUTO: 4.58 X10E6/UL (ref 4.14–5.8)
SODIUM SERPL-SCNC: 139 MMOL/L (ref 134–144)
WBC # BLD AUTO: 6.3 X10E3/UL (ref 3.4–10.8)

## 2025-06-03 ENCOUNTER — OFFICE VISIT (OUTPATIENT)
Dept: INFECTIOUS DISEASES | Facility: CLINIC | Age: 70
End: 2025-06-03
Payer: COMMERCIAL

## 2025-06-03 VITALS
TEMPERATURE: 98 F | WEIGHT: 242.8 LBS | BODY MASS INDEX: 33.86 KG/M2 | OXYGEN SATURATION: 97 % | DIASTOLIC BLOOD PRESSURE: 78 MMHG | SYSTOLIC BLOOD PRESSURE: 122 MMHG | HEART RATE: 81 BPM

## 2025-06-03 DIAGNOSIS — C61 MALIGNANT NEOPLASM OF PROSTATE (HCC): ICD-10-CM

## 2025-06-03 DIAGNOSIS — E78.5 HYPERLIPEMIA: ICD-10-CM

## 2025-06-03 DIAGNOSIS — B20 HIV DISEASE (HCC): Primary | ICD-10-CM

## 2025-06-03 PROCEDURE — 99214 OFFICE O/P EST MOD 30 MIN: CPT | Performed by: STUDENT IN AN ORGANIZED HEALTH CARE EDUCATION/TRAINING PROGRAM

## 2025-06-03 RX ORDER — BICTEGRAVIR SODIUM, EMTRICITABINE, AND TENOFOVIR ALAFENAMIDE FUMARATE 50; 200; 25 MG/1; MG/1; MG/1
1 TABLET ORAL
Qty: 90 TABLET | Refills: 1 | Status: SHIPPED | OUTPATIENT
Start: 2025-07-01 | End: 2025-12-28

## 2025-06-03 NOTE — PROGRESS NOTES
Name: Rashad Wilkinson      : 1955      MRN: [id number]  Encounter Provider: Shilpi Martinez MD  Encounter Date: 6/3/2025   Encounter department: [facility]  :  Assessment & Plan  HIV disease (HCC)  HIV.  Diagnosed in  when he was hospitalized for pneumocystis pneumonia.  Patient initially on Genvoya which was then switched to Biktarvy 2019 due to DDI's.  The patient is tolerating Biktarvy without side effect.  Reports 100% adherence with his medications.  Most recent viral load undetectable and CD4 629              -Continue Biktarvy 1 tab daily              -Patient aware to separate Biktarvy from multivitamins for at least 4 hours              -Patient was provided medication, adherence and prevention education              -Patient would like to get HIV VL q3 months. Otherwise check labs in 8 months to evaluate for virologic control, drug toxicity, coinfection              -ID follow-up in 9 months   -advised to get PCV20 vaccine; he does not want to get this in the office because he states he is charged for vaccines  Orders:    bictegravir-emtricitab-tenofovir alafenamide (Biktarvy) -25 MG tablet; Take 1 tablet by mouth daily with breakfast Do not start before 2025.    Hyperlipemia  Continue statin therapy per primary  Orders:    Lipid panel; Future    Malignant neoplasm of prostate (HCC)  Diagnosed in  and completed radiation therapy.  No cancer therapy since that time.  Patient follows with oncology.  Scheduled for MRI and bone scan      History of Present Illness   Routine follow-up for HIV.  Patient claims 100% adherence with Biktarvy. Patient denies any notable side effects.  Overall he is feeling well.  The patient denies any fever chills or sweats, denies any nausea vomiting or diarrhea, denies any cough or shortness of breath. Reports some weight loss which is intentional with diet.    ROS: A complete review of systems are negative other than  "that noted in the HPI            Objective   /78   Pulse 81   Temp 98 °F (36.7 °C)   Wt 110 kg (242 lb 12.8 oz)   SpO2 97%   BMI 33.86 kg/m²     General: Alert, interactive, appearing well, nontoxic, no acute distress.  Lungs: Clear to auscultation bilaterally; no wheezes, rhonchi or rales; respirations unlabored  Heart: RRR; no murmur, rub or gallop  Abdomen: Soft, non-tender, non-distended  Extremities: No clubbing, cyanosis or edema  Skin: No new rashes or lesions. No draining wounds noted.    Lab Results: I have personally reviewed pertinent labs.  Lab Results   Component Value Date     06/09/2017    K 4.5 05/29/2025     05/29/2025    CO2 21 05/29/2025    BUN 13 05/29/2025    CREATININE 0.85 05/29/2025    GLUCOSE 100 (H) 06/09/2017    CALCIUM 8.9 08/10/2018    AST 36 05/29/2025    ALT 33 05/29/2025    ALKPHOS 57 08/10/2018    PROT 7.9 06/09/2017    BILITOT 0.3 06/09/2017    EGFR 93 05/29/2025     Lab Results   Component Value Date    WBC 6.3 05/29/2025    HGB 14.0 05/29/2025    HCT 43.6 05/29/2025    MCV 95 05/29/2025     05/29/2025     Lab Results   Component Value Date    HEPCAB Non Reactive 05/15/2024     Lab Results   Component Value Date    HAV Positive (A) 09/11/2024    HEPAIGM Negative 05/03/2019    HEPBIGM Negative 05/03/2019    HEPCAB Non Reactive 05/15/2024     Lab Results   Component Value Date    RPR Non Reactive 07/19/2018     CD4 ABS   Date/Time Value Ref Range Status   05/29/2025 10:19  359 - 1,519 /uL Final     HIV-1 RNA by PCR, Qn   Date/Time Value Ref Range Status   05/29/2025 10:19 AM <20 copies/mL Final     Comment:     HIV-1 RNA detected  The reportable range for this assay is 20 to 10,000,000  copies HIV-1 RNA/mL.       No results found for: \"[id number]\"      "

## 2025-06-03 NOTE — PATIENT INSTRUCTIONS
-continue Biktarvy  -recommend the PCV20 pneumococcal vaccine  -follow up 9 months with labs prior

## 2025-06-03 NOTE — ASSESSMENT & PLAN NOTE
HIV.  Diagnosed in 2018 when he was hospitalized for pneumocystis pneumonia.  Patient initially on Genvoya which was then switched to Biktarvy 5/2019 due to DDI's.  The patient is tolerating Biktarvy without side effect.  Reports 100% adherence with his medications.  Most recent viral load undetectable and CD4 629              -Continue Biktarvy 1 tab daily              -Patient aware to separate Biktarvy from multivitamins for at least 4 hours              -Patient was provided medication, adherence and prevention education              -Patient would like to get HIV VL q3 months. Otherwise check labs in 8 months to evaluate for virologic control, drug toxicity, coinfection              -ID follow-up in 9 months   -advised to get PCV20 vaccine; he does not want to get this in the office because he states he is charged for vaccines  Orders:    bictegravir-emtricitab-tenofovir alafenamide (Biktarvy) -25 MG tablet; Take 1 tablet by mouth daily with breakfast Do not start before July 1, 2025.

## 2025-07-24 ENCOUNTER — PATIENT MESSAGE (OUTPATIENT)
Dept: PULMONOLOGY | Facility: CLINIC | Age: 70
End: 2025-07-24

## 2025-07-28 DIAGNOSIS — J45.20 MILD INTERMITTENT ASTHMA WITHOUT COMPLICATION: ICD-10-CM

## 2025-07-28 RX ORDER — FLUTICASONE PROPIONATE AND SALMETEROL 113; 14 UG/1; UG/1
1 POWDER, METERED RESPIRATORY (INHALATION) 2 TIMES DAILY
Qty: 3 EACH | Refills: 3 | Status: SHIPPED | OUTPATIENT
Start: 2025-07-28 | End: 2025-08-01

## 2025-07-29 ENCOUNTER — HOSPITAL ENCOUNTER (OUTPATIENT)
Dept: RADIOLOGY | Age: 70
Discharge: HOME | End: 2025-07-29
Attending: INTERNAL MEDICINE
Payer: COMMERCIAL

## 2025-07-29 DIAGNOSIS — C61 MALIGNANT NEOPLASM OF PROSTATE (HCC): ICD-10-CM

## 2025-07-29 PROCEDURE — 76377 3D RENDER W/INTRP POSTPROCES: CPT

## 2025-07-29 PROCEDURE — A9585 GADOBUTROL INJECTION: HCPCS | Performed by: INTERNAL MEDICINE

## 2025-07-29 PROCEDURE — 72197 MRI PELVIS W/O & W/DYE: CPT

## 2025-07-29 RX ORDER — GADOBUTROL 604.72 MG/ML
11 INJECTION INTRAVENOUS
Status: COMPLETED | OUTPATIENT
Start: 2025-07-29 | End: 2025-07-29

## 2025-07-29 RX ADMIN — GADOBUTROL 11 ML: 604.72 INJECTION INTRAVENOUS at 08:58

## 2025-07-30 ENCOUNTER — OFFICE VISIT (OUTPATIENT)
Dept: FAMILY MEDICINE CLINIC | Facility: HOSPITAL | Age: 70
End: 2025-07-30
Payer: COMMERCIAL

## 2025-07-30 VITALS
SYSTOLIC BLOOD PRESSURE: 122 MMHG | HEIGHT: 71 IN | DIASTOLIC BLOOD PRESSURE: 70 MMHG | BODY MASS INDEX: 32.37 KG/M2 | HEART RATE: 65 BPM | WEIGHT: 231.2 LBS | OXYGEN SATURATION: 97 %

## 2025-07-30 DIAGNOSIS — G25.0 BENIGN ESSENTIAL TREMOR: ICD-10-CM

## 2025-07-30 DIAGNOSIS — S39.011A STRAIN OF ABDOMINAL MUSCLE, INITIAL ENCOUNTER: Primary | ICD-10-CM

## 2025-07-30 PROCEDURE — 99213 OFFICE O/P EST LOW 20 MIN: CPT | Performed by: NURSE PRACTITIONER

## 2025-07-31 ENCOUNTER — HOSPITAL ENCOUNTER (OUTPATIENT)
Dept: NUCLEAR MEDICINE | Facility: HOSPITAL | Age: 70
Discharge: HOME | End: 2025-07-31
Attending: INTERNAL MEDICINE
Payer: COMMERCIAL

## 2025-07-31 DIAGNOSIS — C61 MALIGNANT NEOPLASM OF PROSTATE (HCC): ICD-10-CM

## 2025-07-31 PROCEDURE — 78306 BONE IMAGING WHOLE BODY: CPT

## 2025-07-31 PROCEDURE — A9503 TC99M MEDRONATE: HCPCS

## 2025-08-01 ENCOUNTER — TELEPHONE (OUTPATIENT)
Age: 70
End: 2025-08-01

## 2025-08-01 ENCOUNTER — TELEPHONE (OUTPATIENT)
Dept: HEMATOLOGY ONCOLOGY | Facility: CLINIC | Age: 70
End: 2025-08-01

## 2025-08-01 ENCOUNTER — PATIENT MESSAGE (OUTPATIENT)
Dept: PULMONOLOGY | Facility: CLINIC | Age: 70
End: 2025-08-01

## 2025-08-01 DIAGNOSIS — Z87.09 HISTORY OF ASTHMA: Primary | ICD-10-CM

## 2025-08-01 RX ORDER — FLUTICASONE PROPIONATE AND SALMETEROL XINAFOATE 115; 21 UG/1; UG/1
2 AEROSOL, METERED RESPIRATORY (INHALATION) 2 TIMES DAILY
Qty: 12 G | Refills: 5 | Status: SHIPPED | OUTPATIENT
Start: 2025-08-01

## 2025-08-04 ENCOUNTER — TELEPHONE (OUTPATIENT)
Dept: HEMATOLOGY ONCOLOGY | Facility: CLINIC | Age: 70
End: 2025-08-04

## 2025-08-04 DIAGNOSIS — C79.51 METASTATIC BONE TUMOR (HCC): ICD-10-CM

## 2025-08-04 DIAGNOSIS — C61 MALIGNANT NEOPLASM OF PROSTATE (HCC): Primary | ICD-10-CM

## 2025-08-04 DIAGNOSIS — R97.20 RISING PSA LEVEL: ICD-10-CM

## 2025-08-15 ENCOUNTER — HOSPITAL ENCOUNTER (OUTPATIENT)
Dept: NUCLEAR MEDICINE | Facility: HOSPITAL | Age: 70
End: 2025-08-15
Attending: INTERNAL MEDICINE
Payer: COMMERCIAL